# Patient Record
Sex: FEMALE | Race: WHITE | Employment: OTHER | URBAN - METROPOLITAN AREA
[De-identification: names, ages, dates, MRNs, and addresses within clinical notes are randomized per-mention and may not be internally consistent; named-entity substitution may affect disease eponyms.]

---

## 2017-01-01 ENCOUNTER — OFFICE VISIT (OUTPATIENT)
Dept: ONCOLOGY | Age: 67
End: 2017-01-01

## 2017-01-01 ENCOUNTER — HOSPITAL ENCOUNTER (OUTPATIENT)
Dept: LAB | Age: 67
Discharge: HOME | End: 2017-12-26
Payer: MEDICARE

## 2017-01-01 ENCOUNTER — TELEPHONE (OUTPATIENT)
Dept: INTERNAL MEDICINE CLINIC | Age: 67
End: 2017-01-01

## 2017-01-01 ENCOUNTER — HOSPITAL ENCOUNTER (OUTPATIENT)
Dept: LAB | Age: 67
Discharge: HOME | End: 2017-10-09
Payer: MEDICARE

## 2017-01-01 ENCOUNTER — DOCUMENTATION ONLY (OUTPATIENT)
Dept: INTERNAL MEDICINE CLINIC | Age: 67
End: 2017-01-01

## 2017-01-01 ENCOUNTER — HOSPITAL ENCOUNTER (OUTPATIENT)
Age: 67
Setting detail: OUTPATIENT SURGERY
Discharge: HOME | End: 2017-08-16
Attending: SURGERY | Admitting: SURGERY
Payer: MEDICARE

## 2017-01-01 ENCOUNTER — TELEPHONE (OUTPATIENT)
Dept: ONCOLOGY | Age: 67
End: 2017-01-01

## 2017-01-01 ENCOUNTER — HOSPITAL ENCOUNTER (OUTPATIENT)
Dept: NON INVASIVE DIAGNOSTICS | Age: 67
Discharge: HOME | End: 2017-08-11
Payer: MEDICARE

## 2017-01-01 ENCOUNTER — HOSPITAL ENCOUNTER (OUTPATIENT)
Dept: CT IMAGING | Age: 67
Discharge: HOME | End: 2017-11-28
Attending: INTERNAL MEDICINE
Payer: MEDICARE

## 2017-01-01 ENCOUNTER — DOCUMENTATION ONLY (OUTPATIENT)
Dept: ONCOLOGY | Age: 67
End: 2017-01-01

## 2017-01-01 ENCOUNTER — HOSPITAL ENCOUNTER (OUTPATIENT)
Dept: MRI IMAGING | Age: 67
Discharge: HOME | End: 2017-08-25
Attending: INTERNAL MEDICINE
Payer: MEDICARE

## 2017-01-01 ENCOUNTER — HOSPITAL ENCOUNTER (OUTPATIENT)
Dept: LAB | Age: 67
Discharge: HOME | End: 2017-10-30
Payer: MEDICARE

## 2017-01-01 ENCOUNTER — TELEPHONE (OUTPATIENT)
Dept: SURGERY | Age: 67
End: 2017-01-01

## 2017-01-01 ENCOUNTER — ANESTHESIA (OUTPATIENT)
Dept: SURGERY | Age: 67
End: 2017-01-01
Payer: MEDICARE

## 2017-01-01 ENCOUNTER — HOSPITAL ENCOUNTER (OUTPATIENT)
Dept: LAB | Age: 67
Discharge: HOME | End: 2017-08-23
Payer: MEDICARE

## 2017-01-01 ENCOUNTER — HOSPITAL ENCOUNTER (INPATIENT)
Age: 67
LOS: 10 days | Discharge: NURSING, REHAB OR LONG-TERM CARE | DRG: 871 | End: 2017-09-10
Attending: EMERGENCY MEDICINE | Admitting: FAMILY MEDICINE
Payer: MEDICARE

## 2017-01-01 ENCOUNTER — HOSPITAL ENCOUNTER (OUTPATIENT)
Dept: LAB | Age: 67
Discharge: HOME | End: 2017-11-20
Payer: MEDICARE

## 2017-01-01 ENCOUNTER — HOSPITAL ENCOUNTER (OUTPATIENT)
Dept: CT IMAGING | Age: 67
Discharge: HOME | End: 2017-08-01
Attending: NURSE PRACTITIONER
Payer: MEDICARE

## 2017-01-01 ENCOUNTER — HOSPITAL ENCOUNTER (OUTPATIENT)
Dept: ULTRASOUND IMAGING | Age: 67
Discharge: HOME | End: 2017-08-01
Attending: NURSE PRACTITIONER
Payer: MEDICARE

## 2017-01-01 ENCOUNTER — OFFICE VISIT (OUTPATIENT)
Dept: INTERNAL MEDICINE CLINIC | Age: 67
End: 2017-01-01

## 2017-01-01 ENCOUNTER — HOSPITAL ENCOUNTER (OUTPATIENT)
Dept: LAB | Age: 67
Discharge: HOME | End: 2017-11-27
Payer: MEDICARE

## 2017-01-01 ENCOUNTER — HOSPITAL ENCOUNTER (OUTPATIENT)
Age: 67
Discharge: HOME | End: 2017-09-23
Attending: PHYSICAL MEDICINE & REHABILITATION | Admitting: PHYSICAL MEDICINE & REHABILITATION

## 2017-01-01 ENCOUNTER — OFFICE VISIT (OUTPATIENT)
Dept: SURGERY | Age: 67
End: 2017-01-01

## 2017-01-01 ENCOUNTER — HOSPITAL ENCOUNTER (OUTPATIENT)
Dept: CT IMAGING | Age: 67
Discharge: HOME | End: 2017-08-22
Attending: SURGERY
Payer: MEDICARE

## 2017-01-01 ENCOUNTER — HOSPITAL ENCOUNTER (OUTPATIENT)
Dept: MRI IMAGING | Age: 67
Discharge: HOME | End: 2017-12-02
Attending: INTERNAL MEDICINE
Payer: MEDICARE

## 2017-01-01 ENCOUNTER — HOSPITAL ENCOUNTER (OUTPATIENT)
Dept: LAB | Age: 67
Discharge: HOME | End: 2017-11-06
Payer: MEDICARE

## 2017-01-01 ENCOUNTER — APPOINTMENT (OUTPATIENT)
Dept: GENERAL RADIOLOGY | Age: 67
DRG: 871 | End: 2017-01-01
Attending: FAMILY MEDICINE
Payer: MEDICARE

## 2017-01-01 ENCOUNTER — HOSPITAL ENCOUNTER (OUTPATIENT)
Dept: NON INVASIVE DIAGNOSTICS | Age: 67
Discharge: HOME | End: 2017-10-09
Payer: MEDICARE

## 2017-01-01 ENCOUNTER — APPOINTMENT (OUTPATIENT)
Dept: MRI IMAGING | Age: 67
DRG: 871 | End: 2017-01-01
Attending: HOSPITALIST
Payer: MEDICARE

## 2017-01-01 ENCOUNTER — HOSPITAL ENCOUNTER (OUTPATIENT)
Dept: MAMMOGRAPHY | Age: 67
Discharge: HOME | End: 2017-03-17
Attending: INTERNAL MEDICINE
Payer: MEDICARE

## 2017-01-01 ENCOUNTER — HOSPITAL ENCOUNTER (OUTPATIENT)
Dept: LAB | Age: 67
Discharge: HOME | End: 2017-11-13
Payer: MEDICARE

## 2017-01-01 ENCOUNTER — ANESTHESIA EVENT (OUTPATIENT)
Dept: SURGERY | Age: 67
End: 2017-01-01
Payer: MEDICARE

## 2017-01-01 ENCOUNTER — APPOINTMENT (OUTPATIENT)
Dept: ULTRASOUND IMAGING | Age: 67
DRG: 871 | End: 2017-01-01
Attending: FAMILY MEDICINE
Payer: MEDICARE

## 2017-01-01 VITALS
HEIGHT: 60 IN | OXYGEN SATURATION: 97 % | WEIGHT: 97 LBS | RESPIRATION RATE: 20 BRPM | BODY MASS INDEX: 19.04 KG/M2 | HEART RATE: 69 BPM | SYSTOLIC BLOOD PRESSURE: 121 MMHG | DIASTOLIC BLOOD PRESSURE: 79 MMHG | TEMPERATURE: 98.5 F

## 2017-01-01 VITALS
HEART RATE: 73 BPM | DIASTOLIC BLOOD PRESSURE: 69 MMHG | SYSTOLIC BLOOD PRESSURE: 131 MMHG | WEIGHT: 132.3 LBS | RESPIRATION RATE: 16 BRPM | HEIGHT: 60 IN | OXYGEN SATURATION: 94 % | TEMPERATURE: 97.5 F | BODY MASS INDEX: 25.97 KG/M2

## 2017-01-01 VITALS
DIASTOLIC BLOOD PRESSURE: 43 MMHG | SYSTOLIC BLOOD PRESSURE: 132 MMHG | RESPIRATION RATE: 20 BRPM | HEART RATE: 86 BPM | WEIGHT: 111.55 LBS | OXYGEN SATURATION: 94 % | BODY MASS INDEX: 21.9 KG/M2 | HEIGHT: 60 IN | TEMPERATURE: 97.9 F

## 2017-01-01 VITALS
HEIGHT: 60 IN | TEMPERATURE: 98.9 F | DIASTOLIC BLOOD PRESSURE: 68 MMHG | OXYGEN SATURATION: 97 % | RESPIRATION RATE: 16 BRPM | SYSTOLIC BLOOD PRESSURE: 116 MMHG | HEART RATE: 75 BPM

## 2017-01-01 VITALS
HEIGHT: 61 IN | OXYGEN SATURATION: 99 % | DIASTOLIC BLOOD PRESSURE: 76 MMHG | BODY MASS INDEX: 20.69 KG/M2 | SYSTOLIC BLOOD PRESSURE: 112 MMHG | TEMPERATURE: 97.1 F | RESPIRATION RATE: 16 BRPM | HEART RATE: 88 BPM | WEIGHT: 109.6 LBS

## 2017-01-01 VITALS
BODY MASS INDEX: 22.58 KG/M2 | SYSTOLIC BLOOD PRESSURE: 131 MMHG | HEIGHT: 60 IN | HEART RATE: 79 BPM | OXYGEN SATURATION: 100 % | DIASTOLIC BLOOD PRESSURE: 72 MMHG | WEIGHT: 115 LBS

## 2017-01-01 VITALS
SYSTOLIC BLOOD PRESSURE: 130 MMHG | RESPIRATION RATE: 18 BRPM | HEART RATE: 76 BPM | HEIGHT: 60 IN | TEMPERATURE: 97.8 F | BODY MASS INDEX: 22.54 KG/M2 | DIASTOLIC BLOOD PRESSURE: 77 MMHG | OXYGEN SATURATION: 100 % | WEIGHT: 114.8 LBS

## 2017-01-01 VITALS
HEART RATE: 98 BPM | HEIGHT: 60 IN | WEIGHT: 117.8 LBS | DIASTOLIC BLOOD PRESSURE: 82 MMHG | SYSTOLIC BLOOD PRESSURE: 158 MMHG | OXYGEN SATURATION: 100 % | BODY MASS INDEX: 23.13 KG/M2

## 2017-01-01 VITALS
DIASTOLIC BLOOD PRESSURE: 78 MMHG | HEART RATE: 94 BPM | OXYGEN SATURATION: 95 % | BODY MASS INDEX: 23.95 KG/M2 | WEIGHT: 122 LBS | HEIGHT: 60 IN | SYSTOLIC BLOOD PRESSURE: 166 MMHG | TEMPERATURE: 99 F

## 2017-01-01 VITALS
DIASTOLIC BLOOD PRESSURE: 61 MMHG | WEIGHT: 125 LBS | HEIGHT: 60 IN | HEART RATE: 62 BPM | BODY MASS INDEX: 24.54 KG/M2 | SYSTOLIC BLOOD PRESSURE: 123 MMHG

## 2017-01-01 VITALS
DIASTOLIC BLOOD PRESSURE: 79 MMHG | WEIGHT: 103 LBS | BODY MASS INDEX: 19.45 KG/M2 | HEART RATE: 84 BPM | SYSTOLIC BLOOD PRESSURE: 122 MMHG | HEIGHT: 61 IN

## 2017-01-01 VITALS
BODY MASS INDEX: 19.14 KG/M2 | OXYGEN SATURATION: 96 % | SYSTOLIC BLOOD PRESSURE: 103 MMHG | WEIGHT: 101.4 LBS | DIASTOLIC BLOOD PRESSURE: 60 MMHG | HEIGHT: 61 IN | RESPIRATION RATE: 18 BRPM | HEART RATE: 83 BPM | TEMPERATURE: 99.1 F

## 2017-01-01 VITALS
WEIGHT: 113 LBS | DIASTOLIC BLOOD PRESSURE: 70 MMHG | HEIGHT: 60 IN | BODY MASS INDEX: 22.19 KG/M2 | SYSTOLIC BLOOD PRESSURE: 138 MMHG

## 2017-01-01 DIAGNOSIS — R74.01 TRANSAMINITIS: ICD-10-CM

## 2017-01-01 DIAGNOSIS — C79.9 METASTATIC ADENOCARCINOMA (HCC): Primary | ICD-10-CM

## 2017-01-01 DIAGNOSIS — C64.9 CLEAR CELL RENAL CELL CARCINOMA, UNSPECIFIED LATERALITY (HCC): Primary | ICD-10-CM

## 2017-01-01 DIAGNOSIS — E43 PROTEIN-CALORIE MALNUTRITION, SEVERE (HCC): ICD-10-CM

## 2017-01-01 DIAGNOSIS — K76.9 LIVER LESION, RIGHT LOBE: ICD-10-CM

## 2017-01-01 DIAGNOSIS — C79.31 BRAIN METASTASES (HCC): ICD-10-CM

## 2017-01-01 DIAGNOSIS — A41.9 SEPSIS, DUE TO UNSPECIFIED ORGANISM: ICD-10-CM

## 2017-01-01 DIAGNOSIS — Z79.899 ON STATIN THERAPY: ICD-10-CM

## 2017-01-01 DIAGNOSIS — C79.9 METASTATIC CANCER (HCC): ICD-10-CM

## 2017-01-01 DIAGNOSIS — D49.89 NEOPLASM OF ABDOMEN: ICD-10-CM

## 2017-01-01 DIAGNOSIS — C79.9 METASTATIC CARCINOMA (HCC): ICD-10-CM

## 2017-01-01 DIAGNOSIS — R53.1 WEAKNESS GENERALIZED: ICD-10-CM

## 2017-01-01 DIAGNOSIS — C64.2 KIDNEY CANCER, PRIMARY, WITH METASTASIS FROM KIDNEY TO OTHER SITE, LEFT (HCC): ICD-10-CM

## 2017-01-01 DIAGNOSIS — M48.061 SPINAL STENOSIS OF LUMBAR REGION WITH RADICULOPATHY: ICD-10-CM

## 2017-01-01 DIAGNOSIS — C79.9 METASTATIC ADENOCARCINOMA (HCC): ICD-10-CM

## 2017-01-01 DIAGNOSIS — C64.9 CLEAR CELL ADENOCARCINOMA OF KIDNEY, UNSPECIFIED LATERALITY: Primary | ICD-10-CM

## 2017-01-01 DIAGNOSIS — C64.9 METASTATIC RENAL CELL CARCINOMA, UNSPECIFIED LATERALITY (HCC): ICD-10-CM

## 2017-01-01 DIAGNOSIS — C64.9 CLEAR CELL ADENOCARCINOMA OF KIDNEY, UNSPECIFIED LATERALITY: ICD-10-CM

## 2017-01-01 DIAGNOSIS — Z12.31 VISIT FOR SCREENING MAMMOGRAM: ICD-10-CM

## 2017-01-01 DIAGNOSIS — R22.1 MASS OF RIGHT SIDE OF NECK: ICD-10-CM

## 2017-01-01 DIAGNOSIS — Z09 POSTOPERATIVE EXAMINATION: Primary | ICD-10-CM

## 2017-01-01 DIAGNOSIS — C78.00 METASTATIC RENAL CELL CARCINOMA TO LUNG, UNSPECIFIED LATERALITY (HCC): ICD-10-CM

## 2017-01-01 DIAGNOSIS — R93.89 ABNORMAL CHEST X-RAY: ICD-10-CM

## 2017-01-01 DIAGNOSIS — E27.8 ADRENAL NODULE (HCC): ICD-10-CM

## 2017-01-01 DIAGNOSIS — B37.9 CANDIDA INFECTION: ICD-10-CM

## 2017-01-01 DIAGNOSIS — C78.02 SECONDARY RENAL CELL CARCINOMA OF LEFT LUNG (HCC): Primary | ICD-10-CM

## 2017-01-01 DIAGNOSIS — R22.1 NECK MASS: Primary | ICD-10-CM

## 2017-01-01 DIAGNOSIS — C64.2 CLEAR CELL ADENOCARCINOMA OF LEFT KIDNEY (HCC): ICD-10-CM

## 2017-01-01 DIAGNOSIS — C64.9 METASTATIC RENAL CELL CARCINOMA TO LUNG, UNSPECIFIED LATERALITY (HCC): Primary | ICD-10-CM

## 2017-01-01 DIAGNOSIS — Z87.898: Primary | ICD-10-CM

## 2017-01-01 DIAGNOSIS — E11.9 TYPE 2 DIABETES MELLITUS WITHOUT COMPLICATION, WITHOUT LONG-TERM CURRENT USE OF INSULIN (HCC): Primary | ICD-10-CM

## 2017-01-01 DIAGNOSIS — R53.0 NEOPLASTIC MALIGNANT RELATED FATIGUE: ICD-10-CM

## 2017-01-01 DIAGNOSIS — E78.00 HYPERCHOLESTEROLEMIA: ICD-10-CM

## 2017-01-01 DIAGNOSIS — K76.9 LIVER LESION, LEFT LOBE: ICD-10-CM

## 2017-01-01 DIAGNOSIS — Z71.89 GOALS OF CARE, COUNSELING/DISCUSSION: ICD-10-CM

## 2017-01-01 DIAGNOSIS — C64.2 RENAL CELL CARCINOMA, LEFT (HCC): ICD-10-CM

## 2017-01-01 DIAGNOSIS — R22.1 MASS OF RIGHT SIDE OF NECK: Primary | ICD-10-CM

## 2017-01-01 DIAGNOSIS — G95.9 CERVICAL MYELOPATHY (HCC): ICD-10-CM

## 2017-01-01 DIAGNOSIS — F32.89 OTHER DEPRESSION: ICD-10-CM

## 2017-01-01 DIAGNOSIS — R63.0 ANOREXIA: ICD-10-CM

## 2017-01-01 DIAGNOSIS — E83.42 HYPOMAGNESEMIA: ICD-10-CM

## 2017-01-01 DIAGNOSIS — K59.09 OTHER CONSTIPATION: ICD-10-CM

## 2017-01-01 DIAGNOSIS — C79.70 MALIGNANT NEOPLASM METASTATIC TO ADRENAL GLAND, UNSPECIFIED LATERALITY (HCC): ICD-10-CM

## 2017-01-01 DIAGNOSIS — C64.2 RENAL CELL CANCER, LEFT (HCC): ICD-10-CM

## 2017-01-01 DIAGNOSIS — R73.9 HYPERGLYCEMIA: ICD-10-CM

## 2017-01-01 DIAGNOSIS — R52 GENERALIZED PAIN: ICD-10-CM

## 2017-01-01 DIAGNOSIS — A49.9 BACTERIAL UTI: Primary | ICD-10-CM

## 2017-01-01 DIAGNOSIS — M54.16 SPINAL STENOSIS OF LUMBAR REGION WITH RADICULOPATHY: ICD-10-CM

## 2017-01-01 DIAGNOSIS — R52 PAIN: ICD-10-CM

## 2017-01-01 DIAGNOSIS — E11.9 CONTROLLED TYPE 2 DIABETES MELLITUS WITHOUT COMPLICATION, WITHOUT LONG-TERM CURRENT USE OF INSULIN (HCC): Primary | ICD-10-CM

## 2017-01-01 DIAGNOSIS — N39.0 BACTERIAL UTI: Primary | ICD-10-CM

## 2017-01-01 DIAGNOSIS — C64.2 RENAL CELL CARCINOMA, LEFT (HCC): Primary | ICD-10-CM

## 2017-01-01 DIAGNOSIS — G47.00 INSOMNIA, UNSPECIFIED TYPE: ICD-10-CM

## 2017-01-01 DIAGNOSIS — C64.9 CLEAR CELL RENAL CELL CARCINOMA, UNSPECIFIED LATERALITY (HCC): ICD-10-CM

## 2017-01-01 DIAGNOSIS — R22.1 NECK MASS: ICD-10-CM

## 2017-01-01 DIAGNOSIS — C64.2 KIDNEY CANCER, PRIMARY, WITH METASTASIS FROM KIDNEY TO OTHER SITE, LEFT (HCC): Primary | ICD-10-CM

## 2017-01-01 DIAGNOSIS — F41.9 ANXIETY: ICD-10-CM

## 2017-01-01 DIAGNOSIS — C78.00 METASTATIC RENAL CELL CARCINOMA TO LUNG, UNSPECIFIED LATERALITY (HCC): Primary | ICD-10-CM

## 2017-01-01 DIAGNOSIS — C80.1 CARCINOMA (HCC): ICD-10-CM

## 2017-01-01 DIAGNOSIS — C64.9 METASTATIC RENAL CELL CARCINOMA TO LUNG, UNSPECIFIED LATERALITY (HCC): ICD-10-CM

## 2017-01-01 DIAGNOSIS — B37.31 VAGINITIS DUE TO CANDIDA: ICD-10-CM

## 2017-01-01 DIAGNOSIS — C64.9 SECONDARY RENAL CELL CARCINOMA OF LEFT LUNG (HCC): Primary | ICD-10-CM

## 2017-01-01 DIAGNOSIS — R53.83 FATIGUE, UNSPECIFIED TYPE: ICD-10-CM

## 2017-01-01 LAB
25(OH)D3 SERPL-MCNC: 33.3 NG/ML (ref 30–100)
ABO + RH BLD: NORMAL
ALBUMIN SERPL-MCNC: 1.7 G/DL (ref 3.5–5)
ALBUMIN SERPL-MCNC: 1.9 G/DL (ref 3.5–5)
ALBUMIN SERPL-MCNC: 2 G/DL (ref 3.5–5)
ALBUMIN SERPL-MCNC: 2.3 G/DL (ref 3.5–5)
ALBUMIN SERPL-MCNC: 2.8 G/DL (ref 3.6–4.8)
ALBUMIN SERPL-MCNC: 3 G/DL (ref 3.6–4.8)
ALBUMIN SERPL-MCNC: 3.2 G/DL (ref 3.6–4.8)
ALBUMIN SERPL-MCNC: 3.3 G/DL (ref 3.6–4.8)
ALBUMIN SERPL-MCNC: 3.6 G/DL (ref 3.9–5.4)
ALBUMIN/GLOB SERPL: 0.3 {RATIO} (ref 1.1–2.2)
ALBUMIN/GLOB SERPL: 0.4 {RATIO} (ref 1.1–2.2)
ALBUMIN/GLOB SERPL: 0.5 {RATIO} (ref 1.1–2.2)
ALBUMIN/GLOB SERPL: 0.6 {RATIO} (ref 1.1–2.2)
ALBUMIN/GLOB SERPL: 0.7 {RATIO} (ref 1.1–2.2)
ALBUMIN/GLOB SERPL: 0.8 {RATIO} (ref 1.2–2.2)
ALBUMIN/GLOB SERPL: 0.8 {RATIO} (ref 1.2–2.2)
ALBUMIN/GLOB SERPL: 0.9 {RATIO} (ref 1.2–2.2)
ALBUMIN/GLOB SERPL: 0.9 {RATIO} (ref 1.2–2.2)
ALBUMIN/GLOB SERPL: 1 {RATIO} (ref 1.2–2.2)
ALBUMIN/GLOB SERPL: 1 {RATIO} (ref 1.2–2.2)
ALBUMIN/GLOB SERPL: 1.1 {RATIO} (ref 1.2–2.2)
ALBUMIN/GLOB SERPL: 1.1 {RATIO} (ref 1.2–2.2)
ALKALINE PHOS POC: 109 U/L (ref 38–126)
ALP SERPL-CCNC: 100 IU/L (ref 39–117)
ALP SERPL-CCNC: 106 U/L (ref 45–117)
ALP SERPL-CCNC: 136 U/L (ref 45–117)
ALP SERPL-CCNC: 150 U/L (ref 45–117)
ALP SERPL-CCNC: 153 IU/L (ref 39–117)
ALP SERPL-CCNC: 191 IU/L (ref 39–117)
ALP SERPL-CCNC: 211 IU/L (ref 39–117)
ALP SERPL-CCNC: 221 IU/L (ref 39–117)
ALP SERPL-CCNC: 232 IU/L (ref 39–117)
ALP SERPL-CCNC: 250 IU/L (ref 39–117)
ALP SERPL-CCNC: 90 U/L (ref 45–117)
ALP SERPL-CCNC: 92 U/L (ref 45–117)
ALP SERPL-CCNC: 99 IU/L (ref 39–117)
ALT SERPL-CCNC: 108 IU/L (ref 0–32)
ALT SERPL-CCNC: 117 IU/L (ref 0–32)
ALT SERPL-CCNC: 12 U/L (ref 12–78)
ALT SERPL-CCNC: 14 U/L (ref 12–78)
ALT SERPL-CCNC: 15 U/L (ref 12–78)
ALT SERPL-CCNC: 174 IU/L (ref 0–32)
ALT SERPL-CCNC: 207 IU/L (ref 0–32)
ALT SERPL-CCNC: 207 IU/L (ref 0–32)
ALT SERPL-CCNC: 23 U/L (ref 9–52)
ALT SERPL-CCNC: 45 IU/L (ref 0–32)
ALT SERPL-CCNC: 7 U/L (ref 12–78)
ALT SERPL-CCNC: 8 IU/L (ref 0–32)
ALT SERPL-CCNC: 8 U/L (ref 12–78)
ALT SERPL-CCNC: 89 IU/L (ref 0–32)
ANION GAP BLD CALC-SCNC: 14 MMOL/L (ref 5–15)
ANION GAP SERPL CALC-SCNC: 11 MMOL/L (ref 5–15)
ANION GAP SERPL CALC-SCNC: 11 MMOL/L (ref 5–15)
ANION GAP SERPL CALC-SCNC: 12 MMOL/L (ref 5–15)
ANION GAP SERPL CALC-SCNC: 13 MMOL/L (ref 5–15)
ANION GAP SERPL CALC-SCNC: 14 MMOL/L (ref 5–15)
ANION GAP SERPL CALC-SCNC: 15 MMOL/L (ref 5–15)
ANION GAP SERPL CALC-SCNC: 16 MMOL/L (ref 5–15)
ANION GAP SERPL CALC-SCNC: 16 MMOL/L (ref 5–15)
ANION GAP SERPL CALC-SCNC: 6 MMOL/L (ref 5–15)
ANION GAP SERPL CALC-SCNC: 7 MMOL/L (ref 5–15)
ANION GAP SERPL CALC-SCNC: 8 MMOL/L (ref 5–15)
ANION GAP SERPL CALC-SCNC: 9 MMOL/L (ref 5–15)
ANION GAP SERPL CALC-SCNC: 9 MMOL/L (ref 5–15)
APPEARANCE UR: ABNORMAL
ARTERIAL PATENCY WRIST A: YES
AST SERPL-CCNC: 124 IU/L (ref 0–40)
AST SERPL-CCNC: 132 IU/L (ref 0–40)
AST SERPL-CCNC: 14 U/L (ref 14–36)
AST SERPL-CCNC: 14 U/L (ref 15–37)
AST SERPL-CCNC: 16 U/L (ref 15–37)
AST SERPL-CCNC: 19 U/L (ref 15–37)
AST SERPL-CCNC: 28 IU/L (ref 0–40)
AST SERPL-CCNC: 30 IU/L (ref 0–40)
AST SERPL-CCNC: 61 IU/L (ref 0–40)
AST SERPL-CCNC: 7 IU/L (ref 0–40)
AST SERPL-CCNC: 71 IU/L (ref 0–40)
AST SERPL-CCNC: 8 U/L (ref 15–37)
AST SERPL-CCNC: 8 U/L (ref 15–37)
AST SERPL-CCNC: 99 IU/L (ref 0–40)
ATRIAL RATE: 113 BPM
ATRIAL RATE: 60 BPM
ATRIAL RATE: 83 BPM
ATRIAL RATE: 99 BPM
BACTERIA SPEC CULT: ABNORMAL
BACTERIA SPEC CULT: NORMAL
BACTERIA URNS QL MICRO: ABNORMAL /HPF
BACTERIA URNS QL MICRO: ABNORMAL /HPF
BACTERIA URNS QL MICRO: NEGATIVE /HPF
BASE DEFICIT BLD-SCNC: 6 MMOL/L
BASOPHILS # BLD AUTO: 0 X10E3/UL (ref 0–0.2)
BASOPHILS # BLD AUTO: 0.1 X10E3/UL (ref 0–0.2)
BASOPHILS # BLD: 0 K/UL (ref 0–0.1)
BASOPHILS NFR BLD AUTO: 0 %
BASOPHILS NFR BLD AUTO: 1 %
BASOPHILS NFR BLD: 0 % (ref 0–1)
BDY SITE: ABNORMAL
BILIRUB SERPL-MCNC: 0.2 MG/DL (ref 0.2–1)
BILIRUB SERPL-MCNC: 0.2 MG/DL (ref 0–1.2)
BILIRUB SERPL-MCNC: 0.3 MG/DL (ref 0.2–1)
BILIRUB SERPL-MCNC: 0.3 MG/DL (ref 0–1.2)
BILIRUB SERPL-MCNC: 0.4 MG/DL (ref 0.2–1)
BILIRUB SERPL-MCNC: <0.2 MG/DL (ref 0–1.2)
BILIRUB SERPL-MCNC: <0.2 MG/DL (ref 0–1.2)
BILIRUB UR QL: NEGATIVE
BLD PROD TYP BPU: NORMAL
BLD PROD TYP BPU: NORMAL
BLOOD GROUP ANTIBODIES SERPL: NORMAL
BPU ID: NORMAL
BPU ID: NORMAL
BUN BLD-MCNC: 11 MG/DL (ref 7–17)
BUN BLD-MCNC: 13 MG/DL (ref 9–20)
BUN SERPL-MCNC: 11 MG/DL (ref 6–20)
BUN SERPL-MCNC: 13 MG/DL (ref 8–27)
BUN SERPL-MCNC: 14 MG/DL (ref 6–20)
BUN SERPL-MCNC: 15 MG/DL (ref 8–27)
BUN SERPL-MCNC: 16 MG/DL (ref 8–27)
BUN SERPL-MCNC: 18 MG/DL (ref 6–20)
BUN SERPL-MCNC: 18 MG/DL (ref 8–27)
BUN SERPL-MCNC: 19 MG/DL (ref 8–27)
BUN SERPL-MCNC: 23 MG/DL (ref 8–27)
BUN SERPL-MCNC: 27 MG/DL (ref 6–20)
BUN SERPL-MCNC: 31 MG/DL (ref 6–20)
BUN SERPL-MCNC: 35 MG/DL (ref 6–20)
BUN SERPL-MCNC: 41 MG/DL (ref 6–20)
BUN SERPL-MCNC: 42 MG/DL (ref 6–20)
BUN SERPL-MCNC: 43 MG/DL (ref 6–20)
BUN SERPL-MCNC: 50 MG/DL (ref 6–20)
BUN SERPL-MCNC: 51 MG/DL (ref 6–20)
BUN SERPL-MCNC: 54 MG/DL (ref 6–20)
BUN SERPL-MCNC: 54 MG/DL (ref 6–20)
BUN SERPL-MCNC: 56 MG/DL (ref 6–20)
BUN SERPL-MCNC: 57 MG/DL (ref 6–20)
BUN SERPL-MCNC: 58 MG/DL (ref 6–20)
BUN SERPL-MCNC: 58 MG/DL (ref 6–20)
BUN/CREAT SERPL: 14 (ref 12–20)
BUN/CREAT SERPL: 14 (ref 12–28)
BUN/CREAT SERPL: 14 (ref 12–28)
BUN/CREAT SERPL: 15 (ref 12–28)
BUN/CREAT SERPL: 16 (ref 12–20)
BUN/CREAT SERPL: 16 (ref 12–28)
BUN/CREAT SERPL: 17 (ref 12–20)
BUN/CREAT SERPL: 18 (ref 12–20)
BUN/CREAT SERPL: 18 (ref 12–28)
BUN/CREAT SERPL: 19 (ref 12–20)
BUN/CREAT SERPL: 19 (ref 12–20)
BUN/CREAT SERPL: 21 (ref 12–20)
BUN/CREAT SERPL: 21 (ref 12–20)
BUN/CREAT SERPL: 23 (ref 12–20)
BUN/CREAT SERPL: 25 (ref 12–20)
BUN/CREAT SERPL: 27 (ref 12–28)
BUN/CREAT SERPL: 31 (ref 12–20)
BUN/CREAT SERPL: 33 (ref 12–20)
BUN/CREAT SERPL: 40 (ref 12–20)
CA-I BLD-MCNC: 1.49 MMOL/L (ref 1.12–1.32)
CALCIUM BLD-MCNC: 10.9 MG/DL (ref 8.4–10.2)
CALCIUM SERPL-MCNC: 10 MG/DL (ref 8.7–10.3)
CALCIUM SERPL-MCNC: 10.2 MG/DL (ref 8.5–10.1)
CALCIUM SERPL-MCNC: 10.9 MG/DL (ref 8.5–10.1)
CALCIUM SERPL-MCNC: 8.1 MG/DL (ref 8.7–10.3)
CALCIUM SERPL-MCNC: 8.2 MG/DL (ref 8.5–10.1)
CALCIUM SERPL-MCNC: 8.4 MG/DL (ref 8.5–10.1)
CALCIUM SERPL-MCNC: 8.4 MG/DL (ref 8.7–10.3)
CALCIUM SERPL-MCNC: 8.5 MG/DL (ref 8.5–10.1)
CALCIUM SERPL-MCNC: 8.6 MG/DL (ref 8.5–10.1)
CALCIUM SERPL-MCNC: 8.6 MG/DL (ref 8.5–10.1)
CALCIUM SERPL-MCNC: 8.6 MG/DL (ref 8.7–10.3)
CALCIUM SERPL-MCNC: 8.6 MG/DL (ref 8.7–10.3)
CALCIUM SERPL-MCNC: 8.8 MG/DL (ref 8.5–10.1)
CALCIUM SERPL-MCNC: 8.8 MG/DL (ref 8.5–10.1)
CALCIUM SERPL-MCNC: 9 MG/DL (ref 8.5–10.1)
CALCIUM SERPL-MCNC: 9 MG/DL (ref 8.7–10.3)
CALCIUM SERPL-MCNC: 9.2 MG/DL (ref 8.5–10.1)
CALCIUM SERPL-MCNC: 9.2 MG/DL (ref 8.5–10.1)
CALCIUM SERPL-MCNC: 9.3 MG/DL (ref 8.5–10.1)
CALCIUM SERPL-MCNC: 9.4 MG/DL (ref 8.5–10.1)
CALCIUM SERPL-MCNC: 9.5 MG/DL (ref 8.5–10.1)
CALCIUM SERPL-MCNC: 9.6 MG/DL (ref 8.5–10.1)
CALCIUM SERPL-MCNC: 9.7 MG/DL (ref 8.5–10.1)
CALCIUM SERPL-MCNC: 9.8 MG/DL (ref 8.5–10.1)
CALCULATED P AXIS, ECG09: -11 DEGREES
CALCULATED P AXIS, ECG09: 18 DEGREES
CALCULATED P AXIS, ECG09: 62 DEGREES
CALCULATED P AXIS, ECG09: 66 DEGREES
CALCULATED R AXIS, ECG10: -27 DEGREES
CALCULATED R AXIS, ECG10: -33 DEGREES
CALCULATED R AXIS, ECG10: -48 DEGREES
CALCULATED R AXIS, ECG10: -51 DEGREES
CALCULATED T AXIS, ECG11: 155 DEGREES
CALCULATED T AXIS, ECG11: 28 DEGREES
CALCULATED T AXIS, ECG11: 31 DEGREES
CALCULATED T AXIS, ECG11: 91 DEGREES
CC UR VC: ABNORMAL
CC UR VC: NORMAL
CC UR VC: NORMAL
CHLORIDE BLD-SCNC: 98 MMOL/L (ref 98–107)
CHLORIDE BLD-SCNC: 98 MMOL/L (ref 98–107)
CHLORIDE SERPL-SCNC: 101 MMOL/L (ref 96–106)
CHLORIDE SERPL-SCNC: 101 MMOL/L (ref 97–108)
CHLORIDE SERPL-SCNC: 102 MMOL/L (ref 96–106)
CHLORIDE SERPL-SCNC: 102 MMOL/L (ref 96–106)
CHLORIDE SERPL-SCNC: 103 MMOL/L (ref 96–106)
CHLORIDE SERPL-SCNC: 103 MMOL/L (ref 96–106)
CHLORIDE SERPL-SCNC: 103 MMOL/L (ref 97–108)
CHLORIDE SERPL-SCNC: 104 MMOL/L (ref 96–106)
CHLORIDE SERPL-SCNC: 104 MMOL/L (ref 97–108)
CHLORIDE SERPL-SCNC: 105 MMOL/L (ref 96–106)
CHLORIDE SERPL-SCNC: 105 MMOL/L (ref 97–108)
CHLORIDE SERPL-SCNC: 108 MMOL/L (ref 97–108)
CHLORIDE SERPL-SCNC: 108 MMOL/L (ref 97–108)
CHLORIDE SERPL-SCNC: 109 MMOL/L (ref 97–108)
CHLORIDE SERPL-SCNC: 89 MMOL/L (ref 97–108)
CHLORIDE SERPL-SCNC: 97 MMOL/L (ref 97–108)
CHLORIDE SERPL-SCNC: 97 MMOL/L (ref 97–108)
CHLORIDE SERPL-SCNC: 98 MMOL/L (ref 96–106)
CHLORIDE SERPL-SCNC: 98 MMOL/L (ref 97–108)
CHLORIDE SERPL-SCNC: 99 MMOL/L (ref 97–108)
CHOLEST SERPL-MCNC: 119 MG/DL
CK SERPL-CCNC: 49 U/L (ref 26–192)
CO2 BLD-SCNC: 28 MMOL/L (ref 21–32)
CO2 POC: 27 MMOL/L (ref 22–32)
CO2 SERPL-SCNC: 16 MMOL/L (ref 18–29)
CO2 SERPL-SCNC: 17 MMOL/L (ref 18–29)
CO2 SERPL-SCNC: 18 MMOL/L (ref 18–29)
CO2 SERPL-SCNC: 18 MMOL/L (ref 18–29)
CO2 SERPL-SCNC: 19 MMOL/L (ref 18–29)
CO2 SERPL-SCNC: 19 MMOL/L (ref 21–32)
CO2 SERPL-SCNC: 20 MMOL/L (ref 21–32)
CO2 SERPL-SCNC: 20 MMOL/L (ref 21–32)
CO2 SERPL-SCNC: 21 MMOL/L (ref 21–32)
CO2 SERPL-SCNC: 22 MMOL/L (ref 21–32)
CO2 SERPL-SCNC: 22 MMOL/L (ref 21–32)
CO2 SERPL-SCNC: 23 MMOL/L (ref 21–32)
CO2 SERPL-SCNC: 24 MMOL/L (ref 21–32)
CO2 SERPL-SCNC: 26 MMOL/L (ref 21–32)
CO2 SERPL-SCNC: 27 MMOL/L (ref 18–29)
CO2 SERPL-SCNC: 27 MMOL/L (ref 21–32)
COLOR UR: ABNORMAL
CREAT BLD-MCNC: 0.7 MG/DL (ref 0.7–1.2)
CREAT BLD-MCNC: 0.9 MG/DL (ref 0.6–1.3)
CREAT SERPL-MCNC: 0.73 MG/DL (ref 0.57–1)
CREAT SERPL-MCNC: 0.77 MG/DL (ref 0.55–1.02)
CREAT SERPL-MCNC: 0.81 MG/DL (ref 0.55–1.02)
CREAT SERPL-MCNC: 0.86 MG/DL (ref 0.57–1)
CREAT SERPL-MCNC: 0.95 MG/DL (ref 0.55–1.02)
CREAT SERPL-MCNC: 0.97 MG/DL (ref 0.57–1)
CREAT SERPL-MCNC: 0.97 MG/DL (ref 0.57–1)
CREAT SERPL-MCNC: 1.07 MG/DL (ref 0.55–1.02)
CREAT SERPL-MCNC: 1.07 MG/DL (ref 0.55–1.02)
CREAT SERPL-MCNC: 1.14 MG/DL (ref 0.57–1)
CREAT SERPL-MCNC: 1.15 MG/DL (ref 0.57–1)
CREAT SERPL-MCNC: 1.16 MG/DL (ref 0.57–1)
CREAT SERPL-MCNC: 1.17 MG/DL (ref 0.57–1)
CREAT SERPL-MCNC: 1.28 MG/DL (ref 0.55–1.02)
CREAT SERPL-MCNC: 1.34 MG/DL (ref 0.55–1.02)
CREAT SERPL-MCNC: 1.34 MG/DL (ref 0.55–1.02)
CREAT SERPL-MCNC: 1.63 MG/DL (ref 0.55–1.02)
CREAT SERPL-MCNC: 2.37 MG/DL (ref 0.55–1.02)
CREAT SERPL-MCNC: 2.99 MG/DL (ref 0.55–1.02)
CREAT SERPL-MCNC: 3.06 MG/DL (ref 0.55–1.02)
CREAT SERPL-MCNC: 3.17 MG/DL (ref 0.55–1.02)
CREAT SERPL-MCNC: 3.2 MG/DL (ref 0.55–1.02)
CREAT SERPL-MCNC: 3.24 MG/DL (ref 0.55–1.02)
CREAT SERPL-MCNC: 3.31 MG/DL (ref 0.55–1.02)
CREAT SERPL-MCNC: 3.4 MG/DL (ref 0.55–1.02)
CREAT UR-MCNC: 77.3 MG/DL
CROSSMATCH RESULT,%XM: NORMAL
CROSSMATCH RESULT,%XM: NORMAL
DIAGNOSIS, 93000: NORMAL
DIFFERENTIAL METHOD BLD: ABNORMAL
DIFFERENTIAL METHOD BLD: ABNORMAL
EGFR (POC): 88.4
EOSINOPHIL # BLD AUTO: 0 X10E3/UL (ref 0–0.4)
EOSINOPHIL # BLD AUTO: 0.1 X10E3/UL (ref 0–0.4)
EOSINOPHIL # BLD AUTO: 0.2 X10E3/UL (ref 0–0.4)
EOSINOPHIL # BLD: 0 K/UL (ref 0–0.4)
EOSINOPHIL NFR BLD AUTO: 0 %
EOSINOPHIL NFR BLD AUTO: 2 %
EOSINOPHIL NFR BLD AUTO: 3 %
EOSINOPHIL NFR BLD AUTO: 3 %
EOSINOPHIL NFR BLD: 0 % (ref 0–7)
EPITH CASTS URNS QL MICRO: ABNORMAL /LPF
ERYTHROCYTE [DISTWIDTH] IN BLOOD BY AUTOMATED COUNT: 14.2 % (ref 12.3–15.4)
ERYTHROCYTE [DISTWIDTH] IN BLOOD BY AUTOMATED COUNT: 14.5 % (ref 11.5–14.5)
ERYTHROCYTE [DISTWIDTH] IN BLOOD BY AUTOMATED COUNT: 14.5 % (ref 11.5–14.5)
ERYTHROCYTE [DISTWIDTH] IN BLOOD BY AUTOMATED COUNT: 14.6 % (ref 11.5–14.5)
ERYTHROCYTE [DISTWIDTH] IN BLOOD BY AUTOMATED COUNT: 14.7 % (ref 11.5–14.5)
ERYTHROCYTE [DISTWIDTH] IN BLOOD BY AUTOMATED COUNT: 14.8 % (ref 11.5–14.5)
ERYTHROCYTE [DISTWIDTH] IN BLOOD BY AUTOMATED COUNT: 15.1 % (ref 11.5–14.5)
ERYTHROCYTE [DISTWIDTH] IN BLOOD BY AUTOMATED COUNT: 15.6 % (ref 11.5–14.5)
ERYTHROCYTE [DISTWIDTH] IN BLOOD BY AUTOMATED COUNT: 16 % (ref 11.5–14.5)
ERYTHROCYTE [DISTWIDTH] IN BLOOD BY AUTOMATED COUNT: 16.2 % (ref 11.5–14.5)
ERYTHROCYTE [DISTWIDTH] IN BLOOD BY AUTOMATED COUNT: 16.5 % (ref 11.5–14.5)
ERYTHROCYTE [DISTWIDTH] IN BLOOD BY AUTOMATED COUNT: 16.5 % (ref 11.5–14.5)
ERYTHROCYTE [DISTWIDTH] IN BLOOD BY AUTOMATED COUNT: 17.3 % (ref 11.5–14.5)
ERYTHROCYTE [DISTWIDTH] IN BLOOD BY AUTOMATED COUNT: 19.1 % (ref 12.3–15.4)
ERYTHROCYTE [DISTWIDTH] IN BLOOD BY AUTOMATED COUNT: 20.7 % (ref 12.3–15.4)
ERYTHROCYTE [DISTWIDTH] IN BLOOD BY AUTOMATED COUNT: 23 % (ref 12.3–15.4)
ERYTHROCYTE [DISTWIDTH] IN BLOOD BY AUTOMATED COUNT: 23.2 % (ref 12.3–15.4)
ERYTHROCYTE [DISTWIDTH] IN BLOOD BY AUTOMATED COUNT: 23.8 % (ref 12.3–15.4)
EST. AVERAGE GLUCOSE BLD GHB EST-MCNC: 169 MG/DL
GAS FLOW.O2 O2 DELIVERY SYS: ABNORMAL L/MIN
GFR SERPLBLD CREATININE-BSD FMLA CKD-EPI: 48 ML/MIN/1.73
GFR SERPLBLD CREATININE-BSD FMLA CKD-EPI: 48 ML/MIN/1.73
GFR SERPLBLD CREATININE-BSD FMLA CKD-EPI: 49 ML/MIN/1.73
GFR SERPLBLD CREATININE-BSD FMLA CKD-EPI: 49 ML/MIN/1.73
GFR SERPLBLD CREATININE-BSD FMLA CKD-EPI: 55 ML/MIN/1.73
GFR SERPLBLD CREATININE-BSD FMLA CKD-EPI: 56 ML/MIN/1.73
GFR SERPLBLD CREATININE-BSD FMLA CKD-EPI: 56 ML/MIN/1.73
GFR SERPLBLD CREATININE-BSD FMLA CKD-EPI: 57 ML/MIN/1.73
GFR SERPLBLD CREATININE-BSD FMLA CKD-EPI: 60 ML/MIN/1.73
GFR SERPLBLD CREATININE-BSD FMLA CKD-EPI: 69 ML/MIN/1.73
GLOBULIN SER CALC-MCNC: 2.9 G/DL (ref 1.5–4.5)
GLOBULIN SER CALC-MCNC: 3 G/DL (ref 1.5–4.5)
GLOBULIN SER CALC-MCNC: 3 G/DL (ref 1.5–4.5)
GLOBULIN SER CALC-MCNC: 3.2 G/DL (ref 1.5–4.5)
GLOBULIN SER CALC-MCNC: 3.3 G/DL (ref 1.5–4.5)
GLOBULIN SER CALC-MCNC: 3.4 G/DL (ref 1.5–4.5)
GLOBULIN SER CALC-MCNC: 3.4 G/DL (ref 1.5–4.5)
GLOBULIN SER CALC-MCNC: 3.4 G/DL (ref 2–4)
GLOBULIN SER CALC-MCNC: 3.4 G/DL (ref 2–4)
GLOBULIN SER CALC-MCNC: 3.5 G/DL (ref 1.5–4.5)
GLOBULIN SER CALC-MCNC: 3.7 G/DL (ref 2–4)
GLOBULIN SER CALC-MCNC: 4.2 G/DL (ref 2–4)
GLOBULIN SER CALC-MCNC: 5.5 G/DL (ref 2–4)
GLUCOSE BLD STRIP.AUTO-MCNC: 110 MG/DL (ref 65–100)
GLUCOSE BLD STRIP.AUTO-MCNC: 111 MG/DL (ref 65–100)
GLUCOSE BLD STRIP.AUTO-MCNC: 112 MG/DL (ref 65–100)
GLUCOSE BLD STRIP.AUTO-MCNC: 117 MG/DL (ref 65–100)
GLUCOSE BLD STRIP.AUTO-MCNC: 117 MG/DL (ref 65–100)
GLUCOSE BLD STRIP.AUTO-MCNC: 127 MG/DL (ref 65–100)
GLUCOSE BLD STRIP.AUTO-MCNC: 138 MG/DL (ref 65–100)
GLUCOSE BLD STRIP.AUTO-MCNC: 139 MG/DL (ref 65–100)
GLUCOSE BLD STRIP.AUTO-MCNC: 140 MG/DL (ref 65–100)
GLUCOSE BLD STRIP.AUTO-MCNC: 142 MG/DL (ref 65–100)
GLUCOSE BLD STRIP.AUTO-MCNC: 143 MG/DL (ref 65–100)
GLUCOSE BLD STRIP.AUTO-MCNC: 151 MG/DL (ref 65–100)
GLUCOSE BLD STRIP.AUTO-MCNC: 152 MG/DL (ref 65–100)
GLUCOSE BLD STRIP.AUTO-MCNC: 155 MG/DL (ref 65–100)
GLUCOSE BLD STRIP.AUTO-MCNC: 155 MG/DL (ref 65–100)
GLUCOSE BLD STRIP.AUTO-MCNC: 159 MG/DL (ref 65–100)
GLUCOSE BLD STRIP.AUTO-MCNC: 163 MG/DL (ref 65–100)
GLUCOSE BLD STRIP.AUTO-MCNC: 166 MG/DL (ref 65–100)
GLUCOSE BLD STRIP.AUTO-MCNC: 167 MG/DL (ref 65–100)
GLUCOSE BLD STRIP.AUTO-MCNC: 172 MG/DL (ref 65–100)
GLUCOSE BLD STRIP.AUTO-MCNC: 177 MG/DL (ref 65–100)
GLUCOSE BLD STRIP.AUTO-MCNC: 178 MG/DL (ref 65–100)
GLUCOSE BLD STRIP.AUTO-MCNC: 182 MG/DL (ref 65–100)
GLUCOSE BLD STRIP.AUTO-MCNC: 182 MG/DL (ref 65–100)
GLUCOSE BLD STRIP.AUTO-MCNC: 185 MG/DL (ref 65–100)
GLUCOSE BLD STRIP.AUTO-MCNC: 186 MG/DL (ref 65–100)
GLUCOSE BLD STRIP.AUTO-MCNC: 187 MG/DL (ref 65–100)
GLUCOSE BLD STRIP.AUTO-MCNC: 188 MG/DL (ref 65–100)
GLUCOSE BLD STRIP.AUTO-MCNC: 194 MG/DL (ref 65–100)
GLUCOSE BLD STRIP.AUTO-MCNC: 196 MG/DL (ref 65–100)
GLUCOSE BLD STRIP.AUTO-MCNC: 196 MG/DL (ref 65–100)
GLUCOSE BLD STRIP.AUTO-MCNC: 197 MG/DL (ref 65–100)
GLUCOSE BLD STRIP.AUTO-MCNC: 205 MG/DL (ref 65–100)
GLUCOSE BLD STRIP.AUTO-MCNC: 205 MG/DL (ref 65–100)
GLUCOSE BLD STRIP.AUTO-MCNC: 215 MG/DL (ref 65–100)
GLUCOSE BLD STRIP.AUTO-MCNC: 217 MG/DL (ref 65–100)
GLUCOSE BLD STRIP.AUTO-MCNC: 224 MG/DL (ref 65–100)
GLUCOSE BLD STRIP.AUTO-MCNC: 247 MG/DL (ref 65–100)
GLUCOSE BLD STRIP.AUTO-MCNC: 276 MG/DL (ref 65–100)
GLUCOSE BLD STRIP.AUTO-MCNC: 292 MG/DL (ref 65–100)
GLUCOSE BLD STRIP.AUTO-MCNC: 56 MG/DL (ref 65–100)
GLUCOSE BLD STRIP.AUTO-MCNC: 58 MG/DL (ref 65–100)
GLUCOSE BLD STRIP.AUTO-MCNC: 89 MG/DL (ref 65–100)
GLUCOSE BLD STRIP.AUTO-MCNC: 91 MG/DL (ref 65–100)
GLUCOSE BLD-MCNC: 103 MG/DL (ref 65–100)
GLUCOSE POC: 99 MG/DL (ref 65–105)
GLUCOSE SERPL-MCNC: 121 MG/DL (ref 65–100)
GLUCOSE SERPL-MCNC: 125 MG/DL (ref 65–100)
GLUCOSE SERPL-MCNC: 126 MG/DL (ref 65–100)
GLUCOSE SERPL-MCNC: 127 MG/DL (ref 65–99)
GLUCOSE SERPL-MCNC: 135 MG/DL (ref 65–100)
GLUCOSE SERPL-MCNC: 138 MG/DL (ref 65–99)
GLUCOSE SERPL-MCNC: 145 MG/DL (ref 65–99)
GLUCOSE SERPL-MCNC: 150 MG/DL (ref 65–100)
GLUCOSE SERPL-MCNC: 151 MG/DL (ref 65–100)
GLUCOSE SERPL-MCNC: 152 MG/DL (ref 65–99)
GLUCOSE SERPL-MCNC: 153 MG/DL (ref 65–99)
GLUCOSE SERPL-MCNC: 161 MG/DL (ref 65–100)
GLUCOSE SERPL-MCNC: 164 MG/DL (ref 65–100)
GLUCOSE SERPL-MCNC: 165 MG/DL (ref 65–99)
GLUCOSE SERPL-MCNC: 175 MG/DL (ref 65–100)
GLUCOSE SERPL-MCNC: 175 MG/DL (ref 65–100)
GLUCOSE SERPL-MCNC: 178 MG/DL (ref 65–100)
GLUCOSE SERPL-MCNC: 181 MG/DL (ref 65–100)
GLUCOSE SERPL-MCNC: 189 MG/DL (ref 65–100)
GLUCOSE SERPL-MCNC: 216 MG/DL (ref 65–100)
GLUCOSE SERPL-MCNC: 219 MG/DL (ref 65–100)
GLUCOSE SERPL-MCNC: 220 MG/DL (ref 65–99)
GLUCOSE SERPL-MCNC: 239 MG/DL (ref 65–99)
GLUCOSE SERPL-MCNC: 269 MG/DL (ref 65–100)
GLUCOSE SERPL-MCNC: 51 MG/DL (ref 65–100)
GLUCOSE UR STRIP.AUTO-MCNC: 100 MG/DL
GLUCOSE UR STRIP.AUTO-MCNC: 100 MG/DL
GLUCOSE UR STRIP.AUTO-MCNC: NEGATIVE MG/DL
GRAN# POC: 8.2 K/UL (ref 2–7.8)
GRAN% POC: 80.4 % (ref 37–92)
HBA1C MFR BLD: 7.5 % (ref 4.2–6.3)
HCO3 BLD-SCNC: 18.1 MMOL/L (ref 22–26)
HCT VFR BLD AUTO: 19.8 % (ref 35–47)
HCT VFR BLD AUTO: 22 % (ref 35–47)
HCT VFR BLD AUTO: 22.9 % (ref 35–47)
HCT VFR BLD AUTO: 23.3 % (ref 35–47)
HCT VFR BLD AUTO: 23.4 % (ref 35–47)
HCT VFR BLD AUTO: 24.2 % (ref 35–47)
HCT VFR BLD AUTO: 24.3 % (ref 35–47)
HCT VFR BLD AUTO: 25.3 % (ref 35–47)
HCT VFR BLD AUTO: 25.4 % (ref 35–47)
HCT VFR BLD AUTO: 25.8 % (ref 35–47)
HCT VFR BLD AUTO: 26.7 % (ref 35–47)
HCT VFR BLD AUTO: 26.8 % (ref 35–47)
HCT VFR BLD AUTO: 26.9 % (ref 35–47)
HCT VFR BLD AUTO: 27.9 % (ref 34–46.6)
HCT VFR BLD AUTO: 29.9 % (ref 35–47)
HCT VFR BLD AUTO: 30.1 % (ref 34–46.6)
HCT VFR BLD AUTO: 30.7 % (ref 34–46.6)
HCT VFR BLD AUTO: 30.8 % (ref 34–46.6)
HCT VFR BLD AUTO: 31.3 % (ref 34–46.6)
HCT VFR BLD AUTO: 31.7 % (ref 35–47)
HCT VFR BLD AUTO: 32.3 % (ref 34–46.6)
HCT VFR BLD CALC: 32.1 % (ref 37–51)
HCT VFR BLD CALC: 33 % (ref 35–47)
HDLC SERPL-MCNC: 17 MG/DL
HDLC SERPL: 7 {RATIO} (ref 0–5)
HGB BLD-MCNC: 10 G/DL (ref 11.1–15.9)
HGB BLD-MCNC: 10.3 G/DL (ref 12–18)
HGB BLD-MCNC: 10.4 G/DL (ref 11.1–15.9)
HGB BLD-MCNC: 10.6 G/DL (ref 11.5–16)
HGB BLD-MCNC: 11.2 GM/DL (ref 11.5–16)
HGB BLD-MCNC: 6.4 G/DL (ref 11.5–16)
HGB BLD-MCNC: 7.2 G/DL (ref 11.5–16)
HGB BLD-MCNC: 7.3 G/DL (ref 11.5–16)
HGB BLD-MCNC: 7.4 G/DL (ref 11.5–16)
HGB BLD-MCNC: 7.4 G/DL (ref 11.5–16)
HGB BLD-MCNC: 7.7 G/DL (ref 11.5–16)
HGB BLD-MCNC: 7.8 G/DL (ref 11.5–16)
HGB BLD-MCNC: 8.1 G/DL (ref 11.5–16)
HGB BLD-MCNC: 8.3 G/DL (ref 11.5–16)
HGB BLD-MCNC: 8.5 G/DL (ref 11.5–16)
HGB BLD-MCNC: 8.5 G/DL (ref 11.5–16)
HGB BLD-MCNC: 8.6 G/DL (ref 11.1–15.9)
HGB BLD-MCNC: 8.8 G/DL (ref 11.5–16)
HGB BLD-MCNC: 8.9 G/DL (ref 11.5–16)
HGB BLD-MCNC: 9.4 G/DL (ref 11.5–16)
HGB BLD-MCNC: 9.6 G/DL (ref 11.1–15.9)
HGB BLD-MCNC: 9.7 G/DL (ref 11.1–15.9)
HGB BLD-MCNC: 9.7 G/DL (ref 11.5–16)
HGB BLD-MCNC: 9.8 G/DL (ref 11.1–15.9)
HGB UR QL STRIP: ABNORMAL
HYALINE CASTS URNS QL MICRO: ABNORMAL /LPF (ref 0–5)
IMM GRANULOCYTES # BLD: 0 X10E3/UL (ref 0–0.1)
IMM GRANULOCYTES # BLD: 0.1 X10E3/UL (ref 0–0.1)
IMM GRANULOCYTES NFR BLD: 0 %
IMM GRANULOCYTES NFR BLD: 1 %
IMM GRANULOCYTES NFR BLD: 1 %
KETONES UR QL STRIP.AUTO: ABNORMAL MG/DL
KETONES UR QL STRIP.AUTO: NEGATIVE MG/DL
KETONES UR QL STRIP.AUTO: NEGATIVE MG/DL
LACTATE SERPL-SCNC: 1.7 MMOL/L (ref 0.4–2)
LACTATE SERPL-SCNC: 1.9 MMOL/L (ref 0.4–2)
LDH SERPL-CCNC: 92 IU/L (ref 119–226)
LDLC SERPL CALC-MCNC: 32 MG/DL (ref 0–100)
LEUKOCYTE ESTERASE UR QL STRIP.AUTO: ABNORMAL
LIPID PROFILE,FLP: ABNORMAL
LY# POC: 1.5 K/UL (ref 0.6–4.1)
LY% POC: 15.4 % (ref 10–58.5)
LYMPHOCYTES # BLD AUTO: 0.8 X10E3/UL (ref 0.7–3.1)
LYMPHOCYTES # BLD AUTO: 0.9 X10E3/UL (ref 0.7–3.1)
LYMPHOCYTES # BLD AUTO: 1 X10E3/UL (ref 0.7–3.1)
LYMPHOCYTES # BLD AUTO: 1.2 X10E3/UL (ref 0.7–3.1)
LYMPHOCYTES # BLD AUTO: 1.3 X10E3/UL (ref 0.7–3.1)
LYMPHOCYTES # BLD AUTO: 1.4 X10E3/UL (ref 0.7–3.1)
LYMPHOCYTES # BLD: 0.3 K/UL (ref 0.8–3.5)
LYMPHOCYTES # BLD: 0.5 K/UL (ref 0.8–3.5)
LYMPHOCYTES # BLD: 0.6 K/UL (ref 0.8–3.5)
LYMPHOCYTES # BLD: 0.9 K/UL (ref 0.8–3.5)
LYMPHOCYTES NFR BLD AUTO: 12 %
LYMPHOCYTES NFR BLD AUTO: 14 %
LYMPHOCYTES NFR BLD AUTO: 15 %
LYMPHOCYTES NFR BLD AUTO: 16 %
LYMPHOCYTES NFR BLD AUTO: 17 %
LYMPHOCYTES NFR BLD AUTO: 8 %
LYMPHOCYTES NFR BLD: 1 % (ref 12–49)
LYMPHOCYTES NFR BLD: 2 % (ref 12–49)
LYMPHOCYTES NFR BLD: 3 % (ref 12–49)
LYMPHOCYTES NFR BLD: 6 % (ref 12–49)
MAGNESIUM SERPL-MCNC: 1.1 MG/DL (ref 1.6–2.4)
MAGNESIUM SERPL-MCNC: 1.2 MG/DL (ref 1.6–2.3)
MAGNESIUM SERPL-MCNC: 1.2 MG/DL (ref 1.6–2.3)
MAGNESIUM SERPL-MCNC: 1.2 MG/DL (ref 1.6–2.4)
MAGNESIUM SERPL-MCNC: 1.2 MG/DL (ref 1.6–2.4)
MAGNESIUM SERPL-MCNC: 1.3 MG/DL (ref 1.6–2.3)
MAGNESIUM SERPL-MCNC: 1.3 MG/DL (ref 1.6–2.4)
MAGNESIUM SERPL-MCNC: 1.5 MG/DL (ref 1.6–2.4)
MAGNESIUM SERPL-MCNC: 1.6 MG/DL (ref 1.6–2.4)
MAGNESIUM SERPL-MCNC: 1.7 MG/DL (ref 1.6–2.3)
MAGNESIUM SERPL-MCNC: 1.7 MG/DL (ref 1.6–2.4)
MAGNESIUM SERPL-MCNC: 1.9 MG/DL (ref 1.6–2.4)
MAGNESIUM SERPL-MCNC: 1.9 MG/DL (ref 1.6–2.4)
MAGNESIUM SERPL-MCNC: 2 MG/DL (ref 1.6–2.4)
MAGNESIUM SERPL-MCNC: 2 MG/DL (ref 1.6–2.4)
MAGNESIUM SERPL-MCNC: 2.2 MG/DL (ref 1.6–2.4)
MCH RBC QN AUTO: 24.2 PG (ref 26–34)
MCH RBC QN AUTO: 24.4 PG (ref 26–34)
MCH RBC QN AUTO: 24.5 PG (ref 26–34)
MCH RBC QN AUTO: 24.5 PG (ref 26–34)
MCH RBC QN AUTO: 24.7 PG (ref 26–34)
MCH RBC QN AUTO: 24.8 PG (ref 26–34)
MCH RBC QN AUTO: 24.9 PG (ref 26.6–33)
MCH RBC QN AUTO: 24.9 PG (ref 26.6–33)
MCH RBC QN AUTO: 24.9 PG (ref 26–34)
MCH RBC QN AUTO: 25 PG (ref 26–34)
MCH RBC QN AUTO: 25.1 PG (ref 26–34)
MCH RBC QN AUTO: 25.4 PG (ref 26–34)
MCH RBC QN AUTO: 25.5 PG (ref 26–34)
MCH RBC QN AUTO: 25.7 PG (ref 26.6–33)
MCH RBC QN AUTO: 25.7 PG (ref 26.6–33)
MCH RBC QN AUTO: 25.7 PG (ref 26–34)
MCH RBC QN AUTO: 26.7 PG (ref 26.6–33)
MCH RBC QN AUTO: 29.8 PG (ref 26.6–33)
MCH RBC QN: 26.5 PG (ref 26–32)
MCHC RBC AUTO-ENTMCNC: 30.8 G/DL (ref 31.5–35.7)
MCHC RBC AUTO-ENTMCNC: 31 G/DL (ref 31.5–35.7)
MCHC RBC AUTO-ENTMCNC: 31.2 G/DL (ref 31.5–35.7)
MCHC RBC AUTO-ENTMCNC: 31.3 G/DL (ref 30–36.5)
MCHC RBC AUTO-ENTMCNC: 31.4 G/DL (ref 30–36.5)
MCHC RBC AUTO-ENTMCNC: 31.4 G/DL (ref 30–36.5)
MCHC RBC AUTO-ENTMCNC: 31.6 G/DL (ref 30–36.5)
MCHC RBC AUTO-ENTMCNC: 31.8 G/DL (ref 30–36.5)
MCHC RBC AUTO-ENTMCNC: 31.9 G/DL (ref 31.5–35.7)
MCHC RBC AUTO-ENTMCNC: 32.2 G/DL (ref 30–36.5)
MCHC RBC AUTO-ENTMCNC: 32.2 G/DL (ref 31.5–35.7)
MCHC RBC AUTO-ENTMCNC: 32.3 G/DL (ref 30–36.5)
MCHC RBC AUTO-ENTMCNC: 32.3 G/DL (ref 30–36.5)
MCHC RBC AUTO-ENTMCNC: 32.7 G/DL (ref 30–36.5)
MCHC RBC AUTO-ENTMCNC: 33.2 G/DL (ref 30–36.5)
MCHC RBC AUTO-ENTMCNC: 33.2 G/DL (ref 31.5–35.7)
MCHC RBC AUTO-ENTMCNC: 33.4 G/DL (ref 30–36.5)
MCHC RBC AUTO-ENTMCNC: 33.6 G/DL (ref 30–36.5)
MCHC RBC-ENTMCNC: 31.9 G/DL (ref 30–36)
MCV RBC AUTO: 74.7 FL (ref 80–99)
MCV RBC AUTO: 76 FL (ref 80–99)
MCV RBC AUTO: 76.1 FL (ref 80–99)
MCV RBC AUTO: 76.3 FL (ref 80–99)
MCV RBC AUTO: 76.4 FL (ref 80–99)
MCV RBC AUTO: 76.6 FL (ref 80–99)
MCV RBC AUTO: 76.8 FL (ref 80–99)
MCV RBC AUTO: 76.9 FL (ref 80–99)
MCV RBC AUTO: 77 FL (ref 79–97)
MCV RBC AUTO: 77.2 FL (ref 80–99)
MCV RBC AUTO: 77.3 FL (ref 80–99)
MCV RBC AUTO: 78.2 FL (ref 80–99)
MCV RBC AUTO: 78.2 FL (ref 80–99)
MCV RBC AUTO: 78.5 FL (ref 80–99)
MCV RBC AUTO: 78.9 FL (ref 80–99)
MCV RBC AUTO: 81 FL (ref 79–97)
MCV RBC AUTO: 81 FL (ref 79–97)
MCV RBC AUTO: 83 FL (ref 79–97)
MCV RBC AUTO: 86 FL (ref 79–97)
MCV RBC AUTO: 90 FL (ref 79–97)
MCV RBC: 83 FL (ref 80–97)
MID #, POC: 0.4 K/UL (ref 0–1.8)
MID% POC: 4.2 % (ref 0.1–24)
MONOCYTES # BLD AUTO: 0.6 X10E3/UL (ref 0.1–0.9)
MONOCYTES # BLD AUTO: 0.7 X10E3/UL (ref 0.1–0.9)
MONOCYTES # BLD AUTO: 0.8 X10E3/UL (ref 0.1–0.9)
MONOCYTES # BLD AUTO: 0.8 X10E3/UL (ref 0.1–0.9)
MONOCYTES # BLD AUTO: 0.9 X10E3/UL (ref 0.1–0.9)
MONOCYTES # BLD AUTO: 0.9 X10E3/UL (ref 0.1–0.9)
MONOCYTES # BLD: 0 K/UL (ref 0–1)
MONOCYTES # BLD: 0.7 K/UL (ref 0–1)
MONOCYTES # BLD: 0.7 K/UL (ref 0–1)
MONOCYTES # BLD: 0.9 K/UL (ref 0–1)
MONOCYTES NFR BLD AUTO: 10 %
MONOCYTES NFR BLD AUTO: 10 %
MONOCYTES NFR BLD AUTO: 11 %
MONOCYTES NFR BLD AUTO: 7 %
MONOCYTES NFR BLD AUTO: 9 %
MONOCYTES NFR BLD AUTO: 9 %
MONOCYTES NFR BLD: 0 % (ref 5–13)
MONOCYTES NFR BLD: 3 % (ref 5–13)
MONOCYTES NFR BLD: 4 % (ref 5–13)
MONOCYTES NFR BLD: 5 % (ref 5–13)
MORPHOLOGY BLD-IMP: ABNORMAL
NEUTROPHILS # BLD AUTO: 5 X10E3/UL (ref 1.4–7)
NEUTROPHILS # BLD AUTO: 5.5 X10E3/UL (ref 1.4–7)
NEUTROPHILS # BLD AUTO: 6 X10E3/UL (ref 1.4–7)
NEUTROPHILS # BLD AUTO: 6.1 X10E3/UL (ref 1.4–7)
NEUTROPHILS # BLD AUTO: 6.2 X10E3/UL (ref 1.4–7)
NEUTROPHILS # BLD AUTO: 8.1 X10E3/UL (ref 1.4–7)
NEUTROPHILS NFR BLD AUTO: 70 %
NEUTROPHILS NFR BLD AUTO: 71 %
NEUTROPHILS NFR BLD AUTO: 72 %
NEUTROPHILS NFR BLD AUTO: 72 %
NEUTROPHILS NFR BLD AUTO: 76 %
NEUTROPHILS NFR BLD AUTO: 84 %
NEUTS BAND NFR BLD MANUAL: 10 % (ref 0–6)
NEUTS BAND NFR BLD MANUAL: 15 % (ref 0–6)
NEUTS SEG # BLD: 12.4 K/UL (ref 1.8–8)
NEUTS SEG # BLD: 18.9 K/UL (ref 1.8–8)
NEUTS SEG # BLD: 23 K/UL (ref 1.8–8)
NEUTS SEG # BLD: 30.4 K/UL (ref 1.8–8)
NEUTS SEG NFR BLD: 83 % (ref 32–75)
NEUTS SEG NFR BLD: 86 % (ref 32–75)
NEUTS SEG NFR BLD: 89 % (ref 32–75)
NEUTS SEG NFR BLD: 93 % (ref 32–75)
NITRITE UR QL STRIP.AUTO: NEGATIVE
P-R INTERVAL, ECG05: 128 MS
P-R INTERVAL, ECG05: 130 MS
P-R INTERVAL, ECG05: 152 MS
P-R INTERVAL, ECG05: 158 MS
PATH REV BLD -IMP: ABNORMAL
PCO2 BLD: 28.3 MMHG (ref 35–45)
PH BLD: 7.41 [PH] (ref 7.35–7.45)
PH UR STRIP: 5.5 [PH] (ref 5–8)
PH UR STRIP: 6 [PH] (ref 5–8)
PH UR STRIP: 6.5 [PH] (ref 5–8)
PHOSPHATE SERPL-MCNC: 2.4 MG/DL (ref 2.6–4.7)
PHOSPHATE SERPL-MCNC: 2.4 MG/DL (ref 2.6–4.7)
PHOSPHATE SERPL-MCNC: 2.6 MG/DL (ref 2.6–4.7)
PHOSPHATE SERPL-MCNC: 2.7 MG/DL (ref 2.6–4.7)
PHOSPHATE SERPL-MCNC: 2.8 MG/DL (ref 2.6–4.7)
PHOSPHATE SERPL-MCNC: 3.2 MG/DL (ref 2.6–4.7)
PHOSPHATE SERPL-MCNC: 3.2 MG/DL (ref 2.6–4.7)
PHOSPHATE SERPL-MCNC: 3.3 MG/DL (ref 2.6–4.7)
PHOSPHATE SERPL-MCNC: 3.5 MG/DL (ref 2.6–4.7)
PLATELET # BLD AUTO: 234 K/UL (ref 150–400)
PLATELET # BLD AUTO: 278 K/UL (ref 150–400)
PLATELET # BLD AUTO: 283 K/UL (ref 150–400)
PLATELET # BLD AUTO: 288 K/UL (ref 150–400)
PLATELET # BLD AUTO: 302 X10E3/UL (ref 150–379)
PLATELET # BLD AUTO: 309 X10E3/UL (ref 150–379)
PLATELET # BLD AUTO: 322 K/UL (ref 150–400)
PLATELET # BLD AUTO: 333 X10E3/UL (ref 150–379)
PLATELET # BLD AUTO: 343 K/UL (ref 150–400)
PLATELET # BLD AUTO: 346 K/UL (ref 150–400)
PLATELET # BLD AUTO: 348 K/UL (ref 150–400)
PLATELET # BLD AUTO: 351 X10E3/UL (ref 150–379)
PLATELET # BLD AUTO: 356 K/UL (ref 150–400)
PLATELET # BLD AUTO: 357 K/UL (ref 150–400)
PLATELET # BLD AUTO: 420 K/UL (ref 150–400)
PLATELET # BLD AUTO: 439 K/UL (ref 150–400)
PLATELET # BLD AUTO: 453 K/UL (ref 150–400)
PLATELET # BLD AUTO: 496 X10E3/UL (ref 150–379)
PLATELET # BLD AUTO: 537 X10E3/UL (ref 150–379)
PLATELET # BLD AUTO: 556 K/UL (ref 150–400)
PLATELET # BLD: 404 K/UL (ref 140–440)
PLATELET COMMENTS,PCOM: ABNORMAL
PLATELET COMMENTS,PCOM: ABNORMAL
PO2 BLD: 74 MMHG (ref 80–100)
POTASSIUM BLD-SCNC: 4 MMOL/L (ref 3.5–5.1)
POTASSIUM SERPL-SCNC: 3 MMOL/L (ref 3.5–5.1)
POTASSIUM SERPL-SCNC: 3.2 MMOL/L (ref 3.5–5.1)
POTASSIUM SERPL-SCNC: 3.4 MMOL/L (ref 3.5–5.1)
POTASSIUM SERPL-SCNC: 3.5 MMOL/L (ref 3.5–5.1)
POTASSIUM SERPL-SCNC: 3.7 MMOL/L (ref 3.5–5.1)
POTASSIUM SERPL-SCNC: 3.8 MMOL/L (ref 3.5–5.1)
POTASSIUM SERPL-SCNC: 3.8 MMOL/L (ref 3.5–5.1)
POTASSIUM SERPL-SCNC: 4 MMOL/L (ref 3.5–5.1)
POTASSIUM SERPL-SCNC: 4.1 MMOL/L (ref 3.5–5.1)
POTASSIUM SERPL-SCNC: 4.2 MMOL/L (ref 3.5–5.1)
POTASSIUM SERPL-SCNC: 4.3 MMOL/L (ref 3.5–5.1)
POTASSIUM SERPL-SCNC: 4.3 MMOL/L (ref 3.5–5.2)
POTASSIUM SERPL-SCNC: 4.4 MMOL/L (ref 3.5–5.1)
POTASSIUM SERPL-SCNC: 4.5 MMOL/L (ref 3.5–5.2)
POTASSIUM SERPL-SCNC: 4.6 MMOL/L (ref 3.5–5.1)
POTASSIUM SERPL-SCNC: 4.6 MMOL/L (ref 3.5–5.2)
POTASSIUM SERPL-SCNC: 4.6 MMOL/L (ref 3.5–5.2)
POTASSIUM SERPL-SCNC: 4.7 MMOL/L (ref 3.5–5.2)
POTASSIUM SERPL-SCNC: 4.7 MMOL/L (ref 3.6–5)
POTASSIUM SERPL-SCNC: 4.8 MMOL/L (ref 3.5–5.2)
POTASSIUM SERPL-SCNC: 5.3 MMOL/L (ref 3.5–5.1)
POTASSIUM SERPL-SCNC: 5.7 MMOL/L (ref 3.5–5.1)
PROT SERPL-MCNC: 5.4 G/DL (ref 6.4–8.2)
PROT SERPL-MCNC: 5.6 G/DL (ref 6.4–8.2)
PROT SERPL-MCNC: 5.7 G/DL (ref 6.4–8.2)
PROT SERPL-MCNC: 5.7 G/DL (ref 6–8.5)
PROT SERPL-MCNC: 5.9 G/DL (ref 6.4–8.2)
PROT SERPL-MCNC: 6.1 G/DL (ref 6–8.5)
PROT SERPL-MCNC: 6.2 G/DL (ref 6–8.5)
PROT SERPL-MCNC: 6.5 G/DL (ref 6–8.5)
PROT SERPL-MCNC: 6.5 G/DL (ref 6–8.5)
PROT SERPL-MCNC: 6.6 G/DL (ref 6–8.5)
PROT SERPL-MCNC: 6.7 G/DL (ref 6.3–8.2)
PROT SERPL-MCNC: 7.4 G/DL (ref 6.4–8.2)
PROT UR STRIP-MCNC: 100 MG/DL
PROT UR STRIP-MCNC: 30 MG/DL
PROT UR STRIP-MCNC: 300 MG/DL
PROT UR-MCNC: 77.3 MG/DL
PROT UR-MCNC: NORMAL MG/DL
PTH-INTACT SERPL-MCNC: <6.3 PG/ML (ref 14–72)
Q-T INTERVAL, ECG07: 328 MS
Q-T INTERVAL, ECG07: 344 MS
Q-T INTERVAL, ECG07: 354 MS
Q-T INTERVAL, ECG07: 394 MS
QRS DURATION, ECG06: 74 MS
QRS DURATION, ECG06: 82 MS
QRS DURATION, ECG06: 84 MS
QRS DURATION, ECG06: 86 MS
QTC CALCULATION (BEZET), ECG08: 394 MS
QTC CALCULATION (BEZET), ECG08: 415 MS
QTC CALCULATION (BEZET), ECG08: 441 MS
QTC CALCULATION (BEZET), ECG08: 449 MS
RBC # BLD AUTO: 2.65 M/UL (ref 3.8–5.2)
RBC # BLD AUTO: 2.89 M/UL (ref 3.8–5.2)
RBC # BLD AUTO: 2.98 M/UL (ref 3.8–5.2)
RBC # BLD AUTO: 2.99 M/UL (ref 3.8–5.2)
RBC # BLD AUTO: 3.03 M/UL (ref 3.8–5.2)
RBC # BLD AUTO: 3.13 M/UL (ref 3.8–5.2)
RBC # BLD AUTO: 3.3 M/UL (ref 3.8–5.2)
RBC # BLD AUTO: 3.33 M/UL (ref 3.8–5.2)
RBC # BLD AUTO: 3.33 M/UL (ref 3.8–5.2)
RBC # BLD AUTO: 3.4 M/UL (ref 3.8–5.2)
RBC # BLD AUTO: 3.45 X10E6/UL (ref 3.77–5.28)
RBC # BLD AUTO: 3.49 X10E6/UL (ref 3.77–5.28)
RBC # BLD AUTO: 3.5 M/UL (ref 3.8–5.2)
RBC # BLD AUTO: 3.51 M/UL (ref 3.8–5.2)
RBC # BLD AUTO: 3.59 X10E6/UL (ref 3.77–5.28)
RBC # BLD AUTO: 3.79 M/UL (ref 3.8–5.2)
RBC # BLD AUTO: 3.81 X10E6/UL (ref 3.77–5.28)
RBC # BLD AUTO: 3.89 X10E6/UL (ref 3.77–5.28)
RBC # BLD AUTO: 3.9 X10E6/UL (ref 3.77–5.28)
RBC # BLD AUTO: 4.13 M/UL (ref 3.8–5.2)
RBC # BLD: 3.88 M/UL (ref 4.2–6.3)
RBC #/AREA URNS HPF: ABNORMAL /HPF (ref 0–5)
RBC MORPH BLD: ABNORMAL
SAO2 % BLD: 95 % (ref 92–97)
SERVICE CMNT-IMP: ABNORMAL
SERVICE CMNT-IMP: NORMAL
SODIUM BLD-SCNC: 135 MMOL/L (ref 136–145)
SODIUM SERPL-SCNC: 125 MMOL/L (ref 136–145)
SODIUM SERPL-SCNC: 130 MMOL/L (ref 136–145)
SODIUM SERPL-SCNC: 132 MMOL/L (ref 136–145)
SODIUM SERPL-SCNC: 133 MMOL/L (ref 136–145)
SODIUM SERPL-SCNC: 134 MMOL/L (ref 136–145)
SODIUM SERPL-SCNC: 135 MMOL/L (ref 134–144)
SODIUM SERPL-SCNC: 135 MMOL/L (ref 136–145)
SODIUM SERPL-SCNC: 136 MMOL/L (ref 136–145)
SODIUM SERPL-SCNC: 137 MMOL/L (ref 134–144)
SODIUM SERPL-SCNC: 138 MMOL/L (ref 134–144)
SODIUM SERPL-SCNC: 138 MMOL/L (ref 136–145)
SODIUM SERPL-SCNC: 138 MMOL/L (ref 136–145)
SODIUM SERPL-SCNC: 139 MMOL/L (ref 134–144)
SODIUM SERPL-SCNC: 139 MMOL/L (ref 137–145)
SODIUM SERPL-SCNC: 140 MMOL/L (ref 134–144)
SODIUM SERPL-SCNC: 140 MMOL/L (ref 136–145)
SODIUM SERPL-SCNC: 141 MMOL/L (ref 134–144)
SODIUM SERPL-SCNC: 142 MMOL/L (ref 136–145)
SODIUM UR-SCNC: 64 MMOL/L
SP GR UR REFRACTOMETRY: 1.01 (ref 1–1.03)
SP GR UR REFRACTOMETRY: 1.01 (ref 1–1.03)
SP GR UR REFRACTOMETRY: 1.02 (ref 1–1.03)
SPECIMEN EXP DATE BLD: NORMAL
SPECIMEN TYPE: ABNORMAL
STATUS OF UNIT,%ST: NORMAL
STATUS OF UNIT,%ST: NORMAL
TOTAL BILIRUBIN POC: 0.5 MG/DL (ref 0.2–1.3)
TRIGL SERPL-MCNC: 350 MG/DL (ref ?–150)
TROPONIN I SERPL-MCNC: 4.47 NG/ML
TROPONIN I SERPL-MCNC: 5.12 NG/ML
TROPONIN I SERPL-MCNC: 6.61 NG/ML
TSH SERPL DL<=0.005 MIU/L-ACNC: 0.79 UIU/ML (ref 0.45–4.5)
TSH SERPL DL<=0.005 MIU/L-ACNC: 2.68 UIU/ML (ref 0.45–4.5)
TSH SERPL DL<=0.005 MIU/L-ACNC: 5.3 UIU/ML (ref 0.45–4.5)
UNIT DIVISION, %UDIV: 0
UNIT DIVISION, %UDIV: 0
UR CULT HOLD, URHOLD: NORMAL
UROBILINOGEN UR QL STRIP.AUTO: 0.2 EU/DL (ref 0.2–1)
VENTRICULAR RATE, ECG03: 113 BPM
VENTRICULAR RATE, ECG03: 60 BPM
VENTRICULAR RATE, ECG03: 83 BPM
VENTRICULAR RATE, ECG03: 99 BPM
VLDLC SERPL CALC-MCNC: 70 MG/DL
WBC # BLD AUTO: 11.3 K/UL (ref 3.6–11)
WBC # BLD AUTO: 11.3 K/UL (ref 3.6–11)
WBC # BLD AUTO: 12 K/UL (ref 3.6–11)
WBC # BLD AUTO: 12.1 K/UL (ref 3.6–11)
WBC # BLD AUTO: 12.1 K/UL (ref 3.6–11)
WBC # BLD AUTO: 12.2 K/UL (ref 3.6–11)
WBC # BLD AUTO: 14.1 K/UL (ref 3.6–11)
WBC # BLD AUTO: 17.9 K/UL (ref 3.6–11)
WBC # BLD AUTO: 20.2 K/UL (ref 3.6–11)
WBC # BLD AUTO: 23.5 K/UL (ref 3.6–11)
WBC # BLD AUTO: 31.6 K/UL (ref 3.6–11)
WBC # BLD AUTO: 6.8 X10E3/UL (ref 3.4–10.8)
WBC # BLD AUTO: 7.2 K/UL (ref 3.6–11)
WBC # BLD AUTO: 7.7 X10E3/UL (ref 3.4–10.8)
WBC # BLD AUTO: 8.3 X10E3/UL (ref 3.4–10.8)
WBC # BLD AUTO: 8.4 X10E3/UL (ref 3.4–10.8)
WBC # BLD AUTO: 8.5 X10E3/UL (ref 3.4–10.8)
WBC # BLD AUTO: 9.7 K/UL (ref 3.6–11)
WBC # BLD AUTO: 9.7 X10E3/UL (ref 3.4–10.8)
WBC # BLD AUTO: 9.9 K/UL (ref 3.6–11)
WBC # BLD: 10.1 K/UL (ref 4.1–10.9)
WBC URNS QL MICRO: >100 /HPF (ref 0–4)

## 2017-01-01 PROCEDURE — 36415 COLL VENOUS BLD VENIPUNCTURE: CPT

## 2017-01-01 PROCEDURE — 74011250636 HC RX REV CODE- 250/636: Performed by: HOSPITALIST

## 2017-01-01 PROCEDURE — 81001 URINALYSIS AUTO W/SCOPE: CPT | Performed by: NURSE PRACTITIONER

## 2017-01-01 PROCEDURE — 74011636637 HC RX REV CODE- 636/637: Performed by: PHYSICAL MEDICINE & REHABILITATION

## 2017-01-01 PROCEDURE — 74011250637 HC RX REV CODE- 250/637: Performed by: INTERNAL MEDICINE

## 2017-01-01 PROCEDURE — 74011000250 HC RX REV CODE- 250: Performed by: FAMILY MEDICINE

## 2017-01-01 PROCEDURE — 74011250637 HC RX REV CODE- 250/637: Performed by: FAMILY MEDICINE

## 2017-01-01 PROCEDURE — 80061 LIPID PANEL: CPT | Performed by: NURSE PRACTITIONER

## 2017-01-01 PROCEDURE — 74011250637 HC RX REV CODE- 250/637: Performed by: PHYSICAL MEDICINE & REHABILITATION

## 2017-01-01 PROCEDURE — 82962 GLUCOSE BLOOD TEST: CPT

## 2017-01-01 PROCEDURE — 85027 COMPLETE CBC AUTOMATED: CPT | Performed by: PHYSICAL MEDICINE & REHABILITATION

## 2017-01-01 PROCEDURE — 74011250636 HC RX REV CODE- 250/636: Performed by: INTERNAL MEDICINE

## 2017-01-01 PROCEDURE — 83735 ASSAY OF MAGNESIUM: CPT | Performed by: PHYSICAL MEDICINE & REHABILITATION

## 2017-01-01 PROCEDURE — 36415 COLL VENOUS BLD VENIPUNCTURE: CPT | Performed by: HOSPITALIST

## 2017-01-01 PROCEDURE — 88172 CYTP DX EVAL FNA 1ST EA SITE: CPT | Performed by: NURSE PRACTITIONER

## 2017-01-01 PROCEDURE — 80048 BASIC METABOLIC PNL TOTAL CA: CPT | Performed by: INTERNAL MEDICINE

## 2017-01-01 PROCEDURE — 97165 OT EVAL LOW COMPLEX 30 MIN: CPT

## 2017-01-01 PROCEDURE — 97116 GAIT TRAINING THERAPY: CPT

## 2017-01-01 PROCEDURE — 74011250637 HC RX REV CODE- 250/637: Performed by: HOSPITALIST

## 2017-01-01 PROCEDURE — 80048 BASIC METABOLIC PNL TOTAL CA: CPT | Performed by: FAMILY MEDICINE

## 2017-01-01 PROCEDURE — 87186 SC STD MICRODIL/AGAR DIL: CPT | Performed by: NURSE PRACTITIONER

## 2017-01-01 PROCEDURE — 74011636320 HC RX REV CODE- 636/320: Performed by: INTERNAL MEDICINE

## 2017-01-01 PROCEDURE — 74183 MRI ABD W/O CNTR FLWD CNTR: CPT

## 2017-01-01 PROCEDURE — 80053 COMPREHEN METABOLIC PANEL: CPT | Performed by: PHYSICAL MEDICINE & REHABILITATION

## 2017-01-01 PROCEDURE — P9047 ALBUMIN (HUMAN), 25%, 50ML: HCPCS | Performed by: HOSPITALIST

## 2017-01-01 PROCEDURE — 77030008684 HC TU ET CUF COVD -B: Performed by: ANESTHESIOLOGY

## 2017-01-01 PROCEDURE — 97530 THERAPEUTIC ACTIVITIES: CPT

## 2017-01-01 PROCEDURE — 74011250636 HC RX REV CODE- 250/636: Performed by: PHYSICAL MEDICINE & REHABILITATION

## 2017-01-01 PROCEDURE — 84300 ASSAY OF URINE SODIUM: CPT | Performed by: FAMILY MEDICINE

## 2017-01-01 PROCEDURE — 82306 VITAMIN D 25 HYDROXY: CPT | Performed by: PHYSICAL MEDICINE & REHABILITATION

## 2017-01-01 PROCEDURE — 83735 ASSAY OF MAGNESIUM: CPT | Performed by: FAMILY MEDICINE

## 2017-01-01 PROCEDURE — 93005 ELECTROCARDIOGRAM TRACING: CPT

## 2017-01-01 PROCEDURE — 80069 RENAL FUNCTION PANEL: CPT | Performed by: HOSPITALIST

## 2017-01-01 PROCEDURE — 74011250637 HC RX REV CODE- 250/637: Performed by: SPECIALIST

## 2017-01-01 PROCEDURE — 96360 HYDRATION IV INFUSION INIT: CPT

## 2017-01-01 PROCEDURE — 74011636637 HC RX REV CODE- 636/637: Performed by: HOSPITALIST

## 2017-01-01 PROCEDURE — 97161 PT EVAL LOW COMPLEX 20 MIN: CPT

## 2017-01-01 PROCEDURE — 77030031139 HC SUT VCRL2 J&J -A: Performed by: SURGERY

## 2017-01-01 PROCEDURE — 85025 COMPLETE CBC W/AUTO DIFF WBC: CPT

## 2017-01-01 PROCEDURE — 88305 TISSUE EXAM BY PATHOLOGIST: CPT | Performed by: SURGERY

## 2017-01-01 PROCEDURE — 88185 FLOWCYTOMETRY/TC ADD-ON: CPT | Performed by: SURGERY

## 2017-01-01 PROCEDURE — 85025 COMPLETE CBC W/AUTO DIFF WBC: CPT | Performed by: HOSPITALIST

## 2017-01-01 PROCEDURE — 65660000000 HC RM CCU STEPDOWN

## 2017-01-01 PROCEDURE — 83615 LACTATE (LD) (LDH) ENZYME: CPT

## 2017-01-01 PROCEDURE — 74011000250 HC RX REV CODE- 250: Performed by: SURGERY

## 2017-01-01 PROCEDURE — 77067 SCR MAMMO BI INCL CAD: CPT

## 2017-01-01 PROCEDURE — G8988 SELF CARE GOAL STATUS: HCPCS

## 2017-01-01 PROCEDURE — 83735 ASSAY OF MAGNESIUM: CPT

## 2017-01-01 PROCEDURE — 74011250636 HC RX REV CODE- 250/636

## 2017-01-01 PROCEDURE — 74011250636 HC RX REV CODE- 250/636: Performed by: ANESTHESIOLOGY

## 2017-01-01 PROCEDURE — 83735 ASSAY OF MAGNESIUM: CPT | Performed by: INTERNAL MEDICINE

## 2017-01-01 PROCEDURE — 36600 WITHDRAWAL OF ARTERIAL BLOOD: CPT

## 2017-01-01 PROCEDURE — 84100 ASSAY OF PHOSPHORUS: CPT | Performed by: INTERNAL MEDICINE

## 2017-01-01 PROCEDURE — 74011250637 HC RX REV CODE- 250/637

## 2017-01-01 PROCEDURE — 74011000258 HC RX REV CODE- 258: Performed by: NURSE PRACTITIONER

## 2017-01-01 PROCEDURE — 88342 IMHCHEM/IMCYTCHM 1ST ANTB: CPT | Performed by: SURGERY

## 2017-01-01 PROCEDURE — 36415 COLL VENOUS BLD VENIPUNCTURE: CPT | Performed by: FAMILY MEDICINE

## 2017-01-01 PROCEDURE — 74011250636 HC RX REV CODE- 250/636: Performed by: FAMILY MEDICINE

## 2017-01-01 PROCEDURE — 74011000258 HC RX REV CODE- 258: Performed by: INTERNAL MEDICINE

## 2017-01-01 PROCEDURE — 82803 BLOOD GASES ANY COMBINATION: CPT

## 2017-01-01 PROCEDURE — 74011250636 HC RX REV CODE- 250/636: Performed by: NURSE PRACTITIONER

## 2017-01-01 PROCEDURE — 36415 COLL VENOUS BLD VENIPUNCTURE: CPT | Performed by: PHYSICAL MEDICINE & REHABILITATION

## 2017-01-01 PROCEDURE — 77030032490 HC SLV COMPR SCD KNE COVD -B: Performed by: SURGERY

## 2017-01-01 PROCEDURE — 87040 BLOOD CULTURE FOR BACTERIA: CPT | Performed by: NURSE PRACTITIONER

## 2017-01-01 PROCEDURE — 80053 COMPREHEN METABOLIC PANEL: CPT | Performed by: INTERNAL MEDICINE

## 2017-01-01 PROCEDURE — 87086 URINE CULTURE/COLONY COUNT: CPT | Performed by: PHYSICAL MEDICINE & REHABILITATION

## 2017-01-01 PROCEDURE — 88341 IMHCHEM/IMCYTCHM EA ADD ANTB: CPT | Performed by: SURGERY

## 2017-01-01 PROCEDURE — A9577 INJ MULTIHANCE: HCPCS | Performed by: INTERNAL MEDICINE

## 2017-01-01 PROCEDURE — 88184 FLOWCYTOMETRY/ TC 1 MARKER: CPT | Performed by: SURGERY

## 2017-01-01 PROCEDURE — 36415 COLL VENOUS BLD VENIPUNCTURE: CPT | Performed by: INTERNAL MEDICINE

## 2017-01-01 PROCEDURE — 74011250637 HC RX REV CODE- 250/637: Performed by: NURSE PRACTITIONER

## 2017-01-01 PROCEDURE — G8987 SELF CARE CURRENT STATUS: HCPCS

## 2017-01-01 PROCEDURE — 80053 COMPREHEN METABOLIC PANEL: CPT

## 2017-01-01 PROCEDURE — 88185 FLOWCYTOMETRY/TC ADD-ON: CPT | Performed by: NURSE PRACTITIONER

## 2017-01-01 PROCEDURE — 74011000258 HC RX REV CODE- 258: Performed by: FAMILY MEDICINE

## 2017-01-01 PROCEDURE — 65270000032 HC RM SEMIPRIVATE

## 2017-01-01 PROCEDURE — 93306 TTE W/DOPPLER COMPLETE: CPT

## 2017-01-01 PROCEDURE — 76942 ECHO GUIDE FOR BIOPSY: CPT

## 2017-01-01 PROCEDURE — 74011250636 HC RX REV CODE- 250/636: Performed by: SURGERY

## 2017-01-01 PROCEDURE — 77030011640 HC PAD GRND REM COVD -A: Performed by: SURGERY

## 2017-01-01 PROCEDURE — 77030010507 HC ADH SKN DERMBND J&J -B: Performed by: SURGERY

## 2017-01-01 PROCEDURE — 80047 BASIC METABLC PNL IONIZED CA: CPT

## 2017-01-01 PROCEDURE — 85027 COMPLETE CBC AUTOMATED: CPT | Performed by: HOSPITALIST

## 2017-01-01 PROCEDURE — 80048 BASIC METABOLIC PNL TOTAL CA: CPT | Performed by: PHYSICAL MEDICINE & REHABILITATION

## 2017-01-01 PROCEDURE — 74178 CT ABD&PLV WO CNTR FLWD CNTR: CPT

## 2017-01-01 PROCEDURE — 71010 XR CHEST PORT: CPT

## 2017-01-01 PROCEDURE — 94762 N-INVAS EAR/PLS OXIMTRY CONT: CPT

## 2017-01-01 PROCEDURE — 51798 US URINE CAPACITY MEASURE: CPT

## 2017-01-01 PROCEDURE — P9016 RBC LEUKOCYTES REDUCED: HCPCS | Performed by: PHYSICAL MEDICINE & REHABILITATION

## 2017-01-01 PROCEDURE — 88173 CYTOPATH EVAL FNA REPORT: CPT | Performed by: NURSE PRACTITIONER

## 2017-01-01 PROCEDURE — 70553 MRI BRAIN STEM W/O & W/DYE: CPT

## 2017-01-01 PROCEDURE — 83605 ASSAY OF LACTIC ACID: CPT | Performed by: NURSE PRACTITIONER

## 2017-01-01 PROCEDURE — 84443 ASSAY THYROID STIM HORMONE: CPT

## 2017-01-01 PROCEDURE — 85018 HEMOGLOBIN: CPT | Performed by: HOSPITALIST

## 2017-01-01 PROCEDURE — 76010000149 HC OR TIME 1 TO 1.5 HR: Performed by: SURGERY

## 2017-01-01 PROCEDURE — 85025 COMPLETE CBC W/AUTO DIFF WBC: CPT | Performed by: STUDENT IN AN ORGANIZED HEALTH CARE EDUCATION/TRAINING PROGRAM

## 2017-01-01 PROCEDURE — A9576 INJ PROHANCE MULTIPACK: HCPCS | Performed by: INTERNAL MEDICINE

## 2017-01-01 PROCEDURE — 77030019908 HC STETH ESOPH SIMS -A: Performed by: ANESTHESIOLOGY

## 2017-01-01 PROCEDURE — 76210000006 HC OR PH I REC 0.5 TO 1 HR: Performed by: SURGERY

## 2017-01-01 PROCEDURE — 65620000000 HC RM CCU GENERAL

## 2017-01-01 PROCEDURE — 74011000258 HC RX REV CODE- 258: Performed by: HOSPITALIST

## 2017-01-01 PROCEDURE — 83036 HEMOGLOBIN GLYCOSYLATED A1C: CPT | Performed by: FAMILY MEDICINE

## 2017-01-01 PROCEDURE — 85027 COMPLETE CBC AUTOMATED: CPT | Performed by: INTERNAL MEDICINE

## 2017-01-01 PROCEDURE — 74011636320 HC RX REV CODE- 636/320: Performed by: NURSE PRACTITIONER

## 2017-01-01 PROCEDURE — 74011636637 HC RX REV CODE- 636/637: Performed by: FAMILY MEDICINE

## 2017-01-01 PROCEDURE — 84132 ASSAY OF SERUM POTASSIUM: CPT | Performed by: HOSPITALIST

## 2017-01-01 PROCEDURE — 71260 CT THORAX DX C+: CPT

## 2017-01-01 PROCEDURE — 99285 EMERGENCY DEPT VISIT HI MDM: CPT

## 2017-01-01 PROCEDURE — 77010033678 HC OXYGEN DAILY

## 2017-01-01 PROCEDURE — 82570 ASSAY OF URINE CREATININE: CPT | Performed by: FAMILY MEDICINE

## 2017-01-01 PROCEDURE — 85025 COMPLETE CBC W/AUTO DIFF WBC: CPT | Performed by: FAMILY MEDICINE

## 2017-01-01 PROCEDURE — 74011000250 HC RX REV CODE- 250

## 2017-01-01 PROCEDURE — 82040 ASSAY OF SERUM ALBUMIN: CPT | Performed by: HOSPITALIST

## 2017-01-01 PROCEDURE — 84484 ASSAY OF TROPONIN QUANT: CPT | Performed by: NURSE PRACTITIONER

## 2017-01-01 PROCEDURE — 86923 COMPATIBILITY TEST ELECTRIC: CPT | Performed by: PHYSICAL MEDICINE & REHABILITATION

## 2017-01-01 PROCEDURE — 87040 BLOOD CULTURE FOR BACTERIA: CPT | Performed by: INTERNAL MEDICINE

## 2017-01-01 PROCEDURE — A9585 GADOBUTROL INJECTION: HCPCS | Performed by: HOSPITALIST

## 2017-01-01 PROCEDURE — 83970 ASSAY OF PARATHORMONE: CPT | Performed by: HOSPITALIST

## 2017-01-01 PROCEDURE — 87086 URINE CULTURE/COLONY COUNT: CPT | Performed by: NURSE PRACTITIONER

## 2017-01-01 PROCEDURE — 99233 SBSQ HOSP IP/OBS HIGH 50: CPT | Performed by: INTERNAL MEDICINE

## 2017-01-01 PROCEDURE — 81001 URINALYSIS AUTO W/SCOPE: CPT | Performed by: PHYSICAL MEDICINE & REHABILITATION

## 2017-01-01 PROCEDURE — 83605 ASSAY OF LACTIC ACID: CPT | Performed by: FAMILY MEDICINE

## 2017-01-01 PROCEDURE — 76210000021 HC REC RM PH II 0.5 TO 1 HR: Performed by: SURGERY

## 2017-01-01 PROCEDURE — 76450000000

## 2017-01-01 PROCEDURE — 99232 SBSQ HOSP IP/OBS MODERATE 35: CPT | Performed by: INTERNAL MEDICINE

## 2017-01-01 PROCEDURE — 77030002888 HC SUT CHRMC J&J -A: Performed by: SURGERY

## 2017-01-01 PROCEDURE — 99223 1ST HOSP IP/OBS HIGH 75: CPT | Performed by: INTERNAL MEDICINE

## 2017-01-01 PROCEDURE — 74177 CT ABD & PELVIS W/CONTRAST: CPT

## 2017-01-01 PROCEDURE — 77030010938 HC CLP LIG TELE -A: Performed by: SURGERY

## 2017-01-01 PROCEDURE — 77030034850

## 2017-01-01 PROCEDURE — 76060000033 HC ANESTHESIA 1 TO 1.5 HR: Performed by: SURGERY

## 2017-01-01 PROCEDURE — C9113 INJ PANTOPRAZOLE SODIUM, VIA: HCPCS | Performed by: FAMILY MEDICINE

## 2017-01-01 PROCEDURE — 77030026438 HC STYL ET INTUB CARD -A: Performed by: ANESTHESIOLOGY

## 2017-01-01 PROCEDURE — 80053 COMPREHEN METABOLIC PANEL: CPT | Performed by: STUDENT IN AN ORGANIZED HEALTH CARE EDUCATION/TRAINING PROGRAM

## 2017-01-01 PROCEDURE — 82550 ASSAY OF CK (CPK): CPT | Performed by: FAMILY MEDICINE

## 2017-01-01 PROCEDURE — 87077 CULTURE AEROBIC IDENTIFY: CPT | Performed by: NURSE PRACTITIONER

## 2017-01-01 PROCEDURE — 84156 ASSAY OF PROTEIN URINE: CPT

## 2017-01-01 PROCEDURE — 77030018836 HC SOL IRR NACL ICUM -A: Performed by: SURGERY

## 2017-01-01 PROCEDURE — 88305 TISSUE EXAM BY PATHOLOGIST: CPT | Performed by: NURSE PRACTITIONER

## 2017-01-01 PROCEDURE — 88112 CYTOPATH CELL ENHANCE TECH: CPT | Performed by: NURSE PRACTITIONER

## 2017-01-01 PROCEDURE — 77030020782 HC GWN BAIR PAWS FLX 3M -B

## 2017-01-01 PROCEDURE — 74011636320 HC RX REV CODE- 636/320: Performed by: SURGERY

## 2017-01-01 PROCEDURE — 76770 US EXAM ABDO BACK WALL COMP: CPT

## 2017-01-01 PROCEDURE — 84100 ASSAY OF PHOSPHORUS: CPT | Performed by: FAMILY MEDICINE

## 2017-01-01 PROCEDURE — 80053 COMPREHEN METABOLIC PANEL: CPT | Performed by: HOSPITALIST

## 2017-01-01 PROCEDURE — 85018 HEMOGLOBIN: CPT | Performed by: PHYSICAL MEDICINE & REHABILITATION

## 2017-01-01 PROCEDURE — 88184 FLOWCYTOMETRY/ TC 1 MARKER: CPT | Performed by: NURSE PRACTITIONER

## 2017-01-01 PROCEDURE — 86900 BLOOD TYPING SEROLOGIC ABO: CPT | Performed by: PHYSICAL MEDICINE & REHABILITATION

## 2017-01-01 PROCEDURE — 77030013079 HC BLNKT BAIR HGGR 3M -A: Performed by: ANESTHESIOLOGY

## 2017-01-01 PROCEDURE — 76536 US EXAM OF HEAD AND NECK: CPT

## 2017-01-01 RX ORDER — SODIUM CHLORIDE 9 MG/ML
50 INJECTION, SOLUTION INTRAVENOUS
Status: COMPLETED | OUTPATIENT
Start: 2017-01-01 | End: 2017-01-01

## 2017-01-01 RX ORDER — CARVEDILOL 6.25 MG/1
6.25 TABLET ORAL 2 TIMES DAILY WITH MEALS
Qty: 60 TAB | Refills: 2 | Status: SHIPPED
Start: 2017-01-01 | End: 2018-01-01 | Stop reason: SDUPTHER

## 2017-01-01 RX ORDER — HYDROMORPHONE HYDROCHLORIDE 1 MG/ML
.2-.5 INJECTION, SOLUTION INTRAMUSCULAR; INTRAVENOUS; SUBCUTANEOUS
Status: CANCELLED | OUTPATIENT
Start: 2017-01-01

## 2017-01-01 RX ORDER — ALPRAZOLAM 0.25 MG/1
0.25 TABLET ORAL
Status: DISCONTINUED | OUTPATIENT
Start: 2017-01-01 | End: 2017-01-01

## 2017-01-01 RX ORDER — FUROSEMIDE 80 MG/1
80 TABLET ORAL DAILY
COMMUNITY
End: 2017-01-01

## 2017-01-01 RX ORDER — MAGNESIUM SULFATE 100 %
4 CRYSTALS MISCELLANEOUS AS NEEDED
Status: DISCONTINUED | OUTPATIENT
Start: 2017-01-01 | End: 2017-01-01 | Stop reason: HOSPADM

## 2017-01-01 RX ORDER — INSULIN LISPRO 100 [IU]/ML
INJECTION, SOLUTION INTRAVENOUS; SUBCUTANEOUS EVERY 6 HOURS
Status: DISCONTINUED | OUTPATIENT
Start: 2017-01-01 | End: 2017-01-01

## 2017-01-01 RX ORDER — HYDROCODONE BITARTRATE AND ACETAMINOPHEN 5; 325 MG/1; MG/1
1 TABLET ORAL
Qty: 20 TAB | Refills: 0 | Status: SHIPPED | OUTPATIENT
Start: 2017-01-01 | End: 2017-01-01

## 2017-01-01 RX ORDER — OXYCODONE HYDROCHLORIDE 5 MG/1
5 TABLET ORAL
Status: DISCONTINUED | OUTPATIENT
Start: 2017-01-01 | End: 2017-01-01 | Stop reason: HOSPADM

## 2017-01-01 RX ORDER — DIPHENHYDRAMINE HYDROCHLORIDE 50 MG/ML
12.5 INJECTION, SOLUTION INTRAMUSCULAR; INTRAVENOUS AS NEEDED
Status: CANCELLED | OUTPATIENT
Start: 2017-01-01 | End: 2017-01-01

## 2017-01-01 RX ORDER — GUAIFENESIN 100 MG/5ML
81 LIQUID (ML) ORAL DAILY
Status: DISCONTINUED | OUTPATIENT
Start: 2017-01-01 | End: 2017-01-01 | Stop reason: HOSPADM

## 2017-01-01 RX ORDER — ATORVASTATIN CALCIUM 40 MG/1
40 TABLET, FILM COATED ORAL EVERY EVENING
Status: DISCONTINUED | OUTPATIENT
Start: 2017-01-01 | End: 2017-01-01 | Stop reason: HOSPADM

## 2017-01-01 RX ORDER — INSULIN LISPRO 100 [IU]/ML
INJECTION, SOLUTION INTRAVENOUS; SUBCUTANEOUS
Status: DISCONTINUED | OUTPATIENT
Start: 2017-01-01 | End: 2017-01-01 | Stop reason: HOSPADM

## 2017-01-01 RX ORDER — GLIPIZIDE 5 MG/1
2.5 TABLET ORAL 2 TIMES DAILY
Qty: 30 TAB | Refills: 1 | Status: SHIPPED | OUTPATIENT
Start: 2017-01-01 | End: 2018-01-01 | Stop reason: SDUPTHER

## 2017-01-01 RX ORDER — SODIUM CHLORIDE 0.9 % (FLUSH) 0.9 %
5 SYRINGE (ML) INJECTION EVERY 8 HOURS
Status: DISCONTINUED | OUTPATIENT
Start: 2017-01-01 | End: 2017-01-01

## 2017-01-01 RX ORDER — ASPIRIN 325 MG
325 TABLET ORAL ONCE
Status: COMPLETED | OUTPATIENT
Start: 2017-01-01 | End: 2017-01-01

## 2017-01-01 RX ORDER — LIDOCAINE HYDROCHLORIDE 10 MG/ML
INJECTION INFILTRATION; PERINEURAL AS NEEDED
Status: DISCONTINUED | OUTPATIENT
Start: 2017-01-01 | End: 2017-01-01 | Stop reason: HOSPADM

## 2017-01-01 RX ORDER — FUROSEMIDE 10 MG/ML
20 INJECTION INTRAMUSCULAR; INTRAVENOUS
Status: DISCONTINUED | OUTPATIENT
Start: 2017-01-01 | End: 2017-01-01

## 2017-01-01 RX ORDER — ATORVASTATIN CALCIUM 20 MG/1
20 TABLET, FILM COATED ORAL DAILY
Status: DISCONTINUED | OUTPATIENT
Start: 2017-01-01 | End: 2017-01-01 | Stop reason: SDUPTHER

## 2017-01-01 RX ORDER — MORPHINE SULFATE 10 MG/ML
2 INJECTION, SOLUTION INTRAMUSCULAR; INTRAVENOUS
Status: CANCELLED | OUTPATIENT
Start: 2017-01-01

## 2017-01-01 RX ORDER — POLYETHYLENE GLYCOL 3350 17 G/17G
17 POWDER, FOR SOLUTION ORAL DAILY
Status: DISCONTINUED | OUTPATIENT
Start: 2017-01-01 | End: 2017-01-01

## 2017-01-01 RX ORDER — MAGNESIUM SULFATE 1 G/100ML
1 INJECTION INTRAVENOUS
Status: COMPLETED | OUTPATIENT
Start: 2017-01-01 | End: 2017-01-01

## 2017-01-01 RX ORDER — MAGNESIUM SULFATE 1 G/100ML
1 INJECTION INTRAVENOUS ONCE
Status: COMPLETED | OUTPATIENT
Start: 2017-01-01 | End: 2017-01-01

## 2017-01-01 RX ORDER — HYDROCODONE BITARTRATE AND ACETAMINOPHEN 5; 325 MG/1; MG/1
1 TABLET ORAL
Status: DISCONTINUED | OUTPATIENT
Start: 2017-01-01 | End: 2017-01-01

## 2017-01-01 RX ORDER — FENTANYL CITRATE 50 UG/ML
INJECTION, SOLUTION INTRAMUSCULAR; INTRAVENOUS AS NEEDED
Status: DISCONTINUED | OUTPATIENT
Start: 2017-01-01 | End: 2017-01-01 | Stop reason: HOSPADM

## 2017-01-01 RX ORDER — KETOROLAC TROMETHAMINE 30 MG/ML
INJECTION, SOLUTION INTRAMUSCULAR; INTRAVENOUS AS NEEDED
Status: DISCONTINUED | OUTPATIENT
Start: 2017-01-01 | End: 2017-01-01 | Stop reason: HOSPADM

## 2017-01-01 RX ORDER — CEFAZOLIN SODIUM IN 0.9 % NACL 2 G/100 ML
2 PLASTIC BAG, INJECTION (ML) INTRAVENOUS ONCE
Status: COMPLETED | OUTPATIENT
Start: 2017-01-01 | End: 2017-01-01

## 2017-01-01 RX ORDER — DEXAMETHASONE 0.5 MG/1
TABLET ORAL
COMMUNITY
Start: 2017-01-01 | End: 2017-01-01

## 2017-01-01 RX ORDER — DIAZEPAM 5 MG/1
5 TABLET ORAL
Status: DISCONTINUED | OUTPATIENT
Start: 2017-01-01 | End: 2017-01-01

## 2017-01-01 RX ORDER — INSULIN GLARGINE 100 [IU]/ML
12 INJECTION, SOLUTION SUBCUTANEOUS
Qty: 1 VIAL | Refills: 0 | Status: SHIPPED
Start: 2017-01-01 | End: 2017-01-01

## 2017-01-01 RX ORDER — GLIPIZIDE AND METFORMIN HCL 5; 500 MG/1; MG/1
2 TABLET, FILM COATED ORAL
COMMUNITY
End: 2017-01-01

## 2017-01-01 RX ORDER — ADHESIVE BANDAGE
30 BANDAGE TOPICAL DAILY PRN
Status: DISCONTINUED | OUTPATIENT
Start: 2017-01-01 | End: 2017-01-01 | Stop reason: HOSPADM

## 2017-01-01 RX ORDER — FACIAL-BODY WIPES
10 EACH TOPICAL DAILY PRN
Status: DISCONTINUED | OUTPATIENT
Start: 2017-01-01 | End: 2017-01-01 | Stop reason: HOSPADM

## 2017-01-01 RX ORDER — OXYCODONE HYDROCHLORIDE 5 MG/1
7.5 TABLET ORAL
Status: DISCONTINUED | OUTPATIENT
Start: 2017-01-01 | End: 2017-01-01 | Stop reason: HOSPADM

## 2017-01-01 RX ORDER — LOPERAMIDE HYDROCHLORIDE 2 MG/1
2 CAPSULE ORAL
Status: DISCONTINUED | OUTPATIENT
Start: 2017-01-01 | End: 2017-01-01 | Stop reason: HOSPADM

## 2017-01-01 RX ORDER — GLYCOPYRROLATE 0.2 MG/ML
INJECTION INTRAMUSCULAR; INTRAVENOUS AS NEEDED
Status: DISCONTINUED | OUTPATIENT
Start: 2017-01-01 | End: 2017-01-01 | Stop reason: HOSPADM

## 2017-01-01 RX ORDER — DEXTROSE 50 % IN WATER (D50W) INTRAVENOUS SYRINGE
25 ONCE
Status: COMPLETED | OUTPATIENT
Start: 2017-01-01 | End: 2017-01-01

## 2017-01-01 RX ORDER — CARVEDILOL 6.25 MG/1
6.25 TABLET ORAL 2 TIMES DAILY WITH MEALS
Status: DISCONTINUED | OUTPATIENT
Start: 2017-01-01 | End: 2017-01-01 | Stop reason: HOSPADM

## 2017-01-01 RX ORDER — ALPRAZOLAM 0.25 MG/1
TABLET ORAL
Qty: 10 TAB | Refills: 0 | Status: SHIPPED | OUTPATIENT
Start: 2017-01-01 | End: 2017-01-01 | Stop reason: ALTCHOICE

## 2017-01-01 RX ORDER — DEXAMETHASONE 1 MG/1
1 TABLET ORAL 2 TIMES DAILY WITH MEALS
Qty: 60 TAB | Refills: 0 | Status: SHIPPED
Start: 2017-01-01 | End: 2017-01-01

## 2017-01-01 RX ORDER — SERTRALINE HYDROCHLORIDE 50 MG/1
25 TABLET, FILM COATED ORAL DAILY
Status: DISCONTINUED | OUTPATIENT
Start: 2017-01-01 | End: 2017-01-01

## 2017-01-01 RX ORDER — CALCIUM GLUCONATE 94 MG/ML
1 INJECTION, SOLUTION INTRAVENOUS ONCE
Status: DISCONTINUED | OUTPATIENT
Start: 2017-01-01 | End: 2017-01-01 | Stop reason: SDUPTHER

## 2017-01-01 RX ORDER — SODIUM CHLORIDE 0.9 % (FLUSH) 0.9 %
5-10 SYRINGE (ML) INJECTION AS NEEDED
Status: CANCELLED | OUTPATIENT
Start: 2017-01-01

## 2017-01-01 RX ORDER — ATORVASTATIN CALCIUM 40 MG/1
40 TABLET, FILM COATED ORAL
Status: DISCONTINUED | OUTPATIENT
Start: 2017-01-01 | End: 2017-01-01 | Stop reason: HOSPADM

## 2017-01-01 RX ORDER — ONDANSETRON 2 MG/ML
4 INJECTION INTRAMUSCULAR; INTRAVENOUS
Status: DISCONTINUED | OUTPATIENT
Start: 2017-01-01 | End: 2017-01-01

## 2017-01-01 RX ORDER — LANOLIN ALCOHOL/MO/W.PET/CERES
CREAM (GRAM) TOPICAL
COMMUNITY
End: 2017-01-01

## 2017-01-01 RX ORDER — MAGNESIUM SULFATE 100 %
CRYSTALS MISCELLANEOUS
Status: COMPLETED
Start: 2017-01-01 | End: 2017-01-01

## 2017-01-01 RX ORDER — SIMETHICONE 80 MG
80 TABLET,CHEWABLE ORAL
Status: DISCONTINUED | OUTPATIENT
Start: 2017-01-01 | End: 2017-01-01 | Stop reason: HOSPADM

## 2017-01-01 RX ORDER — SERTRALINE HYDROCHLORIDE 50 MG/1
50 TABLET, FILM COATED ORAL DAILY
COMMUNITY
Start: 2017-01-01 | End: 2018-01-01 | Stop reason: SDUPTHER

## 2017-01-01 RX ORDER — THERA TABS 400 MCG
1 TAB ORAL DAILY
Status: DISCONTINUED | OUTPATIENT
Start: 2017-01-01 | End: 2017-01-01 | Stop reason: HOSPADM

## 2017-01-01 RX ORDER — SODIUM CHLORIDE 0.9 % (FLUSH) 0.9 %
5-10 SYRINGE (ML) INJECTION AS NEEDED
Status: DISCONTINUED | OUTPATIENT
Start: 2017-01-01 | End: 2017-01-01 | Stop reason: HOSPADM

## 2017-01-01 RX ORDER — ACETAMINOPHEN 10 MG/ML
INJECTION, SOLUTION INTRAVENOUS AS NEEDED
Status: DISCONTINUED | OUTPATIENT
Start: 2017-01-01 | End: 2017-01-01 | Stop reason: HOSPADM

## 2017-01-01 RX ORDER — INSULIN GLARGINE 100 [IU]/ML
12 INJECTION, SOLUTION SUBCUTANEOUS
Status: DISCONTINUED | OUTPATIENT
Start: 2017-01-01 | End: 2017-01-01 | Stop reason: ALTCHOICE

## 2017-01-01 RX ORDER — SODIUM CHLORIDE 0.9 % (FLUSH) 0.9 %
5 SYRINGE (ML) INJECTION EVERY 8 HOURS
Status: DISCONTINUED | OUTPATIENT
Start: 2017-01-01 | End: 2017-01-01 | Stop reason: HOSPADM

## 2017-01-01 RX ORDER — SODIUM CHLORIDE 0.9 % (FLUSH) 0.9 %
5-10 SYRINGE (ML) INJECTION EVERY 8 HOURS
Status: DISCONTINUED | OUTPATIENT
Start: 2017-01-01 | End: 2017-01-01 | Stop reason: HOSPADM

## 2017-01-01 RX ORDER — SODIUM CHLORIDE 0.9 % (FLUSH) 0.9 %
10 SYRINGE (ML) INJECTION
Status: COMPLETED | OUTPATIENT
Start: 2017-01-01 | End: 2017-01-01

## 2017-01-01 RX ORDER — ONDANSETRON 2 MG/ML
4 INJECTION INTRAMUSCULAR; INTRAVENOUS AS NEEDED
Status: CANCELLED | OUTPATIENT
Start: 2017-01-01

## 2017-01-01 RX ORDER — AMOXICILLIN 250 MG
2 CAPSULE ORAL DAILY
Status: DISCONTINUED | OUTPATIENT
Start: 2017-01-01 | End: 2017-01-01 | Stop reason: HOSPADM

## 2017-01-01 RX ORDER — ALPRAZOLAM 0.25 MG/1
TABLET ORAL
COMMUNITY
End: 2017-01-01 | Stop reason: SDUPTHER

## 2017-01-01 RX ORDER — HYDROCODONE BITARTRATE AND ACETAMINOPHEN 5; 325 MG/1; MG/1
1-2 TABLET ORAL
Qty: 15 TAB | Refills: 0 | Status: SHIPPED | OUTPATIENT
Start: 2017-01-01 | End: 2017-01-01 | Stop reason: ALTCHOICE

## 2017-01-01 RX ORDER — OXYCODONE HYDROCHLORIDE 5 MG/1
5 TABLET ORAL
Qty: 90 TAB | Refills: 0 | Status: SHIPPED | OUTPATIENT
Start: 2017-01-01 | End: 2017-01-01 | Stop reason: SDUPTHER

## 2017-01-01 RX ORDER — ALPRAZOLAM 0.25 MG/1
0.25 TABLET ORAL
Status: ON HOLD | COMMUNITY
End: 2017-01-01

## 2017-01-01 RX ORDER — HYDROMORPHONE HYDROCHLORIDE 5 MG/5ML
1 SOLUTION ORAL
Status: DISCONTINUED | OUTPATIENT
Start: 2017-01-01 | End: 2017-01-01

## 2017-01-01 RX ORDER — FERROUS SULFATE, DRIED 160(50) MG
1 TABLET, EXTENDED RELEASE ORAL 2 TIMES DAILY WITH MEALS
COMMUNITY
End: 2017-01-01

## 2017-01-01 RX ORDER — NYSTATIN 100000 [USP'U]/ML
500000 SUSPENSION ORAL 3 TIMES DAILY
Status: DISCONTINUED | OUTPATIENT
Start: 2017-01-01 | End: 2017-01-01 | Stop reason: SDUPTHER

## 2017-01-01 RX ORDER — ALPRAZOLAM 0.25 MG/1
0.25 TABLET ORAL
Status: DISCONTINUED | OUTPATIENT
Start: 2017-01-01 | End: 2017-01-01 | Stop reason: HOSPADM

## 2017-01-01 RX ORDER — NYSTATIN 100000 [USP'U]/ML
500000 SUSPENSION ORAL 4 TIMES DAILY
Qty: 100 ML | Refills: 0 | Status: SHIPPED
Start: 2017-01-01 | End: 2017-01-01

## 2017-01-01 RX ORDER — OXYBUTYNIN CHLORIDE 5 MG/1
5 TABLET ORAL
COMMUNITY
Start: 2017-01-01 | End: 2017-01-01

## 2017-01-01 RX ORDER — NITROGLYCERIN 0.4 MG/1
0.4 TABLET SUBLINGUAL
Status: DISCONTINUED | OUTPATIENT
Start: 2017-01-01 | End: 2017-01-01 | Stop reason: HOSPADM

## 2017-01-01 RX ORDER — GUAIFENESIN 100 MG/5ML
81 LIQUID (ML) ORAL DAILY
Qty: 30 TAB | Refills: 2 | Status: SHIPPED
Start: 2017-01-01 | End: 2017-01-01

## 2017-01-01 RX ORDER — INSULIN GLARGINE 100 [IU]/ML
12 INJECTION, SOLUTION SUBCUTANEOUS
Status: DISCONTINUED | OUTPATIENT
Start: 2017-01-01 | End: 2017-01-01 | Stop reason: HOSPADM

## 2017-01-01 RX ORDER — ALPRAZOLAM 0.25 MG/1
TABLET ORAL
Qty: 90 TAB | Refills: 0 | Status: SHIPPED | OUTPATIENT
Start: 2017-01-01 | End: 2017-01-01

## 2017-01-01 RX ORDER — PHENYLEPHRINE HCL IN 0.9% NACL 0.4MG/10ML
SYRINGE (ML) INTRAVENOUS AS NEEDED
Status: DISCONTINUED | OUTPATIENT
Start: 2017-01-01 | End: 2017-01-01 | Stop reason: HOSPADM

## 2017-01-01 RX ORDER — NYSTATIN 100000 [USP'U]/G
POWDER TOPICAL 4 TIMES DAILY
Qty: 60 G | Refills: 2 | Status: SHIPPED | OUTPATIENT
Start: 2017-01-01

## 2017-01-01 RX ORDER — LIDOCAINE HYDROCHLORIDE 20 MG/ML
INJECTION, SOLUTION EPIDURAL; INFILTRATION; INTRACAUDAL; PERINEURAL AS NEEDED
Status: DISCONTINUED | OUTPATIENT
Start: 2017-01-01 | End: 2017-01-01 | Stop reason: HOSPADM

## 2017-01-01 RX ORDER — DEXAMETHASONE 1 MG/1
1 TABLET ORAL EVERY 12 HOURS
Status: DISCONTINUED | OUTPATIENT
Start: 2017-01-01 | End: 2017-01-01 | Stop reason: HOSPADM

## 2017-01-01 RX ORDER — LEVOFLOXACIN 500 MG/1
500 TABLET, FILM COATED ORAL EVERY 24 HOURS
Status: DISCONTINUED | OUTPATIENT
Start: 2017-01-01 | End: 2017-01-01

## 2017-01-01 RX ORDER — HEPARIN SODIUM 5000 [USP'U]/ML
5000 INJECTION, SOLUTION INTRAVENOUS; SUBCUTANEOUS EVERY 12 HOURS
Status: DISCONTINUED | OUTPATIENT
Start: 2017-01-01 | End: 2017-01-01 | Stop reason: HOSPADM

## 2017-01-01 RX ORDER — MEGESTROL ACETATE 40 MG/ML
200 SUSPENSION ORAL DAILY
Qty: 100 ML | Refills: 0 | Status: SHIPPED
Start: 2017-01-01 | End: 2017-01-01 | Stop reason: SDUPTHER

## 2017-01-01 RX ORDER — SODIUM CHLORIDE, SODIUM LACTATE, POTASSIUM CHLORIDE, CALCIUM CHLORIDE 600; 310; 30; 20 MG/100ML; MG/100ML; MG/100ML; MG/100ML
25 INJECTION, SOLUTION INTRAVENOUS CONTINUOUS
Status: DISCONTINUED | OUTPATIENT
Start: 2017-01-01 | End: 2017-01-01 | Stop reason: HOSPADM

## 2017-01-01 RX ORDER — POTASSIUM CHLORIDE 1500 MG/1
20 TABLET, EXTENDED RELEASE ORAL DAILY
COMMUNITY
Start: 2017-01-01 | End: 2017-01-01

## 2017-01-01 RX ORDER — ONDANSETRON 2 MG/ML
4 INJECTION INTRAMUSCULAR; INTRAVENOUS
Status: DISCONTINUED | OUTPATIENT
Start: 2017-01-01 | End: 2017-01-01 | Stop reason: HOSPADM

## 2017-01-01 RX ORDER — DEXAMETHASONE 0.5 MG/1
1 TABLET ORAL EVERY 12 HOURS
Status: DISCONTINUED | OUTPATIENT
Start: 2017-01-01 | End: 2017-01-01 | Stop reason: HOSPADM

## 2017-01-01 RX ORDER — PANTOPRAZOLE SODIUM 40 MG/1
40 TABLET, DELAYED RELEASE ORAL
Status: DISCONTINUED | OUTPATIENT
Start: 2017-01-01 | End: 2017-01-01 | Stop reason: HOSPADM

## 2017-01-01 RX ORDER — DEXTROSE MONOHYDRATE 50 MG/ML
75 INJECTION, SOLUTION INTRAVENOUS CONTINUOUS
Status: DISCONTINUED | OUTPATIENT
Start: 2017-01-01 | End: 2017-01-01

## 2017-01-01 RX ORDER — NYSTATIN 100000 U/G
CREAM TOPICAL 2 TIMES DAILY
Qty: 15 G | Refills: 0 | Status: SHIPPED | OUTPATIENT
Start: 2017-01-01 | End: 2017-01-01 | Stop reason: ALTCHOICE

## 2017-01-01 RX ORDER — CALCITONIN SALMON 200 [USP'U]/ML
100 INJECTION, SOLUTION INTRAMUSCULAR; SUBCUTANEOUS EVERY 12 HOURS
Status: DISCONTINUED | OUTPATIENT
Start: 2017-01-01 | End: 2017-01-01 | Stop reason: HOSPADM

## 2017-01-01 RX ORDER — NYSTATIN 100000 [USP'U]/ML
500000 SUSPENSION ORAL 4 TIMES DAILY
Status: DISPENSED | OUTPATIENT
Start: 2017-01-01 | End: 2017-01-01

## 2017-01-01 RX ORDER — LIDOCAINE HYDROCHLORIDE 10 MG/ML
0.1 INJECTION, SOLUTION EPIDURAL; INFILTRATION; INTRACAUDAL; PERINEURAL AS NEEDED
Status: DISCONTINUED | OUTPATIENT
Start: 2017-01-01 | End: 2017-01-01 | Stop reason: HOSPADM

## 2017-01-01 RX ORDER — CIPROFLOXACIN 250 MG/1
250 TABLET, FILM COATED ORAL 2 TIMES DAILY
Qty: 20 TAB | Refills: 0 | Status: SHIPPED | OUTPATIENT
Start: 2017-01-01 | End: 2017-01-01 | Stop reason: ALTCHOICE

## 2017-01-01 RX ORDER — PANTOPRAZOLE SODIUM 40 MG/1
TABLET, DELAYED RELEASE ORAL
COMMUNITY
Start: 2017-01-01 | End: 2017-01-01 | Stop reason: SDUPTHER

## 2017-01-01 RX ORDER — MEGESTROL ACETATE 40 MG/ML
200 SUSPENSION ORAL DAILY
Status: DISCONTINUED | OUTPATIENT
Start: 2017-01-01 | End: 2017-01-01 | Stop reason: HOSPADM

## 2017-01-01 RX ORDER — SODIUM CHLORIDE 9 MG/ML
100 INJECTION, SOLUTION INTRAVENOUS CONTINUOUS
Status: DISCONTINUED | OUTPATIENT
Start: 2017-01-01 | End: 2017-01-01

## 2017-01-01 RX ORDER — LANOLIN ALCOHOL/MO/W.PET/CERES
1 CREAM (GRAM) TOPICAL 2 TIMES DAILY WITH MEALS
Status: DISCONTINUED | OUTPATIENT
Start: 2017-01-01 | End: 2017-01-01 | Stop reason: HOSPADM

## 2017-01-01 RX ORDER — POTASSIUM CHLORIDE 750 MG/1
20 TABLET, FILM COATED, EXTENDED RELEASE ORAL DAILY
Status: DISCONTINUED | OUTPATIENT
Start: 2017-01-01 | End: 2017-01-01

## 2017-01-01 RX ORDER — METOPROLOL TARTRATE 25 MG/1
25 TABLET, FILM COATED ORAL 2 TIMES DAILY
Status: DISCONTINUED | OUTPATIENT
Start: 2017-01-01 | End: 2017-01-01

## 2017-01-01 RX ORDER — FUROSEMIDE 10 MG/ML
20 INJECTION INTRAMUSCULAR; INTRAVENOUS ONCE
Status: COMPLETED | OUTPATIENT
Start: 2017-01-01 | End: 2017-01-01

## 2017-01-01 RX ORDER — LEVOFLOXACIN 500 MG/1
500 TABLET, FILM COATED ORAL DAILY
Qty: 8 TAB | Refills: 0 | Status: SHIPPED
Start: 2017-01-01 | End: 2017-01-01

## 2017-01-01 RX ORDER — SODIUM CHLORIDE 9 MG/ML
75 INJECTION, SOLUTION INTRAVENOUS CONTINUOUS
Status: DISCONTINUED | OUTPATIENT
Start: 2017-01-01 | End: 2017-01-01

## 2017-01-01 RX ORDER — GLIPIZIDE 5 MG/1
10 TABLET ORAL
Status: DISCONTINUED | OUTPATIENT
Start: 2017-01-01 | End: 2017-01-01

## 2017-01-01 RX ORDER — HYDROMORPHONE HYDROCHLORIDE 1 MG/ML
1 INJECTION, SOLUTION INTRAMUSCULAR; INTRAVENOUS; SUBCUTANEOUS ONCE
Status: DISPENSED | OUTPATIENT
Start: 2017-01-01 | End: 2017-01-01

## 2017-01-01 RX ORDER — ROCURONIUM BROMIDE 10 MG/ML
INJECTION, SOLUTION INTRAVENOUS AS NEEDED
Status: DISCONTINUED | OUTPATIENT
Start: 2017-01-01 | End: 2017-01-01 | Stop reason: HOSPADM

## 2017-01-01 RX ORDER — SERTRALINE HYDROCHLORIDE 50 MG/1
50 TABLET, FILM COATED ORAL DAILY
Status: DISCONTINUED | OUTPATIENT
Start: 2017-01-01 | End: 2017-01-01 | Stop reason: HOSPADM

## 2017-01-01 RX ORDER — GLIPIZIDE 5 MG/1
5 TABLET ORAL
Status: DISCONTINUED | OUTPATIENT
Start: 2017-01-01 | End: 2017-01-01

## 2017-01-01 RX ORDER — AMOXICILLIN 250 MG
1 CAPSULE ORAL 2 TIMES DAILY
Status: DISCONTINUED | OUTPATIENT
Start: 2017-01-01 | End: 2017-01-01

## 2017-01-01 RX ORDER — MEGESTROL ACETATE 40 MG/ML
200 SUSPENSION ORAL DAILY
Qty: 100 ML | Refills: 0 | Status: SHIPPED | OUTPATIENT
Start: 2017-01-01

## 2017-01-01 RX ORDER — GLIPIZIDE 5 MG/1
2.5 TABLET ORAL DAILY
Qty: 30 TAB | Refills: 1 | Status: SHIPPED | OUTPATIENT
Start: 2017-01-01 | End: 2017-01-01 | Stop reason: SDUPTHER

## 2017-01-01 RX ORDER — NYSTATIN 100000 U/G
CREAM TOPICAL
COMMUNITY
Start: 2017-01-01

## 2017-01-01 RX ORDER — FENTANYL CITRATE 50 UG/ML
50 INJECTION, SOLUTION INTRAMUSCULAR; INTRAVENOUS AS NEEDED
Status: DISCONTINUED | OUTPATIENT
Start: 2017-01-01 | End: 2017-01-01 | Stop reason: HOSPADM

## 2017-01-01 RX ORDER — HYDROMORPHONE HYDROCHLORIDE 1 MG/ML
1 INJECTION, SOLUTION INTRAMUSCULAR; INTRAVENOUS; SUBCUTANEOUS
Status: DISCONTINUED | OUTPATIENT
Start: 2017-01-01 | End: 2017-01-01

## 2017-01-01 RX ORDER — OXYCODONE HYDROCHLORIDE 5 MG/1
5 TABLET ORAL
Qty: 90 TAB | Refills: 0 | Status: SHIPPED | OUTPATIENT
Start: 2017-01-01

## 2017-01-01 RX ORDER — ACETAMINOPHEN 500 MG
1000 TABLET ORAL AS NEEDED
Status: DISCONTINUED | OUTPATIENT
Start: 2017-01-01 | End: 2017-01-01 | Stop reason: HOSPADM

## 2017-01-01 RX ORDER — POTASSIUM CHLORIDE 750 MG/1
20 TABLET, FILM COATED, EXTENDED RELEASE ORAL
Status: COMPLETED | OUTPATIENT
Start: 2017-01-01 | End: 2017-01-01

## 2017-01-01 RX ORDER — INSULIN LISPRO 100 [IU]/ML
INJECTION, SOLUTION INTRAVENOUS; SUBCUTANEOUS
Qty: 1 VIAL | Refills: 0 | Status: SHIPPED
Start: 2017-01-01 | End: 2017-01-01

## 2017-01-01 RX ORDER — METHYLPHENIDATE HYDROCHLORIDE 5 MG/1
2.5 TABLET ORAL 2 TIMES DAILY
Status: DISCONTINUED | OUTPATIENT
Start: 2017-01-01 | End: 2017-01-01

## 2017-01-01 RX ORDER — BARIUM SULFATE 20 MG/ML
900 SUSPENSION ORAL
Status: DISCONTINUED | OUTPATIENT
Start: 2017-01-01 | End: 2017-01-01 | Stop reason: SINTOL

## 2017-01-01 RX ORDER — POTASSIUM CHLORIDE 750 MG/1
40 TABLET, FILM COATED, EXTENDED RELEASE ORAL
Status: COMPLETED | OUTPATIENT
Start: 2017-01-01 | End: 2017-01-01

## 2017-01-01 RX ORDER — GENTAMICIN SULFATE 3 MG/ML
2 SOLUTION/ DROPS OPHTHALMIC 4 TIMES DAILY
Qty: 1 BOTTLE | Refills: 0 | Status: SHIPPED | OUTPATIENT
Start: 2017-01-01 | End: 2017-01-01

## 2017-01-01 RX ORDER — SODIUM CHLORIDE 0.9 % (FLUSH) 0.9 %
10 SYRINGE (ML) INJECTION AS NEEDED
Status: DISCONTINUED | OUTPATIENT
Start: 2017-01-01 | End: 2017-01-01

## 2017-01-01 RX ORDER — TALC
250 POWDER (GRAM) TOPICAL
COMMUNITY

## 2017-01-01 RX ORDER — MIRTAZAPINE 15 MG/1
15 TABLET, FILM COATED ORAL
Status: DISCONTINUED | OUTPATIENT
Start: 2017-01-01 | End: 2017-01-01 | Stop reason: HOSPADM

## 2017-01-01 RX ORDER — MEGESTROL ACETATE 40 MG/ML
SUSPENSION ORAL
COMMUNITY
Start: 2017-01-01 | End: 2017-01-01 | Stop reason: SDUPTHER

## 2017-01-01 RX ORDER — FENTANYL CITRATE 50 UG/ML
25 INJECTION, SOLUTION INTRAMUSCULAR; INTRAVENOUS
Status: CANCELLED | OUTPATIENT
Start: 2017-01-01

## 2017-01-01 RX ORDER — METFORMIN HYDROCHLORIDE 500 MG/1
500 TABLET ORAL 2 TIMES DAILY WITH MEALS
COMMUNITY
Start: 2017-01-01 | End: 2017-01-01

## 2017-01-01 RX ORDER — POTASSIUM CHLORIDE AND SODIUM CHLORIDE 900; 300 MG/100ML; MG/100ML
INJECTION, SOLUTION INTRAVENOUS CONTINUOUS
Status: DISCONTINUED | OUTPATIENT
Start: 2017-01-01 | End: 2017-01-01

## 2017-01-01 RX ORDER — LANOLIN ALCOHOL/MO/W.PET/CERES
1 CREAM (GRAM) TOPICAL
Status: DISCONTINUED | OUTPATIENT
Start: 2017-01-01 | End: 2017-01-01

## 2017-01-01 RX ORDER — GLIPIZIDE 5 MG/1
5 TABLET ORAL
Status: DISCONTINUED | OUTPATIENT
Start: 2017-01-01 | End: 2017-01-01 | Stop reason: ALTCHOICE

## 2017-01-01 RX ORDER — METFORMIN HYDROCHLORIDE 500 MG/1
500 TABLET ORAL
Status: DISCONTINUED | OUTPATIENT
Start: 2017-01-01 | End: 2017-01-01

## 2017-01-01 RX ORDER — MAGNESIUM SULFATE HEPTAHYDRATE 40 MG/ML
2 INJECTION, SOLUTION INTRAVENOUS ONCE
Status: COMPLETED | OUTPATIENT
Start: 2017-01-01 | End: 2017-01-01

## 2017-01-01 RX ORDER — FUROSEMIDE 40 MG/1
40 TABLET ORAL DAILY
Status: DISCONTINUED | OUTPATIENT
Start: 2017-01-01 | End: 2017-01-01

## 2017-01-01 RX ORDER — FUROSEMIDE 20 MG/1
20 TABLET ORAL DAILY
Status: DISCONTINUED | OUTPATIENT
Start: 2017-01-01 | End: 2017-01-01

## 2017-01-01 RX ORDER — NYSTATIN 100000 [USP'U]/ML
500000 SUSPENSION ORAL 4 TIMES DAILY
Qty: 200 ML | Refills: 0 | Status: SHIPPED | OUTPATIENT
Start: 2017-01-01 | End: 2017-01-01 | Stop reason: ALTCHOICE

## 2017-01-01 RX ORDER — MIDAZOLAM HYDROCHLORIDE 1 MG/ML
0.5 INJECTION, SOLUTION INTRAMUSCULAR; INTRAVENOUS
Status: CANCELLED | OUTPATIENT
Start: 2017-01-01

## 2017-01-01 RX ORDER — METFORMIN HYDROCHLORIDE 500 MG/1
500 TABLET ORAL 2 TIMES DAILY WITH MEALS
Status: DISCONTINUED | OUTPATIENT
Start: 2017-01-01 | End: 2017-01-01 | Stop reason: HOSPADM

## 2017-01-01 RX ORDER — ONDANSETRON 4 MG/1
4 TABLET, ORALLY DISINTEGRATING ORAL
Status: DISCONTINUED | OUTPATIENT
Start: 2017-01-01 | End: 2017-01-01 | Stop reason: HOSPADM

## 2017-01-01 RX ORDER — ATORVASTATIN CALCIUM 40 MG/1
40 TABLET, FILM COATED ORAL DAILY
Qty: 30 TAB | Refills: 2 | Status: SHIPPED
Start: 2017-01-01

## 2017-01-01 RX ORDER — NITROGLYCERIN 0.4 MG/1
0.4 TABLET SUBLINGUAL
Qty: 10 TAB | Refills: 0 | Status: SHIPPED
Start: 2017-01-01 | End: 2017-01-01

## 2017-01-01 RX ORDER — NYSTATIN 100000 [USP'U]/ML
SUSPENSION ORAL
COMMUNITY
Start: 2017-01-01

## 2017-01-01 RX ORDER — SODIUM POLYSTYRENE SULFONATE 15 G/60ML
30 SUSPENSION ORAL; RECTAL
Status: COMPLETED | OUTPATIENT
Start: 2017-01-01 | End: 2017-01-01

## 2017-01-01 RX ORDER — SODIUM CHLORIDE 9 MG/ML
500 INJECTION, SOLUTION INTRAVENOUS ONCE
Status: COMPLETED | OUTPATIENT
Start: 2017-01-01 | End: 2017-01-01

## 2017-01-01 RX ORDER — ATORVASTATIN CALCIUM 20 MG/1
20 TABLET, FILM COATED ORAL DAILY
Status: ON HOLD | COMMUNITY
End: 2017-01-01

## 2017-01-01 RX ORDER — NEOSTIGMINE METHYLSULFATE 1 MG/ML
INJECTION INTRAVENOUS AS NEEDED
Status: DISCONTINUED | OUTPATIENT
Start: 2017-01-01 | End: 2017-01-01 | Stop reason: HOSPADM

## 2017-01-01 RX ORDER — SAME BUTANEDISULFONATE/BETAINE 400-600 MG
250 POWDER IN PACKET (EA) ORAL 2 TIMES DAILY
Qty: 60 CAP | Refills: 0 | Status: SHIPPED
Start: 2017-01-01 | End: 2017-01-01

## 2017-01-01 RX ORDER — SODIUM CHLORIDE 0.9 % (FLUSH) 0.9 %
10 SYRINGE (ML) INJECTION AS NEEDED
Status: DISCONTINUED | OUTPATIENT
Start: 2017-01-01 | End: 2017-01-01 | Stop reason: HOSPADM

## 2017-01-01 RX ORDER — MIDAZOLAM HYDROCHLORIDE 1 MG/ML
INJECTION, SOLUTION INTRAMUSCULAR; INTRAVENOUS AS NEEDED
Status: DISCONTINUED | OUTPATIENT
Start: 2017-01-01 | End: 2017-01-01 | Stop reason: HOSPADM

## 2017-01-01 RX ORDER — ONDANSETRON 2 MG/ML
INJECTION INTRAMUSCULAR; INTRAVENOUS AS NEEDED
Status: DISCONTINUED | OUTPATIENT
Start: 2017-01-01 | End: 2017-01-01 | Stop reason: HOSPADM

## 2017-01-01 RX ORDER — OXYBUTYNIN CHLORIDE 5 MG/1
5 TABLET, EXTENDED RELEASE ORAL
Status: DISCONTINUED | OUTPATIENT
Start: 2017-01-01 | End: 2017-01-01 | Stop reason: HOSPADM

## 2017-01-01 RX ORDER — INSULIN GLARGINE 100 [IU]/ML
8 INJECTION, SOLUTION SUBCUTANEOUS
Status: DISCONTINUED | OUTPATIENT
Start: 2017-01-01 | End: 2017-01-01

## 2017-01-01 RX ORDER — AMOXICILLIN 250 MG
2 CAPSULE ORAL DAILY
Qty: 60 TAB | Refills: 0 | Status: SHIPPED
Start: 2017-01-01 | End: 2017-01-01

## 2017-01-01 RX ORDER — PANTOPRAZOLE SODIUM 40 MG/1
40 TABLET, DELAYED RELEASE ORAL DAILY
Qty: 30 TAB | Refills: 3 | Status: SHIPPED | OUTPATIENT
Start: 2017-01-01

## 2017-01-01 RX ORDER — THERA TABS 400 MCG
1 TAB ORAL DAILY
COMMUNITY
End: 2017-01-01

## 2017-01-01 RX ORDER — MIRTAZAPINE 15 MG/1
15 TABLET, FILM COATED ORAL
Qty: 30 TAB | Refills: 0 | Status: SHIPPED
Start: 2017-01-01 | End: 2017-01-01 | Stop reason: SDUPTHER

## 2017-01-01 RX ORDER — ALBUMIN HUMAN 250 G/1000ML
25 SOLUTION INTRAVENOUS EVERY 6 HOURS
Status: COMPLETED | OUTPATIENT
Start: 2017-01-01 | End: 2017-01-01

## 2017-01-01 RX ORDER — ONDANSETRON 4 MG/1
4 TABLET, ORALLY DISINTEGRATING ORAL
Qty: 40 TAB | Refills: 1 | Status: SHIPPED | OUTPATIENT
Start: 2017-01-01

## 2017-01-01 RX ORDER — DEXTROSE 50 % IN WATER (D50W) INTRAVENOUS SYRINGE
12.5-25 AS NEEDED
Status: DISCONTINUED | OUTPATIENT
Start: 2017-01-01 | End: 2017-01-01 | Stop reason: HOSPADM

## 2017-01-01 RX ORDER — NYSTATIN 100000 [USP'U]/ML
500000 SUSPENSION ORAL 4 TIMES DAILY
Status: DISCONTINUED | OUTPATIENT
Start: 2017-01-01 | End: 2017-01-01 | Stop reason: HOSPADM

## 2017-01-01 RX ORDER — PROCHLORPERAZINE MALEATE 10 MG
5 TABLET ORAL
Qty: 40 TAB | Refills: 1 | Status: SHIPPED | OUTPATIENT
Start: 2017-01-01 | End: 2017-01-01

## 2017-01-01 RX ORDER — POTASSIUM CHLORIDE 750 MG/1
10 TABLET, FILM COATED, EXTENDED RELEASE ORAL DAILY
Status: DISCONTINUED | OUTPATIENT
Start: 2017-01-01 | End: 2017-01-01

## 2017-01-01 RX ORDER — PROPOFOL 10 MG/ML
INJECTION, EMULSION INTRAVENOUS AS NEEDED
Status: DISCONTINUED | OUTPATIENT
Start: 2017-01-01 | End: 2017-01-01 | Stop reason: HOSPADM

## 2017-01-01 RX ORDER — FUROSEMIDE 40 MG/1
80 TABLET ORAL DAILY
Status: DISCONTINUED | OUTPATIENT
Start: 2017-01-01 | End: 2017-01-01

## 2017-01-01 RX ORDER — PANTOPRAZOLE SODIUM 40 MG/1
40 TABLET, DELAYED RELEASE ORAL DAILY
Qty: 30 TAB | Refills: 6 | OUTPATIENT
Start: 2017-01-01

## 2017-01-01 RX ORDER — HEPARIN SODIUM 5000 [USP'U]/ML
5000 INJECTION, SOLUTION INTRAVENOUS; SUBCUTANEOUS EVERY 8 HOURS
Status: DISCONTINUED | OUTPATIENT
Start: 2017-01-01 | End: 2017-01-01

## 2017-01-01 RX ORDER — MIRTAZAPINE 30 MG/1
30 TABLET, FILM COATED ORAL
Qty: 30 TAB | Refills: 3 | Status: SHIPPED | OUTPATIENT
Start: 2017-01-01 | End: 2018-01-01 | Stop reason: SDUPTHER

## 2017-01-01 RX ADMIN — CALCIUM GLUCONATE 1 G: 94 INJECTION, SOLUTION INTRAVENOUS at 19:17

## 2017-01-01 RX ADMIN — HEPARIN SODIUM 5000 UNITS: 5000 INJECTION, SOLUTION INTRAVENOUS; SUBCUTANEOUS at 11:11

## 2017-01-01 RX ADMIN — CARVEDILOL 6.25 MG: 6.25 TABLET, FILM COATED ORAL at 09:16

## 2017-01-01 RX ADMIN — DEXAMETHASONE 1 MG: 0.5 TABLET ORAL at 08:55

## 2017-01-01 RX ADMIN — INSULIN LISPRO 2 UNITS: 100 INJECTION, SOLUTION INTRAVENOUS; SUBCUTANEOUS at 07:23

## 2017-01-01 RX ADMIN — NYSTATIN 500000 UNITS: 100000 SUSPENSION ORAL at 18:00

## 2017-01-01 RX ADMIN — GADOBENATE DIMEGLUMINE 10 ML: 529 INJECTION, SOLUTION INTRAVENOUS at 18:53

## 2017-01-01 RX ADMIN — HYDROMORPHONE HYDROCHLORIDE 1 MG: 1 INJECTION, SOLUTION INTRAMUSCULAR; INTRAVENOUS; SUBCUTANEOUS at 05:45

## 2017-01-01 RX ADMIN — METFORMIN HYDROCHLORIDE 500 MG: 500 TABLET ORAL at 09:24

## 2017-01-01 RX ADMIN — INSULIN LISPRO 5 UNITS: 100 INJECTION, SOLUTION INTRAVENOUS; SUBCUTANEOUS at 17:19

## 2017-01-01 RX ADMIN — Medication 10 ML: at 14:22

## 2017-01-01 RX ADMIN — ASPIRIN 81 MG 81 MG: 81 TABLET ORAL at 09:16

## 2017-01-01 RX ADMIN — FERROUS SULFATE TAB 325 MG (65 MG ELEMENTAL FE) 325 MG: 325 (65 FE) TAB at 09:23

## 2017-01-01 RX ADMIN — ASPIRIN 81 MG 81 MG: 81 TABLET ORAL at 09:56

## 2017-01-01 RX ADMIN — MEGESTROL ACETATE 200 MG: 40 SUSPENSION ORAL at 08:57

## 2017-01-01 RX ADMIN — IOPAMIDOL 100 ML: 612 INJECTION, SOLUTION INTRAVENOUS at 08:54

## 2017-01-01 RX ADMIN — CARVEDILOL 6.25 MG: 6.25 TABLET, FILM COATED ORAL at 17:15

## 2017-01-01 RX ADMIN — HYDROMORPHONE HYDROCHLORIDE 1 MG: 1 INJECTION, SOLUTION INTRAMUSCULAR; INTRAVENOUS; SUBCUTANEOUS at 22:25

## 2017-01-01 RX ADMIN — DEXTROSE MONOHYDRATE 25 G: 25 INJECTION, SOLUTION INTRAVENOUS at 19:25

## 2017-01-01 RX ADMIN — NYSTATIN 500000 UNITS: 100000 SUSPENSION ORAL at 12:36

## 2017-01-01 RX ADMIN — ALPRAZOLAM 0.25 MG: 0.25 TABLET ORAL at 05:35

## 2017-01-01 RX ADMIN — CALCITONIN SALMON 100 INT'L UNITS: 200 INJECTION, SOLUTION INTRAMUSCULAR; SUBCUTANEOUS at 21:10

## 2017-01-01 RX ADMIN — FUROSEMIDE 20 MG: 10 INJECTION, SOLUTION INTRAMUSCULAR; INTRAVENOUS at 19:00

## 2017-01-01 RX ADMIN — PANTOPRAZOLE SODIUM 40 MG: 40 TABLET, DELAYED RELEASE ORAL at 08:55

## 2017-01-01 RX ADMIN — ASPIRIN 81 MG 81 MG: 81 TABLET ORAL at 08:57

## 2017-01-01 RX ADMIN — ALPRAZOLAM 0.25 MG: 0.25 TABLET ORAL at 20:21

## 2017-01-01 RX ADMIN — INSULIN LISPRO 2 UNITS: 100 INJECTION, SOLUTION INTRAVENOUS; SUBCUTANEOUS at 12:25

## 2017-01-01 RX ADMIN — ALPRAZOLAM 0.25 MG: 0.25 TABLET ORAL at 12:30

## 2017-01-01 RX ADMIN — Medication 1 CAPSULE: at 08:35

## 2017-01-01 RX ADMIN — ASPIRIN 81 MG 81 MG: 81 TABLET ORAL at 09:24

## 2017-01-01 RX ADMIN — Medication 10 ML: at 22:20

## 2017-01-01 RX ADMIN — MULTIPLE VITAMINS W/ MINERALS TAB 1 TABLET: TAB at 09:23

## 2017-01-01 RX ADMIN — PANTOPRAZOLE SODIUM 40 MG: 40 TABLET, DELAYED RELEASE ORAL at 09:24

## 2017-01-01 RX ADMIN — SENNOSIDES AND DOCUSATE SODIUM 1 TABLET: 8.6; 5 TABLET ORAL at 08:36

## 2017-01-01 RX ADMIN — NYSTATIN 500000 UNITS: 100000 SUSPENSION ORAL at 10:00

## 2017-01-01 RX ADMIN — OXYCODONE HYDROCHLORIDE 5 MG: 5 TABLET ORAL at 23:08

## 2017-01-01 RX ADMIN — CALCITONIN SALMON 100 INT'L UNITS: 200 INJECTION, SOLUTION INTRAMUSCULAR; SUBCUTANEOUS at 09:16

## 2017-01-01 RX ADMIN — MEGESTROL ACETATE 200 MG: 40 SUSPENSION ORAL at 17:10

## 2017-01-01 RX ADMIN — ALPRAZOLAM 0.25 MG: 0.25 TABLET ORAL at 22:17

## 2017-01-01 RX ADMIN — Medication 1 CAPSULE: at 08:55

## 2017-01-01 RX ADMIN — HYDROMORPHONE HYDROCHLORIDE 1 MG: 1 INJECTION, SOLUTION INTRAMUSCULAR; INTRAVENOUS; SUBCUTANEOUS at 12:37

## 2017-01-01 RX ADMIN — HEPARIN SODIUM 5000 UNITS: 5000 INJECTION, SOLUTION INTRAVENOUS; SUBCUTANEOUS at 22:19

## 2017-01-01 RX ADMIN — Medication 5 ML: at 07:04

## 2017-01-01 RX ADMIN — INSULIN LISPRO 3 UNITS: 100 INJECTION, SOLUTION INTRAVENOUS; SUBCUTANEOUS at 12:45

## 2017-01-01 RX ADMIN — Medication 10 ML: at 22:10

## 2017-01-01 RX ADMIN — INSULIN GLARGINE 12 UNITS: 100 INJECTION, SOLUTION SUBCUTANEOUS at 21:52

## 2017-01-01 RX ADMIN — NYSTATIN 500000 UNITS: 100000 SUSPENSION ORAL at 17:19

## 2017-01-01 RX ADMIN — ATORVASTATIN CALCIUM 40 MG: 20 TABLET, FILM COATED ORAL at 17:18

## 2017-01-01 RX ADMIN — POTASSIUM CHLORIDE 10 MEQ: 750 TABLET, FILM COATED, EXTENDED RELEASE ORAL at 08:52

## 2017-01-01 RX ADMIN — ALPRAZOLAM 0.25 MG: 0.25 TABLET ORAL at 21:42

## 2017-01-01 RX ADMIN — GLIPIZIDE 5 MG: 5 TABLET ORAL at 08:56

## 2017-01-01 RX ADMIN — POTASSIUM CHLORIDE 20 MEQ: 750 TABLET, FILM COATED, EXTENDED RELEASE ORAL at 08:36

## 2017-01-01 RX ADMIN — Medication 5 ML: at 06:12

## 2017-01-01 RX ADMIN — DEXAMETHASONE 1 MG: 0.5 TABLET ORAL at 21:13

## 2017-01-01 RX ADMIN — OXYCODONE HYDROCHLORIDE 5 MG: 5 TABLET ORAL at 18:48

## 2017-01-01 RX ADMIN — HEPARIN SODIUM 5000 UNITS: 5000 INJECTION, SOLUTION INTRAVENOUS; SUBCUTANEOUS at 22:00

## 2017-01-01 RX ADMIN — SENNOSIDES AND DOCUSATE SODIUM 1 TABLET: 8.6; 5 TABLET ORAL at 20:25

## 2017-01-01 RX ADMIN — SENNOSIDES AND DOCUSATE SODIUM 2 TABLET: 8.6; 5 TABLET ORAL at 10:00

## 2017-01-01 RX ADMIN — ATORVASTATIN CALCIUM 40 MG: 40 TABLET, FILM COATED ORAL at 21:18

## 2017-01-01 RX ADMIN — GLIPIZIDE 5 MG: 5 TABLET ORAL at 07:50

## 2017-01-01 RX ADMIN — Medication 10 ML: at 21:23

## 2017-01-01 RX ADMIN — CARVEDILOL 6.25 MG: 6.25 TABLET, FILM COATED ORAL at 17:29

## 2017-01-01 RX ADMIN — Medication 10 ML: at 22:08

## 2017-01-01 RX ADMIN — SENNOSIDES AND DOCUSATE SODIUM 2 TABLET: 8.6; 5 TABLET ORAL at 09:17

## 2017-01-01 RX ADMIN — FUROSEMIDE 20 MG: 10 INJECTION, SOLUTION INTRAMUSCULAR; INTRAVENOUS at 07:23

## 2017-01-01 RX ADMIN — Medication 5 ML: at 22:00

## 2017-01-01 RX ADMIN — METFORMIN HYDROCHLORIDE 500 MG: 500 TABLET ORAL at 08:36

## 2017-01-01 RX ADMIN — ATORVASTATIN CALCIUM 40 MG: 20 TABLET, FILM COATED ORAL at 18:15

## 2017-01-01 RX ADMIN — MIRTAZAPINE 15 MG: 15 TABLET, FILM COATED ORAL at 20:34

## 2017-01-01 RX ADMIN — Medication 10 ML: at 05:33

## 2017-01-01 RX ADMIN — INSULIN GLARGINE 12 UNITS: 100 INJECTION, SOLUTION SUBCUTANEOUS at 21:51

## 2017-01-01 RX ADMIN — LEVOFLOXACIN 500 MG: 500 TABLET, FILM COATED ORAL at 08:35

## 2017-01-01 RX ADMIN — INSULIN GLARGINE 12 UNITS: 100 INJECTION, SOLUTION SUBCUTANEOUS at 21:54

## 2017-01-01 RX ADMIN — HEPARIN SODIUM 5000 UNITS: 5000 INJECTION, SOLUTION INTRAVENOUS; SUBCUTANEOUS at 09:38

## 2017-01-01 RX ADMIN — NYSTATIN 500000 UNITS: 100000 SUSPENSION ORAL at 09:00

## 2017-01-01 RX ADMIN — FUROSEMIDE 20 MG: 10 INJECTION, SOLUTION INTRAMUSCULAR; INTRAVENOUS at 17:21

## 2017-01-01 RX ADMIN — CALCITONIN SALMON 100 INT'L UNITS: 200 INJECTION, SOLUTION INTRAMUSCULAR; SUBCUTANEOUS at 14:20

## 2017-01-01 RX ADMIN — MULTIPLE VITAMINS W/ MINERALS TAB 1 TABLET: TAB at 08:56

## 2017-01-01 RX ADMIN — MEGESTROL ACETATE 200 MG: 40 SUSPENSION ORAL at 09:25

## 2017-01-01 RX ADMIN — NYSTATIN 500000 UNITS: 100000 SUSPENSION ORAL at 12:29

## 2017-01-01 RX ADMIN — ALPRAZOLAM 0.25 MG: 0.25 TABLET ORAL at 21:01

## 2017-01-01 RX ADMIN — NYSTATIN 500000 UNITS: 100000 SUSPENSION ORAL at 20:20

## 2017-01-01 RX ADMIN — Medication 80 MCG: at 13:07

## 2017-01-01 RX ADMIN — MIRTAZAPINE 15 MG: 15 TABLET, FILM COATED ORAL at 21:52

## 2017-01-01 RX ADMIN — MEROPENEM 500 MG: 500 INJECTION, POWDER, FOR SOLUTION INTRAVENOUS at 09:55

## 2017-01-01 RX ADMIN — INSULIN GLARGINE 12 UNITS: 100 INJECTION, SOLUTION SUBCUTANEOUS at 20:13

## 2017-01-01 RX ADMIN — CARVEDILOL 6.25 MG: 6.25 TABLET, FILM COATED ORAL at 07:50

## 2017-01-01 RX ADMIN — SERTRALINE HYDROCHLORIDE 25 MG: 50 TABLET ORAL at 08:35

## 2017-01-01 RX ADMIN — GADOBUTROL 5 ML: 604.72 INJECTION INTRAVENOUS at 18:54

## 2017-01-01 RX ADMIN — GLIPIZIDE 5 MG: 5 TABLET ORAL at 13:09

## 2017-01-01 RX ADMIN — SENNOSIDES AND DOCUSATE SODIUM 1 TABLET: 8.6; 5 TABLET ORAL at 20:21

## 2017-01-01 RX ADMIN — ATORVASTATIN CALCIUM 40 MG: 20 TABLET, FILM COATED ORAL at 18:48

## 2017-01-01 RX ADMIN — ATORVASTATIN CALCIUM 40 MG: 40 TABLET, FILM COATED ORAL at 20:13

## 2017-01-01 RX ADMIN — DEXAMETHASONE 1 MG: 1 TABLET ORAL at 22:38

## 2017-01-01 RX ADMIN — CARVEDILOL 6.25 MG: 6.25 TABLET, FILM COATED ORAL at 17:33

## 2017-01-01 RX ADMIN — Medication 10 ML: at 22:03

## 2017-01-01 RX ADMIN — METOPROLOL TARTRATE 25 MG: 25 TABLET ORAL at 18:15

## 2017-01-01 RX ADMIN — SODIUM CHLORIDE 50 ML/HR: 900 INJECTION, SOLUTION INTRAVENOUS at 00:39

## 2017-01-01 RX ADMIN — FERROUS SULFATE TAB 325 MG (65 MG ELEMENTAL FE) 325 MG: 325 (65 FE) TAB at 08:36

## 2017-01-01 RX ADMIN — DEXAMETHASONE 1 MG: 0.5 TABLET ORAL at 20:21

## 2017-01-01 RX ADMIN — NYSTATIN 500000 UNITS: 100000 SUSPENSION ORAL at 21:53

## 2017-01-01 RX ADMIN — POTASSIUM CHLORIDE 20 MEQ: 750 TABLET, FILM COATED, EXTENDED RELEASE ORAL at 09:58

## 2017-01-01 RX ADMIN — ATORVASTATIN CALCIUM 40 MG: 40 TABLET, FILM COATED ORAL at 21:42

## 2017-01-01 RX ADMIN — Medication 80 MCG: at 13:01

## 2017-01-01 RX ADMIN — ATORVASTATIN CALCIUM 40 MG: 20 TABLET, FILM COATED ORAL at 19:28

## 2017-01-01 RX ADMIN — ASPIRIN 81 MG 81 MG: 81 TABLET ORAL at 08:52

## 2017-01-01 RX ADMIN — ASPIRIN 81 MG 81 MG: 81 TABLET ORAL at 10:24

## 2017-01-01 RX ADMIN — THERA TABS 1 TABLET: TAB at 09:38

## 2017-01-01 RX ADMIN — ALPRAZOLAM 0.25 MG: 0.25 TABLET ORAL at 21:23

## 2017-01-01 RX ADMIN — ALPRAZOLAM 0.25 MG: 0.25 TABLET ORAL at 22:03

## 2017-01-01 RX ADMIN — ACETAMINOPHEN 1000 MG: 10 INJECTION, SOLUTION INTRAVENOUS at 12:52

## 2017-01-01 RX ADMIN — Medication 5 ML: at 21:02

## 2017-01-01 RX ADMIN — DEXAMETHASONE 1 MG: 0.5 TABLET ORAL at 09:16

## 2017-01-01 RX ADMIN — PANTOPRAZOLE SODIUM 40 MG: 40 TABLET, DELAYED RELEASE ORAL at 08:44

## 2017-01-01 RX ADMIN — ALPRAZOLAM 0.25 MG: 0.25 TABLET ORAL at 21:43

## 2017-01-01 RX ADMIN — HEPARIN SODIUM 5000 UNITS: 5000 INJECTION, SOLUTION INTRAVENOUS; SUBCUTANEOUS at 09:58

## 2017-01-01 RX ADMIN — Medication 10 ML: at 21:04

## 2017-01-01 RX ADMIN — Medication 10 ML: at 07:12

## 2017-01-01 RX ADMIN — INSULIN LISPRO 2 UNITS: 100 INJECTION, SOLUTION INTRAVENOUS; SUBCUTANEOUS at 22:01

## 2017-01-01 RX ADMIN — Medication 5 ML: at 22:14

## 2017-01-01 RX ADMIN — ATORVASTATIN CALCIUM 40 MG: 40 TABLET, FILM COATED ORAL at 21:01

## 2017-01-01 RX ADMIN — ROCURONIUM BROMIDE 5 MG: 10 INJECTION, SOLUTION INTRAVENOUS at 12:39

## 2017-01-01 RX ADMIN — DEXAMETHASONE 1 MG: 0.5 TABLET ORAL at 21:51

## 2017-01-01 RX ADMIN — MIRTAZAPINE 15 MG: 15 TABLET, FILM COATED ORAL at 21:43

## 2017-01-01 RX ADMIN — HYDROMORPHONE HYDROCHLORIDE 1 MG: 1 INJECTION, SOLUTION INTRAMUSCULAR; INTRAVENOUS; SUBCUTANEOUS at 00:39

## 2017-01-01 RX ADMIN — NYSTATIN 500000 UNITS: 100000 SUSPENSION ORAL at 13:00

## 2017-01-01 RX ADMIN — Medication 5 ML: at 14:00

## 2017-01-01 RX ADMIN — DEXAMETHASONE 1 MG: 0.5 TABLET ORAL at 08:09

## 2017-01-01 RX ADMIN — Medication 10 ML: at 14:19

## 2017-01-01 RX ADMIN — SODIUM CHLORIDE 75 ML/HR: 900 INJECTION, SOLUTION INTRAVENOUS at 21:03

## 2017-01-01 RX ADMIN — Medication 80 MCG: at 12:55

## 2017-01-01 RX ADMIN — ALPRAZOLAM 0.25 MG: 0.25 TABLET ORAL at 21:51

## 2017-01-01 RX ADMIN — NYSTATIN 500000 UNITS: 100000 SUSPENSION ORAL at 13:09

## 2017-01-01 RX ADMIN — Medication 10 ML: at 14:16

## 2017-01-01 RX ADMIN — Medication 10 ML: at 07:13

## 2017-01-01 RX ADMIN — POTASSIUM CHLORIDE 20 MEQ: 750 TABLET, FILM COATED, EXTENDED RELEASE ORAL at 08:55

## 2017-01-01 RX ADMIN — MULTIPLE VITAMINS W/ MINERALS TAB 1 TABLET: TAB at 08:25

## 2017-01-01 RX ADMIN — DEXAMETHASONE 1 MG: 0.5 TABLET ORAL at 20:24

## 2017-01-01 RX ADMIN — DEXAMETHASONE 1 MG: 0.5 TABLET ORAL at 21:50

## 2017-01-01 RX ADMIN — ALPRAZOLAM 0.25 MG: 0.25 TABLET ORAL at 20:55

## 2017-01-01 RX ADMIN — INSULIN LISPRO 3 UNITS: 100 INJECTION, SOLUTION INTRAVENOUS; SUBCUTANEOUS at 09:25

## 2017-01-01 RX ADMIN — POTASSIUM CHLORIDE 10 MEQ: 750 TABLET, FILM COATED, EXTENDED RELEASE ORAL at 08:25

## 2017-01-01 RX ADMIN — FUROSEMIDE 20 MG: 40 TABLET ORAL at 10:22

## 2017-01-01 RX ADMIN — POTASSIUM CHLORIDE 20 MEQ: 750 TABLET, FILM COATED, EXTENDED RELEASE ORAL at 09:16

## 2017-01-01 RX ADMIN — INSULIN LISPRO 2 UNITS: 100 INJECTION, SOLUTION INTRAVENOUS; SUBCUTANEOUS at 18:15

## 2017-01-01 RX ADMIN — Medication 10 ML: at 21:05

## 2017-01-01 RX ADMIN — DEXTROSE MONOHYDRATE 75 ML/HR: 5 INJECTION, SOLUTION INTRAVENOUS at 19:31

## 2017-01-01 RX ADMIN — Medication 5 ML: at 06:07

## 2017-01-01 RX ADMIN — THERA TABS 1 TABLET: TAB at 09:56

## 2017-01-01 RX ADMIN — Medication 10 ML: at 22:02

## 2017-01-01 RX ADMIN — CEFTRIAXONE 2 G: 2 INJECTION, POWDER, FOR SOLUTION INTRAMUSCULAR; INTRAVENOUS at 14:14

## 2017-01-01 RX ADMIN — PANTOPRAZOLE SODIUM 40 MG: 40 TABLET, DELAYED RELEASE ORAL at 08:52

## 2017-01-01 RX ADMIN — Medication 5 ML: at 14:01

## 2017-01-01 RX ADMIN — Medication 5 ML: at 19:00

## 2017-01-01 RX ADMIN — ALPRAZOLAM 0.25 MG: 0.25 TABLET ORAL at 22:12

## 2017-01-01 RX ADMIN — ASPIRIN 325 MG: 325 TABLET ORAL at 19:28

## 2017-01-01 RX ADMIN — ALPRAZOLAM 0.25 MG: 0.25 TABLET ORAL at 14:14

## 2017-01-01 RX ADMIN — DIAZEPAM 5 MG: 5 TABLET ORAL at 21:43

## 2017-01-01 RX ADMIN — ASPIRIN 81 MG 81 MG: 81 TABLET ORAL at 10:00

## 2017-01-01 RX ADMIN — LOPERAMIDE HYDROCHLORIDE 2 MG: 2 CAPSULE ORAL at 12:32

## 2017-01-01 RX ADMIN — MEGESTROL ACETATE 200 MG: 40 SUSPENSION ORAL at 10:01

## 2017-01-01 RX ADMIN — DEXAMETHASONE 1 MG: 1 TABLET ORAL at 09:16

## 2017-01-01 RX ADMIN — FERROUS SULFATE TAB 325 MG (65 MG ELEMENTAL FE) 325 MG: 325 (65 FE) TAB at 08:52

## 2017-01-01 RX ADMIN — Medication 10 ML: at 07:17

## 2017-01-01 RX ADMIN — FERROUS SULFATE TAB 325 MG (65 MG ELEMENTAL FE) 325 MG: 325 (65 FE) TAB at 08:25

## 2017-01-01 RX ADMIN — ALPRAZOLAM 0.25 MG: 0.25 TABLET ORAL at 22:30

## 2017-01-01 RX ADMIN — CALCITONIN SALMON 100 INT'L UNITS: 200 INJECTION, SOLUTION INTRAMUSCULAR; SUBCUTANEOUS at 22:05

## 2017-01-01 RX ADMIN — MAGNESIUM SULFATE IN DEXTROSE 1 G: 10 INJECTION, SOLUTION INTRAVENOUS at 12:10

## 2017-01-01 RX ADMIN — INSULIN LISPRO 2 UNITS: 100 INJECTION, SOLUTION INTRAVENOUS; SUBCUTANEOUS at 19:32

## 2017-01-01 RX ADMIN — CARVEDILOL 6.25 MG: 6.25 TABLET, FILM COATED ORAL at 08:36

## 2017-01-01 RX ADMIN — GADOTERIDOL 10 ML: 279.3 INJECTION, SOLUTION INTRAVENOUS at 09:59

## 2017-01-01 RX ADMIN — FERROUS SULFATE TAB 325 MG (65 MG ELEMENTAL FE) 325 MG: 325 (65 FE) TAB at 10:24

## 2017-01-01 RX ADMIN — ONDANSETRON 4 MG: 2 INJECTION INTRAMUSCULAR; INTRAVENOUS at 21:23

## 2017-01-01 RX ADMIN — Medication 5 ML: at 06:27

## 2017-01-01 RX ADMIN — MIRTAZAPINE 15 MG: 15 TABLET, FILM COATED ORAL at 20:21

## 2017-01-01 RX ADMIN — Medication 10 ML: at 15:10

## 2017-01-01 RX ADMIN — SERTRALINE HYDROCHLORIDE 50 MG: 50 TABLET ORAL at 09:25

## 2017-01-01 RX ADMIN — LEVOFLOXACIN 500 MG: 500 TABLET, FILM COATED ORAL at 08:56

## 2017-01-01 RX ADMIN — Medication 1 CAPSULE: at 09:24

## 2017-01-01 RX ADMIN — HEPARIN SODIUM 5000 UNITS: 5000 INJECTION, SOLUTION INTRAVENOUS; SUBCUTANEOUS at 10:05

## 2017-01-01 RX ADMIN — ALPRAZOLAM 0.25 MG: 0.25 TABLET ORAL at 20:25

## 2017-01-01 RX ADMIN — CARVEDILOL 6.25 MG: 6.25 TABLET, FILM COATED ORAL at 16:41

## 2017-01-01 RX ADMIN — NYSTATIN 500000 UNITS: 100000 SUSPENSION ORAL at 14:11

## 2017-01-01 RX ADMIN — CARVEDILOL 6.25 MG: 6.25 TABLET, FILM COATED ORAL at 07:23

## 2017-01-01 RX ADMIN — INSULIN LISPRO 2 UNITS: 100 INJECTION, SOLUTION INTRAVENOUS; SUBCUTANEOUS at 06:56

## 2017-01-01 RX ADMIN — DEXAMETHASONE 1 MG: 0.5 TABLET ORAL at 09:24

## 2017-01-01 RX ADMIN — Medication 10 ML: at 05:05

## 2017-01-01 RX ADMIN — POTASSIUM CHLORIDE 40 MEQ: 750 TABLET, FILM COATED, EXTENDED RELEASE ORAL at 12:57

## 2017-01-01 RX ADMIN — FERROUS SULFATE TAB 325 MG (65 MG ELEMENTAL FE) 325 MG: 325 (65 FE) TAB at 17:24

## 2017-01-01 RX ADMIN — NYSTATIN 500000 UNITS: 100000 SUSPENSION ORAL at 08:50

## 2017-01-01 RX ADMIN — DEXAMETHASONE 1 MG: 0.5 TABLET ORAL at 10:24

## 2017-01-01 RX ADMIN — SENNOSIDES AND DOCUSATE SODIUM 2 TABLET: 8.6; 5 TABLET ORAL at 11:09

## 2017-01-01 RX ADMIN — PROPOFOL 30 MG: 10 INJECTION, EMULSION INTRAVENOUS at 13:34

## 2017-01-01 RX ADMIN — ATORVASTATIN CALCIUM 40 MG: 40 TABLET, FILM COATED ORAL at 21:51

## 2017-01-01 RX ADMIN — FERROUS SULFATE TAB 325 MG (65 MG ELEMENTAL FE) 325 MG: 325 (65 FE) TAB at 08:43

## 2017-01-01 RX ADMIN — CARVEDILOL 6.25 MG: 6.25 TABLET, FILM COATED ORAL at 08:49

## 2017-01-01 RX ADMIN — SENNOSIDES AND DOCUSATE SODIUM 1 TABLET: 8.6; 5 TABLET ORAL at 08:43

## 2017-01-01 RX ADMIN — NYSTATIN 500000 UNITS: 100000 SUSPENSION ORAL at 12:57

## 2017-01-01 RX ADMIN — MEGESTROL ACETATE 200 MG: 40 SUSPENSION ORAL at 11:39

## 2017-01-01 RX ADMIN — MULTIPLE VITAMINS W/ MINERALS TAB 1 TABLET: TAB at 08:52

## 2017-01-01 RX ADMIN — ASPIRIN 81 MG 81 MG: 81 TABLET ORAL at 08:55

## 2017-01-01 RX ADMIN — Medication 10 ML: at 21:24

## 2017-01-01 RX ADMIN — HEPARIN SODIUM 5000 UNITS: 5000 INJECTION, SOLUTION INTRAVENOUS; SUBCUTANEOUS at 10:34

## 2017-01-01 RX ADMIN — INSULIN GLARGINE 12 UNITS: 100 INJECTION, SOLUTION SUBCUTANEOUS at 21:50

## 2017-01-01 RX ADMIN — NYSTATIN 500000 UNITS: 100000 SUSPENSION ORAL at 17:54

## 2017-01-01 RX ADMIN — THERA TABS 1 TABLET: TAB at 09:17

## 2017-01-01 RX ADMIN — CARVEDILOL 6.25 MG: 6.25 TABLET, FILM COATED ORAL at 09:25

## 2017-01-01 RX ADMIN — OXYCODONE HYDROCHLORIDE 5 MG: 5 TABLET ORAL at 21:50

## 2017-01-01 RX ADMIN — Medication 1 CAPSULE: at 08:52

## 2017-01-01 RX ADMIN — HYDROCODONE BITARTRATE AND ACETAMINOPHEN 1 TABLET: 5; 325 TABLET ORAL at 11:51

## 2017-01-01 RX ADMIN — DEXAMETHASONE 1 MG: 0.5 TABLET ORAL at 20:54

## 2017-01-01 RX ADMIN — FERROUS SULFATE TAB 325 MG (65 MG ELEMENTAL FE) 325 MG: 325 (65 FE) TAB at 17:33

## 2017-01-01 RX ADMIN — POTASSIUM CHLORIDE 10 MEQ: 750 TABLET, FILM COATED, EXTENDED RELEASE ORAL at 08:56

## 2017-01-01 RX ADMIN — GLIPIZIDE 5 MG: 5 TABLET ORAL at 08:55

## 2017-01-01 RX ADMIN — Medication 10 ML: at 07:26

## 2017-01-01 RX ADMIN — DEXTROSE MONOHYDRATE 75 ML/HR: 5 INJECTION, SOLUTION INTRAVENOUS at 06:02

## 2017-01-01 RX ADMIN — CARVEDILOL 6.25 MG: 6.25 TABLET, FILM COATED ORAL at 08:55

## 2017-01-01 RX ADMIN — HYDROMORPHONE HYDROCHLORIDE 1 MG: 1 INJECTION, SOLUTION INTRAMUSCULAR; INTRAVENOUS; SUBCUTANEOUS at 21:51

## 2017-01-01 RX ADMIN — DEXAMETHASONE 1 MG: 0.5 TABLET ORAL at 08:25

## 2017-01-01 RX ADMIN — INSULIN LISPRO 2 UNITS: 100 INJECTION, SOLUTION INTRAVENOUS; SUBCUTANEOUS at 11:53

## 2017-01-01 RX ADMIN — MAGNESIUM SULFATE HEPTAHYDRATE 2 G: 40 INJECTION, SOLUTION INTRAVENOUS at 09:58

## 2017-01-01 RX ADMIN — MEGESTROL ACETATE 200 MG: 40 SUSPENSION ORAL at 08:50

## 2017-01-01 RX ADMIN — POTASSIUM CHLORIDE 20 MEQ: 750 TABLET, FILM COATED, EXTENDED RELEASE ORAL at 08:56

## 2017-01-01 RX ADMIN — Medication 1 CAPSULE: at 08:25

## 2017-01-01 RX ADMIN — INSULIN GLARGINE 12 UNITS: 100 INJECTION, SOLUTION SUBCUTANEOUS at 21:11

## 2017-01-01 RX ADMIN — INSULIN LISPRO 3 UNITS: 100 INJECTION, SOLUTION INTRAVENOUS; SUBCUTANEOUS at 12:52

## 2017-01-01 RX ADMIN — Medication 1 CAPSULE: at 09:16

## 2017-01-01 RX ADMIN — POTASSIUM CHLORIDE 20 MEQ: 750 TABLET, FILM COATED, EXTENDED RELEASE ORAL at 08:44

## 2017-01-01 RX ADMIN — ASPIRIN 81 MG 81 MG: 81 TABLET ORAL at 08:35

## 2017-01-01 RX ADMIN — MIRTAZAPINE 15 MG: 15 TABLET, FILM COATED ORAL at 22:12

## 2017-01-01 RX ADMIN — OXYCODONE HYDROCHLORIDE 5 MG: 5 TABLET ORAL at 20:25

## 2017-01-01 RX ADMIN — DEXAMETHASONE 1 MG: 0.5 TABLET ORAL at 21:37

## 2017-01-01 RX ADMIN — Medication 10 ML: at 05:45

## 2017-01-01 RX ADMIN — CARVEDILOL 6.25 MG: 6.25 TABLET, FILM COATED ORAL at 17:28

## 2017-01-01 RX ADMIN — ACETAMINOPHEN 1000 MG: 500 TABLET ORAL at 01:06

## 2017-01-01 RX ADMIN — HYDROMORPHONE HYDROCHLORIDE 1 MG: 1 INJECTION, SOLUTION INTRAMUSCULAR; INTRAVENOUS; SUBCUTANEOUS at 17:07

## 2017-01-01 RX ADMIN — HEPARIN SODIUM 5000 UNITS: 5000 INJECTION, SOLUTION INTRAVENOUS; SUBCUTANEOUS at 22:09

## 2017-01-01 RX ADMIN — SENNOSIDES AND DOCUSATE SODIUM 2 TABLET: 8.6; 5 TABLET ORAL at 10:05

## 2017-01-01 RX ADMIN — DEXAMETHASONE 1 MG: 0.5 TABLET ORAL at 20:33

## 2017-01-01 RX ADMIN — MULTIPLE VITAMINS W/ MINERALS TAB 1 TABLET: TAB at 10:23

## 2017-01-01 RX ADMIN — CARVEDILOL 6.25 MG: 6.25 TABLET, FILM COATED ORAL at 17:31

## 2017-01-01 RX ADMIN — NYSTATIN 500000 UNITS: 100000 SUSPENSION ORAL at 10:05

## 2017-01-01 RX ADMIN — INSULIN GLARGINE 12 UNITS: 100 INJECTION, SOLUTION SUBCUTANEOUS at 20:24

## 2017-01-01 RX ADMIN — INSULIN LISPRO 2 UNITS: 100 INJECTION, SOLUTION INTRAVENOUS; SUBCUTANEOUS at 17:20

## 2017-01-01 RX ADMIN — CARVEDILOL 6.25 MG: 6.25 TABLET, FILM COATED ORAL at 07:24

## 2017-01-01 RX ADMIN — SODIUM CHLORIDE 40 MG: 9 INJECTION INTRAMUSCULAR; INTRAVENOUS; SUBCUTANEOUS at 09:30

## 2017-01-01 RX ADMIN — PANTOPRAZOLE SODIUM 40 MG: 40 TABLET, DELAYED RELEASE ORAL at 08:56

## 2017-01-01 RX ADMIN — ALBUMIN (HUMAN) 12.5 G: 0.25 INJECTION, SOLUTION INTRAVENOUS at 12:38

## 2017-01-01 RX ADMIN — HEPARIN SODIUM 5000 UNITS: 5000 INJECTION, SOLUTION INTRAVENOUS; SUBCUTANEOUS at 09:05

## 2017-01-01 RX ADMIN — PANTOPRAZOLE SODIUM 40 MG: 40 TABLET, DELAYED RELEASE ORAL at 08:34

## 2017-01-01 RX ADMIN — ALBUMIN (HUMAN) 25 G: 0.25 INJECTION, SOLUTION INTRAVENOUS at 05:33

## 2017-01-01 RX ADMIN — FUROSEMIDE 80 MG: 40 TABLET ORAL at 08:55

## 2017-01-01 RX ADMIN — ATORVASTATIN CALCIUM 40 MG: 20 TABLET, FILM COATED ORAL at 17:10

## 2017-01-01 RX ADMIN — PANTOPRAZOLE SODIUM 40 MG: 40 TABLET, DELAYED RELEASE ORAL at 08:25

## 2017-01-01 RX ADMIN — MEROPENEM 500 MG: 500 INJECTION, POWDER, FOR SOLUTION INTRAVENOUS at 09:37

## 2017-01-01 RX ADMIN — HEPARIN SODIUM 5000 UNITS: 5000 INJECTION, SOLUTION INTRAVENOUS; SUBCUTANEOUS at 09:59

## 2017-01-01 RX ADMIN — INSULIN GLARGINE 12 UNITS: 100 INJECTION, SOLUTION SUBCUTANEOUS at 22:10

## 2017-01-01 RX ADMIN — OXYCODONE HYDROCHLORIDE 5 MG: 5 TABLET ORAL at 21:02

## 2017-01-01 RX ADMIN — DIAZEPAM 5 MG: 5 TABLET ORAL at 21:01

## 2017-01-01 RX ADMIN — POTASSIUM CHLORIDE 10 MEQ: 750 TABLET, FILM COATED, EXTENDED RELEASE ORAL at 08:34

## 2017-01-01 RX ADMIN — MULTIPLE VITAMINS W/ MINERALS TAB 1 TABLET: TAB at 09:25

## 2017-01-01 RX ADMIN — SODIUM CHLORIDE, SODIUM LACTATE, POTASSIUM CHLORIDE, AND CALCIUM CHLORIDE 25 ML/HR: 600; 310; 30; 20 INJECTION, SOLUTION INTRAVENOUS at 11:42

## 2017-01-01 RX ADMIN — DEXTROSE MONOHYDRATE 25 G: 500 INJECTION PARENTERAL at 07:22

## 2017-01-01 RX ADMIN — MULTIPLE VITAMINS W/ MINERALS TAB 1 TABLET: TAB at 09:16

## 2017-01-01 RX ADMIN — ASPIRIN 81 MG 81 MG: 81 TABLET ORAL at 10:34

## 2017-01-01 RX ADMIN — SODIUM CHLORIDE 50 ML/HR: 900 INJECTION, SOLUTION INTRAVENOUS at 09:20

## 2017-01-01 RX ADMIN — INSULIN GLARGINE 12 UNITS: 100 INJECTION, SOLUTION SUBCUTANEOUS at 22:01

## 2017-01-01 RX ADMIN — HUMAN INSULIN 10 UNITS: 100 INJECTION, SOLUTION SUBCUTANEOUS at 19:25

## 2017-01-01 RX ADMIN — Medication 10 ML: at 18:55

## 2017-01-01 RX ADMIN — ASPIRIN 81 MG 81 MG: 81 TABLET ORAL at 10:05

## 2017-01-01 RX ADMIN — INSULIN GLARGINE 8 UNITS: 100 INJECTION, SOLUTION SUBCUTANEOUS at 21:22

## 2017-01-01 RX ADMIN — MULTIPLE VITAMINS W/ MINERALS TAB 1 TABLET: TAB at 08:55

## 2017-01-01 RX ADMIN — MEGESTROL ACETATE 200 MG: 40 SUSPENSION ORAL at 08:37

## 2017-01-01 RX ADMIN — FUROSEMIDE 20 MG: 10 INJECTION, SOLUTION INTRAMUSCULAR; INTRAVENOUS at 17:10

## 2017-01-01 RX ADMIN — INSULIN LISPRO 3 UNITS: 100 INJECTION, SOLUTION INTRAVENOUS; SUBCUTANEOUS at 17:29

## 2017-01-01 RX ADMIN — METOPROLOL TARTRATE 25 MG: 25 TABLET ORAL at 09:56

## 2017-01-01 RX ADMIN — INSULIN GLARGINE 12 UNITS: 100 INJECTION, SOLUTION SUBCUTANEOUS at 22:18

## 2017-01-01 RX ADMIN — LEVOFLOXACIN 500 MG: 500 TABLET, FILM COATED ORAL at 08:10

## 2017-01-01 RX ADMIN — MIDAZOLAM HYDROCHLORIDE 2 MG: 1 INJECTION, SOLUTION INTRAMUSCULAR; INTRAVENOUS at 12:26

## 2017-01-01 RX ADMIN — Medication 5 ML: at 06:28

## 2017-01-01 RX ADMIN — DEXAMETHASONE 1 MG: 0.5 TABLET ORAL at 08:52

## 2017-01-01 RX ADMIN — CALCITONIN SALMON 100 INT'L UNITS: 200 INJECTION, SOLUTION INTRAMUSCULAR; SUBCUTANEOUS at 22:17

## 2017-01-01 RX ADMIN — CEFTRIAXONE 2 G: 2 INJECTION, POWDER, FOR SOLUTION INTRAMUSCULAR; INTRAVENOUS at 14:44

## 2017-01-01 RX ADMIN — SENNOSIDES AND DOCUSATE SODIUM 1 TABLET: 8.6; 5 TABLET ORAL at 08:56

## 2017-01-01 RX ADMIN — CARVEDILOL 6.25 MG: 6.25 TABLET, FILM COATED ORAL at 08:53

## 2017-01-01 RX ADMIN — OXYCODONE HYDROCHLORIDE 5 MG: 5 TABLET ORAL at 10:00

## 2017-01-01 RX ADMIN — ATORVASTATIN CALCIUM 40 MG: 40 TABLET, FILM COATED ORAL at 20:33

## 2017-01-01 RX ADMIN — INSULIN LISPRO 2 UNITS: 100 INJECTION, SOLUTION INTRAVENOUS; SUBCUTANEOUS at 09:24

## 2017-01-01 RX ADMIN — NYSTATIN 500000 UNITS: 100000 SUSPENSION ORAL at 14:44

## 2017-01-01 RX ADMIN — HYDROMORPHONE HYDROCHLORIDE 1 MG: 1 INJECTION, SOLUTION INTRAMUSCULAR; INTRAVENOUS; SUBCUTANEOUS at 19:59

## 2017-01-01 RX ADMIN — GLIPIZIDE 5 MG: 5 TABLET ORAL at 17:10

## 2017-01-01 RX ADMIN — OXYCODONE HYDROCHLORIDE 5 MG: 5 TABLET ORAL at 21:36

## 2017-01-01 RX ADMIN — CALCITONIN SALMON 100 INT'L UNITS: 200 INJECTION, SOLUTION INTRAMUSCULAR; SUBCUTANEOUS at 21:56

## 2017-01-01 RX ADMIN — HYDROMORPHONE HYDROCHLORIDE 1 MG: 1 INJECTION, SOLUTION INTRAMUSCULAR; INTRAVENOUS; SUBCUTANEOUS at 16:27

## 2017-01-01 RX ADMIN — SODIUM POLYSTYRENE SULFONATE 30 G: 15 SUSPENSION ORAL; RECTAL at 19:29

## 2017-01-01 RX ADMIN — CEFTRIAXONE 2 G: 2 INJECTION, POWDER, FOR SOLUTION INTRAMUSCULAR; INTRAVENOUS at 15:07

## 2017-01-01 RX ADMIN — FUROSEMIDE 20 MG: 10 INJECTION, SOLUTION INTRAMUSCULAR; INTRAVENOUS at 07:50

## 2017-01-01 RX ADMIN — ASPIRIN 81 MG 81 MG: 81 TABLET ORAL at 08:36

## 2017-01-01 RX ADMIN — MAGNESIUM SULFATE HEPTAHYDRATE 2 G: 40 INJECTION, SOLUTION INTRAVENOUS at 11:51

## 2017-01-01 RX ADMIN — ALPRAZOLAM 0.25 MG: 0.25 TABLET ORAL at 23:09

## 2017-01-01 RX ADMIN — DEXAMETHASONE 1 MG: 1 TABLET ORAL at 10:04

## 2017-01-01 RX ADMIN — THERA TABS 1 TABLET: TAB at 08:46

## 2017-01-01 RX ADMIN — MULTIPLE VITAMINS W/ MINERALS TAB 1 TABLET: TAB at 08:44

## 2017-01-01 RX ADMIN — Medication 80 MCG: at 12:47

## 2017-01-01 RX ADMIN — MIRTAZAPINE 15 MG: 15 TABLET, FILM COATED ORAL at 20:27

## 2017-01-01 RX ADMIN — LEVOFLOXACIN 500 MG: 500 TABLET, FILM COATED ORAL at 08:44

## 2017-01-01 RX ADMIN — Medication 1 CAPSULE: at 10:23

## 2017-01-01 RX ADMIN — INSULIN LISPRO 2 UNITS: 100 INJECTION, SOLUTION INTRAVENOUS; SUBCUTANEOUS at 17:09

## 2017-01-01 RX ADMIN — ALPRAZOLAM 0.25 MG: 0.25 TABLET ORAL at 20:13

## 2017-01-01 RX ADMIN — ALBUMIN (HUMAN) 25 G: 0.25 INJECTION, SOLUTION INTRAVENOUS at 17:23

## 2017-01-01 RX ADMIN — MAGNESIUM SULFATE IN DEXTROSE 1 G: 10 INJECTION, SOLUTION INTRAVENOUS at 13:58

## 2017-01-01 RX ADMIN — POTASSIUM CHLORIDE 10 MEQ: 750 TABLET, FILM COATED, EXTENDED RELEASE ORAL at 10:23

## 2017-01-01 RX ADMIN — OXYCODONE HYDROCHLORIDE 5 MG: 5 TABLET ORAL at 20:21

## 2017-01-01 RX ADMIN — GLIPIZIDE 5 MG: 5 TABLET ORAL at 08:09

## 2017-01-01 RX ADMIN — MULTIPLE VITAMINS W/ MINERALS TAB 1 TABLET: TAB at 08:09

## 2017-01-01 RX ADMIN — HEPARIN SODIUM 5000 UNITS: 5000 INJECTION, SOLUTION INTRAVENOUS; SUBCUTANEOUS at 21:50

## 2017-01-01 RX ADMIN — Medication 5 ML: at 06:00

## 2017-01-01 RX ADMIN — FENTANYL CITRATE 50 MCG: 50 INJECTION, SOLUTION INTRAMUSCULAR; INTRAVENOUS at 12:34

## 2017-01-01 RX ADMIN — ASPIRIN 81 MG 81 MG: 81 TABLET ORAL at 11:09

## 2017-01-01 RX ADMIN — POTASSIUM CHLORIDE 20 MEQ: 750 TABLET, FILM COATED, EXTENDED RELEASE ORAL at 08:08

## 2017-01-01 RX ADMIN — FERROUS SULFATE TAB 325 MG (65 MG ELEMENTAL FE) 325 MG: 325 (65 FE) TAB at 08:55

## 2017-01-01 RX ADMIN — ALPRAZOLAM 0.25 MG: 0.25 TABLET ORAL at 20:35

## 2017-01-01 RX ADMIN — SERTRALINE HYDROCHLORIDE 25 MG: 50 TABLET ORAL at 08:52

## 2017-01-01 RX ADMIN — FUROSEMIDE 80 MG: 40 TABLET ORAL at 08:56

## 2017-01-01 RX ADMIN — CALCITONIN SALMON 100 INT'L UNITS: 200 INJECTION, SOLUTION INTRAMUSCULAR; SUBCUTANEOUS at 10:00

## 2017-01-01 RX ADMIN — ATORVASTATIN CALCIUM 40 MG: 20 TABLET, FILM COATED ORAL at 17:54

## 2017-01-01 RX ADMIN — CARVEDILOL 6.25 MG: 6.25 TABLET, FILM COATED ORAL at 08:34

## 2017-01-01 RX ADMIN — INSULIN GLARGINE 12 UNITS: 100 INJECTION, SOLUTION SUBCUTANEOUS at 22:06

## 2017-01-01 RX ADMIN — MIRTAZAPINE 15 MG: 15 TABLET, FILM COATED ORAL at 20:20

## 2017-01-01 RX ADMIN — DEXAMETHASONE 1 MG: 0.5 TABLET ORAL at 21:01

## 2017-01-01 RX ADMIN — ATORVASTATIN CALCIUM 40 MG: 20 TABLET, FILM COATED ORAL at 17:23

## 2017-01-01 RX ADMIN — OXYCODONE HYDROCHLORIDE 5 MG: 5 TABLET ORAL at 20:54

## 2017-01-01 RX ADMIN — DEXAMETHASONE 1 MG: 0.5 TABLET ORAL at 21:42

## 2017-01-01 RX ADMIN — INSULIN LISPRO 3 UNITS: 100 INJECTION, SOLUTION INTRAVENOUS; SUBCUTANEOUS at 12:20

## 2017-01-01 RX ADMIN — SODIUM CHLORIDE 50 ML/HR: 900 INJECTION, SOLUTION INTRAVENOUS at 07:12

## 2017-01-01 RX ADMIN — CEFTRIAXONE 2 G: 2 INJECTION, POWDER, FOR SOLUTION INTRAMUSCULAR; INTRAVENOUS at 14:03

## 2017-01-01 RX ADMIN — CEFTRIAXONE 2 G: 2 INJECTION, POWDER, FOR SOLUTION INTRAMUSCULAR; INTRAVENOUS at 13:51

## 2017-01-01 RX ADMIN — CARVEDILOL 6.25 MG: 6.25 TABLET, FILM COATED ORAL at 18:58

## 2017-01-01 RX ADMIN — Medication 5 ML: at 13:09

## 2017-01-01 RX ADMIN — MEGESTROL ACETATE 200 MG: 40 SUSPENSION ORAL at 08:56

## 2017-01-01 RX ADMIN — THERA TABS 1 TABLET: TAB at 11:09

## 2017-01-01 RX ADMIN — Medication 1 CAPSULE: at 08:36

## 2017-01-01 RX ADMIN — DEXAMETHASONE 1 MG: 0.5 TABLET ORAL at 08:57

## 2017-01-01 RX ADMIN — DIAZEPAM 5 MG: 5 TABLET ORAL at 21:13

## 2017-01-01 RX ADMIN — SODIUM CHLORIDE AND POTASSIUM CHLORIDE: 9; 2.98 INJECTION, SOLUTION INTRAVENOUS at 11:08

## 2017-01-01 RX ADMIN — FUROSEMIDE 80 MG: 40 TABLET ORAL at 09:16

## 2017-01-01 RX ADMIN — INSULIN LISPRO 2 UNITS: 100 INJECTION, SOLUTION INTRAVENOUS; SUBCUTANEOUS at 17:55

## 2017-01-01 RX ADMIN — LOPERAMIDE HYDROCHLORIDE 2 MG: 2 CAPSULE ORAL at 17:47

## 2017-01-01 RX ADMIN — CEFTRIAXONE 2 G: 2 INJECTION, POWDER, FOR SOLUTION INTRAMUSCULAR; INTRAVENOUS at 14:11

## 2017-01-01 RX ADMIN — ONDANSETRON 4 MG: 2 INJECTION INTRAMUSCULAR; INTRAVENOUS at 13:00

## 2017-01-01 RX ADMIN — OXYCODONE HYDROCHLORIDE 5 MG: 5 TABLET ORAL at 18:03

## 2017-01-01 RX ADMIN — Medication 1 CAPSULE: at 08:09

## 2017-01-01 RX ADMIN — POTASSIUM CHLORIDE 40 MEQ: 750 TABLET, FILM COATED, EXTENDED RELEASE ORAL at 11:09

## 2017-01-01 RX ADMIN — CARVEDILOL 6.25 MG: 6.25 TABLET, FILM COATED ORAL at 17:19

## 2017-01-01 RX ADMIN — HYDROMORPHONE HYDROCHLORIDE 1 MG: 1 INJECTION, SOLUTION INTRAMUSCULAR; INTRAVENOUS; SUBCUTANEOUS at 14:15

## 2017-01-01 RX ADMIN — PIPERACILLIN SODIUM,TAZOBACTAM SODIUM 3.38 G: 3; .375 INJECTION, POWDER, FOR SOLUTION INTRAVENOUS at 21:04

## 2017-01-01 RX ADMIN — INSULIN GLARGINE 12 UNITS: 100 INJECTION, SOLUTION SUBCUTANEOUS at 22:12

## 2017-01-01 RX ADMIN — Medication 10 ML: at 08:54

## 2017-01-01 RX ADMIN — INSULIN GLARGINE 12 UNITS: 100 INJECTION, SOLUTION SUBCUTANEOUS at 20:20

## 2017-01-01 RX ADMIN — Medication 10 ML: at 21:51

## 2017-01-01 RX ADMIN — HEPARIN SODIUM 5000 UNITS: 5000 INJECTION, SOLUTION INTRAVENOUS; SUBCUTANEOUS at 22:17

## 2017-01-01 RX ADMIN — ASPIRIN 81 MG 81 MG: 81 TABLET ORAL at 09:10

## 2017-01-01 RX ADMIN — MULTIPLE VITAMINS W/ MINERALS TAB 1 TABLET: TAB at 08:37

## 2017-01-01 RX ADMIN — Medication 10 ML: at 14:20

## 2017-01-01 RX ADMIN — FERROUS SULFATE TAB 325 MG (65 MG ELEMENTAL FE) 325 MG: 325 (65 FE) TAB at 09:24

## 2017-01-01 RX ADMIN — FERROUS SULFATE TAB 325 MG (65 MG ELEMENTAL FE) 325 MG: 325 (65 FE) TAB at 18:58

## 2017-01-01 RX ADMIN — Medication 5 ML: at 21:37

## 2017-01-01 RX ADMIN — Medication 10 ML: at 13:55

## 2017-01-01 RX ADMIN — Medication 10 ML: at 18:58

## 2017-01-01 RX ADMIN — CALCITONIN SALMON 100 INT'L UNITS: 200 INJECTION, SOLUTION INTRAMUSCULAR; SUBCUTANEOUS at 11:15

## 2017-01-01 RX ADMIN — CARVEDILOL 6.25 MG: 6.25 TABLET, FILM COATED ORAL at 17:54

## 2017-01-01 RX ADMIN — THERA TABS 1 TABLET: TAB at 10:05

## 2017-01-01 RX ADMIN — Medication 5 ML: at 12:45

## 2017-01-01 RX ADMIN — NYSTATIN 500000 UNITS: 100000 SUSPENSION ORAL at 09:18

## 2017-01-01 RX ADMIN — NYSTATIN 500000 UNITS: 100000 SUSPENSION ORAL at 22:04

## 2017-01-01 RX ADMIN — Medication 5 ML: at 20:15

## 2017-01-01 RX ADMIN — ALPRAZOLAM 0.25 MG: 0.25 TABLET ORAL at 11:51

## 2017-01-01 RX ADMIN — FERROUS SULFATE TAB 325 MG (65 MG ELEMENTAL FE) 325 MG: 325 (65 FE) TAB at 12:29

## 2017-01-01 RX ADMIN — FUROSEMIDE 40 MG: 40 TABLET ORAL at 08:26

## 2017-01-01 RX ADMIN — FUROSEMIDE 20 MG: 10 INJECTION, SOLUTION INTRAMUSCULAR; INTRAVENOUS at 10:04

## 2017-01-01 RX ADMIN — INSULIN LISPRO 2 UNITS: 100 INJECTION, SOLUTION INTRAVENOUS; SUBCUTANEOUS at 17:07

## 2017-01-01 RX ADMIN — HEPARIN SODIUM 5000 UNITS: 5000 INJECTION, SOLUTION INTRAVENOUS; SUBCUTANEOUS at 21:23

## 2017-01-01 RX ADMIN — DEXAMETHASONE 1 MG: 0.5 TABLET ORAL at 22:12

## 2017-01-01 RX ADMIN — SODIUM CHLORIDE 75 ML/HR: 900 INJECTION, SOLUTION INTRAVENOUS at 14:32

## 2017-01-01 RX ADMIN — FUROSEMIDE 20 MG: 40 TABLET ORAL at 08:35

## 2017-01-01 RX ADMIN — SODIUM CHLORIDE, SODIUM LACTATE, POTASSIUM CHLORIDE, AND CALCIUM CHLORIDE: 600; 310; 30; 20 INJECTION, SOLUTION INTRAVENOUS at 13:41

## 2017-01-01 RX ADMIN — DEXAMETHASONE 1 MG: 0.5 TABLET ORAL at 08:44

## 2017-01-01 RX ADMIN — HEPARIN SODIUM 5000 UNITS: 5000 INJECTION, SOLUTION INTRAVENOUS; SUBCUTANEOUS at 10:28

## 2017-01-01 RX ADMIN — MIRTAZAPINE 15 MG: 15 TABLET, FILM COATED ORAL at 21:18

## 2017-01-01 RX ADMIN — Medication 10 ML: at 14:11

## 2017-01-01 RX ADMIN — Medication 1 CAPSULE: at 08:57

## 2017-01-01 RX ADMIN — NEOSTIGMINE METHYLSULFATE 3 MG: 1 INJECTION INTRAVENOUS at 13:10

## 2017-01-01 RX ADMIN — PANTOPRAZOLE SODIUM 40 MG: 40 TABLET, DELAYED RELEASE ORAL at 10:24

## 2017-01-01 RX ADMIN — INSULIN LISPRO 3 UNITS: 100 INJECTION, SOLUTION INTRAVENOUS; SUBCUTANEOUS at 17:34

## 2017-01-01 RX ADMIN — MIRTAZAPINE 15 MG: 15 TABLET, FILM COATED ORAL at 21:50

## 2017-01-01 RX ADMIN — NYSTATIN 500000 UNITS: 100000 SUSPENSION ORAL at 22:17

## 2017-01-01 RX ADMIN — SERTRALINE HYDROCHLORIDE 50 MG: 50 TABLET ORAL at 09:24

## 2017-01-01 RX ADMIN — Medication 10 ML: at 08:10

## 2017-01-01 RX ADMIN — INSULIN LISPRO 2 UNITS: 100 INJECTION, SOLUTION INTRAVENOUS; SUBCUTANEOUS at 16:41

## 2017-01-01 RX ADMIN — ACETAMINOPHEN 1000 MG: 500 TABLET ORAL at 17:18

## 2017-01-01 RX ADMIN — NYSTATIN 500000 UNITS: 100000 SUSPENSION ORAL at 13:55

## 2017-01-01 RX ADMIN — IOPAMIDOL 100 ML: 755 INJECTION, SOLUTION INTRAVENOUS at 14:16

## 2017-01-01 RX ADMIN — ASPIRIN 81 MG 81 MG: 81 TABLET ORAL at 08:09

## 2017-01-01 RX ADMIN — NYSTATIN 500000 UNITS: 100000 SUSPENSION ORAL at 21:51

## 2017-01-01 RX ADMIN — Medication 10 ML: at 06:02

## 2017-01-01 RX ADMIN — INSULIN LISPRO 2 UNITS: 100 INJECTION, SOLUTION INTRAVENOUS; SUBCUTANEOUS at 13:05

## 2017-01-01 RX ADMIN — Medication 5 ML: at 21:18

## 2017-01-01 RX ADMIN — PIPERACILLIN SODIUM,TAZOBACTAM SODIUM 3.38 G: 3; .375 INJECTION, POWDER, FOR SOLUTION INTRAVENOUS at 05:04

## 2017-01-01 RX ADMIN — NYSTATIN 500000 UNITS: 100000 SUSPENSION ORAL at 09:17

## 2017-01-01 RX ADMIN — INSULIN LISPRO 2 UNITS: 100 INJECTION, SOLUTION INTRAVENOUS; SUBCUTANEOUS at 17:18

## 2017-01-01 RX ADMIN — NYSTATIN 500000 UNITS: 100000 SUSPENSION ORAL at 17:18

## 2017-01-01 RX ADMIN — MEROPENEM 500 MG: 500 INJECTION, POWDER, FOR SOLUTION INTRAVENOUS at 21:20

## 2017-01-01 RX ADMIN — OXYBUTYNIN CHLORIDE 5 MG: 5 TABLET, FILM COATED, EXTENDED RELEASE ORAL at 21:37

## 2017-01-01 RX ADMIN — INSULIN LISPRO 2 UNITS: 100 INJECTION, SOLUTION INTRAVENOUS; SUBCUTANEOUS at 23:58

## 2017-01-01 RX ADMIN — MIRTAZAPINE 15 MG: 15 TABLET, FILM COATED ORAL at 20:55

## 2017-01-01 RX ADMIN — Medication 80 MCG: at 12:51

## 2017-01-01 RX ADMIN — FERROUS SULFATE TAB 325 MG (65 MG ELEMENTAL FE) 325 MG: 325 (65 FE) TAB at 08:56

## 2017-01-01 RX ADMIN — ALPRAZOLAM 0.25 MG: 0.25 TABLET ORAL at 22:07

## 2017-01-01 RX ADMIN — METFORMIN HYDROCHLORIDE 500 MG: 500 TABLET ORAL at 10:23

## 2017-01-01 RX ADMIN — SODIUM CHLORIDE 1000 ML: 900 INJECTION, SOLUTION INTRAVENOUS at 16:08

## 2017-01-01 RX ADMIN — Medication 1 CAPSULE: at 08:46

## 2017-01-01 RX ADMIN — ALPRAZOLAM 0.25 MG: 0.25 TABLET ORAL at 16:08

## 2017-01-01 RX ADMIN — Medication 5 ML: at 06:18

## 2017-01-01 RX ADMIN — Medication 5 ML: at 14:28

## 2017-01-01 RX ADMIN — DEXTROSE MONOHYDRATE 75 ML/HR: 5 INJECTION, SOLUTION INTRAVENOUS at 12:25

## 2017-01-01 RX ADMIN — INSULIN LISPRO 2 UNITS: 100 INJECTION, SOLUTION INTRAVENOUS; SUBCUTANEOUS at 07:05

## 2017-01-01 RX ADMIN — METFORMIN HYDROCHLORIDE 500 MG: 500 TABLET ORAL at 08:09

## 2017-01-01 RX ADMIN — HEPARIN SODIUM 5000 UNITS: 5000 INJECTION, SOLUTION INTRAVENOUS; SUBCUTANEOUS at 21:10

## 2017-01-01 RX ADMIN — DIAZEPAM 5 MG: 5 TABLET ORAL at 20:35

## 2017-01-01 RX ADMIN — MEGESTROL ACETATE 200 MG: 40 SUSPENSION ORAL at 09:18

## 2017-01-01 RX ADMIN — CARVEDILOL 6.25 MG: 6.25 TABLET, FILM COATED ORAL at 07:16

## 2017-01-01 RX ADMIN — INSULIN LISPRO 3 UNITS: 100 INJECTION, SOLUTION INTRAVENOUS; SUBCUTANEOUS at 08:36

## 2017-01-01 RX ADMIN — SODIUM CHLORIDE 100 ML/HR: 900 INJECTION, SOLUTION INTRAVENOUS at 00:00

## 2017-01-01 RX ADMIN — THERA TABS 1 TABLET: TAB at 09:30

## 2017-01-01 RX ADMIN — SENNOSIDES AND DOCUSATE SODIUM 1 TABLET: 8.6; 5 TABLET ORAL at 09:16

## 2017-01-01 RX ADMIN — NYSTATIN 500000 UNITS: 100000 SUSPENSION ORAL at 09:01

## 2017-01-01 RX ADMIN — MEGESTROL ACETATE 200 MG: 40 SUSPENSION ORAL at 08:30

## 2017-01-01 RX ADMIN — FERROUS SULFATE TAB 325 MG (65 MG ELEMENTAL FE) 325 MG: 325 (65 FE) TAB at 08:09

## 2017-01-01 RX ADMIN — Medication 1 CAPSULE: at 09:23

## 2017-01-01 RX ADMIN — HEPARIN SODIUM 5000 UNITS: 5000 INJECTION, SOLUTION INTRAVENOUS; SUBCUTANEOUS at 09:17

## 2017-01-01 RX ADMIN — ALBUMIN (HUMAN) 25 G: 0.25 INJECTION, SOLUTION INTRAVENOUS at 23:19

## 2017-01-01 RX ADMIN — HYDROMORPHONE HYDROCHLORIDE 1 MG: 1 INJECTION, SOLUTION INTRAMUSCULAR; INTRAVENOUS; SUBCUTANEOUS at 16:43

## 2017-01-01 RX ADMIN — MIRTAZAPINE 15 MG: 15 TABLET, FILM COATED ORAL at 21:09

## 2017-01-01 RX ADMIN — INSULIN LISPRO 2 UNITS: 100 INJECTION, SOLUTION INTRAVENOUS; SUBCUTANEOUS at 12:32

## 2017-01-01 RX ADMIN — THERA TABS 1 TABLET: TAB at 10:27

## 2017-01-01 RX ADMIN — CARVEDILOL 6.25 MG: 6.25 TABLET, FILM COATED ORAL at 09:11

## 2017-01-01 RX ADMIN — CARVEDILOL 6.25 MG: 6.25 TABLET, FILM COATED ORAL at 09:23

## 2017-01-01 RX ADMIN — SENNOSIDES AND DOCUSATE SODIUM 2 TABLET: 8.6; 5 TABLET ORAL at 08:45

## 2017-01-01 RX ADMIN — CARVEDILOL 6.25 MG: 6.25 TABLET, FILM COATED ORAL at 17:10

## 2017-01-01 RX ADMIN — ATORVASTATIN CALCIUM 40 MG: 40 TABLET, FILM COATED ORAL at 21:36

## 2017-01-01 RX ADMIN — MEGESTROL ACETATE 200 MG: 40 SUSPENSION ORAL at 08:55

## 2017-01-01 RX ADMIN — ALPRAZOLAM 0.25 MG: 0.25 TABLET ORAL at 06:26

## 2017-01-01 RX ADMIN — CARVEDILOL 6.25 MG: 6.25 TABLET, FILM COATED ORAL at 17:24

## 2017-01-01 RX ADMIN — NYSTATIN 500000 UNITS: 100000 SUSPENSION ORAL at 22:09

## 2017-01-01 RX ADMIN — NYSTATIN 500000 UNITS: 100000 SUSPENSION ORAL at 19:59

## 2017-01-01 RX ADMIN — DEXAMETHASONE 1 MG: 0.5 TABLET ORAL at 08:36

## 2017-01-01 RX ADMIN — Medication 5 ML: at 13:07

## 2017-01-01 RX ADMIN — MEROPENEM 500 MG: 500 INJECTION, POWDER, FOR SOLUTION INTRAVENOUS at 10:33

## 2017-01-01 RX ADMIN — ATORVASTATIN CALCIUM 40 MG: 20 TABLET, FILM COATED ORAL at 19:33

## 2017-01-01 RX ADMIN — ATORVASTATIN CALCIUM 40 MG: 40 TABLET, FILM COATED ORAL at 20:54

## 2017-01-01 RX ADMIN — FUROSEMIDE 40 MG: 40 TABLET ORAL at 08:56

## 2017-01-01 RX ADMIN — INSULIN LISPRO 2 UNITS: 100 INJECTION, SOLUTION INTRAVENOUS; SUBCUTANEOUS at 17:24

## 2017-01-01 RX ADMIN — CALCITONIN SALMON 100 INT'L UNITS: 200 INJECTION, SOLUTION INTRAMUSCULAR; SUBCUTANEOUS at 11:57

## 2017-01-01 RX ADMIN — Medication 4 TABLET: at 17:48

## 2017-01-01 RX ADMIN — ALPRAZOLAM 0.25 MG: 0.25 TABLET ORAL at 11:27

## 2017-01-01 RX ADMIN — METFORMIN HYDROCHLORIDE 500 MG: 500 TABLET ORAL at 08:55

## 2017-01-01 RX ADMIN — OXYCODONE HYDROCHLORIDE 5 MG: 5 TABLET ORAL at 21:43

## 2017-01-01 RX ADMIN — CARVEDILOL 6.25 MG: 6.25 TABLET, FILM COATED ORAL at 08:26

## 2017-01-01 RX ADMIN — INSULIN LISPRO 3 UNITS: 100 INJECTION, SOLUTION INTRAVENOUS; SUBCUTANEOUS at 16:26

## 2017-01-01 RX ADMIN — ROCURONIUM BROMIDE 35 MG: 10 INJECTION, SOLUTION INTRAVENOUS at 12:40

## 2017-01-01 RX ADMIN — CARVEDILOL 6.25 MG: 6.25 TABLET, FILM COATED ORAL at 08:56

## 2017-01-01 RX ADMIN — NYSTATIN 500000 UNITS: 100000 SUSPENSION ORAL at 21:07

## 2017-01-01 RX ADMIN — KETOROLAC TROMETHAMINE 15 MG: 30 INJECTION, SOLUTION INTRAMUSCULAR; INTRAVENOUS at 13:43

## 2017-01-01 RX ADMIN — Medication 5 ML: at 04:57

## 2017-01-01 RX ADMIN — METFORMIN HYDROCHLORIDE 500 MG: 500 TABLET ORAL at 13:09

## 2017-01-01 RX ADMIN — MEGESTROL ACETATE 200 MG: 40 SUSPENSION ORAL at 08:10

## 2017-01-01 RX ADMIN — MAGNESIUM SULFATE HEPTAHYDRATE 2 G: 40 INJECTION, SOLUTION INTRAVENOUS at 11:50

## 2017-01-01 RX ADMIN — CARVEDILOL 6.25 MG: 6.25 TABLET, FILM COATED ORAL at 10:23

## 2017-01-01 RX ADMIN — MIRTAZAPINE 15 MG: 15 TABLET, FILM COATED ORAL at 21:01

## 2017-01-01 RX ADMIN — ALPRAZOLAM 0.25 MG: 0.25 TABLET ORAL at 08:46

## 2017-01-01 RX ADMIN — ASPIRIN 81 MG 81 MG: 81 TABLET ORAL at 10:27

## 2017-01-01 RX ADMIN — Medication 10 ML: at 09:20

## 2017-01-01 RX ADMIN — INSULIN LISPRO 2 UNITS: 100 INJECTION, SOLUTION INTRAVENOUS; SUBCUTANEOUS at 12:30

## 2017-01-01 RX ADMIN — DEXAMETHASONE 1 MG: 1 TABLET ORAL at 21:11

## 2017-01-01 RX ADMIN — SODIUM CHLORIDE 50 ML/HR: 900 INJECTION, SOLUTION INTRAVENOUS at 14:16

## 2017-01-01 RX ADMIN — PANTOPRAZOLE SODIUM 40 MG: 40 TABLET, DELAYED RELEASE ORAL at 08:36

## 2017-01-01 RX ADMIN — SODIUM CHLORIDE 50 ML/HR: 900 INJECTION, SOLUTION INTRAVENOUS at 08:54

## 2017-01-01 RX ADMIN — MEROPENEM 500 MG: 500 INJECTION, POWDER, FOR SOLUTION INTRAVENOUS at 22:19

## 2017-01-01 RX ADMIN — ASPIRIN 81 MG 81 MG: 81 TABLET ORAL at 08:44

## 2017-01-01 RX ADMIN — INSULIN LISPRO 3 UNITS: 100 INJECTION, SOLUTION INTRAVENOUS; SUBCUTANEOUS at 13:10

## 2017-01-01 RX ADMIN — MIRTAZAPINE 15 MG: 15 TABLET, FILM COATED ORAL at 21:13

## 2017-01-01 RX ADMIN — LEVOFLOXACIN 500 MG: 500 TABLET, FILM COATED ORAL at 09:15

## 2017-01-01 RX ADMIN — NYSTATIN 500000 UNITS: 100000 SUSPENSION ORAL at 22:07

## 2017-01-01 RX ADMIN — ASPIRIN 81 MG 81 MG: 81 TABLET ORAL at 08:25

## 2017-01-01 RX ADMIN — ATORVASTATIN CALCIUM 40 MG: 20 TABLET, FILM COATED ORAL at 17:29

## 2017-01-01 RX ADMIN — FUROSEMIDE 80 MG: 40 TABLET ORAL at 08:36

## 2017-01-01 RX ADMIN — ALPRAZOLAM 0.25 MG: 0.25 TABLET ORAL at 21:37

## 2017-01-01 RX ADMIN — ATORVASTATIN CALCIUM 40 MG: 40 TABLET, FILM COATED ORAL at 20:25

## 2017-01-01 RX ADMIN — PANTOPRAZOLE SODIUM 40 MG: 40 TABLET, DELAYED RELEASE ORAL at 08:09

## 2017-01-01 RX ADMIN — OXYCODONE HYDROCHLORIDE 5 MG: 5 TABLET ORAL at 21:56

## 2017-01-01 RX ADMIN — FUROSEMIDE 80 MG: 40 TABLET ORAL at 08:08

## 2017-01-01 RX ADMIN — DEXAMETHASONE 1 MG: 0.5 TABLET ORAL at 20:12

## 2017-01-01 RX ADMIN — ATORVASTATIN CALCIUM 40 MG: 40 TABLET, FILM COATED ORAL at 22:12

## 2017-01-01 RX ADMIN — NYSTATIN 500000 UNITS: 100000 SUSPENSION ORAL at 18:48

## 2017-01-01 RX ADMIN — MULTIPLE VITAMINS W/ MINERALS TAB 1 TABLET: TAB at 08:34

## 2017-01-01 RX ADMIN — DEXAMETHASONE 1 MG: 0.5 TABLET ORAL at 08:35

## 2017-01-01 RX ADMIN — NYSTATIN 500000 UNITS: 100000 SUSPENSION ORAL at 17:30

## 2017-01-01 RX ADMIN — ASPIRIN 81 MG 81 MG: 81 TABLET ORAL at 08:49

## 2017-01-01 RX ADMIN — ALPRAZOLAM 0.25 MG: 0.25 TABLET ORAL at 10:12

## 2017-01-01 RX ADMIN — HYDROMORPHONE HYDROCHLORIDE 1 MG: 1 INJECTION, SOLUTION INTRAMUSCULAR; INTRAVENOUS; SUBCUTANEOUS at 07:40

## 2017-01-01 RX ADMIN — DIAZEPAM 5 MG: 5 TABLET ORAL at 21:18

## 2017-01-01 RX ADMIN — NYSTATIN 500000 UNITS: 100000 SUSPENSION ORAL at 09:13

## 2017-01-01 RX ADMIN — CARVEDILOL 6.25 MG: 6.25 TABLET, FILM COATED ORAL at 08:10

## 2017-01-01 RX ADMIN — GLIPIZIDE 5 MG: 5 TABLET ORAL at 08:36

## 2017-01-01 RX ADMIN — HEPARIN SODIUM 5000 UNITS: 5000 INJECTION, SOLUTION INTRAVENOUS; SUBCUTANEOUS at 09:56

## 2017-01-01 RX ADMIN — ALPRAZOLAM 0.25 MG: 0.25 TABLET ORAL at 21:50

## 2017-01-01 RX ADMIN — NYSTATIN 500000 UNITS: 100000 SUSPENSION ORAL at 21:50

## 2017-01-01 RX ADMIN — DEXAMETHASONE 1 MG: 0.5 TABLET ORAL at 21:18

## 2017-01-01 RX ADMIN — HYDROMORPHONE HYDROCHLORIDE 1 MG: 1 INJECTION, SOLUTION INTRAMUSCULAR; INTRAVENOUS; SUBCUTANEOUS at 23:02

## 2017-01-01 RX ADMIN — SODIUM CHLORIDE 1000 ML: 900 INJECTION, SOLUTION INTRAVENOUS at 15:30

## 2017-01-01 RX ADMIN — IOPAMIDOL 100 ML: 755 INJECTION, SOLUTION INTRAVENOUS at 09:20

## 2017-01-01 RX ADMIN — METFORMIN HYDROCHLORIDE 500 MG: 500 TABLET ORAL at 08:25

## 2017-01-01 RX ADMIN — INSULIN LISPRO 3 UNITS: 100 INJECTION, SOLUTION INTRAVENOUS; SUBCUTANEOUS at 05:14

## 2017-01-01 RX ADMIN — OXYBUTYNIN CHLORIDE 5 MG: 5 TABLET, FILM COATED, EXTENDED RELEASE ORAL at 20:54

## 2017-01-01 RX ADMIN — MIRTAZAPINE 15 MG: 15 TABLET, FILM COATED ORAL at 21:37

## 2017-01-01 RX ADMIN — HEPARIN SODIUM 5000 UNITS: 5000 INJECTION, SOLUTION INTRAVENOUS; SUBCUTANEOUS at 22:01

## 2017-01-01 RX ADMIN — CEFAZOLIN 2 G: 10 INJECTION, POWDER, FOR SOLUTION INTRAVENOUS; PARENTERAL at 12:24

## 2017-01-01 RX ADMIN — HYDROMORPHONE HYDROCHLORIDE 1 MG: 1 INJECTION, SOLUTION INTRAMUSCULAR; INTRAVENOUS; SUBCUTANEOUS at 06:38

## 2017-01-01 RX ADMIN — PROPOFOL 80 MG: 10 INJECTION, EMULSION INTRAVENOUS at 12:39

## 2017-01-01 RX ADMIN — CEFTRIAXONE 2 G: 2 INJECTION, POWDER, FOR SOLUTION INTRAMUSCULAR; INTRAVENOUS at 14:19

## 2017-01-01 RX ADMIN — THERA TABS 1 TABLET: TAB at 09:10

## 2017-01-01 RX ADMIN — FUROSEMIDE 20 MG: 40 TABLET ORAL at 08:52

## 2017-01-01 RX ADMIN — CARVEDILOL 6.25 MG: 6.25 TABLET, FILM COATED ORAL at 06:38

## 2017-01-01 RX ADMIN — ATORVASTATIN CALCIUM 40 MG: 40 TABLET, FILM COATED ORAL at 20:21

## 2017-01-01 RX ADMIN — GLYCOPYRROLATE 0.4 MG: 0.2 INJECTION INTRAMUSCULAR; INTRAVENOUS at 13:10

## 2017-01-01 RX ADMIN — CARVEDILOL 6.25 MG: 6.25 TABLET, FILM COATED ORAL at 18:32

## 2017-01-01 RX ADMIN — CEFTRIAXONE 1 G: 1 INJECTION, POWDER, FOR SOLUTION INTRAMUSCULAR; INTRAVENOUS at 18:04

## 2017-01-01 RX ADMIN — METFORMIN HYDROCHLORIDE 500 MG: 500 TABLET ORAL at 09:25

## 2017-01-01 RX ADMIN — METFORMIN HYDROCHLORIDE 500 MG: 500 TABLET ORAL at 08:57

## 2017-01-01 RX ADMIN — MEGESTROL ACETATE 200 MG: 40 SUSPENSION ORAL at 10:32

## 2017-01-01 RX ADMIN — INSULIN LISPRO 2 UNITS: 100 INJECTION, SOLUTION INTRAVENOUS; SUBCUTANEOUS at 12:36

## 2017-01-01 RX ADMIN — FUROSEMIDE 80 MG: 40 TABLET ORAL at 08:50

## 2017-01-01 RX ADMIN — ALPRAZOLAM 0.25 MG: 0.25 TABLET ORAL at 21:18

## 2017-01-01 RX ADMIN — HEPARIN SODIUM 5000 UNITS: 5000 INJECTION, SOLUTION INTRAVENOUS; SUBCUTANEOUS at 22:08

## 2017-01-01 RX ADMIN — METFORMIN HYDROCHLORIDE 500 MG: 500 TABLET ORAL at 18:58

## 2017-01-01 RX ADMIN — ATORVASTATIN CALCIUM 40 MG: 40 TABLET, FILM COATED ORAL at 21:13

## 2017-01-01 RX ADMIN — ALPRAZOLAM 0.25 MG: 0.25 TABLET ORAL at 21:13

## 2017-01-01 RX ADMIN — METFORMIN HYDROCHLORIDE 500 MG: 500 TABLET ORAL at 08:52

## 2017-01-01 RX ADMIN — OXYCODONE HYDROCHLORIDE 5 MG: 5 TABLET ORAL at 14:44

## 2017-01-01 RX ADMIN — THERA TABS 1 TABLET: TAB at 10:00

## 2017-01-01 RX ADMIN — ASPIRIN 81 MG 81 MG: 81 TABLET ORAL at 09:38

## 2017-01-01 RX ADMIN — Medication 5 ML: at 17:19

## 2017-01-01 RX ADMIN — LIDOCAINE HYDROCHLORIDE 80 MG: 20 INJECTION, SOLUTION EPIDURAL; INFILTRATION; INTRACAUDAL; PERINEURAL at 12:39

## 2017-01-01 RX ADMIN — ASPIRIN 81 MG 81 MG: 81 TABLET ORAL at 09:17

## 2017-01-01 RX ADMIN — INSULIN LISPRO 2 UNITS: 100 INJECTION, SOLUTION INTRAVENOUS; SUBCUTANEOUS at 06:54

## 2017-01-01 RX ADMIN — FERROUS SULFATE TAB 325 MG (65 MG ELEMENTAL FE) 325 MG: 325 (65 FE) TAB at 17:31

## 2017-01-01 RX ADMIN — SODIUM CHLORIDE 500 ML: 900 INJECTION, SOLUTION INTRAVENOUS at 13:00

## 2017-01-01 RX ADMIN — FERROUS SULFATE TAB 325 MG (65 MG ELEMENTAL FE) 325 MG: 325 (65 FE) TAB at 08:35

## 2017-01-01 RX ADMIN — ATORVASTATIN CALCIUM 40 MG: 40 TABLET, FILM COATED ORAL at 21:50

## 2017-01-01 RX ADMIN — Medication 10 ML: at 17:33

## 2017-07-31 PROBLEM — Z79.899 ON STATIN THERAPY: Status: ACTIVE | Noted: 2017-01-01

## 2017-07-31 PROBLEM — Z78.0 POST-MENOPAUSAL: Status: ACTIVE | Noted: 2017-01-01

## 2017-07-31 PROBLEM — R53.83 FATIGUE: Status: ACTIVE | Noted: 2017-01-01

## 2017-07-31 PROBLEM — M54.16 SPINAL STENOSIS OF LUMBAR REGION WITH RADICULOPATHY: Status: ACTIVE | Noted: 2017-01-01

## 2017-07-31 PROBLEM — M81.0 OSTEOPOROSIS: Status: ACTIVE | Noted: 2017-01-01

## 2017-07-31 PROBLEM — E78.00 HYPERCHOLESTEROLEMIA: Status: ACTIVE | Noted: 2017-01-01

## 2017-07-31 PROBLEM — G95.9 CERVICAL MYELOPATHY (HCC): Status: ACTIVE | Noted: 2017-01-01

## 2017-07-31 PROBLEM — R73.9 HYPERGLYCEMIA: Status: ACTIVE | Noted: 2017-01-01

## 2017-07-31 PROBLEM — I10 HYPERTENSION: Status: ACTIVE | Noted: 2017-01-01

## 2017-07-31 PROBLEM — B37.31 VAGINITIS DUE TO CANDIDA: Status: ACTIVE | Noted: 2017-01-01

## 2017-07-31 PROBLEM — E11.9 CONTROLLED DIABETES MELLITUS (HCC): Status: ACTIVE | Noted: 2017-01-01

## 2017-07-31 PROBLEM — F41.9 ANXIETY: Status: ACTIVE | Noted: 2017-01-01

## 2017-07-31 PROBLEM — M48.061 SPINAL STENOSIS OF LUMBAR REGION WITH RADICULOPATHY: Status: ACTIVE | Noted: 2017-01-01

## 2017-07-31 NOTE — MR AVS SNAPSHOT
Visit Information Date & Time Provider Department Dept. Phone Encounter #  
 7/31/2017  3:00 PM Frank Michele NP [facility] [phone] [id number] Follow-up Instructions Return if symptoms worsen or fail to improve. Your Appointments 8/25/2017  9:30 AM  
Follow Up with [facility] ([facility]) Appt Note: 6 mo  
 [address] [id number] [address] [address] Upcoming Health Maintenance Date Due Hepatitis C Screening 1950 HEMOGLOBIN A1C Q6M 1950 LIPID PANEL Q1 1950 FOOT EXAM Q1 1/3/1958 MICROALBUMIN Q1 1/3/1958 EYE EXAM RETINAL OR DILATED Q1 1/3/1958 DTaP/Tdap/Td series (1 - Tdap) 1/3/1969 FOBT Q 1 YEAR AGE 50-75 1/3/1998 ZOSTER VACCINE AGE 60> 11/3/2007 GLAUCOMA SCREENING Q2Y 1/3/2013 OSTEOPOROSIS SCREENING (DEXA) 1/3/2013 Pneumococcal 65+ Low/Medium Risk (1 of 2 - PCV13) 1/3/2013 MEDICARE YEARLY EXAM 1/3/2013 INFLUENZA AGE 9 TO ADULT 8/1/2017 BREAST CANCER SCRN MAMMOGRAM 3/17/2019 Allergies as of 7/31/2017  Review Complete On: 7/31/2017 By: Eb Dacosta No Known Allergies Current Immunizations  Never Reviewed No immunizations on file. Not reviewed this visit You Were Diagnosed With   
  
 Codes Comments Mass of right side of neck    -  Primary ICD-10-CM: R22.1 ICD-9-CM: 883. 2 Abnormal chest x-ray     ICD-10-CM: R93.8 ICD-9-CM: 793.2 Vitals BP Pulse Temp Height(growth percentile) Weight(growth percentile) SpO2  
 166/78 (BP 1 Location: Left arm, BP Patient Position: Sitting) 94 99 °F (37.2 °C) (Oral) 5' (1.524 m) 122 lb (55.3 kg) 95% BMI OB Status 23.83 kg/m2 Postmenopausal      
 Vitals History BMI and BSA Data Body Mass Index Body Surface Area 23.83 kg/m 2 1.53 m 2 Your Updated Medication List  
  
   
This list is accurate as of: 7/31/17  5:03 PM.  Always use your most recent med list.  
  
  
  
  
 ACCU-CHEK TOREY PLUS TEST STRP strip Generic drug:  glucose blood VI test strips  
by Does Not Apply route See Admin Instructions. ALPRAZolam 0.25 mg tablet Commonly known as:  Yeimi Belling Take  by mouth. CALCIUM + D PO Take  by mouth. glipiZIDE-metFORMIN 5-500 mg per tablet Commonly known as:  METAGLIP Take 1 Tab by mouth Before breakfast and dinner. LASIX 80 mg tablet Generic drug:  furosemide Take  by mouth daily. LIPITOR 20 mg tablet Generic drug:  atorvastatin Take  by mouth daily. MULTIVITAMIN PO Take  by mouth. [place] Take  by mouth. POTASSIUM CHLORIDE PO Take  by mouth. We Performed the Following AMB POC COMPLETE CBC,AUTOMATED ENTER [id number] CPT(R)] AMB POC COMPREHENSIVE METABOLIC PANEL [72201 CPT(R)] Follow-up Instructions Return if symptoms worsen or fail to improve. To-Do List   
 07/31/2017 Imaging:  CT CHEST W WO CONT   
  
 07/31/2017 Imaging:  US GUIDE FINE NDL ASP W IMAGE   
  
 07/31/2017 Imaging:  US HEAD NECK SOFT TISSUE   
  
 07/31/2017 Imaging:  XR CHEST PA LAT   
  
 08/01/2017 9:30 AM  
  Appointment with [facility] CT 1 at [facility] RAD CT ([phone]) CONTRAST STUDY: 1. The patient should not eat solid food four hours before the appointment but should be encouraged to drink clear liquids. 2. The patient will require IV access for contrast administration. 3. The patient should not take  Ibuprofen (Advil, Motrin, etc.) and Naproxen Sodium (Aleve, etc.)  on the day of the exam. Stopping non-steroidal anti-inflammatory agents (NSAIDs) like Ibuprofen decreases the risk of kidney damage from the x-ray contrast (dye).  4. Bring any [facility] facility films/images pertaining to the area of interest with you on the day of appointment. 5. Bring current lab work if available(within last 90 days CMP) ***[facility], labs will need to be done before appointment*** 6. Check in at registration at least 30 minutes before appt time unless you were instructed to do otherwise. 7. If you have to drink oral contrast please pick it up any weekday prior to your appointment, if you cannot please check in 2 hrs  before appt time. 08/01/2017 3:30 PM  
  Appointment with Dimple Bolivar at [facility] Ultrasound ([phone]) No Prep  GENERAL INSTRUCTIONS 1. Bring any non [facility] facility films/reports pertaining to the area being studied with you on the day of appointment. 2. A written order with a valid diagnosis and Physicians signature is required for all scheduled tests. 3. Check in at registration 30 minutes before your appointment time unless you were instructed to do otherwise. Patient Instructions Noninsulin Medicines for Type 2 Diabetes: Care Instructions Your Care Instructions There are different types of noninsulin medicines for diabetes. Each works in a different way. But they all help you control your blood sugar. Some types help your body make insulin to lower your blood sugar. Others lower how much insulin your body needs. Some can slow how fast your body digests sugars. And some can remove extra glucose through your urine. · Alpha-glucosidase inhibitors. These keep starches from breaking down. This means that they lower the amount of glucose absorbed when you eat. They don't help your body make more insulin. So they will not cause low blood sugar unless you use them with other medicines for diabetes. They include acarbose and miglitol. · DPP-4 inhibitors.  These help your body raise the level of insulin after you eat. They also help your body make less of a hormone that raises blood sugar. They include linagliptin, saxagliptin, and sitagliptin. · Incretin hormones (GLP-1 receptor agonists). Your body makes a protein that can raise your insulin level. It also can lower your blood sugar and make you less hungry. You can get shots of hormones that work the same way. They include exenatide and liraglutide. · Meglitinides. These help your body release insulin. They also help slow how your body digests sugars. So they can keep your blood sugar from rising too fast after you eat. They include nateglinide and repaglinide. · Metformin. This lowers how much glucose your liver makes. And it helps you respond better to insulin. It also lowers the amount of stored sugar that your liver releases when you are not eating. · SGLT2 inhibitors. These help to remove extra glucose through your urine. They may also help some people lose weight. They include canagliflozin, dapagliflozin, and empagliflozin. · Sulfonylureas. These help your body release more insulin. Some work for many hours. They can cause low blood sugar if you don't eat as you planned. They include glipizide and glyburide. · Thiazolidinediones. These reduce the amount of blood glucose. They also help you respond better to insulin. They include pioglitazone and rosiglitazone. You may need to take more than one medicine for diabetes. Two or more medicines may work better to lower your blood sugar level than just one does. Follow-up care is a key part of your treatment and safety. Be sure to make and go to all appointments, and call your doctor if you are having problems. It's also a good idea to know your test results and keep a list of the medicines you take. How can you care for yourself at home? · Eat a healthy diet. Get some exercise each day. This may help you to reduce how much medicine you need. · Do not take other prescription or over-the-counter medicines, vitamins, herbal products, or supplements without talking to your doctor first. Some medicines for type 2 diabetes can cause problems with other medicines or supplements. · Tell your doctor if you plan to get pregnant. Some of these drugs are not safe for pregnant women. · Be safe with medicines. Take your medicines exactly as prescribed. Meglitinides and sulfonylureas can cause your blood sugar to drop very low. Call your doctor if you think you are having a problem with your medicine. · Check your blood sugar often. You can use a glucose monitor. Keeping track can help you know how certain foods, activities, and medicines affect your blood sugar. And it can help you keep your blood sugar from getting so low that it's not safe. When should you call for help? Call 911 anytime you think you may need emergency care. For example, call if: 
· You passed out (lost consciousness). · You are confused or cannot think clearly. · Your blood sugar is very high or very low. Watch closely for changes in your health, and be sure to contact your doctor if: 
· Your blood sugar stays outside the level your doctor set for you. · You have any problems. Where can you learn more? Go to http://rustam-jurgen.info/. Enter H153 in the search box to learn more about \"Noninsulin Medicines for Type 2 Diabetes: Care Instructions. \" Current as of: March 13, 2017 Content Version: 11.3 © 1902-8078 [employer]. Care instructions adapted under license by [employer] (which disclaims liability or warranty for this information). If you have questions about a medical condition or this instruction, always ask your healthcare professional. [place] [address] any warranty or liability for your use of this information. Introducing [facility]! [employer] introduces Algorithmia patient portal. Now you can access parts of your medical record, email your doctor's office, and request medication refills online. 1. In your internet browser, go to [email or web address] 2. Click on the First Time User? Click Here link in the Sign In box. You will see the New Member Sign Up page. 3. Enter your Algorithmia Access Code exactly as it appears below. You will not need to use this code after youve completed the sign-up process. If you do not sign up before the expiration date, you must request a new code. · Algorithmia Access Code: [email or web address] Expires: 10/29/2017  2:47 PM 
 
4. Enter the last four digits of your Social Security Number (xxxx) and Date of Birth (mm/dd/yyyy) as indicated and click Submit. You will be taken to the next sign-up page. 5. Create a Algorithmia ID. This will be your Algorithmia login ID and cannot be changed, so think of one that is secure and easy to remember. 6. Create a Algorithmia password. You can change your password at any time. 7. Enter your Password Reset Question and Answer. This can be used at a later time if you forget your password. 8. Enter your e-mail address. You will receive e-mail notification when new information is available in [address] 9. Click Sign Up. You can now view and download portions of your medical record. 10. Click the Download Summary menu link to download a portable copy of your medical information. If you have questions, please visit the Frequently Asked Questions section of the Algorithmia website. Remember, Algorithmia is NOT to be used for urgent needs. For medical emergencies, dial 911. Now available from your iPhone and Android! Please provide this summary of care documentation to your next provider. Your primary care clinician is listed as RONY Bermeo. If you have any questions after today's visit, please call [phone].

## 2017-07-31 NOTE — PROGRESS NOTES
Devi Watts presents with   Chief Complaint   Patient presents with    Neck Swelling    Weight Loss    Abdominal Pain    Fatigue     Patient of [employer] here with complaint of a lump that has come up on her neck iurping, cough & blan the last week. Patient also has complaint of weight loss, fatigue, abdominal pain, bloating, balance issues. 1. Have you been to the ER, urgent care clinic since your last visit? Hospitalized since your last visit? No    2. Have you seen or consulted any other health care providers outside of the [address] [place] [address] since your last visit? Include any pap smears or colon screening.  No

## 2017-07-31 NOTE — PROGRESS NOTES
Subjective:  Ms. Kerri Freeman is a very pleasant 69year old lady who comes in today complaining of a one week history of a mass on the right side of her neck. It is painless. She denies any trouble swallowing. Denies any chills or fever. She denies any headaches, dizziness or blurred vision. She denies any chest pain or palpitation. She denies any shortness of breath, but does have an occasional dry cough. She denies any wheezing, hemoptysis, PND or orthopnea. Her appetite is good and she denies any recent weight loss. Denies any previous history of cancer. She is a nonsmoker. Physical Examination:  GENERAL:  On exam she is a pleasant lady in no acute distress. She is alert and oriented. VITALS:  Blood pressure is 166/78, pulse 94 and regular, temperature 99, O2 sat 95. HEENT:  Normocephalic, atraumatic. TMs normal.  Mouth mucosa pink. Tongue midline. Pharynx normal.  NECK:  She has a large mass in the supraclavicular area that is fixed and non mobile. It is non tender. There is no other adenopathy noted. CHEST:  Lungs were clear to auscultation, no rales or wheezes. CARDIAC:  Heart regular rhythm without murmur or gallop. ABDOMEN:  Bowel sounds present, soft, non tender. No organomegaly. EXTREMITIES:  No edema or calf tenderness. Distal pulses were present. Studies:  AP and lateral views of the chest reveal multiple pulmonary nodules in the left lung field. Impression:  1. Right sided neck mass. 2. Abnormal chest xray. Plan:  1. She is scheduled for a CT with contrast of the chest along with an ultrasound of her neck. 2. While here we did a CBC and comprehensive metabolic. 3. All of these studies have been scheduled for 8:30 tomorrow morning, which will be 08/31/17. I will call her as soon as I have the results.

## 2017-07-31 NOTE — PATIENT INSTRUCTIONS
Noninsulin Medicines for Type 2 Diabetes: Care Instructions  Your Care Instructions  There are different types of noninsulin medicines for diabetes. Each works in a different way. But they all help you control your blood sugar. Some types help your body make insulin to lower your blood sugar. Others lower how much insulin your body needs. Some can slow how fast your body digests sugars. And some can remove extra glucose through your urine. · Alpha-glucosidase inhibitors. These keep starches from breaking down. This means that they lower the amount of glucose absorbed when you eat. They don't help your body make more insulin. So they will not cause low blood sugar unless you use them with other medicines for diabetes. They include acarbose and miglitol. · DPP-4 inhibitors. These help your body raise the level of insulin after you eat. They also help your body make less of a hormone that raises blood sugar. They include linagliptin, saxagliptin, and sitagliptin. · Incretin hormones (GLP-1 receptor agonists). Your body makes a protein that can raise your insulin level. It also can lower your blood sugar and make you less hungry. You can get shots of hormones that work the same way. They include exenatide and liraglutide. · Meglitinides. These help your body release insulin. They also help slow how your body digests sugars. So they can keep your blood sugar from rising too fast after you eat. They include nateglinide and repaglinide. · Metformin. This lowers how much glucose your liver makes. And it helps you respond better to insulin. It also lowers the amount of stored sugar that your liver releases when you are not eating. · SGLT2 inhibitors. These help to remove extra glucose through your urine. They may also help some people lose weight. They include canagliflozin, dapagliflozin, and empagliflozin. · Sulfonylureas. These help your body release more insulin. Some work for many hours.  They can cause low blood sugar if you don't eat as you planned. They include glipizide and glyburide. · Thiazolidinediones. These reduce the amount of blood glucose. They also help you respond better to insulin. They include pioglitazone and rosiglitazone. You may need to take more than one medicine for diabetes. Two or more medicines may work better to lower your blood sugar level than just one does. Follow-up care is a key part of your treatment and safety. Be sure to make and go to all appointments, and call your doctor if you are having problems. It's also a good idea to know your test results and keep a list of the medicines you take. How can you care for yourself at home? · Eat a healthy diet. Get some exercise each day. This may help you to reduce how much medicine you need. · Do not take other prescription or over-the-counter medicines, vitamins, herbal products, or supplements without talking to your doctor first. Some medicines for type 2 diabetes can cause problems with other medicines or supplements. · Tell your doctor if you plan to get pregnant. Some of these drugs are not safe for pregnant women. · Be safe with medicines. Take your medicines exactly as prescribed. Meglitinides and sulfonylureas can cause your blood sugar to drop very low. Call your doctor if you think you are having a problem with your medicine. · Check your blood sugar often. You can use a glucose monitor. Keeping track can help you know how certain foods, activities, and medicines affect your blood sugar. And it can help you keep your blood sugar from getting so low that it's not safe. When should you call for help? Call 911 anytime you think you may need emergency care. For example, call if:  · You passed out (lost consciousness). · You are confused or cannot think clearly. · Your blood sugar is very high or very low.   Watch closely for changes in your health, and be sure to contact your doctor if:  · Your blood sugar stays outside the level your doctor set for you. · You have any problems. Where can you learn more? Go to http://rustam-jurgen.info/. Enter H153 in the search box to learn more about \"Noninsulin Medicines for Type 2 Diabetes: Care Instructions. \"  Current as of: March 13, 2017  Content Version: 11.3  © 2908-4871 "[employer]. Care instructions adapted under license by [employer] (which disclaims liability or warranty for this information). If you have questions about a medical condition or this instruction, always ask your healthcare professional. [address] any warranty or liability for your use of this information.

## 2017-08-10 NOTE — PROGRESS NOTES
The patient is a 69 y.o. woman referred by Erica Munguia NP regarding a right neck mass. The patient had noted the mass in the neck by palpation. She did not otherwise have symptoms. She was seen in the primary care physician's office and had CT scan ordered and had ultrasound directed needle aspiration. CT scan showed marked adenopathy at the base of the right neck in the periclavicular region extending into superior mediastinum. There is also right hilar adenopathy or mass and multiple lung nodules some of which were pleural based. The needle aspiration came back as nondiagnostic. She is now referred for surgical biopsy. The patient has a history of cervical fusion for cervical myelopathy, hypercholesterolemia, hyperglycemia, hypertension, lumbar spinal stenosis. The patient has never smoked. She does not use alcohol. The patient has no history of shortness of breath. The patient denies history of anginal chest pain or irregular heart beat. She has no history of coronary intervention. Physical Examination:    GENERAL: On examination the patient is an alert woman in no acute distress. Visit Vitals    /82 (BP 1 Location: Right arm, BP Patient Position: Sitting)    Pulse 98    Ht 5' (1.524 m)    Wt 53.4 kg (117 lb 12.8 oz)    SpO2 100%    BMI 23.01 kg/m2   HEAD/NECK:  Exam showed a firm unmoving mass base of right neck in supraclavicular area. It is nontender. No other masses were noted. LUNGS:  Clear bilaterally without rales, rhonchi or wheezes. HEART:  Regular without murmur, gallop or rub. NEURO:  Patient is alert and oriented and moves all extremities equally. Facial movement is symmetrical. Speech was normal.      I reviewed the CT images. The patient has right neck mass suspicious for malignancy. I will plan for open surgical incisional biopsy of the mass. This can be done as an outpatient. I reviewed with the patient the risks vs benefits of surgery. Risks would include bleeding, infection, injury to adjacent structures including nerves or vessels, potential for nondiagnostic results, risks of anesthesia, etc.  The patient understands and wishes to proceed. Final Diagnosis:  Mass right neck.     Fannie/zulema     cc: Darrell Ly NP

## 2017-08-10 NOTE — MR AVS SNAPSHOT
Visit Information Date & Time Provider Department Dept. Phone Encounter #  
 8/10/2017  2:20 PM Lily Kyle MD [facility] [address] [phone] [id number] Your Appointments 8/25/2017  9:30 AM  
Follow Up with MD BRANDY Brantley [facility] ([address]) Appt Note: 6 mo; 6 mo  
 [address] [id number] [address] [address] Upcoming Health Maintenance Date Due Hepatitis C Screening 1950 HEMOGLOBIN A1C Q6M 1950 LIPID PANEL Q1 1950 FOOT EXAM Q1 1/3/1958 MICROALBUMIN Q1 1/3/1958 EYE EXAM RETINAL OR DILATED Q1 1/3/1958 DTaP/Tdap/Td series (1 - Tdap) 1/3/1969 FOBT Q 1 YEAR AGE 50-75 1/3/1998 ZOSTER VACCINE AGE 60> 11/3/2007 GLAUCOMA SCREENING Q2Y 1/3/2013 Pneumococcal 65+ Low/Medium Risk (1 of 2 - PCV13) 1/3/2013 MEDICARE YEARLY EXAM 1/3/2013 INFLUENZA AGE 9 TO ADULT 8/1/2017 BREAST CANCER SCRN MAMMOGRAM 3/17/2019 Allergies as of 8/10/2017  Review Complete On: 8/10/2017 By: Maximiliano Shore LPN No Known Allergies Current Immunizations  Never Reviewed No immunizations on file. Not reviewed this visit You Were Diagnosed With   
  
 Codes Comments Neck mass    -  Primary ICD-10-CM: R22.1 ICD-9-CM: 753. 2 Vitals BP Pulse Height(growth percentile) Weight(growth percentile) SpO2 BMI  
 158/82 (BP 1 Location: Right arm, BP Patient Position: Sitting) 98 5' (1.524 m) 117 lb 12.8 oz (53.4 kg) 100% 23.01 kg/m2 OB Status Smoking Status Postmenopausal Never Smoker Vitals History BMI and BSA Data Body Mass Index Body Surface Area 23.01 kg/m 2 1.5 m 2 Your Updated Medication List  
  
   
This list is accurate as of: 8/10/17  4:05 PM.  Always use your most recent med list.  
  
  
  
  
 ACCU-CHEK TOREY PLUS TEST STRP strip Generic drug:  glucose blood VI test strips  
by Does Not Apply route See Admin Instructions. ALPRAZolam 0.25 mg tablet Commonly known as:  [place] Take  by mouth. CALCIUM + D PO Take  by mouth. glipiZIDE-metFORMIN 5-500 mg per tablet Commonly known as:  METAGLIP Take 1 Tab by mouth Before breakfast and dinner. LASIX 80 mg tablet Generic drug:  furosemide Take  by mouth daily. LIPITOR 20 mg tablet Generic drug:  atorvastatin Take  by mouth daily. MULTIVITAMIN PO Take  by mouth. [place] Take  by mouth. POTASSIUM CHLORIDE PO Take  by mouth. To-Do List   
 08/10/2017 ECG:  EKG, 12 LEAD, INITIAL Introducing [facility]! Rocío Busch introduces [employer] patient portal. Now you can access parts of your medical record, email your doctor's office, and request medication refills online. 1. In your internet browser, go to [email or web address] 2. Click on the First Time User? Click Here link in the Sign In box. You will see the New Member Sign Up page. 3. Enter your BackerKit Access Code exactly as it appears below. You will not need to use this code after youve completed the sign-up process. If you do not sign up before the expiration date, you must request a new code. · BackerKit Access Code: [email or web address] Expires: 10/29/2017  2:47 PM 
 
4. Enter the last four digits of your Social Security Number (xxxx) and Date of Birth (mm/dd/yyyy) as indicated and click Submit. You will be taken to the next sign-up page. 5. Create a BackerKit ID. This will be your BackerKit login ID and cannot be changed, so think of one that is secure and easy to remember. 6. Create a Back[employer] password. You can change your password at any time. 7. Enter your Password Reset Question and Answer. This can be used at a later time if you forget your password. 8. Enter your e-mail address. You will receive e-mail notification when new information is available in [address] 9. Click Sign Up. You can now view and download portions of your medical record. 10. Click the Download Summary menu link to download a portable copy of your medical information. If you have questions, please visit the Frequently Asked Questions section of the [employer] website. Remember, [employer]ance is NOT to be used for urgent needs. For medical emergencies, dial 911. Now available from your iPhone and Android! Please provide this summary of care documentation to your next provider. Your primary care clinician is listed as RONY Fontaine. If you have any questions after today's visit, please call [phone].

## 2017-08-14 NOTE — PERIOP NOTES
[facility]  Preoperative Instructions        Surgery Date 8/16/17       Time of Arrival 10:30am    1. On the day of your surgery, please report to the Surgical Services Registration Desk and sign in at your designated time. The Surgery Center is located to the right of the Emergency Room. 2. You must have someone with you to drive you home. You should not drive a car for 24 hours following surgery. Please make arrangements for a friend or family member to stay with you for the first 24 hours after your surgery. 3. Do not have anything to eat or drink (including water, gum, mints, coffee, juice) after midnight 8/15/17?? Earlene Belch ? This may not apply to medications prescribed by your physician. ?(Please note below the special instructions with medications to take the morning of your procedure.)    4. We recommend you do not drink any alcoholic beverages for 24 hours before and after your surgery. 5. Stop all Aspirin, non-steroidal anti-inflammatory drugs (i.e. Advil, Aleve), vitamins, and supplements?as directed by your surgeon's office. ? **If you are currently taking Plavix, Coumadin, or other blood-thinning agents, contact your surgeon for instructions. **    6. Wear comfortable clothes. Wear glasses instead of contacts. Do not bring any money or jewelry. Please bring picture ID, insurance card, and any prearranged co-payment or hospital payment. Do not wear make-up, particularly mascara the morning of your surgery. Do not wear nail polish, particularly if you are having foot /hand surgery. Wear your hair loose or down, no ponytails, buns, frida pins or clips. All body piercings must be removed. Please shower with antibacterial soap for three consecutive days before and on the morning of surgery, but do not apply any lotions, powders or deodorants after the shower on the day of surgery. Please use a fresh towels after each shower.  Please sleep in clean clothes and change bed linens the night before surgery. Please do not shave for 48 hours prior to surgery. Shaving of the face is acceptable. 7. You should understand that if you do not follow these instructions your surgery may be cancelled. If your physical condition changes (I.e. fever, cold or flu) please contact your surgeon as soon as possible. 8. It is important that you be on time. If a situation occurs where you may be late, please call [phone] (OR Holding Area). 9. If you have any questions and or problems, please call [phone] (Pre-admission Testing). 10. Your surgery time may be subject to change. You will receive a phone call the evening prior if your time changes. 11.  If having outpatient surgery, you must have someone to drive you here, stay with you during the duration of your stay, and to drive you home at time of discharge. Special Instructions:    MEDICATIONS TO TAKE THE MORNING OF SURGERY WITH A SIP OF WATER:Xanax if needed      I understand a pre-operative phone call will be made to verify my surgery time. In the event that I am not available, I give permission for a message to be left on my answering service and/or with another person?  Yes          ___________________      __________   _________    (Signature of Patient)             (Witness)                (Date and Time)

## 2017-08-14 NOTE — TELEPHONE ENCOUNTER
Spoke with patient and informed her of date of surgery 8/16/17 and she needs to arrive at 10:30 am to surgery center. Needs to be NPO after midnight on 8/15/17and she will receive a phone call from pre admit nurse informing her what medications she can take.

## 2017-08-16 NOTE — H&P
Surgery    The patient was seen and examined on 8/16/2017. There were no changes from the office History and Physical of 8/10/2017. That note is as follows: The patient is a 69 y.o. woman referred by Aarti Bentley NP regarding a right neck mass. The patient had noted the mass in the neck by palpation. She did not otherwise have symptoms. She was seen in the primary care physician's office and had CT scan ordered and had ultrasound directed needle aspiration. CT scan showed marked adenopathy at the base of the right neck in the periclavicular region extending into superior mediastinum. There is also right hilar adenopathy or mass and multiple lung nodules some of which were pleural based.       The needle aspiration came back as nondiagnostic. She is now referred for surgical biopsy.     The patient has a history of cervical fusion for cervical myelopathy, hypercholesterolemia, hyperglycemia, hypertension, lumbar spinal stenosis. The patient has never smoked. She does not use alcohol.       The patient has no history of shortness of breath. The patient denies history of anginal chest pain or irregular heart beat. She has no history of coronary intervention.       Physical Examination:    GENERAL: On examination the patient is an alert woman in no acute distress. Visit Vitals    /82 (BP 1 Location: Right arm, BP Patient Position: Sitting)    Pulse 98    Ht 5' (1.524 m)    Wt 53.4 kg (117 lb 12.8 oz)    SpO2 100%    BMI 23.01 kg/m2   HEAD/NECK:  Exam showed a firm unmoving mass base of right neck in supraclavicular area. It is nontender. No other masses were noted. LUNGS:  Clear bilaterally without rales, rhonchi or wheezes. HEART:  Regular without murmur, gallop or rub. NEURO:  Patient is alert and oriented and moves all extremities equally.   Facial movement is symmetrical. Speech was normal.       I reviewed the CT images.     The patient has right neck mass suspicious for malignancy. I will plan for open surgical incisional biopsy of the mass. This can be done as an outpatient.       I reviewed with the patient the risks vs benefits of surgery. Risks would include bleeding, infection, injury to adjacent structures including nerves or vessels, potential for nondiagnostic results, risks of anesthesia, etc.  The patient understands and wishes to proceed.        Final Diagnosis:  Mass right neck.             MICHAEL Daniels MD

## 2017-08-16 NOTE — ANESTHESIA PREPROCEDURE EVALUATION
Anesthetic History   No history of anesthetic complications            Review of Systems / Medical History  Patient summary reviewed, nursing notes reviewed and pertinent labs reviewed    Pulmonary  Within defined limits                 Neuro/Psych             Comments: Spinal stenosis of lumbar region with radiculopathy   Cervical myelopathy   Anxiety  Cardiovascular    Hypertension          Hyperlipidemia    Exercise tolerance: >4 METS     GI/Hepatic/Renal  Within defined limits              Endo/Other    Diabetes    Anemia     Other Findings   Comments: RIGHT NECK MASS  Osteoporosis          Physical Exam    Airway  Mallampati: III    Neck ROM: normal range of motion   Mouth opening: Normal     Cardiovascular  Regular rate and rhythm,  S1 and S2 normal,  no murmur, click, rub, or gallop             Dental    Dentition: Upper partial plate     Pulmonary  Breath sounds clear to auscultation               Abdominal  GI exam deferred       Other Findings            Anesthetic Plan    ASA: 2  Anesthesia type: general          Induction: Intravenous  Anesthetic plan and risks discussed with: Patient

## 2017-08-16 NOTE — PERIOP NOTES
Handoff Report from Operating Room to PACU    Report received from Bernice Jenkins RN and AMPARO Medina CRNA regarding Carron Railing. Surgeon(s):  Paolo Mackey MD  And Procedure(s) (LRB):  BIOPSY OF RIGHT NECK MASS (Right)  confirmed   with dressings discussed. Anesthesia type, drugs, patient history, complications, estimated blood loss, vital signs, intake and output, and last pain medication, lines and temperature were reviewed.

## 2017-08-16 NOTE — DISCHARGE INSTRUCTIONS
Discharge Instructions Following Right Neck Biopsy        Keep incision dry until tomorrow, then OK to shower. There will be some swelling and numbness in the area of the surgery. Leave Dermabond (plastic coating) in place until it falls off. This usually occurs in about 2 weeks. Take pain medicines as prescribed as needed for pain. Tylenol or Advil may be sufficient for pain. Do not drive  while taking narcotic pain medicines. See Dr Crys Soriano in follow up in the office next week  -  [phone]. Pathology report will be available in about 5-6 days. You can call the office if you have not received notice of the results. If you have any problems, call Dr. Crys Soriano at [phone] or [phone] (after hours). Pramod Irene MD  DISCHARGE SUMMARY from Nurse    The following personal items are in your possession at time of discharge:    Dental Appliances: Uppers, With patient  Visual Aid: Glasses, At home  Hearing Aids/Status: Does not own  Home Medications: None  Jewelry: None  Clothing: Undergarments, With patient (home clothing)  Other Valuables: None  Personal Items Sent to Safe: No valuables to send to security          PATIENT INSTRUCTIONS:    After general anesthesia or intravenous sedation, for 24 hours or while taking prescription Narcotics:  · Limit your activities  · Do not drive and operate hazardous machinery  · Do not make important personal or business decisions  · Do  not drink alcoholic beverages  · If you have not urinated within 8 hours after discharge, please contact your surgeon on call.     Report the following to your surgeon:  · Excessive pain, swelling, redness or odor of or around the surgical area  · Temperature over 100.5  · Nausea and vomiting lasting longer than 4 hours or if unable to take medications  · Any signs of decreased circulation or nerve impairment to extremity: change in color, persistent  numbness, tingling, coldness or increase pain  · Any questions        What to do at Home:  A common side effect of anesthesia following surgery is nausea and/or vomiting. In order to decrease symptoms, it is wise to avoid foods that are high in fat, greasy foods, milk products, and spicy foods for the first 24 hours. Acceptable foods for the first 24 hours following surgery include but are not limited to:     soup   broth    toast    crackers    applesauce    bananas    mashed potatoes,   soft or scrambled eggs   oatmeal    jello    It is important to eat when taking your pain medication. This will help to prevent nausea. If possible, please try to time your meals with your medications. It is very important to stay hydrated following surgery. Sip fluids frequently while awake. Avoid acidic drinks such as citrus juices and soda for 24 hours. Carbonated beverages may cause bloating and gas. Acceptable fluids include:    - water (flavor packets may add variety)  - coffee or tea (in moderation)  - Gatorade  - Tommie-aid  - apple juice  - cranberry juice    You are encouraged to cough and deep breathe every hour when awake. This will help to prevent respiratory complications following anesthesia. You may want to hug a pillow when coughing and sneezing to add additional support to the surgical area and to decrease discomfort if you had abdominal or chest surgery. If you are discharged home with support stockings, you may remove them after 24 hours. Support stockings are used to help prevent blood clots in the legs following surgery. Please take time to review all of your Home Care Instructions and Medication Information sheets provided in your discharge packet. If you have any questions, please contact your surgeons office. Thank you. How to Care for Your Wound After Its Treated With  DERMABOND* Topical Skin Adhesive  DERMABOND* Topical Skin Adhesive (2-octyl cyanoacrylate) is a sterile, liquid skin adhesive  that holds wound edges together. The film will usually remain in place for 5 to 10 days, then  naturally fall off your skin. The following will answer some of your questions and provide instructions for proper care for your  wound while it is healing:    CHECK WOUND APPEARANCE   Some swelling, redness, and pain are common with all wounds and normally will go away as the  wound heals. If swelling, redness, or pain increases or if the wound feels warm to the touch,  contact a doctor. Also contact a doctor if the wound edges reopen or separate. REPLACE BANDAGES   If your wound is bandaged, keep the bandage dry.  Replace the dressing daily until the adhesive film has fallen off or if the  bandage should become wet, unless otherwise instructed by your  physician.  When changing the dressing, do not place tape directly over the  DERMABOND adhesive film, because removing the tape later may also  remove the film. AVOID TOPICAL MEDICATIONS   Do not apply liquid or ointment medications or any other product to your wound while the  DERMABOND adhesive film is in place. These may loosen the film before your wound is healed. KEEP WOUND DRY AND PROTECTED   You may occasionally and briefly wet your wound in the shower or bath. Do not soak or scrub  your wound, do not swim, and avoid periods of heavy perspiration until the DERMABOND  adhesive has naturally fallen off. After showering or bathing, gently blot your wound dry with a  soft towel. If a protective dressing is being used, apply a fresh, dry bandage, being sure to keep  the tape off the DERMABOND adhesive film.  Apply a clean, dry bandage over the wound if necessary to protect it.  Protect your wound from injury until the skin has had sufficient time to heal.   Do not scratch, rub, or pick at the DERMABOND adhesive film. This may loosen the film before  your wound is healed.  Protect the wound from prolonged exposure to sunlight or tanning lamps while the film is in  place.   If you have any questions or concerns about this product, please consult your doctor. *Trademark ©[employer] 2002  Please carry medication information at all times in case of emergency situations. Hydrocodone/Acetaminophen (Vicodin, Norco, Lortab) - (By mouth)   Why this medicine is used:   Treats pain. Contact a nurse or doctor right away if you have:  · Blistering, peeling, red skin rash  · Fast or slow heartbeat, shallow breathing, blue lips, fingernails, or skin  · Anxiety, restlessness, muscle spasms, twitching, seeing or hearing things that are not there  · Dark urine or pale stools, yellow skin or eyes  · Extreme weakness, sweating, seizures, cold or clammy skin  · Lightheadedness, dizziness, fainting, fever, sweating     Common side effects:  · Constipation, nausea, vomiting, loss of appetite, stomach pain  · Tiredness or sleepiness  © 2017 [address] Information is for End User's use only and may not be sold, redistributed or otherwise used for commercial purposes. These are general instructions for a healthy lifestyle:    No smoking/ No tobacco products/ Avoid exposure to second hand smoke    Surgeon General's Warning:  Quitting smoking now greatly reduces serious risk to your health. Obesity, smoking, and sedentary lifestyle greatly increases your risk for illness    A healthy diet, regular physical exercise & weight monitoring are important for maintaining a healthy lifestyle    You may be retaining fluid if you have a history of heart failure or if you experience any of the following symptoms:  Weight gain of 3 pounds or more overnight or 5 pounds in a week, increased swelling in our hands or feet or shortness of breath while lying flat in bed. Please call your doctor as soon as you notice any of these symptoms; do not wait until your next office visit.     Recognize signs and symptoms of STROKE:    F-face looks uneven    A-arms unable to move or move unevenly    S-speech slurred or non-existent    T-time-call 911 as soon as signs and symptoms begin-DO NOT go       Back to bed or wait to see if you get better-TIME IS BRAIN. Warning Signs of HEART ATTACK     Call 911 if you have these symptoms:   Chest discomfort. Most heart attacks involve discomfort in the center of the chest that lasts more than a few minutes, or that goes away and comes back. It can feel like uncomfortable pressure, squeezing, fullness, or pain.  Discomfort in other areas of the upper body. Symptoms can include pain or discomfort in one or both arms, the back, neck, jaw, or stomach.  Shortness of breath with or without chest discomfort.  Other signs may include breaking out in a cold sweat, nausea, or lightheadedness. Don't wait more than five minutes to call 911 - MINUTES MATTER! Fast action can save your life. Calling 911 is almost always the fastest way to get lifesaving treatment. Emergency Medical Services staff can begin treatment when they arrive -- up to an hour sooner than if someone gets to the hospital by car. The discharge information has been reviewed with the patient and caregiver. The patient and caregiver verbalized understanding. Discharge medications reviewed with the patient and caregiver and appropriate educational materials and side effects teaching were provided.

## 2017-08-16 NOTE — BRIEF OP NOTE
BRIEF OPERATIVE NOTE    Date of Procedure: 8/16/2017   Preoperative Diagnosis: RIGHT NECK MASS  Postoperative Diagnosis: RIGHT NECK MASS    Procedure(s):  INCISIONAL BIOPSY OF RIGHT NECK MASS  Surgeon(s) and Role:     * Francisca De La Rosa MD - Primary         Assistant Staff:       Surgical Staff:  Circ-1: Leonie Miles RN  Scrub RN-1: Cooper Velazquez  Event Time In   Incision Start 1255   Incision Close 1341     Anesthesia: General   Estimated Blood Loss: 10 ML  Specimens:   ID Type Source Tests Collected by Time Destination   1 : RIGHT NECK MASS  Fresh Neck  Francisca De La Rosa MD 8/16/2017 1258 Pathology      Findings: Mass right neck, likely matted nodes.    Complications: none  Implants: * No implants in log *

## 2017-08-16 NOTE — ANESTHESIA POSTPROCEDURE EVALUATION
Post-Anesthesia Evaluation and Assessment    Patient: Dilcia Camara MRN: [id number]  SSN: [id number]    YOB: 1950  Age: 69 y.o. Sex: female       Cardiovascular Function/Vital Signs  Visit Vitals    /42    Pulse 88    Temp 36.7 °C (98 °F)    Resp 20    Ht 5' (1.524 m)    Wt 50.6 kg (111 lb 8.8 oz)    SpO2 96%    BMI 21.79 kg/m2       Patient is status post general anesthesia for Procedure(s):  BIOPSY OF RIGHT NECK MASS. Nausea/Vomiting: None    Postoperative hydration reviewed and adequate. Pain:  Pain Scale 1: Numeric (0 - 10) (08/16/17 1420)  Pain Intensity 1: 0 (08/16/17 1420)   Managed    Neurological Status:   Neuro (WDL): Exceptions to WDL (08/16/17 1144)  Neuro  Neurologic State: Alert (08/16/17 1144)  Orientation Level: Oriented X4 (08/16/17 1144)  Cognition: Appropriate decision making; Appropriate for age attention/concentration; Appropriate safety awareness (08/16/17 1144)  Speech: Clear (08/16/17 1144)  LUE Motor Response: Numbness; Purposeful (08/16/17 1144)  LLE Motor Response: Numbness; Purposeful (08/16/17 1144)  RUE Motor Response: Numbness; Purposeful (08/16/17 1144)  RLE Motor Response: Purposeful (08/16/17 1144)   At baseline    Mental Status and Level of Consciousness: Arousable    Pulmonary Status:   O2 Device: Room air (08/16/17 1420)   Adequate oxygenation and airway patent    Complications related to anesthesia: None    Post-anesthesia assessment completed.  No concerns    Signed By: Paul Yadav MD     August 16, 2017

## 2017-08-16 NOTE — IP AVS SNAPSHOT
[address] [facility] 
[phone] Patient: Efrem Holland MRN: [id number] You are allergic to the following No active allergies Recent Documentation Height Weight BMI OB Status Smoking Status 1.524 m 50.6 kg 21.79 kg/m2 Postmenopausal Never Smoker Emergency Contacts Name Discharge Info Relation Home Work Mobile Amelia Scott  Spouse [3] [phone] About your hospitalization You were admitted on:  August 16, 2017 You last received care in the:  [facility] PACU You were discharged on:  August 16, 2017 Unit phone number:  [phone] Why you were hospitalized Your primary diagnosis was:  Not on File Providers Seen During Your Hospitalizations Provider Role Specialty Primary office phone Cas Jung MD Attending Provider General Surgery [phone] Your Primary Care Physician (PCP) Primary Care Physician Office Phone Office Fax Geovanny Trotter  Follow-up Information Follow up With Details Comments Contact Info MD Sam Horner [address] [facility] 
[phone] Your Appointments Friday August 25, 2017  9:30 AM EDT Follow Up with MD Lukas Horner 26 ([facility]) Sam [address] [address] [phone] Current Discharge Medication List  
  
START taking these medications Dose & Instructions Dispensing Information Comments Morning Noon Evening Bedtime HYDROcodone-acetaminophen 5-325 mg per tablet Commonly known as:  Sonia Alcantara Your last dose was: Your next dose is:    
   
   
 Dose:  1 Tab Take 1 Tab by mouth every four (4) hours as needed for Pain. Max Daily Amount: 6 Tabs. Quantity:  20 Tab Refills:  0 CONTINUE these medications which have NOT CHANGED Dose & Instructions Dispensing Information Comments Morning Noon Evening Bedtime ACCU-CHEK TOREY PLUS TEST STRP strip Generic drug:  glucose blood VI test strips Your last dose was: Your next dose is:    
   
   
 by Does Not Apply route See Admin Instructions. Refills:  0 ALPRAZolam 0.25 mg tablet Commonly known as:  Johnice Salts Your last dose was: Your next dose is: Take  by mouth. Refills:  0  
     
   
   
   
  
 CALCIUM + D PO Your last dose was: Your next dose is: Take  by mouth. Refills:  0  
     
   
   
   
  
 glipiZIDE-metFORMIN 5-500 mg per tablet Commonly known as:  METAGLIP Your last dose was: Your next dose is:    
   
   
 Dose:  1 Tab Take 1 Tab by mouth Before breakfast and dinner. Refills:  0  
     
   
   
   
  
 LASIX 80 mg tablet Generic drug:  furosemide Your last dose was: Your next dose is: Take  by mouth daily. Refills:  0 LIPITOR 20 mg tablet Generic drug:  atorvastatin Your last dose was: Your next dose is: Take  by mouth daily. Refills:  0 MULTIVITAMIN PO Your last dose was: Your next dose is: Take  by mouth. Refills:  0  
     
   
   
   
  
 OCUVITE EYE HEALTH PO Your last dose was: Your next dose is: Take  by mouth. Refills:  0 POTASSIUM CHLORIDE PO Your last dose was: Your next dose is: Take  by mouth. Refills:  0 Where to Get Your Medications Information on where to get these meds will be given to you by the nurse or doctor. ! Ask your nurse or doctor about these medications HYDROcodone-acetaminophen 5-325 mg per tablet Discharge Instructions Discharge Instructions Following Right Neck Biopsy Keep incision dry until tomorrow, then OK to shower. There will be some swelling and numbness in the area of the surgery. Leave Dermabond (plastic coating) in place until it falls off. This usually occurs in about 2 weeks. Take pain medicines as prescribed as needed for pain. Tylenol or Advil may be sufficient for pain. Do not drive  while taking narcotic pain medicines. See Dr Katy Gaitan in follow up in the office next week  -  [phone]. Pathology report will be available in about 5-6 days. You can call the office if you have not received notice of the results. If you have any problems, call Dr. Katy Gaitan at [phone] or [phone] (after hours). Stacy Reid MD 
DISCHARGE SUMMARY from Nurse The following personal items are in your possession at time of discharge: 
 
Dental Appliances: Uppers, With patient Visual Aid: Glasses, At home Hearing Aids/Status: Does not own Home Medications: None Jewelry: None Clothing: Undergarments, With patient (home clothing) Other Valuables: None Personal Items Sent to Safe: No valuables to send to security PATIENT INSTRUCTIONS: 
 
After general anesthesia or intravenous sedation, for 24 hours or while taking prescription Narcotics: · Limit your activities · Do not drive and operate hazardous machinery · Do not make important personal or business decisions · Do  not drink alcoholic beverages · If you have not urinated within 8 hours after discharge, please contact your surgeon on call. Report the following to your surgeon: 
· Excessive pain, swelling, redness or odor of or around the surgical area · Temperature over 100.5 · Nausea and vomiting lasting longer than 4 hours or if unable to take medications · Any signs of decreased circulation or nerve impairment to extremity: change in color, persistent  numbness, tingling, coldness or increase pain · Any questions What to do at Home: A common side effect of anesthesia following surgery is nausea and/or vomiting. In order to decrease symptoms, it is wise to avoid foods that are high in fat, greasy foods, milk products, and spicy foods for the first 24 hours. Acceptable foods for the first 24 hours following surgery include but are not limited to: 
 
? soup 
? broth 
?  toast  
? crackers ? applesauce 
? bananas  
? mashed potatoes, 
? soft or scrambled eggs 
? oatmeal 
?  jello It is important to eat when taking your pain medication. This will help to prevent nausea. If possible, please try to time your meals with your medications. It is very important to stay hydrated following surgery. Sip fluids frequently while awake. Avoid acidic drinks such as citrus juices and soda for 24 hours. Carbonated beverages may cause bloating and gas. Acceptable fluids include: 
 
? water (flavor packets may add variety) ? coffee or tea (in moderation) ? Gatorade ? Laney Luria ? apple juice 
? cranberry juice You are encouraged to cough and deep breathe every hour when awake. This will help to prevent respiratory complications following anesthesia. You may want to hug a pillow when coughing and sneezing to add additional support to the surgical area and to decrease discomfort if you had abdominal or chest surgery. If you are discharged home with support stockings, you may remove them after 24 hours. Support stockings are used to help prevent blood clots in the legs following surgery. Please take time to review all of your Home Care Instructions and Medication Information sheets provided in your discharge packet. If you have any questions, please contact your surgeons office. Thank you. How to Care for Your Wound After Its Treated With DERMABOND* Topical Skin Adhesive DERMABOND* Topical Skin Adhesive (2-octyl cyanoacrylate) is a sterile, liquid skin adhesive 
that holds wound edges together. The film will usually remain in place for 5 to 10 days, then 
naturally fall off your skin. The following will answer some of your questions and provide instructions for proper care for your 
wound while it is healing: CHECK WOUND APPEARANCE 
 Some swelling, redness, and pain are common with all wounds and normally will go away as the 
wound heals. If swelling, redness, or pain increases or if the wound feels warm to the touch, 
contact a doctor. Also contact a doctor if the wound edges reopen or separate. REPLACE BANDAGES 
 If your wound is bandaged, keep the bandage dry.  Replace the dressing daily until the adhesive film has fallen off or if the 
bandage should become wet, unless otherwise instructed by your 
physician.  When changing the dressing, do not place tape directly over the DERMABOND adhesive film, because removing the tape later may also 
remove the film. AVOID TOPICAL MEDICATIONS  Do not apply liquid or ointment medications or any other product to your wound while the DERMABOND adhesive film is in place. These may loosen the film before your wound is healed. KEEP WOUND DRY AND PROTECTED  You may occasionally and briefly wet your wound in the shower or bath. Do not soak or scrub 
your wound, do not swim, and avoid periods of heavy perspiration until the DERMABOND 
adhesive has naturally fallen off. After showering or bathing, gently blot your wound dry with a 
soft towel. If a protective dressing is being used, apply a fresh, dry bandage, being sure to keep 
the tape off the DERMABOND adhesive film.  Apply a clean, dry bandage over the wound if necessary to protect it.  Protect your wound from injury until the skin has had sufficient time to heal. 
 Do not scratch, rub, or pick at the DERMABOND adhesive film. This may loosen the film before your wound is healed.  Protect the wound from prolonged exposure to sunlight or tanning lamps while the film is in 
place. If you have any questions or concerns about this product, please consult your doctor. *Trademark ©[employer] 2002  Please carry medication information at all times in case of emergency situations. Hydrocodone/Acetaminophen (Vicodin, Norco, Lortab) - (By mouth) Why this medicine is used:  
Treats pain. Contact a nurse or doctor right away if you have: · Blistering, peeling, red skin rash · Fast or slow heartbeat, shallow breathing, blue lips, fingernails, or skin · Anxiety, restlessness, muscle spasms, twitching, seeing or hearing things that are not there · Dark urine or pale stools, yellow skin or eyes · Extreme weakness, sweating, seizures, cold or clammy skin · Lightheadedness, dizziness, fainting, fever, sweating Common side effects: 
· Constipation, nausea, vomiting, loss of appetite, stomach pain · Tiredness or sleepiness © 2017 [facility] Information is for End User's use only and may not be sold, redistributed or otherwise used for commercial purposes. These are general instructions for a healthy lifestyle: No smoking/ No tobacco products/ Avoid exposure to second hand smoke Surgeon General's Warning:  Quitting smoking now greatly reduces serious risk to your health. Obesity, smoking, and sedentary lifestyle greatly increases your risk for illness A healthy diet, regular physical exercise & weight monitoring are important for maintaining a healthy lifestyle You may be retaining fluid if you have a history of heart failure or if you experience any of the following symptoms:  Weight gain of 3 pounds or more overnight or 5 pounds in a week, increased swelling in our hands or feet or shortness of breath while lying flat in bed.   Please call your doctor as soon as you notice any of these symptoms; do not wait until your next office visit. Recognize signs and symptoms of STROKE: 
 
F-face looks uneven A-arms unable to move or move unevenly S-speech slurred or non-existent T-time-call 911 as soon as signs and symptoms begin-DO NOT go Back to bed or wait to see if you get better-TIME IS BRAIN. Warning Signs of HEART ATTACK Call 911 if you have these symptoms: 
? Chest discomfort. Most heart attacks involve discomfort in the center of the chest that lasts more than a few minutes, or that goes away and comes back. It can feel like uncomfortable pressure, squeezing, fullness, or pain. ? Discomfort in other areas of the upper body. Symptoms can include pain or discomfort in one or both arms, the back, neck, jaw, or stomach. ? Shortness of breath with or without chest discomfort. ? Other signs may include breaking out in a cold sweat, nausea, or lightheadedness. Don't wait more than five minutes to call [address]! Fast action can save your life. Calling 911 is almost always the fastest way to get lifesaving treatment. Emergency Medical Services staff can begin treatment when they arrive  up to an hour sooner than if someone gets to the hospital by car. The discharge information has been reviewed with the patient and caregiver. The patient and caregiver verbalized understanding. Discharge medications reviewed with the patient and caregiver and appropriate educational materials and side effects teaching were provided. Discharge Orders None Introducing [facility] SERVICES! Dave Johnson introduces [email or web address] patient portal. Now you can access parts of your medical record, email your doctor's office, and request medication refills online. 1. In your internet browser, go to [email or web address] 2. Click on the First Time User? Click Here link in the Sign In box. You will see the New Member Sign Up page. 3. Enter your [employer] Access Code exactly as it appears below. You will not need to use this code after youve completed the sign-up process. If you do not sign up before the expiration date, you must request a new code. · [employer] Access Code: [email or web address] Expires: 10/29/2017  2:47 PM 
 
4. Enter the last four digits of your Social Security Number (xxxx) and Date of Birth (mm/dd/yyyy) as indicated and click Submit. You will be taken to the next sign-up page. 5. Create a Lo[employer]la ID. This will be your [employer] login ID and cannot be changed, so think of one that is secure and easy to remember. 6. Create a [employer] password. You can change your password at any time. 7. Enter your Password Reset Question and Answer. This can be used at a later time if you forget your password. 8. Enter your e-mail address. You will receive e-mail notification when new information is available in [address] 9. Click Sign Up. You can now view and download portions of your medical record. 10. Click the Download Summary menu link to download a portable copy of your medical information. If you have questions, please visit the Frequently Asked Questions section of the [employer] website. Remember, [employer] is NOT to be used for urgent needs. For medical emergencies, dial 911. Now available from your iPhone and Android! General Information Please provide this summary of care documentation to your next provider. Patient Signature:  ____________________________________________________________ Date:  ____________________________________________________________  
  
[facility] Signature:  ____________________________________________________________ Date:  ____________________________________________________________

## 2017-08-17 NOTE — OP NOTES
[address], [facility]   OP NOTE       Name:  Ammy Han   MR#:  [id number]   :  1950   Account #:  [de-identified]    Surgery Date:  2017   Date of Adm:  2017       PREOPERATIVE DIAGNOSIS: Right neck mass. POSTOPERATIVE DIAGNOSIS: Right neck mass. PROCEDURES PERFORMED: Incisional biopsy of right neck mass. SURGEON: Floresita Villarreal MD.    ANESTHESIA:  general    DESCRIPTION OF PROCEDURE: The patient was taken to the   operating room, placed on the operating table in supine position. The   patient's right and anterior neck and upper chest were sterilely   prepared and draped. The patient had a firm relatively fixed mass that   was to palpation about 3.5 cm diameter above the clavicle on the base   of the right neck. An incision was made along the skin lines there and   carried down through subcutaneous tissue. The platysma muscle was   divided. Underlying fat was drawn aside. The clavicular head of the   sternocleidomastoid muscle was retracted anteriorly. A mass was   reached, which probably consisted of firm, enlarged confluent   nodes. The mass was inspected with ultrasound to confirm there were   no large vessels in it. Two incisional biopsies were then taken, each   yielding a portion of tissue about 1.5 cm x 2.5 cm  x1 cm in size. The   tissue was sent fresh for pathology after consultation with the   pathologist. A few small bleeding points were then treated by   electrocautery. Muscle was reapproximated with interrupted 3-0 Vicryl. The platysma muscle was reapproximated with 3-0 Vicryl. Several   small cutaneous nerves were divided in the process of reaching   the mass. The dermis was reapproximated with interrupted 3-0   chromic, and the skin was closed with running intracuticular suture of   4-0 Vicryl. Dermabond was applied to the wound.  The patient tolerated   the surgery and was taken to the recovery room in stable condition. SPECIMENS: Portions of the mass base of the right neck. DRAINS: None. ESTIMATED BLOOD LOSS: 30 mL.          MD PARRIS Turner / Win.Naomi   D:  08/16/2017   19:07   T:  08/17/2017   07:34   Job #:  [id number]

## 2017-08-21 NOTE — TELEPHONE ENCOUNTER
Per Dr Andrea Fleming, scheduled CT abdomen & pelvis with IV contrast only. CT scheduled for Tuesday, 8/22/17. Directions & instructions given to pt.

## 2017-08-22 NOTE — TELEPHONE ENCOUNTER
Surgery    Discussed CT findings with the patient - large left renal mass seen, likely primary.     She will see me in the office in AM.    Charmayne Agee, MD

## 2017-08-23 NOTE — PROGRESS NOTES
[employer]  Social Work Navigator Encounter     Patient Name:  Duke Linares    Medical History: dx metastatic bladder cancer, diabetic     Advance Directives:  Not on file. Did not discuss - pt very anxious     Narrative: Pt , 1 [id number] old-works at [employer] \[facility] [place] [facility]\"" -insurance- pt mother  yrs ago. Pt father 96-lives alone, cooks/does for him self- can't hear. Pt mother-in-law has dementia and is in a home- has bills to pay. Pt has 1 sister, 1 brother(who works). Treatment-MRI-if she can- 1 xanax before leaving home,1 when she arrives- to investigate liver. Oncologist to make appt with Dr. Naylor/urologist/surgeon.   Pt is diabetic and has had shots to her eye to help with vision     Barriers to Care:     Plan:

## 2017-08-23 NOTE — PATIENT INSTRUCTIONS
[facility]    [address] [facility], [address] [facility], [address] [facility], [address] [place] [address]    [phone]

## 2017-08-23 NOTE — PROGRESS NOTES
Oncology Consultation Note         Patient: Travis De La Paz MRN: [id number]  SSN: [id number]    YOB: 1950  Age: 69 y.o. Sex: female      Subjective:     Travis De La Paz is a 69 y.o. female who I am seeing in consultation for a new diagnosis of clear cell renal cell carcinoma. She noticed a lump coming up in the right side of the chest and shoulder. A CT followed by biopsy of the LN reveals the diagnosis of kidney cancer. CT of the abdomen shows a large exophytic mass in the left kidney, pleural based lung nodules and mediastinal adenopathy. She denies weight loss, pain, hematuria, bone pains etc. She is very anxious about the presumed diagnosis of cancer. She is accompanied by her . She has a grown up daughter who works at [employer]. She is suffering with uncontrolled DM and diabetic eye complication.        Review of Systems:    Constitutional: negative  Eyes: negative  Ears, Nose, Mouth, Throat, and Face: negative  Respiratory: negative  Cardiovascular: negative  Gastrointestinal: negative  Genitourinary:negative  Integument/Breast: negative  Hematologic/Lymphatic: negative  Musculoskeletal:negative  Neurological: negative    Past Medical History:   Diagnosis Date    Anxiety 7/31/2017    2/3/17:Under a tremendous amount of stress, will continue Alprazolam prn, if using regularly consider changing to an SSRI or counseling    Cervical myelopathy ([facility] 7/31/2017    2/3/17:Cervical fusion     Controlled diabetes mellitus ([facility] [address] 7/31/2017    Fatigue 7/31/2017    Hypercholesterolemia 7/31/2017    Hyperglycemia 7/31/2017    Hypertension 7/31/2017    2/3/17:Poorly controlled, lifestyle changes, repeat /90 will follow    On statin therapy 7/31/2017    Osteoporosis 7/31/2017    Post-menopausal 7/31/2017    Spinal stenosis of lumbar region with radiculopathy 7/31/2017    2/3/17:Symptoms c/w this diagnosis discussed with pt., if progresses then need MRI L spine and follow up    Vaginitis due to Candida 7/31/2017    2/3/17:Associated with antibiotic use, requests prescription med     Past Surgical History:   Procedure Laterality Date    HX ORTHOPAEDIC      2009 spinal cord surgery     HX ORTHOPAEDIC Left     knee procedure    HX OTHER SURGICAL  08/16/2017    incisional biopsy right neck mass      Family History   Problem Relation Age of Onset    Heart Disease Mother      Social History   Substance Use Topics    Smoking status: Never Smoker    Smokeless tobacco: Never Used    Alcohol use No      Prior to Admission medications    Medication Sig Start Date End Date Taking? Authorizing Provider   KLOR-CON M20 20 mEq tablet  6/16/17  Yes Historical Provider   ondansetron (ZOFRAN ODT) 4 mg disintegrating tablet Take 1 Tab by mouth every eight (8) hours as needed for Nausea. 8/23/17  Yes Felisha Alexander MD   prochlorperazine (COMPAZINE) 10 mg tablet Take 0.5 Tabs by mouth every six (6) hours as needed for up to 7 days. 8/23/17 8/30/17 Yes Felisha Alexander MD   glucose blood VI test strips (ACCU-CHEK TOREY PLUS TEST STRP) strip by Does Not Apply route See Admin Instructions. Yes Historical Provider   furosemide (LASIX) 80 mg tablet Take  by mouth daily. Yes Historical Provider   atorvastatin (LIPITOR) 20 mg tablet Take  by mouth daily. Yes Historical Provider   ALPRAZolam (XANAX) 0.25 mg tablet Take  by mouth. Yes Historical Provider   CALCIUM CARBONATE/VITAMIN D3 (CALCIUM + D PO) Take  by mouth. Yes Historical Provider   MULTIVITAMIN PO Take  by mouth. Yes Historical Provider   VIT C/E/ZINC/LUTEIN/ZEAXANTHIN ([address] PO) Take  by mouth. Yes Historical Provider   HYDROcodone-acetaminophen (NORCO) 5-325 mg per tablet Take 1 Tab by mouth every four (4) hours as needed for Pain. Max Daily Amount: 6 Tabs. 8/16/17   Sandra Vivas MD   POTASSIUM CHLORIDE PO Take  by mouth.     Historical Provider   glipiZIDE-metFORMIN (METAGLIP) 5-500 mg per tablet Take 2 Tabs by mouth Before breakfast and dinner. Historical Provider              No Known Allergies        Objective:     Vitals:    08/23/17 0926   BP: 130/77   Pulse: 76   Resp: 18   Temp: 97.8 °F (36.6 °C)   TempSrc: Oral   SpO2: 100%   Weight: 114 lb 12.8 oz (52.1 kg)   Height: 5' (1.524 m)            Physical Exam:  GENERAL: alert, cooperative, no distress, appears stated age  EYE: negative  LYMPHATIC: Cervical, supraclavicular, and axillary nodes normal.   THROAT & NECK: normal and no erythema or exudates noted. LUNG: clear to auscultation bilaterally  HEART: regular rate and rhythm  ABDOMEN: soft, non-tender  EXTREMITIES:  no edema  SKIN: Normal.  NEUROLOGIC: negative      CT Results (most recent):    Results from Hospital Encounter encounter on 08/22/17   CT ABD PELV W WO CONT   Narrative EXAM:  CT ABD PELV W WO CONT    INDICATION:   Right neck mass biopsy showing carcinoma, metastatic with clear  cell features, 8/16/2017. Extensive intrathoracic tumor as well. COMPARISON: None. CONTRAST:  100 mL of Isovue-370. TECHNIQUE:    CT abdomen was performed without IV contrast followed by CT abdomen and pelvis  with IV contrast. Enhanced images of the abdomen include arterial phase and  portal venous phase. CT dose reduction was achieved through use of a  standardized protocol tailored for this examination and automatic exposure  control for dose modulation. FINDINGS:  There is a bulky exophytic mass protruding from the lower pole of the left  kidney extending into the pararenal space, with tumor and/or clot extending into  and expanding the left renal vein, sparing the IVC, with left perirenal  varicosities. There are small left perirenal hyperenhancing masses. There are multiple small enhancing bilateral renal cortical masses. There is a  left adrenal nodule, likely metastasis. Right adrenal is unremarkable.     Liver contains several subcentimeter nonenhancing fluid attenuation round foci  compatible with cysts. There is cholelithiasis without inflammation. Bile and  pancreatic ducts are not enlarged. Pancreatic duct is unremarkable. Spleen is  unremarkable. Aorta is calcified without aneurysm. There are colonic diverticula. Bladder is  midline. There is no pelvic mass or significant adenopathy. There is no ascites. Impression IMPRESSION:  1. Left renal bulky malignancy with left renal vein and pararenal extension. 2. Bilateral small renal cortical hyperenhancing tumors/metastases. 3. Left adrenal metastasis. Assessment:     1. Metastatic clear cell kidney cancer    Lung nodules, mediastinal adenopathy  Left kidney primary  Left adrenal mass    ECOG PS 0  Intent of treatment - palliative    I will refer her to Urology for consideration of cytoreductive nephrectomy. I spent 65 minute with the patient in a face-to-face encounter. I explained her the stage of the disease, pathophysiology of the disease and the treatment approaches. I answered all her questions. More than 50% of the time was utilized in education, counseling and co-ordination of care. I recommended starting Pazopanib. I counseled the patient regarding the therapy. Discussions included side-effect, toxicity, benefit and risks of herapy. She understood the expected side-effect which includes nausea, fatigue, rash, diarrhea, changes in LFT, anemia, need for transfusion among other things. After weighing the benefit and risks, she agreed to proceed with therapy. She understands that the alternative to this treatment is no therapy or Sutent. The standard of care for frontline treatment for metastatic clear cell renal cell carcinoma is tyrosine kinase inhibitors e.g Sunitinib or Pazopanib.  Pazopanib (an oral angiogenesis inhibitor targeting vascular endothelial growth factor receptor, platelet-derived growth factor receptor, and c-Kit) has also been studied in randomized, double-blind, placebo-controlled phase III study for efficacy and safety in the treatment-naive and cytokine-pretreated patients with advanced renal cell carcinoma (RCC). In the treatment-naive subpopulation PFS was significantly prolonged with pazopanib compared with placebo (median PFS 11.1 v 2.8 months; HR, 0.40; 95% CI, 0.27 to 0.60; P < 0.0001). The objective response rate was 30% with pazopanib compared with 3% with placebo (P < 0.001). Sunitinib and Pazopanib has been compared in randomized trial, COMPARZ (presented at [facility] 2012). COMPARZ demonstrated the noninferiority of pazopanib versus sunitinib for progression-free survival.  The study randomized 1110 patients with mRCC to either pazopanib or sunitinib. Baseline demographic and disease characteristics were well balanced between the two arms. Median age was 64 years, about 72% were male, and 83% had prior nephrectomy. All risk groups were included in the open label trial, and treatment arms were well balanced in this regard; the majority were intermediate risk and about 12% were poor risk. The primary endpoint was progression-free survival (PFS) assessed by independent review of scans by radiologists blinded to treatment arm. Median PFS was 8.4 months with pazopanib versus 9.5 months with sunitinib, a nonsignificant difference for noninferiority. Pazopanib was felt to be better tolerated than Sunitinib. The patient's emotional well being was addressed during this office visit and patient seems to be coping well with the diagnosis and the treatment. Patient will be meeting with navigation services to discuss any financial barriers to care/estimated cost of care. Plan:       1. Bone scan  2. MRI liver  3. Labs  4. Start Pazopanib  5. Refer to Dr. Daria Nuñez, Urology  6. Return in 3 weeks        Signed By: Sally Clark MD     August 23, 2017           CC. Parmjit Patel. Chris Prajapati MD  CC. Sherry Kiser MD  CC.  Jae Key MD (Urology)

## 2017-08-23 NOTE — MR AVS SNAPSHOT
Visit Information Date & Time Provider Department Dept. Phone Encounter #  
 8/23/2017  9:00 AM Kathy Camara MD [address] [facility] at [facility] [phone] [id number] Your Appointments 8/25/2017  9:30 AM  
Follow Up with MD ALICIA [facility] ([place]li [place]) Appt Note: 6 mo; 6 mo  
 [address] [address] [address] [address] Upcoming Health Maintenance Date Due Hepatitis C Screening 1950 HEMOGLOBIN A1C Q6M 1950 LIPID PANEL Q1 1950 FOOT EXAM Q1 1/3/1958 MICROALBUMIN Q1 1/3/1958 EYE EXAM RETINAL OR DILATED Q1 1/3/1958 DTaP/Tdap/Td series (1 - Tdap) 1/3/1969 FOBT Q 1 YEAR AGE 50-75 1/3/1998 ZOSTER VACCINE AGE 60> 11/3/2007 GLAUCOMA SCREENING Q2Y 1/3/2013 Pneumococcal 65+ High/Highest Risk (1 of 2 - PCV13) 1/3/2013 MEDICARE YEARLY EXAM 1/3/2013 INFLUENZA AGE 9 TO ADULT 8/1/2017 BREAST CANCER SCRN MAMMOGRAM 3/17/2019 Allergies as of 8/23/2017  Review Complete On: 8/23/2017 By: Juan Brito LPN No Known Allergies Current Immunizations  Never Reviewed No immunizations on file. Not reviewed this visit You Were Diagnosed With   
  
 Codes Comments Renal cell carcinoma, left (HCC)    -  Primary ICD-10-CM: C64.2 ICD-9-CM: 189. 0 Vitals BP Pulse Temp Resp Height(growth percentile) Weight(growth percentile) 130/77 (BP 1 Location: Left arm, BP Patient Position: Sitting) 76 97.8 °F (36.6 °C) (Oral) 18 5' (1.524 m) 114 lb 12.8 oz (52.1 kg) SpO2 BMI OB Status Smoking Status 100% 22.42 kg/m2 Postmenopausal Never Smoker Vitals History BMI and BSA Data Body Mass Index Body Surface Area  
 22.42 kg/m 2 1.49 m 2 Preferred Pharmacy Pharmacy Name Phone [address] [phone] Your Updated Medication List  
  
   
This list is accurate as of: 8/23/17  9:56 AM.  Always use your most recent med list.  
  
  
  
  
 ACCU-CHEK TOREY PLUS TEST STRP strip Generic drug:  glucose blood VI test strips  
by Does Not Apply route See Admin Instructions. ALPRAZolam 0.25 mg tablet Commonly known as:  Jestine Pies Take  by mouth. CALCIUM + D PO Take  by mouth. glipiZIDE-metFORMIN 5-500 mg per tablet Commonly known as:  METAGLIP Take 2 Tabs by mouth Before breakfast and dinner. HYDROcodone-acetaminophen 5-325 mg per tablet Commonly known as:  Clotilde Iron Take 1 Tab by mouth every four (4) hours as needed for Pain. Max Daily Amount: 6 Tabs. LASIX 80 mg tablet Generic drug:  furosemide Take  by mouth daily. LIPITOR 20 mg tablet Generic drug:  atorvastatin Take  by mouth daily. MULTIVITAMIN PO Take  by mouth. [place] Take  by mouth. ondansetron 4 mg disintegrating tablet Commonly known as:  ZOFRAN ODT Take 1 Tab by mouth every eight (8) hours as needed for Nausea. * POTASSIUM CHLORIDE PO Take  by mouth. * KLOR-CON M20 20 mEq tablet Generic drug:  potassium chloride  
  
 prochlorperazine 10 mg tablet Commonly known as:  COMPAZINE Take 0.5 Tabs by mouth every six (6) hours as needed for up to 7 days. * Notice: This list has 2 medication(s) that are the same as other medications prescribed for you. Read the directions carefully, and ask your doctor or other care provider to review them with you. Prescriptions Sent to Pharmacy Refills  
 ondansetron (ZOFRAN ODT) 4 mg disintegrating tablet 1 Sig: Take 1 Tab by mouth every eight (8) hours as needed for Nausea. Class: Normal  
 [facility] [address] #: [phone] Route: Oral  
 prochlorperazine (COMPAZINE) 10 mg tablet 1 Sig: Take 0.5 Tabs by mouth every six (6) hours as needed for up to 7 days. Class: Normal  
 Pharmacy: [address] #: [phone] Route: Oral  
  
We Performed the Following CBC WITH AUTOMATED DIFF [38217 CPT(R)]   
 LD [64349 CPT(R)] METABOLIC PANEL, COMPREHENSIVE [79738 CPT(R)] Shimon Bernal [[id number] Custom] TSH REFLEX TO T4 [[id number] Custom] To-Do List   
 08/23/2017 Imaging:  MRI ABD W WO CONT Patient Instructions You have been diagnosed with renal cell carcinoma. We will start you on an oral medication to treat your cancer, Pazopanib or Votrient. You will take this medication once a day. It should be taken on an empty stomach, at least one hour before or two hours after eating. This medication will be sent to you directly from a specialty pharmacy. They will contact you directly to arrange for delivery. Please call our office when you receive the medication. You will need labs done prior to starting the medication. We will also get an MRI of your abdomen to stage the disease. We will arrange for you to see a urologist to determine if the diseased kidney should be removed. Two prescriptions of anti-nausea medications will be sent to your pharmacy. If for any reason any of the prescriptions are extremely expensive please notify our office. You will be given long acting anti-nausea medications through your port prior to your treatments. We prescribe at home medications incase you are to experience nausea. Please call our office if not effective. Please make sure you have imodium OTC at home prior to starting treatments. This is incase you are to experience diarrhea. If this is not effective please call our office. [phone]. Introducing [address]! Yecenia Kessler introduces [employer] patient portal. Now you can access parts of your medical record, email your doctor's office, and request medication refills online. 1. In your internet browser, go to [email or web address] 2. Click on the First Time User? Click Here link in the Sign In box. You will see the New Member Sign Up page. 3. Enter your [employer] Access Code exactly as it appears below. You will not need to use this code after youve completed the sign-up process. If you do not sign up before the expiration date, you must request a new code. · [employer] Access Code: [email or web address] Expires: 10/29/2017  2:47 PM 
 
4. Enter the last four digits of your Social Security Number (xxxx) and Date of Birth (mm/dd/yyyy) as indicated and click Submit. You will be taken to the next sign-up page. 5. Create a [employer] ID. This will be your [employer] login ID and cannot be changed, so think of one that is secure and easy to remember. 6. Create a [employer] password. You can change your password at any time. 7. Enter your Password Reset Question and Answer. This can be used at a later time if you forget your password. 8. Enter your e-mail address. You will receive e-mail notification when new information is available in [address] 9. Click Sign Up. You can now view and download portions of your medical record. 10. Click the Download Summary menu link to download a portable copy of your medical information. If you have questions, please visit the Frequently Asked Questions section of the [employer] website. Remember, [employer] is NOT to be used for urgent needs. For medical emergencies, dial 911. Now available from your iPhone and Android! Please provide this summary of care documentation to your next provider. Your primary care clinician is listed as RONY Vincent. If you have any questions after today's visit, please call [phone].

## 2017-08-23 NOTE — PROGRESS NOTES
The patient had biopsy of mass base of right neck which confirmed malignancy although site of origin was not apparent even after additional immuno stains were done. A CT of the abdomen and pelvis was obtained yesterday which did confirm the presence of a large mass involving the left kidney with evidence of spread into the renal vein, suggesting renal cell carcinoma. I have contacted Dr. Yulissa Mccurdy office at [facility] and have made arrangements for the patient to be seen there immediately following the appointment this morning. On examination the patient's incision site base of right neck is healing nicely. I spoke with the patient and the patient's  who is present in the office today regarding findings of pathology and findings of recent CT scan. She will proceed directly now to the medical oncology office. Final Diagnosis:  Renal cancer with metastasis.     Fannie/zulema     cc: Maryse Bautista NP

## 2017-08-23 NOTE — MR AVS SNAPSHOT
Visit Information Date & Time Provider Department Dept. Phone Encounter #  
 8/23/2017  8:40 AM Delvin Gonzalez MD [facility] [address] [phone] [id number] Your Appointments 8/25/2017  9:30 AM  
Follow Up with MD ANTONIA Ellis [facility] ([facility]) Appt Note: 6 mo; 6 mo  
 [address] [address] [address] [address] Upcoming Health Maintenance Date Due Hepatitis C Screening 1950 HEMOGLOBIN A1C Q6M 1950 LIPID PANEL Q1 1950 FOOT EXAM Q1 1/3/1958 MICROALBUMIN Q1 1/3/1958 EYE EXAM RETINAL OR DILATED Q1 1/3/1958 DTaP/Tdap/Td series (1 - Tdap) 1/3/1969 FOBT Q 1 YEAR AGE 50-75 1/3/1998 ZOSTER VACCINE AGE 60> 11/3/2007 GLAUCOMA SCREENING Q2Y 1/3/2013 Pneumococcal 65+ High/Highest Risk (1 of 2 - PCV13) 1/3/2013 MEDICARE YEARLY EXAM 1/3/2013 INFLUENZA AGE 9 TO ADULT 8/1/2017 BREAST CANCER SCRN MAMMOGRAM 3/17/2019 Allergies as of 8/23/2017  Review Complete On: 8/23/2017 By: Shawna Gregory LPN No Known Allergies Current Immunizations  Never Reviewed No immunizations on file. Not reviewed this visit Vitals BP Pulse Height(growth percentile) Weight(growth percentile) SpO2 BMI  
 131/72 79 5' (1.524 m) 115 lb (52.2 kg) 100% 22.46 kg/m2 OB Status Smoking Status Postmenopausal Never Smoker Vitals History BMI and BSA Data Body Mass Index Body Surface Area  
 22.46 kg/m 2 1.49 m 2 Your Updated Medication List  
  
   
This list is accurate as of: 8/23/17  8:59 AM.  Always use your most recent med list.  
  
  
  
  
 ACCU-CHEK TOREY PLUS TEST STRP strip Generic drug:  glucose blood VI test strips  
by Does Not Apply route See Admin Instructions. ALPRAZolam 0.25 mg tablet Commonly known as:  Sanchez Banegas Take  by mouth. CALCIUM + D PO Take  by mouth. glipiZIDE-metFORMIN 5-500 mg per tablet Commonly known as:  METAGLIP Take 1 Tab by mouth Before breakfast and dinner. HYDROcodone-acetaminophen 5-325 mg per tablet Commonly known as:  Anushka Cobb Take 1 Tab by mouth every four (4) hours as needed for Pain. Max Daily Amount: 6 Tabs. LASIX 80 mg tablet Generic drug:  furosemide Take  by mouth daily. LIPITOR 20 mg tablet Generic drug:  atorvastatin Take  by mouth daily. MULTIVITAMIN PO Take  by mouth. [place] Take  by mouth. POTASSIUM CHLORIDE PO Take  by mouth. Patient Instructions [facility] [address] [facility], [facility], [address] [facility], [address] [place] [address] 
 
[phone] Introducing [facility]! Gaston Goode introduces [employer] patient portal. Now you can access parts of your medical record, email your doctor's office, and request medication refills online. 1. In your internet browser, go to [email or web address] 2. Click on the First Time User? Click Here link in the Sign In box. You will see the New Member Sign Up page. 3. Enter your [employer] Access Code exactly as it appears below. You will not need to use this code after youve completed the sign-up process. If you do not sign up before the expiration date, you must request a new code. · [employer] Access Code: [email or web address] Expires: 10/29/2017  2:47 PM 
 
4. Enter the last four digits of your Social Security Number (xxxx) and Date of Birth (mm/dd/yyyy) as indicated and click Submit. You will be taken to the next sign-up page. 5. Create a [employer] ID. This will be your [employer] login ID and cannot be changed, so think of one that is secure and easy to remember. 6. Create a [employer] password. You can change your password at any time. 7. Enter your Password Reset Question and Answer.  This can be used at a later time if you forget your password. 8. Enter your e-mail address. You will receive e-mail notification when new information is available in [address] 9. Click Sign Up. You can now view and download portions of your medical record. 10. Click the Download Summary menu link to download a portable copy of your medical information. If you have questions, please visit the Frequently Asked Questions section of the [employer] website. Remember, [employer] is NOT to be used for urgent needs. For medical emergencies, dial 911. Now available from your iPhone and Android! Please provide this summary of care documentation to your next provider. Your primary care clinician is listed as RONY Doherty. If you have any questions after today's visit, please call [phone].

## 2017-08-23 NOTE — PATIENT INSTRUCTIONS
You have been diagnosed with renal cell carcinoma. We will start you on an oral medication to treat your cancer, Pazopanib or Votrient. You will take this medication once a day. It should be taken on an empty stomach, at least one hour before or two hours after eating. This medication will be sent to you directly from a specialty pharmacy. They will contact you directly to arrange for delivery. Please call our office when you receive the medication. You will need labs done prior to starting the medication. We will also get an MRI of your abdomen to stage the disease. We will arrange for you to see a urologist to determine if the diseased kidney should be removed. Two prescriptions of anti-nausea medications will be sent to your pharmacy. If for any reason any of the prescriptions are extremely expensive please notify our office. You will be given long acting anti-nausea medications through your port prior to your treatments. We prescribe at home medications incase you are to experience nausea. Please call our office if not effective. Please make sure you have imodium OTC at home prior to starting treatments. This is incase you are to experience diarrhea. If this is not effective please call our office. [phone].

## 2017-08-25 NOTE — PROGRESS NOTES
Teddy Hall is a 69 y.o. female presenting for Diabetes (Rm 1 - - non fasting - ) and Cholesterol Problem  . 1. Have you been to the ER, urgent care clinic since your last visit? Hospitalized since your last visit? No    2. Have you seen or consulted any other health care providers outside of the [address] [place] [address] since your last visit? Include any pap smears or colon screening.  No

## 2017-08-25 NOTE — MR AVS SNAPSHOT
Visit Information Date & Time Provider Department Dept. Phone Encounter #  
 8/25/2017  9:30 AM RONY Woods MD [address] [place] [address] [facility] [phone] [id number] Follow-up Instructions Return in about 3 months (around 11/25/2017) for follow up. Your Appointments 9/13/2017  9:45 AM  
Any with Rodolfo Romo, NP [address] Oncology at [facility]) Appt Note: 3 wk f/u  
 [address] [facility] [address] [address]  
[phone]  
  
   
 [address]  
  
    
 11/27/2017 10:20 AM  
Follow Up with RONY Woods MD  
[facility] ([address]) Appt Note: [address] [id number] [address] [phone] Upcoming Health Maintenance Date Due Hepatitis C Screening 1950 HEMOGLOBIN A1C Q6M 1950 LIPID PANEL Q1 1950 FOOT EXAM Q1 1/3/1958 MICROALBUMIN Q1 1/3/1958 EYE EXAM RETINAL OR DILATED Q1 1/3/1958 DTaP/Tdap/Td series (1 - Tdap) 1/3/1969 FOBT Q 1 YEAR AGE 50-75 1/3/1998 ZOSTER VACCINE AGE 60> 11/3/2007 GLAUCOMA SCREENING Q2Y 1/3/2013 Pneumococcal 65+ High/Highest Risk (1 of 2 - PCV13) 1/3/2013 MEDICARE YEARLY EXAM 1/3/2013 INFLUENZA AGE 9 TO ADULT 8/1/2017 BREAST CANCER SCRN MAMMOGRAM 3/17/2019 Allergies as of 8/25/2017  Review Complete On: 8/25/2017 By: Kenzie Forte MD  
 No Known Allergies Current Immunizations  Never Reviewed No immunizations on file. Not reviewed this visit You Were Diagnosed With   
  
 Codes Comments Controlled type 2 diabetes mellitus without complication, without long-term current use of insulin ([facility]    -  Primary ICD-10-CM: E11.9 ICD-9-CM: 250.00 On statin therapy     ICD-10-CM: Z79.899 ICD-9-CM: V58.69 Hypercholesterolemia     ICD-10-CM: E78.00 ICD-9-CM: 272.0 Renal cell cancer, left (HCC)     ICD-10-CM: C64.2 ICD-9-CM: 189. 0 Vitals BP Height(growth percentile) Weight(growth percentile) BMI OB Status Smoking Status 138/70 (BP 1 Location: Right arm, BP Patient Position: Sitting) 5' (1.524 m) 113 lb (51.3 kg) 22.07 kg/m2 Postmenopausal Never Smoker BMI and BSA Data Body Mass Index Body Surface Area 22.07 kg/m 2 1.47 m 2 Preferred Pharmacy Pharmacy Name Phone [facility] [address] [phone] Your Updated Medication List  
  
   
This list is accurate as of: 8/25/17 10:11 AM.  Always use your most recent med list.  
  
  
  
  
 ACCU-CHEK TOREY PLUS TEST STRP strip Generic drug:  glucose blood VI test strips  
by Does Not Apply route See Admin Instructions. ALPRAZolam 0.25 mg tablet Commonly known as:  [place] Take  by mouth. CALCIUM + D PO Take  by mouth. glipiZIDE-metFORMIN 5-500 mg per tablet Commonly known as:  METAGLIP Take 2 Tabs by mouth Before breakfast and dinner. KLOR-CON M20 20 mEq tablet Generic drug:  potassium chloride Take 20 mEq by mouth daily. LASIX 80 mg tablet Generic drug:  furosemide Take  by mouth daily. LIPITOR 20 mg tablet Generic drug:  atorvastatin Take  by mouth daily. MULTIVITAMIN PO Take  by mouth. [place] Take  by mouth. ondansetron 4 mg disintegrating tablet Commonly known as:  ZOFRAN ODT Take 1 Tab by mouth every eight (8) hours as needed for Nausea. prochlorperazine 10 mg tablet Commonly known as:  COMPAZINE Take 0.5 Tabs by mouth every six (6) hours as needed for up to 7 days. Follow-up Instructions Return in about 3 months (around 11/25/2017) for follow up.   
  
To-Do List   
 08/25/2017 6:45 PM  
 Appointment with [facility] 1 at [facility] ([phone]) **NPO Nothing to eat or drink 4-6 hours prior to your exam.**  1. Please bring a list or a bag of your current medications to your appointment 2. Please be sure to remove ALL hair clips, pins, extensions, etc., prior to arriving for your MRI procedure. 3. If you have any medical implants or devices, please bring associated medical card with you. 4. Bring any non [facility] films or CDs pertaining to the area being imaged with you on the day of appointment. 5. A written order with a valid diagnosis and Physicians signature is required for all scheduled tests. 6. Check in at registration 30min before your appointment time unless you were instructed to do otherwise. Introducing [facility] SERVICES! [employer] introduces [employer] patient portal. Now you can access parts of your medical record, email your doctor's office, and request medication refills online. 1. In your internet browser, go to [email or web address] [email or web address] [email or web address] easily [email or web address] 2. Click on the First Time User? Click Here link in the Sign In box. You will see the New Member Sign Up page. 3. Enter your [employer] Access Code exactly as it appears below. You will not need to use this code after youve completed the sign-up process. If you do not sign up before the expiration date, you must request a new code. · [employer] Access Code: [email or web address] Expires: 10/29/2017  2:47 PM 
 
4. Enter the last four digits of your Social Security Number (xxxx) and Date of Birth (mm/dd/yyyy) as indicated and click Submit. You will be taken to the next sign-up page. 5. Create a [employer] ID. This will be your [employer] login ID and cannot be changed, so think of one that is secure and easy to remember. 6. Create a [employer] password. You can change your password at any time. 7. Enter your Password Reset Question and Answer. This can be used at a later time if you forget your password. 8. Enter your e-mail address. You will receive e-mail notification when new information is available in [address] 9. Click Sign Up. You can now view and download portions of your medical record. 10. Click the Download Summary menu link to download a portable copy of your medical information. If you have questions, please visit the Frequently Asked Questions section of the [employer] website. Remember, [employer] is NOT to be used for urgent needs. For medical emergencies, dial 911. Now available from your iPhone and Android! Please provide this summary of care documentation to your next provider. Your primary care clinician is listed as RONY Beckett. If you have any questions after today's visit, please call [phone].

## 2017-08-28 PROBLEM — C64.9 CLEAR CELL RENAL CELL CARCINOMA (HCC): Status: ACTIVE | Noted: 2017-01-01

## 2017-08-28 NOTE — PROGRESS NOTES
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    Barriers to Care:     Plan:   1. PT states she can get a ride Tuesday morning around 9:30. Application in envelop with PSR.

## 2017-08-28 NOTE — TELEPHONE ENCOUNTER
[id number] [facility] Nutrition Therapy   [phone]    Nutrition Referral    Referral received from Natalya Beach NP. Called and spoke with Ms. Oquendo, explained that RD is available to address nutrition throughout the spectrum of care. She reports decrease in appetite, but is attempting to eat something every few hours to maintain good BG levels. So far today she has had vegetable soup and cheese and crackers. Discussed importance of nutrition and adequate calories in effort to fuel the body. Discussed importance of eating frequently and not going long periods without eating due to diabetes. She is difficult to keep on task, often changing the subject to other stresses (family, cleaning the house, meeting with , tests, money). Encouraged her to reach out for any questions or concerns. Provided contact information and days available at oncology office.       Abran Guzman, [address], [address] [place] [address] , [address]

## 2017-08-28 NOTE — PROGRESS NOTES
This note will not be viewable in [address] Roldan Calvillo is a 69 y.o. female and presents with Diabetes (Rm 1 - non fasting - ) and Cholesterol Problem  . Subjective:    Mrs. Brandon Youssef presents today for follow-up of diabetes and hyperlipidemia. Most recently she was diagnosed with metastatic renal cell carcinoma. She presented to the office with swelling of a cervical lymph node and biopsy indicated clear cells. Scanning reveals metastatic disease to chest and locally advanced to the left adrenal gland and left renal vein. She has lost weight but denies any ongoing pain. Her blood sugars remain under reasonable control with morning blood sugars around 100. She has only had one episode of hypoglycemia and thinks this may have been related to not eating.   Past Medical History:   Diagnosis Date    Anxiety 7/31/2017    2/3/17:Under a tremendous amount of stress, will continue Alprazolam prn, if using regularly consider changing to an SSRI or counseling    Cervical myelopathy ([facility] 7/31/2017    2/3/17:Cervical fusion     Controlled diabetes mellitus ([facility] 7/31/2017    Fatigue 7/31/2017    Hypercholesterolemia 7/31/2017    Hyperglycemia 7/31/2017    Hypertension 7/31/2017    2/3/17:Poorly controlled, lifestyle changes, repeat /90 will follow    On statin therapy 7/31/2017    Osteoporosis 7/31/2017    Post-menopausal 7/31/2017    Spinal stenosis of lumbar region with radiculopathy 7/31/2017    2/3/17:Symptoms c/w this diagnosis discussed with pt., if progresses then need MRI L spine and follow up    Vaginitis due to Candida 7/31/2017    2/3/17:Associated with antibiotic use, requests prescription med     Past Surgical History:   Procedure Laterality Date    HX ORTHOPAEDIC      2009 spinal cord surgery     HX ORTHOPAEDIC Left     knee procedure    HX OTHER SURGICAL  08/16/2017    incisional biopsy right neck mass     No Known Allergies  Current Outpatient Prescriptions   Medication Sig Dispense Refill    KLOR-CON M20 20 mEq tablet Take 20 mEq by mouth daily.  glucose blood VI test strips (ACCU-CHEK TOREY PLUS TEST STRP) strip by Does Not Apply route See Admin Instructions.  furosemide (LASIX) 80 mg tablet Take  by mouth daily.  atorvastatin (LIPITOR) 20 mg tablet Take  by mouth daily.  glipiZIDE-metFORMIN (METAGLIP) 5-500 mg per tablet Take 2 Tabs by mouth Before breakfast and dinner.  ALPRAZolam (XANAX) 0.25 mg tablet Take  by mouth.  CALCIUM CARBONATE/VITAMIN D3 (CALCIUM + D PO) Take  by mouth.  MULTIVITAMIN PO Take  by mouth.  VIT C/E/ZINC/LUTEIN/ZEAXANTHIN ([address] PO) Take  by mouth.  ondansetron (ZOFRAN ODT) 4 mg disintegrating tablet Take 1 Tab by mouth every eight (8) hours as needed for Nausea. 40 Tab 1    prochlorperazine (COMPAZINE) 10 mg tablet Take 0.5 Tabs by mouth every six (6) hours as needed for up to 7 days. 36 Tab 1     Social History     Social History    Marital status:      Spouse name: N/A    Number of children: N/A    Years of education: N/A     Social History Main Topics    Smoking status: Never Smoker    Smokeless tobacco: Never Used    Alcohol use No    Drug use: No    Sexual activity: Not Asked     Other Topics Concern    None     Social History Narrative     Family History   Problem Relation Age of Onset    Heart Disease Mother        Review of Systems  Constitutional:  negative for fevers, chills, anorexia  Eyes:    negative for visual disturbance and irritation  ENT:    negative for tinnitus,sore throat,nasal congestion,ear pains. hoarseness  Respiratory:     negative for cough, hemoptysis, dyspnea,wheezing  CV:    negative for chest pain, palpitations, lower extremity edema  GI:    negative for nausea, vomiting, diarrhea, abdominal pain,melena  Endo:               negative for polyuria,polydipsia,polyphagia,heat intolerance  Genitourinary : negative for frequency, dysuria and hematuria  Integumentary: negative for rash and pruritus  Hematologic:   negative for easy bruising and gum/nose bleeding  Musculoskel:  negative for myalgias, arthralgias, back pain, muscle weakness, joint pain  Neurological:   negative for headaches, dizziness, vertigo, memory problems and gait   Behavl/Psych:  negative for feelings of anxiety, depression, mood changes  ROS otherwise negative      Objective:  Visit Vitals    /70 (BP 1 Location: Right arm, BP Patient Position: Sitting)    Ht 5' (1.524 m)    Wt 113 lb (51.3 kg)    BMI 22.07 kg/m2     Physical Exam:   General appearance - alert, well appearing, and in no distress  Mental status - alert, oriented to person, place, and time  EYE-EDITH, EOMI, fundi normal, corneas normal, no foreign bodies  ENT-ENT exam normal, no neck nodes or sinus tenderness  Nose - normal and patent, no erythema, discharge or polyps  Mouth - mucous membranes moist, pharynx normal without lesions  Neck - supple, no significant adenopathy   Chest - clear to auscultation, no wheezes, rales or rhonchi, symmetric air entry   Heart - normal rate, regular rhythm, normal S1, S2, no murmurs, rubs, clicks or gallops   Abdomen - soft, nontender, nondistended, no masses or organomegaly  Lymph- no adenopathy palpable  Ext-peripheral pulses normal, no pedal edema, no clubbing or cyanosis  Skin-Warm and dry. no hyperpigmentation, vitiligo, or suspicious lesions  Neuro -alert, oriented, normal speech, no focal findings or movement disorder noted      Assessment/Plan:  Diagnoses and all orders for this visit:    1. Controlled type 2 diabetes mellitus without complication, without long-term current use of insulin ([facility]    2. On statin therapy    3. Hypercholesterolemia    4. Renal cell cancer, left ([facility]          ICD-10-CM ICD-9-CM    1. Controlled type 2 diabetes mellitus without complication, without long-term current use of insulin (HCC) E11.9 250.00    2.  On statin therapy Z79.899 V58. 69    3. Hypercholesterolemia E78.00 272.0    4. Renal cell cancer, left (HCC) C64.2 189.0      Plan:    Patient most recently had extensive lab work done and this was reviewed with her today. Hemoglobin A1c can be obtained on her follow-up labs. Cholesterol and glucose are under good control. If she does develop any more hypoglycemia may need to adjust her medications accordingly as discussed. She has follow-up oncology and is scheduled to start chemotherapy. Follow-up Disposition:  Return in about 3 months (around 11/25/2017) for follow up. I have reviewed with the patient details of the assessment and plan and all questions were answered. Relevent patient education was performed. Verbal and/or written instructions (see AVS) provided. The most recent lab findings were reviewed with the patient. An After Visit Summary was printed and given to the patient.     Shadi Garcia MD

## 2017-08-29 PROBLEM — C79.9 METASTATIC ADENOCARCINOMA (HCC): Status: ACTIVE | Noted: 2017-01-01

## 2017-08-30 NOTE — PROGRESS NOTES
Pt called to inform her Votrient has been approved, Pt did not answer phone, left message. Also spoke with daughter. She reports the patient ( her Mother) has not been eating well, drinking and refusing to go to any more appointments. Pt's daughter spoke with  and nurse, assured daughter that her mother \"is not dying\" and can be treated. Offered assistance, support and encouraged to call with any further questions or problems.

## 2017-08-31 PROBLEM — I21.4 NSTEMI (NON-ST ELEVATED MYOCARDIAL INFARCTION) (HCC): Status: ACTIVE | Noted: 2017-01-01

## 2017-08-31 NOTE — ED TRIAGE NOTES
Pt reports generalized weakness, no appetite for 2 days. Denies N/V, CP, or SOB. Recently diagnosed with renal cell carcinoma. Hypotensive, pale, lightheaded in triage, SBP in 80s. Pt brought back to stretcher.

## 2017-08-31 NOTE — CONSULTS
Cardiology Consultation Note     Subjective:      Travis De La Paz is a 69 y.o. patient who is seen for evaluation of troponin elevation 6.6  She presented with weakness poor appetite  Her BP initially was reported 80 mmHg  She had ECG done around 3 15 pm and I was called in the evening when troponin was elevated  ECG showed sinus tachycardia with anterolateral ST elevation but also Q waves  She did not have chest pain  Her record reviewed showed recent diagnosis of metastatic renal cell cancer  She has undergone imaging studies and biopsies for staging  She is not yet seen by urologist  She has diabetes mellitus and and said she was active, able to mow grass and never had chest pain or dyspnea on exertion  Her  said she is a worrier but was taking care of 79 yo father    Patient Active Problem List   Diagnosis Code    Controlled diabetes mellitus ([facility] E11.9    On statin therapy Z79.899    Fatigue R53.83    Hypercholesterolemia E78.00    Hypertension I10    Osteoporosis M81.0    Spinal stenosis of lumbar region with radiculopathy M48.06, M54.16    Cervical myelopathy (HCC) G95.9    Anxiety F41.9    Vaginitis due to Candida B37.3    Hyperglycemia R73.9    Post-menopausal Z78.0    Clear cell renal cell carcinoma (HCC) C64.9    Metastatic adenocarcinoma (HCC) C79.9    NSTEMI (non-ST elevated myocardial infarction) ([facility] I21.4     Current Facility-Administered Medications   Medication Dose Route Frequency Provider Last Rate Last Dose    insulin regular (NOVOLIN R, HUMULIN R) injection 10 Units  10 Units IntraVENous ONCE Viola Tamayo MD        dextrose (D50W) injection syrg 25 g  25 g IntraVENous ONCE Viola Tamayo MD        calcium gluconate 1 g in 0.9% sodium chloride 100 mL IVPB  1 g IntraVENous ONCE Viola Tamayo MD        sodium chloride (NS) flush 5-10 mL  5-10 mL IntraVENous Q8H Viola Tamayo MD        sodium chloride (NS) flush 5-10 mL  5-10 mL IntraVENous PRN Viola Tamayo MD        nitroglycerin (NITROSTAT) tablet 0.4 mg  0.4 mg SubLINGual Q5MIN PRN Sandy Hernandez MD        aspirin (ASPIRIN) tablet 325 mg  325 mg Oral ONCE Sandy Hernandez MD        atorvastatin (LIPITOR) tablet 40 mg  40 mg Oral QPM Sandy Hernandez MD        glucose chewable tablet 16 g  4 Tab Oral PRN Sandy Hernandez MD        dextrose (D50W) injection syrg 12.5-25 g  12.5-25 g IntraVENous PRN Sandy Hernandez MD        glucagon (GLUCAGEN) injection 1 mg  1 mg IntraMUSCular PRN Sandy Hernandez MD        insulin lispro (HUMALOG) injection   SubCUTAneous Q6H Sandy Hernandez MD        [START ON 9/1/2017] pantoprazole (PROTONIX) 40 mg in sodium chloride 0.9 % 10 mL injection  40 mg IntraVENous DAILY Sandy Hernandez MD        sodium polystyrene (KAYEXALATE) 15 gram/60 mL oral suspension 30 g  30 g Oral NOW Sandy Hernandez MD         Current Outpatient Prescriptions   Medication Sig Dispense Refill    calcium-vitamin D (OYSTER SHELL) 500 mg(1,250mg) -200 unit per tablet Take 1 Tab by mouth two (2) times daily (with meals).  therapeutic multivitamin (THERAGRAN) tablet Take 1 Tab by mouth daily.  vit a,c & e-lutein-minerals (OCUVITE) tablet Take 1 Tab by mouth daily.  ALPRAZolam (XANAX) 0.25 mg tablet Take 0.25 mg by mouth three (3) times daily as needed for Anxiety.  KLOR-CON M20 20 mEq tablet Take 20 mEq by mouth daily.  ondansetron (ZOFRAN ODT) 4 mg disintegrating tablet Take 1 Tab by mouth every eight (8) hours as needed for Nausea. 40 Tab 1    furosemide (LASIX) 80 mg tablet Take 80 mg by mouth daily.  atorvastatin (LIPITOR) 20 mg tablet Take 20 mg by mouth daily.  glipiZIDE-metFORMIN (METAGLIP) 5-500 mg per tablet Take 2 Tabs by mouth Before breakfast and dinner.        No Known Allergies  Past Medical History:   Diagnosis Date    Anxiety 7/31/2017    2/3/17:Under a tremendous amount of stress, will continue Alprazolam prn, if using regularly consider changing to an SSRI or counseling    Cancer ([facility] 08/2017    renal cell carcinoma    Cervical myelopathy ([facility] 7/31/2017    2/3/17:Cervical fusion     Controlled diabetes mellitus ([facility] 7/31/2017    Fatigue 7/31/2017    Hypercholesterolemia 7/31/2017    Hyperglycemia 7/31/2017    Hypertension 7/31/2017    2/3/17:Poorly controlled, lifestyle changes, repeat /90 will follow    On statin therapy 7/31/2017    Osteoporosis 7/31/2017    Post-menopausal 7/31/2017    Spinal stenosis of lumbar region with radiculopathy 7/31/2017    2/3/17:Symptoms c/w this diagnosis discussed with pt., if progresses then need MRI L spine and follow up    Vaginitis due to Candida 7/31/2017    2/3/17:Associated with antibiotic use, requests prescription med     Past Surgical History:   Procedure Laterality Date    HX ORTHOPAEDIC      2009 spinal cord surgery     HX ORTHOPAEDIC Left     knee procedure    HX OTHER SURGICAL  08/16/2017    incisional biopsy right neck mass     Family History   Problem Relation Age of Onset    Heart Disease Mother      Social History   Substance Use Topics    Smoking status: Never Smoker    Smokeless tobacco: Never Used    Alcohol use No        Review of Systems:   Constitutional: Negative for fever, chills,+ weight loss, malaise/fatigue. HEENT: Negative for nosebleeds, vision changes. Respiratory: Negative for cough, hemoptysis  Cardiovascular: Negative for chest pain, palpitations, orthopnea, claudication, leg swelling, syncope, and PND. Gastrointestinal: Negative for nausea, vomiting, diarrhea, blood in stool and melena. Genitourinary: Negative for dysuria, and hematuria. Musculoskeletal: Negative for myalgias, arthralgia. Skin: Negative for rash. Heme: Does not bleed or bruise easily.    Neurological: Negative for speech change and focal weakness     Objective:     Visit Vitals    /50    Pulse (!) 105  Comment: pt unable to take oral temp due to heightened gag reflex    Temp 98.5 °F (36.9 °C)    Resp 29    Ht 5' (1.524 m)    Wt 100 lb (45.4 kg)  Comment: unable to stand r/t weakness    SpO2 96%    BMI 19.53 kg/m2      Physical Exam:   Constitutional: well-developed and well-nourished. No respiratory distress. Head: Normocephalic and atraumatic. Eyes: Pupils are equal, round  ENT: hearing normal  Neck: supple. No JVD present. Cardiovascular: regular rhythm. Exam reveals no gallop and no friction rub. No murmur heard. Pulmonary/Chest: Effort normal and breath sounds normal. No wheezes. Abdominal: Soft, no tenderness. Musculoskeletal: no edema. Neurological: alert,oriented. Skin: Skin is warm and dry  Psychiatric: normal mood and affect. Behavior is normal. Judgment and thought content normal.        Assessment/Plan:   Subacute ST elevation MI, anterolateral wall: ECG already showed q wave development and it was unknown when it was started. She does not have angina or shortness of breaths   She has acute renal failure  WBC is elevated and platelet is high with mild anemia  She has possible UTI and metastatic renal cell cancer    Therefore I recommend and discussed with patient and her  medical management, no cardiac cath at this time  Echo in am for LVEF  Aspirin and statin drug  Bp has been better  She appears hypovolumic and can get some IV fluid    I will ask Dr Brad Darby to follow up and assume care of her tomorrow     Thank you for involving me in this patient's care and please call with further concerns or questions. Kyler Paulson M.D.   Electrophysiology/Cardiology  [facility]  [address], [address] , [address]  [address]  [phone]

## 2017-08-31 NOTE — ROUTINE PROCESS
TRANSFER - OUT REPORT:    Verbal report given to Lubna Gentile RN(name) on Mary Pelaez  being transferred to CCU 24(unit) for routine progression of care       Report consisted of patients Situation, Background, Assessment and   Recommendations(SBAR). Information from the following report(s) SBAR, ED Summary, MAR, Recent Results and Cardiac Rhythm sinus tach was reviewed with the receiving nurse. Lines:   Peripheral IV 08/31/17 Left Wrist (Active)   Site Assessment Clean, dry, & intact 8/31/2017  3:34 PM   Phlebitis Assessment 0 8/31/2017  3:34 PM   Infiltration Assessment 0 8/31/2017  3:34 PM   Dressing Status Clean, dry, & intact 8/31/2017  3:34 PM   Dressing Type Transparent 8/31/2017  3:34 PM   Hub Color/Line Status Patent; Flushed 8/31/2017  3:34 PM   Action Taken Blood drawn 8/31/2017  3:34 PM       Peripheral IV 08/31/17 Left Antecubital (Active)   Site Assessment Clean, dry, & intact 8/31/2017  4:52 PM   Phlebitis Assessment 0 8/31/2017  4:52 PM   Infiltration Assessment 0 8/31/2017  4:52 PM   Dressing Status Clean, dry, & intact 8/31/2017  4:52 PM   Hub Color/Line Status Pink 8/31/2017  4:52 PM        Opportunity for questions and clarification was provided.       Patient transported with:   Monitor  Registered Nurse

## 2017-08-31 NOTE — PROGRESS NOTES
Admission Medication Reconciliation:    Information obtained from: patient, rx query, patient's , paperwork from 8/23 doctor's appt    Significant PMH/Disease States:   Past Medical History:   Diagnosis Date    Anxiety 7/31/2017    2/3/17:Under a tremendous amount of stress, will continue Alprazolam prn, if using regularly consider changing to an SSRI or counseling    Cancer ([facility] [address] 08/2017    renal cell carcinoma    Cervical myelopathy ([facility] [address] 7/31/2017    2/3/17:Cervical fusion     Controlled diabetes mellitus ([facility] [address] 7/31/2017    Fatigue 7/31/2017    Hypercholesterolemia 7/31/2017    Hyperglycemia 7/31/2017    Hypertension 7/31/2017    2/3/17:Poorly controlled, lifestyle changes, repeat /90 will follow    On statin therapy 7/31/2017    Osteoporosis 7/31/2017    Post-menopausal 7/31/2017    Spinal stenosis of lumbar region with radiculopathy 7/31/2017    2/3/17:Symptoms c/w this diagnosis discussed with pt., if progresses then need MRI L spine and follow up    Vaginitis due to Candida 7/31/2017    2/3/17:Associated with antibiotic use, requests prescription med       Chief Complaint for this Admission:  fatigue/hypotension    Allergies:  Review of patient's allergies indicates no known allergies. Prior to Admission Medications:   Prior to Admission Medications   Prescriptions Last Dose Informant Patient Reported? Taking? ALPRAZolam (XANAX) 0.25 mg tablet   Yes Yes   Sig: Take 0.25 mg by mouth three (3) times daily as needed for Anxiety. KLOR-CON M20 20 mEq tablet   Yes No   Sig: Take 20 mEq by mouth daily. atorvastatin (LIPITOR) 20 mg tablet   Yes No   Sig: Take 20 mg by mouth daily. calcium-vitamin D (OYSTER SHELL) 500 mg(1,250mg) -200 unit per tablet   Yes Yes   Sig: Take 1 Tab by mouth two (2) times daily (with meals). furosemide (LASIX) 80 mg tablet   Yes No   Sig: Take 80 mg by mouth daily.    glipiZIDE-metFORMIN (METAGLIP) 5-500 mg per tablet   Yes No   Sig: Take 2 Tabs by mouth Before breakfast and dinner. ondansetron (ZOFRAN ODT) 4 mg disintegrating tablet   No No   Sig: Take 1 Tab by mouth every eight (8) hours as needed for Nausea. prochlorperazine (COMPAZINE) 10 mg tablet   No No   Sig: Take 0.5 Tabs by mouth every six (6) hours as needed for up to 7 days. therapeutic multivitamin [facility]) tablet   Yes Yes   Sig: Take 1 Tab by mouth daily. vit a,c & e-lutein-minerals (OCUVITE) tablet   Yes Yes   Sig: Take 1 Tab by mouth daily. Facility-Administered Medications: None       Comments/Recommendations: Discussed home medications with patient and utilized recent paperwork from [facility] provider. No updates to the above medication list required. The patient did not have any questions at this time and confirmed that she has NKDA.     Agustin Bachelor, PharmD  ED EXT [id number]

## 2017-08-31 NOTE — ED NOTES
Pt stated she was unable to urinate. Malorie Ambriz, NP notified. And  1000mL bolus NS was  ordered.

## 2017-08-31 NOTE — ED PROVIDER NOTES
HPI Comments: Drea Carney is a 70 yo WF presenting to ED via car with c/o generalized weakness, no appetite for 2 days. Denies N/V, CP, or SOB. Recently diagnosed with renal cell carcinoma along with nodules in her lungs. The patient and her  c/o not being able to eat and not having an appetite. In triage, the patient was hypotensive, pale, lightheaded in triage, SBP in 80s. Now the patient is A&O x 3 and feeling better. PCP: Arnaldo Worthington MD    There were no other complaints, changes, physical findings at this time. The history is provided by the patient and the spouse. No  was used.         Past Medical History:   Diagnosis Date    Anxiety 7/31/2017    2/3/17:Under a tremendous amount of stress, will continue Alprazolam prn, if using regularly consider changing to an SSRI or counseling    Cancer ([facility] 08/2017    renal cell carcinoma    Cervical myelopathy ([facility] [address] 7/31/2017    2/3/17:Cervical fusion     Controlled diabetes mellitus ([facility] [address] 7/31/2017    Fatigue 7/31/2017    Hypercholesterolemia 7/31/2017    Hyperglycemia 7/31/2017    Hypertension 7/31/2017    2/3/17:Poorly controlled, lifestyle changes, repeat /90 will follow    On statin therapy 7/31/2017    Osteoporosis 7/31/2017    Post-menopausal 7/31/2017    Spinal stenosis of lumbar region with radiculopathy 7/31/2017    2/3/17:Symptoms c/w this diagnosis discussed with pt., if progresses then need MRI L spine and follow up    Vaginitis due to Candida 7/31/2017    2/3/17:Associated with antibiotic use, requests prescription med       Past Surgical History:   Procedure Laterality Date    HX ORTHOPAEDIC      2009 spinal cord surgery     HX ORTHOPAEDIC Left     knee procedure    HX OTHER SURGICAL  08/16/2017    incisional biopsy right neck mass         Family History:   Problem Relation Age of Onset    Heart Disease Mother        Social History     Social History    Marital status:  Spouse name: N/A    Number of children: N/A    Years of education: N/A     Occupational History    Not on file. Social History Main Topics    Smoking status: Never Smoker    Smokeless tobacco: Never Used    Alcohol use No    Drug use: No    Sexual activity: Not on file     Other Topics Concern    Not on file     Social History Narrative         ALLERGIES: Review of patient's allergies indicates no known allergies. Review of Systems   Constitutional: Positive for activity change, appetite change and fever. Negative for chills and diaphoresis. HENT: Negative. Negative for congestion, rhinorrhea and trouble swallowing. Eyes: Negative. Respiratory: Negative. Cardiovascular: Negative. Gastrointestinal: Negative. Negative for abdominal pain. Endocrine: Negative. Musculoskeletal: Negative for arthralgias, myalgias, neck pain and neck stiffness. Skin: Negative. Negative for rash. Allergic/Immunologic: Negative. Neurological: Negative. Negative for dizziness and syncope. Hematological: Negative. Psychiatric/Behavioral: Negative. Vitals:    08/31/17 1528 08/31/17 1545 08/31/17 1700 08/31/17 1745   BP:  116/47 117/46 116/52   Pulse:  100 (!) 102 (!) 103   Resp:  25 22 28   Temp:       SpO2: 97%   95%   Weight:       Height:                Physical Exam   Constitutional: She is oriented to person, place, and time. She appears well-developed and well-nourished. Non-toxic appearance. She does not have a sickly appearance. She does not appear ill. HENT:   Head: Normocephalic and atraumatic. Eyes: Conjunctivae, EOM and lids are normal. Pupils are equal, round, and reactive to light. Neck: Trachea normal, normal range of motion and full passive range of motion without pain. Neck supple. Cardiovascular: Normal rate, regular rhythm, normal heart sounds and normal pulses.     Pulmonary/Chest: Effort normal. She has decreased breath sounds in the right lower field and the left lower field. Abdominal: Soft. Normal appearance and bowel sounds are normal. There is generalized tenderness. Musculoskeletal: Normal range of motion. Neurological: She is alert and oriented to person, place, and time. She has normal strength. GCS eye subscore is 4. GCS verbal subscore is 5. GCS motor subscore is 6. Skin: Skin is warm, dry and intact. Psychiatric: She has a normal mood and affect. Her speech is normal and behavior is normal. Judgment and thought content normal. Cognition and memory are normal.   Nursing note and vitals reviewed. MDM  Number of Diagnoses or Management Options     Amount and/or Complexity of Data Reviewed  Clinical lab tests: ordered  Tests in the radiology section of CPT®: ordered  Discuss the patient with other providers: yes    Risk of Complications, Morbidity, and/or Mortality  Presenting problems: moderate  Diagnostic procedures: moderate  Management options: moderate    Patient Progress  Patient progress: stable    ED Course       Procedures    4:12 PM  I have just reevaluated the patient. I have reviewed Her vital signs and determined there is currently no worsening in their condition or physical exam.  Results have been reviewed with them and their questions have been answered. We will continue to review further results as they come available.       Bridger Gupta Pagé FNP-BC    Critical Care Note  4:27 PM    Impending deterioration: Airway / Respiratory / Cardiovascular / CNS / Metabolic / Renal     Associated Risk Factors: Hypotension / Shock / Hypoxia / Metabolic changes / Dehydration / Vascular compromise     Management:   Bedside assessment            Interpretation:/ ECG / blood pressure           Interventions: hemodynamic mngmt/ vascular control               Case Review:  Hospitalist     Treatment response: Stable    Performed by:  Self     Notes:    I have spent 39 minutes of critical care time involved in lab review, consultations with specialist, family decision-making, bedside attention and documentation. During this entire length of time I was immediately available to the patient. Miriam Yu NP      4:33 PM  I have just reevaluated the patient. I have reviewed Her vital signs and determined there is currently no worsening in their condition or physical exam.  Results have been reviewed with them and their questions have been answered. We will continue to review further results as they come available. Beatrice Boast Pagé FNP-BC    CONSULT NOTE:   4:43 PM  Beatrice Boast. Libra FNP-BC spoke with Dr Kristel Zimmerman,   Specialty: Hospitalist  Discussed pt's hx, disposition, and available diagnostic and imaging results. Reviewed care plans. Consultant agrees with plans as outlined. Will Admit and please call Cards.      LABORATORY TESTS:  Recent Results (from the past 12 hour(s))   EKG, 12 LEAD, INITIAL    Collection Time: 08/31/17  3:15 PM   Result Value Ref Range    Ventricular Rate 113 BPM    Atrial Rate 113 BPM    P-R Interval 128 ms    QRS Duration 82 ms    Q-T Interval 328 ms    QTC Calculation (Bezet) 449 ms    Calculated P Axis -11 degrees    Calculated R Axis -27 degrees    Calculated T Axis 155 degrees    Diagnosis       Sinus tachycardia  Left anterior mariajose-block  Septal infarct , age undetermined  T wave abnormality, consider lateral ischemia  When compared with ECG of 11-AUG-2017 08:12,  ST elevation, consider anterolateral injury or acute infarct  Septal infarct is now present  T wave inversion now evident in Lateral leads  Confirmed by Cm Law MD, Edilberto Spaulding ([id number]) on 8/31/2017 4:49:34 PM     CBC WITH AUTOMATED DIFF    Collection Time: 08/31/17  3:19 PM   Result Value Ref Range    WBC 31.6 (H) 3.6 - 11.0 K/uL    RBC 4.13 3.80 - 5.20 M/uL    HGB 10.6 (L) 11.5 - 16.0 g/dL    HCT 31.7 (L) 35.0 - 47.0 %    MCV 76.8 (L) 80.0 - 99.0 FL    MCH 25.7 (L) 26.0 - 34.0 PG    MCHC 33.4 30.0 - 36.5 g/dL    RDW 14.5 11.5 - 14.5 %    PLATELET 226 (H) 186 - 400 K/uL NEUTROPHILS 86 (H) 32 - 75 %    BAND NEUTROPHILS 10 (H) 0 - 6 %    LYMPHOCYTES 1 (L) 12 - 49 %    MONOCYTES 3 (L) 5 - 13 %    EOSINOPHILS 0 0 - 7 %    BASOPHILS 0 0 - 1 %    ABS. NEUTROPHILS 30.4 (H) 1.8 - 8.0 K/UL    ABS. LYMPHOCYTES 0.3 (L) 0.8 - 3.5 K/UL    ABS. MONOCYTES 0.9 0.0 - 1.0 K/UL    ABS. EOSINOPHILS 0.0 0.0 - 0.4 K/UL    ABS. BASOPHILS 0.0 0.0 - 0.1 K/UL    DF MANUAL      PLATELET COMMENTS LARGE PLATELETS      RBC COMMENTS NORMOCYTIC, NORMOCHROMIC     METABOLIC PANEL, COMPREHENSIVE    Collection Time: 08/31/17  3:19 PM   Result Value Ref Range    Sodium 125 (L) 136 - 145 mmol/L    Potassium 5.7 (H) 3.5 - 5.1 mmol/L    Chloride 89 (L) 97 - 108 mmol/L    CO2 20 (L) 21 - 32 mmol/L    Anion gap 16 (H) 5 - 15 mmol/L    Glucose 269 (H) 65 - 100 mg/dL    BUN 54 (H) 6 - 20 MG/DL    Creatinine 3.40 (H) 0.55 - 1.02 MG/DL    BUN/Creatinine ratio 16 12 - 20      GFR est AA 16 (L) >60 ml/min/1.73m2    GFR est non-AA 13 (L) >60 ml/min/1.73m2    Calcium 9.4 8.5 - 10.1 MG/DL    Bilirubin, total 0.3 0.2 - 1.0 MG/DL    ALT (SGPT) 12 12 - 78 U/L    AST (SGOT) 16 15 - 37 U/L    Alk.  phosphatase 150 (H) 45 - 117 U/L    Protein, total 7.4 6.4 - 8.2 g/dL    Albumin 1.9 (L) 3.5 - 5.0 g/dL    Globulin 5.5 (H) 2.0 - 4.0 g/dL    A-G Ratio 0.3 (L) 1.1 - 2.2     TROPONIN I    Collection Time: 08/31/17  3:19 PM   Result Value Ref Range    Troponin-I, Qt. 6.61 (H) <0.05 ng/mL   GLUCOSE, POC    Collection Time: 08/31/17  3:32 PM   Result Value Ref Range    Glucose (POC) 292 (H) 65 - 100 mg/dL    Performed by ZACK STEPHENS    LACTIC ACID    Collection Time: 08/31/17  4:47 PM   Result Value Ref Range    Lactic acid 1.9 0.4 - 2.0 MMOL/L   URINALYSIS W/MICROSCOPIC    Collection Time: 08/31/17  5:19 PM   Result Value Ref Range    Color YELLOW/STRAW      Appearance TURBID (A) CLEAR      Specific gravity 1.017 1.003 - 1.030      pH (UA) 5.5 5.0 - 8.0      Protein 300 (A) NEG mg/dL    Glucose 100 (A) NEG mg/dL    Ketone TRACE (A) NEG mg/dL Bilirubin NEGATIVE  NEG      Blood LARGE (A) NEG      Urobilinogen 0.2 0.2 - 1.0 EU/dL    Nitrites NEGATIVE  NEG      Leukocyte Esterase LARGE (A) NEG      WBC >100 (H) 0 - 4 /hpf    RBC 0-5 0 - 5 /hpf    Epithelial cells FEW FEW /lpf    Bacteria 3+ (A) NEG /hpf   URINE CULTURE HOLD SAMPLE    Collection Time: 08/31/17  5:19 PM   Result Value Ref Range    Urine culture hold URINE ON HOLD IN MICROBIOLOGY DEPT FOR 3 DAYS         IMAGING RESULTS:    CT Results  (Last 48 hours)    None        PFT Results  (Last 48 hours)    None        Echo Results  (Last 48 hours)    None        CXR Results  (Last 48 hours)    None        VENOUS DOPPLER results  (Last 48 hours)    None            MEDICATIONS GIVEN:  Medications   cefTRIAXone (ROCEPHIN) 1 g in 0.9% sodium chloride (MBP/ADV) 50 mL (1 g IntraVENous New Bag 8/31/17 1804)   sodium chloride 0.9 % bolus infusion 1,000 mL (0 mL IntraVENous IV Completed 8/31/17 1725)   sodium chloride 0.9 % bolus infusion 1,000 mL (0 mL IntraVENous IV Completed 8/31/17 1725)       IMPRESSION:  1. Bacterial UTI        PLAN:  1.  ADMIT

## 2017-08-31 NOTE — TELEPHONE ENCOUNTER
Pt's  called reporting that his wife is refusing to eat, drink, or leave the house. Pt's  wanted her to be admited. .  This nurse spoke to the NP and Darvin Schroeder, the Pt's pcp. Per Harsha Henderson, Dr Alexi Pritchett nurse, Mr Adriane Vivas can take her to the hospital for a psychiatric eval but no medical reason to admit her. Pt's  called and all this info was relayed. Pt's  reports he will try to get her there if she will go. It was explained to the  we cannot force her to do anything she doesn't want to, but it would be best to get her evaluated. He verbalized understanding.

## 2017-09-01 NOTE — PROGRESS NOTES
Hospitalist Progress Note  Azalia Curtis MD  Office: [phone]  Cell: [phone]      Date of Service:  2017  NAME:  Leopold Darting  :  1950  MRN:  [id number]      Admission Summary:    The patient is a 78-year-old female with past medical history of diabetes, history of hyperlipidemia, anxiety, hypertension, currently diet-controlled, osteoporosis, spinal stenosis, who recently diagnosed with renal cell carcinoma. The patient had some swelling in her cervical lymph nodes and biopsy indicated clear cell carcinoma. The patient is followed up with [address] [place] [address] Oncology with Dr Amy Erickson, and has not started chemotherapy as of yet. The patient also was noted to have metastases of the disease to the left to the pleura and mediastinum. Today, the patient presents because she reports that she has been having generalized weakness, no appetite for 2 days, some dry heaving, but no vomiting, and just has been feeling \"very lethargic. \" The patient was found to be hypotensive, pale and lightheaded in the triage, with systolic blood pressure of 80. The patient was placed in the ER, was started on fluid resuscitation. Her was elevated troponin and was found to be in acute renal failure and was found to be septic and was requested to be admitted under the hospitalist service.     Interval history / Subjective:   Generalized weakness, no chest pain     Assessment & Plan:     Sepsis likely due to UTI POA   -on zosyn and normal saline  -hypotensive BP low normal this morining  -blood cx on  grow gram negative rods in both bottles, follow up with identification  -patient immunocompromised due to her metastatic renal cell carcinoma, will change iv antibiotics to merrem, consult ID  -urine cx pending  -leukocytosis     Subacute STEMI  -no chest pain  -on lipitor, add aspirin 81 mg   -troponin initial was 6.61, then 5.12 and 4.47   -repeated ekg on 8/31 sinus tachy vent rate 113 st elevation in lead I, aVL, inverted t wave  -echo was done this morning, result pending  -cardiologist    BONG possible due to pre renal and sepsis   -creatinine trending up  -nephrologist on board    Hyperkalemia  -received kayexalate, insulin and dextrose  -resolved now    Hyponatremia   -improving  -TSH was normal a week ago    Mild anion gap Metabolic acidosis due to BONG  -improved    DM-II  -finger stick glucose 215-205 since admission, add lantus 8 units, sliding scale and monitor finger stick glucose   -home glipizide and metformin is held    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 There is also  right hilar adenopathy/mass. Multiple lung nodules as described above some of which are pleural-based. Left adrenal nodule. Left lobe liver lesion. .    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   -on heparin 5000 q 12 hrs renally adjusted for deep vein thrombosis   -consult to hematologist    Anemia related to renal cell carcinoma  -monitor H/H    Hx of HTN  -Hypotension improving    Hx of hyperlipidemia  -continue lipitor    Generalized weakness related to underlying illness  -supportive care    Full Code, at risk for decompensation     Code status: Full Code  DVT prophylaxis: heparin    Care Plan discussed with: Patient/Family, Nurse and   Disposition: TBD     Hospital Problems  Date Reviewed: 8/31/2017          Codes Class Noted POA    * (Principal)NSTEMI (non-ST elevated myocardial infarction) ([facility] [address] ICD-10-CM: I21.4  ICD-9-CM: 410.70  8/31/2017 Unknown                Vital Signs:    Last 24hrs VS reviewed since prior progress note. Most recent are:  Visit Vitals    /47 (BP 1 Location: Left arm, BP Patient Position: Supine)    Pulse 100    Temp 98.4 °F (36.9 °C)    Resp 26    Ht 5' (1.524 m)    Wt 52.6 kg (115 lb 15.4 oz)    SpO2 94%    Breastfeeding No    BMI 22.65 kg/m2         Intake/Output Summary (Last 24 hours) at 09/01/17 0732  Last data filed at 09/01/17 0600   Gross per 24 hour   Intake           871.25 ml   Output                0 ml   Net           871.25 ml        Physical Examination:             Constitutional:  No acute distress, cooperative, pleasant    ENT:  Oral mucous moist, oropharynx benign. Neck supple,    Resp:  CTA bilaterally. No wheezing/rhonchi/rales. No accessory muscle use   CV:  Regular rhythm, normal rate, no murmurs, gallops, rubs    GI:  Soft, non distended, non tender. normoactive bowel sounds, no hepatosplenomegaly     Musculoskeletal:  No edema    Neurologic:  Moves all extremities. AAOx3, CN II-XII reviewed     Psych:  Good insight, Not anxious nor agitated. Data Review:    Review and/or order of clinical lab test      Labs:     Recent Labs      09/01/17   0321  08/31/17   1519   WBC  23.5*  31.6*   HGB  8.9*  10.6*   HCT  26.8*  31.7*   PLT  453*  556*     Recent Labs      09/01/17   0321  08/31/17   2255  08/31/17   1847   NA  134*  134*  130*   K  4.4  4.4  5.3*   CL  99  99  98   CO2  22  19*  20*   BUN  56*  54*  51*   CREA  3.31*  3.17*  2.99*   GLU  175*  219*  216*   CA  8.8  8.6  8.2*   MG  1.2*  1.1*  1.2*   PHOS  3.5  3.3  3.2     Recent Labs      08/31/17   1519   SGOT  16   ALT  12   AP  150*   TBILI  0.3   TP  7.4   ALB  1.9*   GLOB  5.5*     No results for input(s): INR, PTP, APTT in the last 72 hours. No lab exists for component: INREXT   No results for input(s): FE, TIBC, PSAT, FERR in the last 72 hours. No results found for: FOL, RBCF   No results for input(s): PH, PCO2, PO2 in the last 72 hours.   Recent Labs      08/31/17   0160  08/31/17 1847  08/31/17   1519   CPK   --   49   --    TROIQ  4.47*  5.12*  6.61*     No results found for: CHOL, CHOLX, CHLST, CHOLV, HDL, LDL, LDLC, DLDLP, TGLX, TRIGL, TRIGP, CHHD, CHHDX  Lab Results   Component Value Date/Time    Glucose (POC) 205 09/01/2017 05:11 AM    Glucose (POC) 215 08/31/2017 11:54 PM    Glucose (POC) 247 08/31/2017 08:35 PM    Glucose (POC) 276 08/31/2017 08:02 PM    Glucose (POC) 292 08/31/2017 03:32 PM     Lab Results   Component Value Date/Time    Color YELLOW/STRAW 08/31/2017 05:19 PM    Appearance TURBID 08/31/2017 05:19 PM    Specific gravity 1.017 08/31/2017 05:19 PM    pH (UA) 5.5 08/31/2017 05:19 PM    Protein 300 08/31/2017 05:19 PM    Glucose 100 08/31/2017 05:19 PM    Ketone TRACE 08/31/2017 05:19 PM    Bilirubin NEGATIVE  08/31/2017 05:19 PM    Urobilinogen 0.2 08/31/2017 05:19 PM    Nitrites NEGATIVE  08/31/2017 05:19 PM    Leukocyte Esterase LARGE 08/31/2017 05:19 PM    Epithelial cells FEW 08/31/2017 05:19 PM    Bacteria 3+ 08/31/2017 05:19 PM    WBC >100 08/31/2017 05:19 PM    RBC 0-5 08/31/2017 05:19 PM         Medications Reviewed:     Current Facility-Administered Medications   Medication Dose Route Frequency    ALPRAZolam (XANAX) tablet 0.25 mg  0.25 mg Oral TID PRN    therapeutic multivitamin (THERAGRAN) tablet 1 Tab  1 Tab Oral DAILY    sodium chloride (NS) flush 5-10 mL  5-10 mL IntraVENous Q8H    sodium chloride (NS) flush 5-10 mL  5-10 mL IntraVENous PRN    sodium chloride (NS) flush 5-10 mL  5-10 mL IntraVENous PRN    0.9% sodium chloride infusion  100 mL/hr IntraVENous CONTINUOUS    0.9% sodium chloride infusion  75 mL/hr IntraVENous CONTINUOUS    sodium chloride (NS) flush 5-10 mL  5-10 mL IntraVENous Q8H    sodium chloride (NS) flush 5-10 mL  5-10 mL IntraVENous PRN    nitroglycerin (NITROSTAT) tablet 0.4 mg  0.4 mg SubLINGual Q5MIN PRN    atorvastatin (LIPITOR) tablet 40 mg  40 mg Oral QPM    glucose chewable tablet 16 g  4 Tab Oral PRN    dextrose (D50W) injection syrg 12.5-25 g  12.5-25 g IntraVENous PRN    glucagon (GLUCAGEN) injection 1 mg  1 mg IntraMUSCular PRN    insulin lispro (HUMALOG) injection   SubCUTAneous Q6H    pantoprazole (PROTONIX) 40 mg in sodium chloride 0.9 % 10 mL injection  40 mg IntraVENous DAILY    piperacillin-tazobactam (ZOSYN) 3.375 g in 0.9% sodium chloride (MBP/ADV) 100 mL  3.375 g IntraVENous Q12H    ondansetron (ZOFRAN) injection 4 mg  4 mg IntraVENous Q8H PRN     ______________________________________________________________________  EXPECTED LENGTH OF STAY: - - -  ACTUAL LENGTH OF STAY:          1                 Nelli Salomon MD

## 2017-09-01 NOTE — INTERDISCIPLINARY ROUNDS
IDR/SLIDR Summary          Patient: Ange Tello MRN: [id number]    Age: 69 y.o. YOB: 1950 Room/Bed: [address] [place] [address]   Admit Diagnosis: NSTEMI (non-ST elevated myocardial infarction) ([facility]  Principal Diagnosis: NSTEMI (non-ST elevated myocardial infarction) ([facility]   Goals: Cardiac stability, rehydration  Readmission: NO  Quality Measure: AMI  VTE Prophylaxis: Mechanical  Influenza Vaccine screening completed? YES  Pneumococcal Vaccine screening completed? YES  Mobility needs: No   Nutrition plan:Yes  Consults:Case Management    Financial concerns:Yes  Escalated to CM? YES  RRAT Score: [place]   Testing due for pt today? YES  LOS: 0 days Expected length of stay 4 days  Discharge plan: To be decided   PCP: Dagoberto Mendoza MD  Transportation needs: No    Days before discharge:two or more days before discharge   Discharge disposition: Home    Signed:     Jerrod Lam  8/31/2017  10:29 PM

## 2017-09-01 NOTE — DIABETES MGMT
DTC Consult Note    Recommendations/ Comments:  BG's > 200 mg/dL  Elevated creatinine noted - acute RF, sepsis    Please consider the following: Add Lantus 10 units daily  Change lispro to high sensitivity correction scale due to renal status  Add diabetic carb consistent to current diet order  Document po intake    Upon Discharge   Oral agent Metformin not appropriate due to renal status    DTC will continue to follow patient as needed. ____________________________    Consult received for:   []           Hospital Medication Recommendations                 [x]           Hospital Blood Glucose Management    Chart reviewed and initial evaluation complete on Stacy Stroud. Patient is a 69 y.o. female with known DM on Metaglip 5-500 mg (two) tabs BID at home. Recent dx renal cell CA    A1c:   Lab Results   Component Value Date/Time    Hemoglobin A1c 7.5 08/31/2017 03:19 PM       Recent Glucose Results:   Lab Results   Component Value Date/Time     (H) 09/01/2017 03:21 AM     (H) 08/31/2017 10:55 PM     (H) 08/31/2017 06:47 PM    GLUCPOC 205 (H) 09/01/2017 05:11 AM    GLUCPOC 215 (H) 08/31/2017 11:54 PM    GLUCPOC 247 (H) 08/31/2017 08:35 PM        Lab Results   Component Value Date/Time    Creatinine 3.31 09/01/2017 03:21 AM       Active Orders   Diet    DIET RENAL Regular        PO intake: No data found. Current hospital DM medication: Lispro normal sensitivity correction scale    Thank you.   Go Morrison RN, CDE

## 2017-09-01 NOTE — PROGRESS NOTES
0730- Bedside report received from [place]; pt currently in bed stating feels tired but other than that she is ok. 1100- Blood drawn and sent to lab  1200- MIV fluids changed to D5 r/t sodium levels. [address]- , Daniele Roberts, called to inform of room change. 1830- TRANSFER - OUT REPORT:    Verbal report given to MICHELLE Rajput(name) on Le Yeung  being transferred to CVSU(unit) for routine progression of care       Report consisted of patients Situation, Background, Assessment and   Recommendations(SBAR). Information from the following report(s) SBAR, ED Summary, Procedure Summary, Intake/Output, MAR, Recent Results and Cardiac Rhythm NSR was reviewed with the receiving nurse. Lines:   Peripheral IV 08/31/17 Left Wrist (Active)   Site Assessment Clean, dry, & intact 9/1/2017  8:00 AM   Phlebitis Assessment 0 9/1/2017  8:00 AM   Infiltration Assessment 0 9/1/2017  8:00 AM   Dressing Status Clean, dry, & intact 9/1/2017  8:00 AM   Dressing Type Transparent 9/1/2017  8:00 AM   Hub Color/Line Status Pink;Capped 9/1/2017  8:00 AM   Action Taken Blood drawn 8/31/2017  3:34 PM   Alcohol Cap Used Yes 9/1/2017  8:00 AM       Peripheral IV 08/31/17 Left Antecubital (Active)   Site Assessment Clean, dry, & intact 9/1/2017  8:00 AM   Phlebitis Assessment 0 9/1/2017  8:00 AM   Infiltration Assessment 0 9/1/2017  8:00 AM   Dressing Status Clean, dry, & intact 9/1/2017  8:00 AM   Dressing Type Transparent 9/1/2017  8:00 AM   Hub Color/Line Status Pink; Infusing 9/1/2017  8:00 AM   Action Taken Open ports on tubing capped 9/1/2017  8:00 AM   Alcohol Cap Used Yes 9/1/2017  8:00 AM        Opportunity for questions and clarification was provided.       Patient transported with:   Monitor  Registered Nurse

## 2017-09-01 NOTE — CONSULTS
[address]   [address]   [facility]       Name:  Lucia Ramirez   MR#:  [id number]   :  1950   Account #:  [de-identified]    Date of Consultation:  2017   Date of Adm:  2017       REQUESTING PHYSICIAN: Dr. Maribell Rasmussen: Gram-negative ruddy bacteremia. HISTORY OF PRESENT ILLNESS: The patient is a 61-year-old   woman whose medical history is significant for recently diagnosed   metastatic renal cell carcinoma. This was diagnosed with a cervical   lymph node biopsy. CT scan showed a bulky mass on the left kidney. Over the past few days, she has been having generalized weakness. She has had anorexia and dry heaving. Because of this, she went to   the emergency room where she was found to be hypotensive with a   systolic blood pressure in the 80s. She was found also to be in acute   renal failure and her white blood cell count was markedly elevated at   31,000. She was also found to be hyponatremic as well as   hyperkalemic. Her urinalysis showed greater than 100 white blood   cells. Surprisingly, she had no dysuria or frequency. She had no fever   or chills. She did not have any abdominal pain. Her blood cultures are   now growing 4/4 gram-negative rods, and we are now being asked to   see her in consult. ALLERGIES: NO KNOWN DRUG ALLERGIES. CURRENT MEDICATIONS:   1. Aspirin. 2. Lipitor. 3. Heparin. 4. Lantus. 5. Humalog. 6. Magnesium sulfate. 7. Merrem. 8. Metoprolol. 9. Multivitamin. REVIEW OF SYSTEMS: Aside from the things mentioned in the   history, the rest of review of systems is negative. PAST MEDICAL HISTORY:   1. Metastatic renal cell carcinoma. 2. Osteoporosis. 3. Spinal stenosis. 4. High blood pressure. 5. Hyperlipidemia. 6. Diabetes. 7. Anxiety. SURGICAL HISTORY: Cervical lymph node biopsy and history of back   surgery.     FAMILY HISTORY: Her mother had heart disease. SOCIAL HISTORY: She does not smoke tobacco, drink alcohol or   engage in recreational drug use. PHYSICAL EXAMINATION:   GENERAL: She is not in respiratory distress. VITAL SIGNS: Temperature 98 degrees, pulse 89, blood pressure is   114/53, saturating at 94% on room air. HEAD AND NECK: She has pink conjunctivae, anicteric sclerae. No   JVD. External ears are normal.   LUNGS: Clear to auscultation. No rales, wheezes, or rhonchi. HEART: No murmurs, rubs, or clicks. ABDOMEN: Soft and nontender. No guarding or rebound. GENITOURINARY: No flank tenderness. MUSCULOSKELETAL: Knees and ankles are not warm and are not   tender. INTEGUMENT: Did not notice any rash. PSYCHIATRIC: She seems to be a little withdrawn. NEUROLOGIC: Tongue midline. No facial asymmetry. Hand  is   5/5. LABORATORY DATA: White blood cell count is 20.2, hemoglobin 8.3,   platelet 125. Urinalysis shows greater than 100 white blood cells. Creatinine 3.2, AST 16, ALT 12, alkaline phosphatase 150, total   bilirubin is 0.3. Ultrasound shows marked heterogeneity of the left   kidney. Blood cultures growing 4/4 gram-negative rods. Chest x-ray shows right superior mediastinal and right hilar   lymphadenopathy. There are also pulmonary nodules. IMPRESSION:   1. Gram-negative ruddy bacteremia. 2. Urinary tract infection. 3. Metastatic renal cell carcinoma. 4. Renal failure. 5. Myocardial infarction. 6. Osteoporosis. 7. Diabetes. PLAN: I would continue Merrem for now. Once the sensitivities are   known, I think we can probably de-escalate. She will need 14 days of   therapy from the first negative blood culture. She can be shifted to oral   therapy if the isolate is sensitive. Thank you for this consult.         MD Keira Bland / [employer] [facility]   D:  09/01/2017   18:12   T:  09/01/2017   19:28   Job #:  [id number]

## 2017-09-01 NOTE — PROGRESS NOTES
NUTRITION COMPLETE ASSESSMENT    RECOMMENDATIONS:   1. Encourage intake of PO at meals and with supplements   - can increase frequency of supplements over the weekend if pt enjoys and consuming    2. Monitor BG with insulin adjustment PRN   - prefer correction with insulin vs diet restriction with poor PO   - DTC recommending increase of lantus to 10 units per note review    3. Daily weights     Interventions/Plan:   Food/Nutrient Delivery:   (liberalize diet to 2gm Na, consistent CHO; soft solids) Commercial supplement (Trial Magic Cup and Ensure Clear)        Nutrition Education:    handouts given    Assessment:   Reason for Assessment:[x]BPA/MST Referral (unsure wt loss)    Diet: Renal  Supplements: none  Nutritionally Significant Medications: [x] Reviewed & Includes: lantus (8 units), SSI, mag sulfate, merrem, zosyn, MVI, D5 @ 75ml/hr  Meal Intake:   Patient Vitals for the past 100 hrs:   % Diet Eaten   09/01/17 1225 0 %   09/01/17 0900 0 %     Pre-Hospitalization:  Usual Appetite: Poor  Diet at Home: diabetic  Vitamins/Supplements: No    Current Hospitalization:   Appetite: Poor  PO Ability: Independent Average po intake:Less than 25%  Average supplements intake:        Subjective:  \"Well what can I eat, I have DM. .. I just don't want to eat, oonly drink right now. And even that goes right through me at the moment. \"    Objective:  Pt admitted for NSTEMI. PMHx: renal CA w/ lung nodules, anxiety, DM, hypercholesterolemia, HTN. Sepsis also noted, likely d/t UTI. Abx rx. Cardiology following. BONG with hyperkalemia and hyponatremia on admit. Kayexalate given,  IVF started and renal following. Recent cancer diagnosis with mets earlier this month noted - has not started chemo, prognosis noted as poor per chart review. Poor appetite x 2 days PTA. Severe wt loss of 6% x 1 month noted.    Wt Readings:   09/01/17 52.6 kg (115 lb 15.4 oz)   08/25/17 51.3 kg (113 lb)   08/23/17 52.1 kg (114 lb 12.8 oz)   08/16/17 50.6 kg (111 lb 8.8 oz)   08/10/17 53.4 kg (117 lb 12.8 oz)   07/31/17 55.3 kg (122 lb)     Patient meets criteria for Severe Acute Protein Calorie Malnutrition as evidenced by:   ASPEN Malnutrition Criteria  Acute Illness, Chronic Illness, or Social/Enviornmental: Acute illness  Energy Intake: <50% est energy req for > 5 days  Weight Loss: Greater than 5% x 1 mo  ASPEN Malnutrition Score - Acute Illness: 12  Acute Illness - Malnutrition Diagnosis: Severe malnutrition. BG elevated with oral DM meds held d/t renal function. Seen by DTC, recommending lantus (10 units/day). Will add consistent CHO to diet order to help with mgmt. Edentulous with dentures at home since they had been causing gagging, diet adjusted to dental soft - can be further down graded or adjusted based on tolerance. No swallowing issues. Pt and  visited. Pt reports minimal intake at home for past few days. Diarrhea since kayexalate yesterday. No lunch or breakfast consumed today. Agreeable to trial of Ensure Clear (240kcal, 8g protein) and Magic Cup (290kcal, 9g protein) but does not like Ensure/Boost milkshakes. Handouts for increasing calorie/protein intake provided but will defer education until feeling better. Plan to follow for PO intake, supplement acceptance, and plan of care. Estimated Nutrition Needs:   Kcals/day: 1575 Kcals/day (1575-1825kcal)  Protein: 63 g (63-74g (1.2-1.4g/kg))  Fluid: 1575 ml (1ml/kcal)  Based On: [place] (x 1.4 + 250kcal)  Weight Used: Actual wt (52.6kg)    Pt expected to meet estimated nutrient needs:  []   Yes     []  No [x] Unable to predict at this time  Nutrition Diagnosis:   1. Unintended weight loss related to inadequate protein/energy intake; increased energy expenditure as evidenced by 6% wt loss x 1 month with poor appeite; renal CA w/ mets    2.  Altered nutrition-related lab values related to DM as evidenced by hyperglycemia with adjustment to medications    Goals:     Consumption of at least 50% of meals and 2-3 supplements/day in 5-7 days; wt maintance     Monitoring & Evaluation:    - Total energy intake, Liquid meal replacement, Protein intake   - Weight/weight change, Glucose profile     Previous Nutrition Goals Met:   N/A  Previous Recommendations:    N/A    Education & Discharge Needs:   [] None Identified   [] Identified and addressed    [] Participated in care plan, discharge planning, and/or interdisciplinary rounds        Cultural, Jainism and ethnic food preferences identified: None    Skin Integrity: [x]Intact  []Other  Edema: [x]None []Other  Last BM: 9/1 - watery  Food Allergies: [x]None []Other  Diet Restrictions: Food Dislikes:  (Ensure/Boost milkshakes)  Cultural/Zoroastrian Preference(s): None     Anthropometrics:    Weight Loss Metrics 9/1/2017 8/31/2017 8/25/2017 8/23/2017 8/23/2017 8/16/2017 8/10/2017   Today's Wt 115 lb 15.4 oz - 113 lb 114 lb 12.8 oz 115 lb 111 lb 8.8 oz 117 lb 12.8 oz   BMI - 22.65 kg/m2 22.07 kg/m2 22.42 kg/m2 22.46 kg/m2 21.79 kg/m2 23.01 kg/m2      Weight Source: Bed  Height: 5' (152.4 cm),    Body mass index is 22.65 kg/(m^2).   IBW : 45.4 kg (100 lb), % IBW (Calculated): 115.96 %  Usual Body Weight: 55.3 kg (122 lb),      Labs:    Lab Results   Component Value Date/Time    Sodium 135 09/01/2017 11:01 AM    Potassium 4.0 09/01/2017 11:01 AM    Chloride 99 09/01/2017 11:01 AM    CO2 21 09/01/2017 11:01 AM    Glucose 181 09/01/2017 11:01 AM    BUN 58 09/01/2017 11:01 AM    Creatinine 3.24 09/01/2017 11:01 AM    Calcium 8.6 09/01/2017 11:01 AM    Magnesium 1.2 09/01/2017 03:21 AM    Phosphorus 3.5 09/01/2017 03:21 AM    Albumin 1.9 08/31/2017 03:19 PM     Lab Results   Component Value Date/Time    Hemoglobin A1c 7.5 08/31/2017 03:19 PM     Eber Leon RD

## 2017-09-01 NOTE — CONSULTS
[address]   [address] [facility], [address] [place] [address] Ave   [address] [facility] [address]       Name:  Noemy Allred   MR#:  [id number]   :  1950   Account #:  [de-identified]    Date of Consultation:  2017   Date of Adm:  2017       REQUESTING PHYSICIAN: Viola Tamayo MD.    REASON FOR CONSULTATION: For evaluation and management of   acute kidney injury. HISTORY OF PRESENT ILLNESS: The patient is a 49-year-old   female with past medical history significant for diabetes,   hyperlipidemia, osteoporosis, spinal stenosis, who was recently   diagnosed with renal cell carcinoma. She has mets to pleura, left   adrenal gland, as well as renal vein extension. 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 She has been   seen by Dr. Rhona Sanchez and has not started chemotherapy yet. She presents to ED with complaints of generalized weakness, poor   appetite, dry heaves, but no vomiting, and just feeling very tired and   weak. She was hypotensive, tachycardic, pale and lightheaded in the   ED. She was also found to have elevated troponin and admission labs   notable for advanced renal failure. Her BUN/creatinine were 54 and   3.4. Baseline creatinine a week ago was normal. She also had   associated hyponatremia and hyperkalemia. She denies any diarrhea   or blood in the stool. She took only 1 dose of Advil last night. Her   diabetes is under fair control. She was not on any new antibiotics   recently. No skin rash. She was started on IV fluids and her blood   pressures have improved with supportive care. REVIEW OF SYSTEMS: As noted in HPI. In addition, she denies any   blood in the urine. No foamy or cloudy urine. No chest pain. No   abdominal pain. No dyspnea, cough, recent travel, sick contacts, falls   or injuries. PAST MEDICAL HISTORY: As noted above. ALLERGIES: NO KNOWN DRUG ALLERGIES. HOME MEDICATIONS: Include:   1.  Calcium with vitamin D.   2. Therapeutic multivitamin. 3. Xanax 0.25 mg t.i.d. p.r.n.   4. Potassium chloride. 5. Compazine. 6. Lasix 80 mg daily. 7. Lipitor 20 mg at bedtime. 8. Glipizide/metformin 2 tablets by mouth with breakfast and dinner. SOCIAL HISTORY: She does not smoke or drink alcohol. FAMILY HISTORY: Mother had history of heart disease. No known   kidney disorders. PHYSICAL EXAMINATION:   GENERAL: Elderly female, somewhat ill-appearing, in no acute   distress. RECENT VITALS: She is febrile, tachycardic with pulse of 102-109, BP   has improved to 121/53 most recent, respiration of 22, saturating 96%   on room air, weight is 52.6 kg. HEENT: Head is atraumatic, normocephalic. Conjunctivae are pink. Mucous membranes are slightly dry. Mucous membranes slightly dry. No scleral icterus. NECK: No JVD. She has right-sided cervical lymphadenopathy. Dressing in place from recent biopsy. LUNGS: Have diminished breath sounds at both bases. Mild   tachypnea towards the end of sentences. No overt respiratory distress. HEART: Regular rate and rhythm with tachycardia. No murmur or rub. ABDOMEN: Soft, nontender, active bowel sounds. EXTREMITIES: No clubbing, cyanosis, or edema. CNS: She is alert, awake. Seems slightly confused, but able to answer   most questions appropriately. Grossly nonfocal.   SKIN: Without any rashes. DATA: Repeat lab shows BUN down to 51, creatinine is also better at   2.99. Sodium is up to 130, potassium down to 5.3, bicarbonate of 20. Troponin-I trending down at 5.12, initial value was 6.61. A1c of 7.5. UA   has proteinuria, large blood, but minimal RBCs, and pyuria. Her lactic   acid was normal. Do not have a CK level. There is discrepancy in the   UA between dipstick and microscopic blood findings.      CT scan done 8 days ago showed left renal bulky malignancy with left   renal vein and pararenal extension, bilateral small renal cortical hyper-  enhancing tumor/metastasis, as well as left adrenal metastasis. No   hydronephrosis noted. Chest x-ray from today shows right superior mediastinal and right hilar   lymphadenopathy, as well as pulmonary nodules. Renal ultrasound is   pending. ASSESSMENT:   1. Acute kidney injury. This is most likely prerenal azotemia in the   setting of nausea, poor intake, and hypotension. She may be septic   and may have early ATN. Obstruction is unlikely, but needs to be   excluded. 2. Sepsis, with most likely source to be urinary source. 3. Hyperkalemia. 4. Hyponatremia. 5. Acidosis. 6. Non-ST-elevation myocardial infarction. 7. Metastatic renal cell cancer with metastases to pleura, lymph nodes,   adrenal glands, and renal vasculature. Prognosis appears to be poor. RECOMMENDATIONS:   1. Isotonic IV fluid as you are.   2. Pressors if needed. Keep MAP greater than 65 mmHg. 3. Avoid nephrotoxins. Hold diuretics. 4. Hold metformin. 5. No emergent indication for dialysis. 6. Watch for over-correction of sodium. 7. If her acidosis gets worse, she may need bicarb-containing IV fluids. 8. Empiric antibiotics as per primary team. Follow up cultures. Prognosis is guarded/poor. Consider palliative care team consult. Discussed with the patient, nurse, and Dr. Reinaldo Tidwell. Thanks for renal consultation. Will follow patient with you.         MD BERE Powell / Parish Dominguez   D:  08/31/2017   21:12   T:  08/31/2017   22:08   Job #:  [id number]

## 2017-09-01 NOTE — H&P
[address]   [address] [place] [address]   HISTORY AND PHYSICAL       Name:  Mayelin Delcid   MR#:  [id number]   :  1950   Account #:  [de-identified]        Date of Adm:  2017       ATTENDING PHYSICIAN: Gracie Chavez MD    CHIEF COMPLAINT: Weakness. HISTORY OF PRESENT ILLNESS: The patient is a 75-year-old   female with past medical history of diabetes, history of hyperlipidemia,   anxiety, hypertension, currently diet-controlled, osteoporosis, spinal   stenosis, who recently diagnosed with renal cell carcinoma. The   patient had some swelling in her cervical lymph nodes and biopsy   indicated clear cell carcinoma. The patient is followed up with [address] [place] [address] with Dr. Giovanni Aburto,  and   has not started chemotherapy as of yet. The patient also was noted to   have metastases of the disease to the left to the pleura and   mediastinum. Today, the patient presents because she reports that   she has been having generalized weakness, no appetite for 2 days,   some dry heaving, but no vomiting, and just has been feeling \"very   lethargic. \" The patient was found to be hypotensive, pale and   lightheaded in the triage, with systolic blood pressure of 80. The   patient was placed in the ER, was started on fluid resuscitation and   was found to have elevated troponin and was found to be in acute   renal failure and was found to be septic and was requested to be   admitted under the hospitalist service. The patient denies any   headache, blurry vision, sore throat, trouble swallowing, trouble with   speech, any chest pain, shortness of breath, cough, urinary symptoms,   constipation, diarrhea, hematemesis, melena, hemoptysis, focal or   generalized neurological weakness, recent travel, sick contacts, falls,   injuries or any other concerns or problems. The patient denies starting   chemotherapy as of yet.  Denies any other complaints or problems. PAST MEDICAL HISTORY: See above. HOME MEDICATIONS: Currently the patient is on:   1. Calcium with vitamin D.   2. Therapeutic multivitamin. 3. Xanax 0.25 mg t.i.d. as needed. 4. Klor-Con. 5. Compazine. 6. Furosemide 80 mg daily. 7. Lipitor 20 mg daily. 8. Glipizide/Metformin 2 tablets by mouth with breakfast and dinner. REVIEW OF SYSTEMS: All systems were reviewed, and were found   to be essentially negative except for the symptoms mentioned above. CODE STATUS: FULL CODE. ALLERGIES: NO KNOWN DRUG ALLERGIES. SOCIAL HISTORY: Never a smoker. Denies alcohol use, denies IV   drug abuse. FAMILY HISTORY: Mother had history of heart disease. PHYSICAL EXAMINATION     VITAL SIGNS: Temperature 98.5, pulse 105, respiratory rate 29, blood   pressure 116/60, pulse oximetry 96% on room air. On arrival, the   patient had a blood pressure 89/54 and heart rate of 105. GENERAL: Alert x2, awake, chronically ill-appearing female, appears   to be stated age, moderately distressed. HEENT: Pupils equal and reactive to light. Dry mucous membranes. Tympanic membranes clear. NECK: Supple. CHEST: Clear to auscultation bilaterally. CORONARY: S1, S2 were heard. ABDOMEN: Soft, nontender, nondistended. Bowel sounds are   physiological.   EXTREMITIES: No clubbing, no cyanosis, no edema. NEUROPSYCHIATRIC: Pleasant mood and affect. Cranial nerves 2-  12 grossly intact. Sensory grossly within normal limits. DTR 2+. Strength 5/5. SKIN: Warm. LABORATORY DATA: White count 31.6, hemoglobin 10.6, hematocrit   31.7, platelets 527. Urine shows large amount of leukocyte esterase,   greater than 100 WBCs, 0-5 RBCs, 2+ bacteria. Sodium 125,   potassium 5.7, chloride 89, bicarbonate 20, anion gap 16, glucose 269,   BUN 54, creatinine 3.40, calcium 9.4, bilirubin total 0.3, protein 7.4,   ALT 12, AST 16, alkaline phosphatase 150, lactic acid 1.9. Troponin   6.6. Blood cultures have been pending. Urine cultures have been   pending. EKG shows sinus tachycardia with nonspecific ST changes and T-  wave abnormality. ASSESSMENT AND PLAN   1. Sepsis, most likely secondary to urinary source. The patient will be   admitted on a critical care bed. Will start the patient on IV Zosyn, IV   fluids. Blood cultures and urine cultures have been ordered. Lactic acid   every 4 hours and will continue to closely monitor. The patient appears   to have had a cardiac event, and I am concerned, secondary to fluid or   fluid overload. The patient did receive a 2 Liters of bolus in the ER   which complies with the sepsis protocol. May consider getting an ID   consult. Await urine culture and further intervention will be per hospital   course. We will reassess as needed. Continue to monitor. Provide   supportive care. 2. Non-ST elevated myocardial infarction. Cardiology has been   consulted, Dr. Leticia Nix is at the bedside evaluating the patient at this point   of time. Will start the patient on aspirin and statin. Will cycle cardiac   enzymes, pain control. No nitroglycerin and beta blocker as the patient   is hypotensive at this point of time. Will get an echocardiogram   and defer to Cardiology to start anticoagulation. Further intervention   will be per hospital course and will reassess as needed. Continue to   monitor. Any acute changes will mandate emergent intervention. The   patient may need a cardiac catheterization. Will defer to Cardiology for   the same. 3. Acute renal failure, most likely prerenal. Will start the patient on IV   hydration. I spoke with Dr. Cisco Burton, who will evaluate the patient   and his recommendations will be incorporated. I will get a renal   ultrasound. Will avoid nephrotoxic medications and provide gentle IV   hydration and continue to monitor. Further intervention will be per   hospital course. 4. Hyperkalemia, mild.  The patient was not treated for the same in the   ER, and thus will provide calcium gluconate and insulin with D50. Will   provide Kayexalate and repeat potassium in 4 hours and continue to   closely monitor. Further intervention will be per hospital course. 5. Hyponatremia, most likely secondary to sepsis. The patient will be   on normal saline. BMP every 4 hours. Optimize correction of   potassium. Nephrology on board. Further intervention will be per   hospital course. Will provide neurovascular checks and continue to   monitor. Will continue to monitor. Will obtain FENA, and will reassess   as needed. 6. Gastrointestinal/deep venous thrombosis prophylaxis. The patient   will be on sequential compression devices.         MD Lindsay Gerber / Joey Russell   D:  08/31/2017   18:50   T:  08/31/2017   20:12   Job #:  [id number]

## 2017-09-01 NOTE — ED NOTES
Pt 20G IV L-AC is patent and saline locked. Pt 20G IV L forearm is patent and saline locked. Both sites are clean dry and intact. VSS. O2 sats 97% on RA. Pt A&O x4. POC . Pt in stable condition at the time of transport inpatient.  Pt transported on monitor with RN to CCU 24

## 2017-09-01 NOTE — PROGRESS NOTES
Problem: Falls - Risk of  Goal: *Absence of Falls  Document Deedee Fall Risk and appropriate interventions in the flowsheet.   Outcome: Progressing Towards Goal  Fall Risk Interventions:  Mobility Interventions: Patient to call before getting OOB                 Elimination Interventions: Call light in reach

## 2017-09-01 NOTE — CDMP QUERY
Please clarify if this patient is being treated/managed for:    =>Thrombosis of left renal vein (POA)  in the setting of renal cancer treated with ultrasound  =>Other Explanation of clinical findings  =>Unable to Determine (no explanation of clinical findings)    The medical record reflects the following clinical findings, treatment, and risk factors:    Risk Factors: renal Ca    Clinical Indicators:   US  IMPRESSION: Marked heterogeneity of the left kidney compatible with the known  neoplasm. Thrombosis of the left renal vein. Treatment: ultrasound    Please clarify and document your clinical opinion in the progress notes and discharge summary including the definitive and/or presumptive diagnosis, (suspected or probable), related to the above clinical findings. Please include clinical findings supporting your diagnosis.     Thank Kendra Hensley [facility]  [phone]

## 2017-09-01 NOTE — PROGRESS NOTES
NAME: Patty Bower        :  1950        MRN:  [id number]        Assessment :    Plan:  --BONG- p early ATN    Sepsis- likely urinary source    Hyperkalemia - better    Hyponatremia     Acidosis-mild; lactate nl    Proteinuria    NSTEMI    Metastatic Renal cell cancer- with mets to pleura, mediastinum, LN, L adrenal, kidneys    DM-2 with proteinuria --Creatinine creeping up since last pm.  Not consistent with prerenal.  U/S does not mention any hydro. Suspect ATN    Sodium mildly overcorrected at this point. Repeat BMP now and adjust fluids as needed. Prognosis appears to be quite poor. Addendum:  Sodium up to 135. Switch to hypotonic fluids. BMP later today. Subjective:     Chief Complaint:  \" I can't get any sleep! \"  Tired. No N/V. Denies any pain. No dyspnea. Review of Systems:    Symptom Y/N Comments  Symptom Y/N Comments   Fever/Chills    Chest Pain     Poor Appetite    Edema     Cough    Abdominal Pain     Sputum    Joint Pain     SOB/GARCIA    Pruritis/Rash     Nausea/vomit    Tolerating PT/OT     Diarrhea    Tolerating Diet     Constipation    Other       Could not obtain due to:      Objective:     VITALS:   Last 24hrs VS reviewed since prior progress note.  Most recent are:  Visit Vitals    /50    Pulse 97    Temp 98.4 °F (36.9 °C)    Resp 26    Ht 5' (1.524 m)    Wt 52.6 kg (115 lb 15.4 oz)    SpO2 95%    Breastfeeding No    BMI 22.65 kg/m2       Intake/Output Summary (Last 24 hours) at 17 1023  Last data filed at 17 0900   Gross per 24 hour   Intake          1096.25 ml   Output                0 ml   Net          1096.25 ml      Telemetry Reviewed:     PHYSICAL EXAM:  General: NAD  Few rhonchi  abd soft  No edema      Lab Data Reviewed: (see below)    Medications Reviewed: (see below)    PMH/SH reviewed - no change compared to H&P  ________________________________________________________________________  Care Plan discussed with:  Patient     Family      RN     Care Manager                    Consultant:          Comments   >50% of visit spent in counseling and coordination of care       ________________________________________________________________________  Denisse Jeronimo MD     Procedures: see electronic medical records for all procedures/Xrays and details which  were not copied into this note but were reviewed prior to creation of Plan. LABS:  Recent Labs      09/01/17 0321 08/31/17   1519   WBC  23.5*  31.6*   HGB  8.9*  10.6*   HCT  26.8*  31.7*   PLT  453*  556*     Recent Labs      09/01/17 0321 08/31/17   2255  08/31/17 1847   NA  134*  134*  130*   K  4.4  4.4  5.3*   CL  99  99  98   CO2  22  19*  20*   BUN  56*  54*  51*   CREA  3.31*  3.17*  2.99*   GLU  175*  219*  216*   CA  8.8  8.6  8.2*   MG  1.2*  1.1*  1.2*   PHOS  3.5  3.3  3.2     Recent Labs      08/31/17   1519   SGOT  16   AP  150*   TP  7.4   ALB  1.9*   GLOB  5.5*     No results for input(s): INR, PTP, APTT in the last 72 hours. No lab exists for component: INREXT   No results for input(s): FE, TIBC, PSAT, FERR in the last 72 hours. No results found for: FOL, RBCF   No results for input(s): PH, PCO2, PO2 in the last 72 hours.   Recent Labs      08/31/17   1847   CPK  49     No components found for: [place]  Lab Results   Component Value Date/Time    Color YELLOW/STRAW 08/31/2017 05:19 PM    Appearance TURBID 08/31/2017 05:19 PM    Specific gravity 1.017 08/31/2017 05:19 PM    pH (UA) 5.5 08/31/2017 05:19 PM    Protein 300 08/31/2017 05:19 PM    Glucose 100 08/31/2017 05:19 PM    Ketone TRACE 08/31/2017 05:19 PM    Bilirubin NEGATIVE  08/31/2017 05:19 PM    Urobilinogen 0.2 08/31/2017 05:19 PM    Nitrites NEGATIVE  08/31/2017 05:19 PM    Leukocyte Esterase LARGE 08/31/2017 05:19 PM    Epithelial cells FEW 08/31/2017 05:19 PM    Bacteria 3+ 08/31/2017 05:19 PM    WBC >100 08/31/2017 05:19 PM    RBC 0-5 08/31/2017 05:19 PM       MEDICATIONS:  Current Facility-Administered Medications   Medication Dose Route Frequency    insulin glargine (LANTUS) injection 8 Units  8 Units SubCUTAneous QHS    aspirin chewable tablet 81 mg  81 mg Oral DAILY    meropenem (MERREM) 500 mg in 0.9% sodium chloride (MBP/ADV) 50 mL  0.5 g IntraVENous Q12H    heparin (porcine) injection 5,000 Units  5,000 Units SubCUTAneous Q12H    ALPRAZolam (XANAX) tablet 0.25 mg  0.25 mg Oral TID PRN    therapeutic multivitamin (THERAGRAN) tablet 1 Tab  1 Tab Oral DAILY    sodium chloride (NS) flush 5-10 mL  5-10 mL IntraVENous Q8H    sodium chloride (NS) flush 5-10 mL  5-10 mL IntraVENous PRN    sodium chloride (NS) flush 5-10 mL  5-10 mL IntraVENous PRN    0.9% sodium chloride infusion  100 mL/hr IntraVENous CONTINUOUS    0.9% sodium chloride infusion  75 mL/hr IntraVENous CONTINUOUS    sodium chloride (NS) flush 5-10 mL  5-10 mL IntraVENous Q8H    sodium chloride (NS) flush 5-10 mL  5-10 mL IntraVENous PRN    nitroglycerin (NITROSTAT) tablet 0.4 mg  0.4 mg SubLINGual Q5MIN PRN    atorvastatin (LIPITOR) tablet 40 mg  40 mg Oral QPM    glucose chewable tablet 16 g  4 Tab Oral PRN    dextrose (D50W) injection syrg 12.5-25 g  12.5-25 g IntraVENous PRN    glucagon (GLUCAGEN) injection 1 mg  1 mg IntraMUSCular PRN    insulin lispro (HUMALOG) injection   SubCUTAneous Q6H    ondansetron (ZOFRAN) injection 4 mg  4 mg IntraVENous Q8H PRN

## 2017-09-01 NOTE — PROGRESS NOTES
Problem: AMI: Day 2  Goal: *Optimal pain control at patients stated goal  Outcome: Progressing Towards Goal  Pt has no complaints of pain   Goal: *Hemodynamically stable  Outcome: Progressing Towards Goal  BP more stable today; MAPs typically in the mid 60s  Goal: *Demonstrates progressive activity  Outcome: Not Progressing Towards Goal  Pt states she is still feeling weak; not willing to get out of bed at this time  Goal: *Tolerating diet  Outcome: Not Progressing Towards Goal  RD to see pt to discuss dietary supplements    Problem: Falls - Risk of  Goal: *Absence of Falls  Document Deedee Fall Risk and appropriate interventions in the flowsheet.    Outcome: Progressing Towards Goal  Fall Risk Interventions:  Mobility Interventions: Patient to call before getting OOB                 Elimination Interventions: Call light in reach, Toileting schedule/hourly rounds, Patient to call for help with toileting needs

## 2017-09-01 NOTE — PROGRESS NOTES
1935 Bedside shift change report given to Pallavi RN by Stephen Burger RN. Report included the following information SBAR, Kardex, Intake/Output, MAR, Recent Results, Med Rec Status and Cardiac Rhythm Sinus tach to sinus rhythm.

## 2017-09-01 NOTE — PROGRESS NOTES
Patient seen and examined    IMPRESSION    1. GNR bacteremia    2. UTI    3. Metastatic renal cell carcinoma     4. Renal failure     5. MI     6. Osteoporosis     7. DM      PLAN    1. Continue merrem for now. Once sensitivities are known, I think we can deescalate. She will need 14 days of therapy from the first negative blood culture. She can be shifted to oral therapy if the isolate is quinolone sensitive.

## 2017-09-01 NOTE — PROGRESS NOTES
Cardiology Progress Note            Admit Date: 8/31/2017  Admit Diagnosis: NSTEMI (non-ST elevated myocardial infarction) [facility])  Date: 9/1/2017     Time: 3:11 PM    HPI: Pt with PMH of DM, HTN, HLD, osteoporosis, DM, depression, anxiety and recently diagnosed metastatic renal cell cancer. Admitted 8/31 with generalized weakness, no appetite. Found to have BONG, sepsis due UTI and bacteremia, and subacute STEMI. Cardiology following for subacute STEMI. Assessment and Plan     1. Subacute ST elevation MI: anterolateral wall. Q wave development with ST elevation anterolateral leads. Troponin trending down 4.47 yesterday evening from 5.12  From 6.61on admission. -EKG from today pending.   -Continue ASA 91 mg daily   -Lipitor 40 mg daily   -Beta blocker not initiated yet- bp low normal.   -TTE pending.   -Medical management at this time due to sepsis/bacteremia, metastatic renal CA    2. Sepsis/UTI/Bacteremia:  Gram neg. Rods. WBC trending down (23.5 from 31.6)   -BP improved. -ID following    3. BONG: suspected ATN per nephrology. Creatinine 3.24 from 3.17 yesterday. GFR 14    4. Hx of HLD:  Continue lipitor   -lipid panel in a.m.    5. Metastatic renal cancer:    6. Anemia:  hgb 8.9 from 10.6 (was 8.6 on 8/23/17)- monitor closely    Pt with subacute STEMI. Troponins trended down. 12 lead EKG pending for today and will follow up TTE read. Medical management. Dr. Roberto Addison to follow tomorrow. Cardiology Attending:Patient seen and examined. I agree with NP assessment and plans. Severe diarrhea today, not a cath candidate at present. Pelon Yeung MD 9/1/2017 4:03 PM         Subjective:   Teddy Hall denies chest pain, palpitations, SOB. States she just feels tired and wants to sleep. Had diarrhea last night due to K-exalate. Had brief run of SVT this afternoon- asymptomatic.       Objective:      Physical Exam: Visit Vitals    /48    Pulse 92    Temp 98.2 °F (36.8 °C)    Resp 25    Ht 5' (1.524 m)    Wt 52.6 kg (115 lb 15.4 oz)    SpO2 95%    Breastfeeding No    BMI 22.65 kg/m2          General Appearance:   Well developed, pale, appears weak,  oriented x 3,NAD. Ears/Nose/Mouth/Throat:    Hearing grossly normal.         Neck:  Supple. Chest:    Lungs clear to auscultation bilaterally. Cardiovascular:    Regular rate and rhythm, S1, S2 normal, no murmur. Abdomen:    Soft, non-distended bowel sounds are active. Extremities:  No edema bilaterally. Skin:  Warm and dry. Telemetry: NSR:  Had brief run of SVT.           Data Review:    Labs:    Recent Results (from the past 24 hour(s))   EKG, 12 LEAD, INITIAL    Collection Time: 08/31/17  3:15 PM   Result Value Ref Range    Ventricular Rate 113 BPM    Atrial Rate 113 BPM    P-R Interval 128 ms    QRS Duration 82 ms    Q-T Interval 328 ms    QTC Calculation (Bezet) 449 ms    Calculated P Axis -11 degrees    Calculated R Axis -27 degrees    Calculated T Axis 155 degrees    Diagnosis       Sinus tachycardia  Left anterior mariajose-block  Septal infarct , age undetermined  T wave abnormality, consider lateral ischemia  When compared with ECG of 11-AUG-2017 08:12,  ST elevation, consider anterolateral injury or acute infarct  Septal infarct is now present  T wave inversion now evident in Lateral leads  Confirmed by Zaina Tamayo MD, Aniket Brody ([id number]) on 8/31/2017 4:49:34 PM     CBC WITH AUTOMATED DIFF    Collection Time: 08/31/17  3:19 PM   Result Value Ref Range    WBC 31.6 (H) 3.6 - 11.0 K/uL    RBC 4.13 3.80 - 5.20 M/uL    HGB 10.6 (L) 11.5 - 16.0 g/dL    HCT 31.7 (L) 35.0 - 47.0 %    MCV 76.8 (L) 80.0 - 99.0 FL    MCH 25.7 (L) 26.0 - 34.0 PG    MCHC 33.4 30.0 - 36.5 g/dL    RDW 14.5 11.5 - 14.5 %    PLATELET 285 (H) 893 - 400 K/uL    NEUTROPHILS 86 (H) 32 - 75 %    BAND NEUTROPHILS 10 (H) 0 - 6 %    LYMPHOCYTES 1 (L) 12 - 49 %    MONOCYTES 3 (L) 5 - 13 % EOSINOPHILS 0 0 - 7 %    BASOPHILS 0 0 - 1 %    ABS. NEUTROPHILS 30.4 (H) 1.8 - 8.0 K/UL    ABS. LYMPHOCYTES 0.3 (L) 0.8 - 3.5 K/UL    ABS. MONOCYTES 0.9 0.0 - 1.0 K/UL    ABS. EOSINOPHILS 0.0 0.0 - 0.4 K/UL    ABS. BASOPHILS 0.0 0.0 - 0.1 K/UL    DF MANUAL      PLATELET COMMENTS LARGE PLATELETS      RBC COMMENTS NORMOCYTIC, NORMOCHROMIC      Pathologist review       THERE IS A MICROCYTIC, NORMOCHROMIC ANEMIA. GRANULOCYTES ARE INCREASED AND SHIFTED TO THE BAND STAGE. LYMPHOCYTES ARE REDUCED AND MATURE. PLATELETS ARE INCREASED AND NO BLASTS ARE SEEN. THE FINDINGS ARE COMPATIBLE WITH A REACTIVE PROCESS. IRON STUDIES MAY BE BENEFICIAL. SMEAR REVIEWED BY DR. ANGELINA SANCHEZ, PATHOLOGIST   METABOLIC PANEL, COMPREHENSIVE    Collection Time: 08/31/17  3:19 PM   Result Value Ref Range    Sodium 125 (L) 136 - 145 mmol/L    Potassium 5.7 (H) 3.5 - 5.1 mmol/L    Chloride 89 (L) 97 - 108 mmol/L    CO2 20 (L) 21 - 32 mmol/L    Anion gap 16 (H) 5 - 15 mmol/L    Glucose 269 (H) 65 - 100 mg/dL    BUN 54 (H) 6 - 20 MG/DL    Creatinine 3.40 (H) 0.55 - 1.02 MG/DL    BUN/Creatinine ratio 16 12 - 20      GFR est AA 16 (L) >60 ml/min/1.73m2    GFR est non-AA 13 (L) >60 ml/min/1.73m2    Calcium 9.4 8.5 - 10.1 MG/DL    Bilirubin, total 0.3 0.2 - 1.0 MG/DL    ALT (SGPT) 12 12 - 78 U/L    AST (SGOT) 16 15 - 37 U/L    Alk.  phosphatase 150 (H) 45 - 117 U/L    Protein, total 7.4 6.4 - 8.2 g/dL    Albumin 1.9 (L) 3.5 - 5.0 g/dL    Globulin 5.5 (H) 2.0 - 4.0 g/dL    A-G Ratio 0.3 (L) 1.1 - 2.2     TROPONIN I    Collection Time: 08/31/17  3:19 PM   Result Value Ref Range    Troponin-I, Qt. 6.61 (H) <0.05 ng/mL   HEMOGLOBIN A1C WITH EAG    Collection Time: 08/31/17  3:19 PM   Result Value Ref Range    Hemoglobin A1c 7.5 (H) 4.2 - 6.3 %    Est. average glucose 169 mg/dL   GLUCOSE, POC    Collection Time: 08/31/17  3:32 PM   Result Value Ref Range    Glucose (POC) 292 (H) 65 - 100 mg/dL    Performed by Chilo Minaya, BLOOD    Collection Time: 08/31/17  4:47 PM   Result Value Ref Range    Special Requests: NO SPECIAL REQUESTS      Culture result: (A)       GRAM NEGATIVE RODS GROWING IN BOTH BOTTLES DRAWN (SITE = L )    Culture result: (A)       PRELIMINARY REPORT OF GRAM NEGATIVE RODS GROWING IN 1 OF 2 BOTTLES DRAWN CALLED TO AND READ BACK BY Andie Roca RN ON 9/1/17 AT 0647 TA. LACTIC ACID    Collection Time: 08/31/17  4:47 PM   Result Value Ref Range    Lactic acid 1.9 0.4 - 2.0 MMOL/L   CULTURE, BLOOD    Collection Time: 08/31/17  5:09 PM   Result Value Ref Range    Special Requests: NO SPECIAL REQUESTS      Culture result: (A)       GRAM NEGATIVE RODS GROWING IN BOTH BOTTLES DRAWN (SITE = L )    Culture result: (A)       PRELIMINARY REPORT OF GRAM NEGATIVE RODS GROWING IN 1 OF 2 BOTTLES DRAWN CALLED TO AND READ BACK BY Andie Roca RN ON 9/1/17 AT 0647 TA.    URINALYSIS W/MICROSCOPIC    Collection Time: 08/31/17  5:19 PM   Result Value Ref Range    Color YELLOW/STRAW      Appearance TURBID (A) CLEAR      Specific gravity 1.017 1.003 - 1.030      pH (UA) 5.5 5.0 - 8.0      Protein 300 (A) NEG mg/dL    Glucose 100 (A) NEG mg/dL    Ketone TRACE (A) NEG mg/dL    Bilirubin NEGATIVE  NEG      Blood LARGE (A) NEG      Urobilinogen 0.2 0.2 - 1.0 EU/dL    Nitrites NEGATIVE  NEG      Leukocyte Esterase LARGE (A) NEG      WBC >100 (H) 0 - 4 /hpf    RBC 0-5 0 - 5 /hpf    Epithelial cells FEW FEW /lpf    Bacteria 3+ (A) NEG /hpf   URINE CULTURE HOLD SAMPLE    Collection Time: 08/31/17  5:19 PM   Result Value Ref Range    Urine culture hold URINE ON HOLD IN MICROBIOLOGY DEPT FOR 3 DAYS     CULTURE, URINE    Collection Time: 08/31/17  5:19 PM   Result Value Ref Range    Special Requests: NO SPECIAL REQUESTS      Ace Count >100,000  COLONIES/mL        Culture result: GRAM NEGATIVE RODS (A)     TROPONIN I    Collection Time: 08/31/17  6:47 PM   Result Value Ref Range    Troponin-I, Qt. 5.12 (H) <2.01 ng/mL   METABOLIC PANEL, BASIC    Collection Time: 08/31/17  6:47 PM   Result Value Ref Range    Sodium 130 (L) 136 - 145 mmol/L    Potassium 5.3 (H) 3.5 - 5.1 mmol/L    Chloride 98 97 - 108 mmol/L    CO2 20 (L) 21 - 32 mmol/L    Anion gap 12 5 - 15 mmol/L    Glucose 216 (H) 65 - 100 mg/dL    BUN 51 (H) 6 - 20 MG/DL    Creatinine 2.99 (H) 0.55 - 1.02 MG/DL    BUN/Creatinine ratio 17 12 - 20      GFR est AA 19 (L) >60 ml/min/1.73m2    GFR est non-AA 16 (L) >60 ml/min/1.73m2    Calcium 8.2 (L) 8.5 - 10.1 MG/DL   MAGNESIUM    Collection Time: 08/31/17  6:47 PM   Result Value Ref Range    Magnesium 1.2 (L) 1.6 - 2.4 mg/dL   PHOSPHORUS    Collection Time: 08/31/17  6:47 PM   Result Value Ref Range    Phosphorus 3.2 2.6 - 4.7 MG/DL   CK    Collection Time: 08/31/17  6:47 PM   Result Value Ref Range    CK 49 26 - 192 U/L   SODIUM, UR, RANDOM    Collection Time: 08/31/17  7:11 PM   Result Value Ref Range    Sodium urine, random 64 MMOL/L   CREATININE, UR, RANDOM    Collection Time: 08/31/17  7:11 PM   Result Value Ref Range    Creatinine, urine 77.30 mg/dL   GLUCOSE, POC    Collection Time: 08/31/17  8:02 PM   Result Value Ref Range    Glucose (POC) 276 (H) 65 - 100 mg/dL    Performed by [facility]    GLUCOSE, POC    Collection Time: 08/31/17  8:35 PM   Result Value Ref Range    Glucose (POC) 247 (H) 65 - 100 mg/dL    Performed by Porsha Gil    POC G3 - PUL    Collection Time: 08/31/17  8:58 PM   Result Value Ref Range    pH (POC) 7.414 7.35 - 7.45      pCO2 (POC) 28.3 (L) 35.0 - 45.0 MMHG    pO2 (POC) 74 (L) 80 - 100 MMHG    HCO3 (POC) 18.1 (L) 22 - 26 MMOL/L    sO2 (POC) 95 92 - 97 %    Base deficit (POC) 6 mmol/L    Site LEFT BRACHIAL      Device: ROOM AIR      Allens test (POC) YES      Specimen type (POC) ARTERIAL     LACTIC ACID    Collection Time: 08/31/17 10:55 PM   Result Value Ref Range    Lactic acid 1.7 0.4 - 2.0 MMOL/L   METABOLIC PANEL, BASIC    Collection Time: 08/31/17 10:55 PM   Result Value Ref Range    Sodium 134 (L) 136 - 145 mmol/L Potassium 4.4 3.5 - 5.1 mmol/L    Chloride 99 97 - 108 mmol/L    CO2 19 (L) 21 - 32 mmol/L    Anion gap 16 (H) 5 - 15 mmol/L    Glucose 219 (H) 65 - 100 mg/dL    BUN 54 (H) 6 - 20 MG/DL    Creatinine 3.17 (H) 0.55 - 1.02 MG/DL    BUN/Creatinine ratio 17 12 - 20      GFR est AA 18 (L) >60 ml/min/1.73m2    GFR est non-AA 15 (L) >60 ml/min/1.73m2    Calcium 8.6 8.5 - 10.1 MG/DL   TROPONIN I    Collection Time: 08/31/17 10:55 PM   Result Value Ref Range    Troponin-I, Qt. 4.47 (H) <0.05 ng/mL   MAGNESIUM    Collection Time: 08/31/17 10:55 PM   Result Value Ref Range    Magnesium 1.1 (L) 1.6 - 2.4 mg/dL   PHOSPHORUS    Collection Time: 08/31/17 10:55 PM   Result Value Ref Range    Phosphorus 3.3 2.6 - 4.7 MG/DL   GLUCOSE, POC    Collection Time: 08/31/17 11:54 PM   Result Value Ref Range    Glucose (POC) 215 (H) 65 - 100 mg/dL    Performed by Tray Mix (TRAVELER)    CBC WITH AUTOMATED DIFF    Collection Time: 09/01/17  3:21 AM   Result Value Ref Range    WBC 23.5 (H) 3.6 - 11.0 K/uL    RBC 3.51 (L) 3.80 - 5.20 M/uL    HGB 8.9 (L) 11.5 - 16.0 g/dL    HCT 26.8 (L) 35.0 - 47.0 %    MCV 76.4 (L) 80.0 - 99.0 FL    MCH 25.4 (L) 26.0 - 34.0 PG    MCHC 33.2 30.0 - 36.5 g/dL    RDW 14.5 11.5 - 14.5 %    PLATELET 130 (H) 121 - 400 K/uL    NEUTROPHILS 83 (H) 32 - 75 %    BAND NEUTROPHILS 15 (H) 0 - 6 %    LYMPHOCYTES 2 (L) 12 - 49 %    MONOCYTES 0 (L) 5 - 13 %    EOSINOPHILS 0 0 - 7 %    BASOPHILS 0 0 - 1 %    ABS. NEUTROPHILS 23.0 (H) 1.8 - 8.0 K/UL    ABS. LYMPHOCYTES 0.5 (L) 0.8 - 3.5 K/UL    ABS. MONOCYTES 0.0 0.0 - 1.0 K/UL    ABS. EOSINOPHILS 0.0 0.0 - 0.4 K/UL    ABS.  BASOPHILS 0.0 0.0 - 0.1 K/UL    DF MANUAL      PLATELET COMMENTS LARGE PLATELETS      RBC COMMENTS ANISOCYTOSIS  1+        RBC COMMENTS MICROCYTOSIS  1+        RBC COMMENTS POLYCHROMASIA  1+        RBC COMMENTS JAY CELLS  PRESENT       METABOLIC PANEL, BASIC    Collection Time: 09/01/17  3:21 AM   Result Value Ref Range    Sodium 134 (L) 136 - 145 mmol/L Potassium 4.4 3.5 - 5.1 mmol/L    Chloride 99 97 - 108 mmol/L    CO2 22 21 - 32 mmol/L    Anion gap 13 5 - 15 mmol/L    Glucose 175 (H) 65 - 100 mg/dL    BUN 56 (H) 6 - 20 MG/DL    Creatinine 3.31 (H) 0.55 - 1.02 MG/DL    BUN/Creatinine ratio 17 12 - 20      GFR est AA 17 (L) >60 ml/min/1.73m2    GFR est non-AA 14 (L) >60 ml/min/1.73m2    Calcium 8.8 8.5 - 10.1 MG/DL   MAGNESIUM    Collection Time: 09/01/17  3:21 AM   Result Value Ref Range    Magnesium 1.2 (L) 1.6 - 2.4 mg/dL   PHOSPHORUS    Collection Time: 09/01/17  3:21 AM   Result Value Ref Range    Phosphorus 3.5 2.6 - 4.7 MG/DL   GLUCOSE, POC    Collection Time: 09/01/17  5:11 AM   Result Value Ref Range    Glucose (POC) 205 (H) 65 - 100 mg/dL    Performed by Francisco James ([facility])    METABOLIC PANEL, BASIC    Collection Time: 09/01/17 11:01 AM   Result Value Ref Range    Sodium 135 (L) 136 - 145 mmol/L    Potassium 4.0 3.5 - 5.1 mmol/L    Chloride 99 97 - 108 mmol/L    CO2 21 21 - 32 mmol/L    Anion gap 15 5 - 15 mmol/L    Glucose 181 (H) 65 - 100 mg/dL    BUN 58 (H) 6 - 20 MG/DL    Creatinine 3.24 (H) 0.55 - 1.02 MG/DL    BUN/Creatinine ratio 18 12 - 20      GFR est AA 17 (L) >60 ml/min/1.73m2    GFR est non-AA 14 (L) >60 ml/min/1.73m2    Calcium 8.6 8.5 - 10.1 MG/DL   GLUCOSE, POC    Collection Time: 09/01/17 11:58 AM   Result Value Ref Range    Glucose (POC) 185 (H) 65 - 100 mg/dL    Performed by Jolie Tuttle           Radiology:        Current Facility-Administered Medications   Medication Dose Route Frequency    insulin glargine (LANTUS) injection 8 Units  8 Units SubCUTAneous QHS    aspirin chewable tablet 81 mg  81 mg Oral DAILY    meropenem (MERREM) 500 mg in 0.9% sodium chloride (MBP/ADV) 50 mL  0.5 g IntraVENous Q12H    heparin (porcine) injection 5,000 Units  5,000 Units SubCUTAneous Q12H    dextrose 5% infusion  75 mL/hr IntraVENous CONTINUOUS    ALPRAZolam (XANAX) tablet 0.25 mg  0.25 mg Oral TID PRN    therapeutic multivitamin [facility]) tablet 1 Tab  1 Tab Oral DAILY    sodium chloride (NS) flush 5-10 mL  5-10 mL IntraVENous Q8H    sodium chloride (NS) flush 5-10 mL  5-10 mL IntraVENous PRN    sodium chloride (NS) flush 5-10 mL  5-10 mL IntraVENous PRN    sodium chloride (NS) flush 5-10 mL  5-10 mL IntraVENous Q8H    sodium chloride (NS) flush 5-10 mL  5-10 mL IntraVENous PRN    nitroglycerin (NITROSTAT) tablet 0.4 mg  0.4 mg SubLINGual Q5MIN PRN    atorvastatin (LIPITOR) tablet 40 mg  40 mg Oral QPM    glucose chewable tablet 16 g  4 Tab Oral PRN    dextrose (D50W) injection syrg 12.5-25 g  12.5-25 g IntraVENous PRN    glucagon (GLUCAGEN) injection 1 mg  1 mg IntraMUSCular PRN    insulin lispro (HUMALOG) injection   SubCUTAneous Q6H    ondansetron (ZOFRAN) injection 4 mg  4 mg IntraVENous Q8H PRN          Delmi Leary.  JARROD Horner     [facility] [address] [place] [address] [id number]   Lidia Vera   [phone]

## 2017-09-01 NOTE — PROGRESS NOTES
1950 TRANSFER - IN REPORT:    Verbal report received from Jensen RN (from ER on [place]  being received from ER for routine progression of care      Report consisted of patients Situation, Background, Assessment and   Recommendations(SBAR). Information from the following report(s) SBAR, Kardex, ED Summary, Intake/Output, MAR, Recent Results, Med Rec Status and Cardiac Rhythm Sinus tach was reviewed with the receiving nurse. Opportunity for questions and clarification was provided. Assessment completed upon patients arrival to unit and care assumed.

## 2017-09-01 NOTE — CONSULTS
[facility] ([facility])         Nephrology and Hypertension         Patient seen and examined. Full consult [id number]     ASSESSMENT:  BONG- pre-renal vs early ATN; r/o obstruction  Sepsis- likely urinary source  Hyperkalemia  Hyponatremia  Acidosis-mild; lactate nl  Proteinuria  Discrepancy on UA between dipstick and microscopic blood- r/o rhabdo  NSTEMI  Metastatic Renal cell cancer- with mets to pleura, mediastinum, LN, L adrenal, kidneys  DM-2 with proteinuria    PLAN:  Isotonic IVF as you are  Empiric IV ABx- follow up cultures  Pressors if needed; keep MAP >65 mm Hg  Watch for rapid correction of Na- initial Na of 125 is partly pseudohyponatremia from high glucose  Renal diet  Hold lasix and metormin  No need for dialysis; if needed, she is poor candidate given her metastatic carcinoma  Consider palliative eval  IOs, daily labs  Check CK level with am labs  Pt is quite unhappy with frequent labs; will minimize the frequency  FeNa  Renal US  Will add prn IV Zofran for n/v    D/W pt  Thanks for Renal consult. We will follow patient with you.     Signed by:  Sary White MD  Nephrology and HTN

## 2017-09-02 NOTE — PROGRESS NOTES
Hospitalist Progress Note  Juany Dennison MD  Office: [phone]  Cell:       Date of Service:  2017  NAME:  Teddy Hall  :  1950  MRN:  [id number]      Admission Summary:    The patient is a 49-year-old female with past medical history of diabetes, history of hyperlipidemia, anxiety, hypertension, currently diet-controlled, osteoporosis, spinal stenosis, who recently diagnosed with renal cell carcinoma. The patient had some swelling in her cervical lymph nodes and biopsy indicated clear cell carcinoma. The patient is followed up with [address] [place] [address] Oncology with Dr Ancil Schaumann, and has not started chemotherapy as of yet. The patient also was noted to have metastases of the disease to the left to the pleura and mediastinum. Today, the patient presents because she reports that she has been having generalized weakness, no appetite for 2 days, some dry heaving, but no vomiting, and just has been feeling \"very lethargic. \" The patient was found to be hypotensive, pale and lightheaded in the triage, with systolic blood pressure of 80. The patient was placed in the ER, was started on fluid resuscitation. Her was elevated troponin and was found to be in acute renal failure and was found to be septic and was requested to be admitted under the hospitalist service.     Interval history / Subjective:   Generalized weakness, insomnia, no chest pain, watery diarrhea,      Assessment & Plan:     Sepsis likely due to UTI POA   -on iv merrem, zosyn discontinued on   -hypotensive this morning, give 500 ml bolus, start albumin, if it doesn't improve will consider pressor  -blood and urine cx on  grow gram negative rods in both bottles, follow up with identification  -leukocytosis improving  -ID on board    Subacute STEMI  -no chest pain  -on lipitor and  aspirin 81 mg   -troponin initial was 6.61, then 5.12 and 4.47   -repeated ekg on 8/31 sinus tachy vent rate 113 st elevation in lead I, aVL, inverted t wave  -echo result pending  -cardiologist on bord    BONG possible due to pre renal and sepsis   -creatinine improving  -nephrologist on board    Hyperkalemia  -received kayexalate, insulin and dextrose  -resolved now    Mild Hyponatremia   -improving  -TSH was normal a week ago    Mild anion gap Metabolic acidosis due to BONG  -resolved    DM-II  -finger stick glucose 215-205 since admission, increase lantus 12 units, sliding scale and monitor finger stick glucose   -home glipizide and metformin is held    Metastatic clear cell renal carcinoma  -CT abdomen pelvis on 8/22 left renal bulky malignancy with left renal vein and pararenal extension, bilateral small renal cortical tumors/metastasis, left adrenal metastasis  -CT on 8/1 there is marked adenopathy/mass in the base of the right neck in the  periclavicular region which extends into the superior mediastinum. There is also  right hilar adenopathy/mass. Multiple lung nodules as described above some of which are pleural-based. Left adrenal nodule. Left lobe liver lesion.     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   -on heparin 5000 q 12 hrs renally adjusted for deep vein thrombosis   -consult to hematologist    Anemia related to renal cell carcinoma  -monitor H/H    Hx of HTN  -Hypotension improved, BP normal now    Hx of hyperlipidemia  -continue lipitor    Generalized weakness related to underlying illness  -supportive care    Diarrhea  -may be due to contrast  -check stool for c difficile    Full Code, at risk for decompensation, consult to palliative care team    Code status: Full Code  DVT prophylaxis: heparin    Care Plan discussed with: Patient/Family, Nurse and   Disposition: TBD     Hospital Problems  Date Reviewed: 8/31/2017          Codes Class Noted POA    * (Principal)NSTEMI (non-ST elevated myocardial infarction) ([facility] [address] ICD-10-CM: I21.4  ICD-9-CM: 410.70 8/31/2017 Unknown                Vital Signs:    Last 24hrs VS reviewed since prior progress note. Most recent are:  Visit Vitals    /53 (BP 1 Location: Right arm, BP Patient Position: At rest)    Pulse 76    Temp 97 °F (36.1 °C)    Resp 20    Ht 5' (1.524 m)    Wt 52.4 kg (115 lb 9.6 oz)    SpO2 95%    Breastfeeding No    BMI 22.58 kg/m2         Intake/Output Summary (Last 24 hours) at 09/02/17 1111  Last data filed at 09/02/17 0608   Gross per 24 hour   Intake           1937.5 ml   Output              600 ml   Net           1337.5 ml        Physical Examination:             Constitutional:  No acute distress, cooperative, looks anxious   ENT:  Oral mucous moist, oropharynx benign. Neck supple,    Resp:  Decrease bronchial breath sound bilaterally. No wheezing/rhonchi/rales. No accessory muscle use   CV:  Regular rhythm, normal rate, no murmurs, gallops, rubs    GI:  Soft, non distended, non tender. normoactive bowel sounds, no hepatosplenomegaly     Musculoskeletal:  No edema    Neurologic:  Moves all extremities. AAOx3, CN II-XII reviewed     Psych: looks anxious nor agitated. Data Review:    Review and/or order of clinical lab test      Labs:     Recent Labs      09/02/17   0333  09/01/17   1617   WBC  17.9*  20.2*   HGB  8.5*  8.3*   HCT  25.3*  25.4*   PLT  420*  439*     Recent Labs      09/02/17   0333  09/01/17   1617  09/01/17   1101  09/01/17   0321  08/31/17   2255   NA  132*  132*  135*  134*  134*   K  3.4*  3.5  4.0  4.4  4.4   CL  97  97  99  99  99   CO2  21  23  21  22  19*   BUN  57*  58*  58*  56*  54*   CREA  3.06*  3.20*  3.24*  3.31*  3.17*   GLU  175*  178*  181*  175*  219*   CA  9.2  8.4*  8.6  8.8  8.6   MG  2.0   --    --   1.2*  1.1*   PHOS  3.2   --    --   3.5  3.3     Recent Labs      08/31/17   1519   SGOT  16   ALT  12   AP  150*   TBILI  0.3   TP  7.4   ALB  1.9*   GLOB  5.5*     No results for input(s): INR, PTP, APTT in the last 72 hours.     No lab exists for component: INREXT, INREXT   No results for input(s): FE, TIBC, PSAT, FERR in the last 72 hours. No results found for: FOL, RBCF   No results for input(s): PH, PCO2, PO2 in the last 72 hours.   Recent Labs      08/31/17   2255  08/31/17   1847  08/31/17   1519   CPK   --   49   --    TROIQ  4.47*  5.12*  6.61*     Lab Results   Component Value Date/Time    Cholesterol, total 119 09/02/2017 03:33 AM    HDL Cholesterol 17 09/02/2017 03:33 AM    LDL, calculated 32 09/02/2017 03:33 AM    Triglyceride 350 09/02/2017 03:33 AM    CHOL/HDL Ratio 7.0 09/02/2017 03:33 AM     Lab Results   Component Value Date/Time    Glucose (POC) 196 09/02/2017 06:39 AM    Glucose (POC) 178 09/01/2017 09:20 PM    Glucose (POC) 186 09/01/2017 06:00 PM    Glucose (POC) 185 09/01/2017 11:58 AM    Glucose (POC) 205 09/01/2017 05:11 AM     Lab Results   Component Value Date/Time    Color YELLOW/STRAW 08/31/2017 05:19 PM    Appearance TURBID 08/31/2017 05:19 PM    Specific gravity 1.017 08/31/2017 05:19 PM    pH (UA) 5.5 08/31/2017 05:19 PM    Protein 300 08/31/2017 05:19 PM    Glucose 100 08/31/2017 05:19 PM    Ketone TRACE 08/31/2017 05:19 PM    Bilirubin NEGATIVE  08/31/2017 05:19 PM    Urobilinogen 0.2 08/31/2017 05:19 PM    Nitrites NEGATIVE  08/31/2017 05:19 PM    Leukocyte Esterase LARGE 08/31/2017 05:19 PM    Epithelial cells FEW 08/31/2017 05:19 PM    Bacteria 3+ 08/31/2017 05:19 PM    WBC >100 08/31/2017 05:19 PM    RBC 0-5 08/31/2017 05:19 PM         Medications Reviewed:     Current Facility-Administered Medications   Medication Dose Route Frequency    insulin glargine (LANTUS) injection 8 Units  8 Units SubCUTAneous QHS    aspirin chewable tablet 81 mg  81 mg Oral DAILY    meropenem (MERREM) 500 mg in 0.9% sodium chloride (MBP/ADV) 50 mL  0.5 g IntraVENous Q12H    heparin (porcine) injection 5,000 Units  5,000 Units SubCUTAneous Q12H    dextrose 5% infusion  75 mL/hr IntraVENous CONTINUOUS    metoprolol tartrate (LOPRESSOR) tablet 25 mg  25 mg Oral BID    insulin lispro (HUMALOG) injection   SubCUTAneous AC&HS    ALPRAZolam (XANAX) tablet 0.25 mg  0.25 mg Oral TID PRN    therapeutic multivitamin (THERAGRAN) tablet 1 Tab  1 Tab Oral DAILY    sodium chloride (NS) flush 5-10 mL  5-10 mL IntraVENous Q8H    sodium chloride (NS) flush 5-10 mL  5-10 mL IntraVENous PRN    sodium chloride (NS) flush 5-10 mL  5-10 mL IntraVENous PRN    sodium chloride (NS) flush 5-10 mL  5-10 mL IntraVENous Q8H    sodium chloride (NS) flush 5-10 mL  5-10 mL IntraVENous PRN    nitroglycerin (NITROSTAT) tablet 0.4 mg  0.4 mg SubLINGual Q5MIN PRN    atorvastatin (LIPITOR) tablet 40 mg  40 mg Oral QPM    glucose chewable tablet 16 g  4 Tab Oral PRN    dextrose (D50W) injection syrg 12.5-25 g  12.5-25 g IntraVENous PRN    glucagon (GLUCAGEN) injection 1 mg  1 mg IntraMUSCular PRN    ondansetron (ZOFRAN) injection 4 mg  4 mg IntraVENous Q8H PRN     ______________________________________________________________________  EXPECTED LENGTH OF STAY: 4d 21h  ACTUAL LENGTH OF STAY:          2                 Geneva Luna MD

## 2017-09-02 NOTE — PROGRESS NOTES
NAME: Lamont Dior        :  1950        MRN:  [id number]        Assessment :    Plan:  --BONG- p early ATN    Sepsis- likely urinary source    Hyperkalemia - better    Hyponatremia     Acidosis-mild; lactate nl    Proteinuria    NSTEMI    Metastatic Renal cell cancer- with mets to pleura, mediastinum, LN, L adrenal, kidneys    DM-2 with proteinuria --Creatinine  Better . Suspect ATN    Sodium stable  NS IVF    Prognosis appears to be quite poor. Subjective:     Chief Complaint:  Complaining of dysuria    Objective:     VITALS:   Last 24hrs VS reviewed since prior progress note. Most recent are:  Visit Vitals    BP (!) 87/36 (BP 1 Location: Right arm, BP Patient Position: At rest)    Pulse 76    Temp 97.7 °F (36.5 °C)    Resp 18    Ht 5' (1.524 m)    Wt 52.4 kg (115 lb 9.6 oz)    SpO2 97%    Breastfeeding No    BMI 22.58 kg/m2       Intake/Output Summary (Last 24 hours) at 17 1209  Last data filed at 17 0608   Gross per 24 hour   Intake           1812.5 ml   Output              600 ml   Net           1212.5 ml      Telemetry Reviewed:     PHYSICAL EXAM:  General: NAD  Few rhonchi  abd soft  No edema       ________________________________________________________________________  Zuly Silva MD     Procedures: see electronic medical records for all procedures/Xrays and details which  were not copied into this note but were reviewed prior to creation of Plan.       LABS:  Recent Labs      17   0333  09/01/17   161   WBC  17.9*  20.2*   HGB  8.5*  8.3*   HCT  25.3*  25.4*   PLT  420*  439*     Recent Labs      17   0333  17   1617  17   1101  17   0321  17   2255   NA  132*  132*  135*  134*  134*   K  3.4*  3.5  4.0  4.4  4.4   CL  97  97  99  99  99   CO2  21  23  21  22  19*   BUN  57*  58*  58*  56*  54*   CREA  3.06*  3.20*  3.24*  3.31*  3.17*   GLU  175* 178*  181*  175*  219*   CA  9.2  8.4*  8.6  8.8  8.6   MG  2.0   --    --   1.2*  1.1*   PHOS  3.2   --    --   3.5  3.3     Recent Labs      08/31/17   1519   SGOT  16   AP  150*   TP  7.4   ALB  1.9*   GLOB  5.5*     No results for input(s): INR, PTP, APTT in the last 72 hours. No lab exists for component: INREXT, INREXT   No results for input(s): FE, TIBC, PSAT, FERR in the last 72 hours. No results found for: FOL, RBCF   No results for input(s): PH, PCO2, PO2 in the last 72 hours.   Recent Labs      08/31/17   1847   CPK  49     No components found for: [place]  Lab Results   Component Value Date/Time    Color YELLOW/STRAW 08/31/2017 05:19 PM    Appearance TURBID 08/31/2017 05:19 PM    Specific gravity 1.017 08/31/2017 05:19 PM    pH (UA) 5.5 08/31/2017 05:19 PM    Protein 300 08/31/2017 05:19 PM    Glucose 100 08/31/2017 05:19 PM    Ketone TRACE 08/31/2017 05:19 PM    Bilirubin NEGATIVE  08/31/2017 05:19 PM    Urobilinogen 0.2 08/31/2017 05:19 PM    Nitrites NEGATIVE  08/31/2017 05:19 PM    Leukocyte Esterase LARGE 08/31/2017 05:19 PM    Epithelial cells FEW 08/31/2017 05:19 PM    Bacteria 3+ 08/31/2017 05:19 PM    WBC >100 08/31/2017 05:19 PM    RBC 0-5 08/31/2017 05:19 PM       MEDICATIONS:  Current Facility-Administered Medications   Medication Dose Route Frequency    insulin glargine (LANTUS) injection 12 Units  12 Units SubCUTAneous QHS    ondansetron (ZOFRAN) injection 4 mg  4 mg IntraVENous Q4H PRN    ALPRAZolam (XANAX) tablet 0.25 mg  0.25 mg Oral BID PRN    0.9% sodium chloride infusion 500 mL  500 mL IntraVENous ONCE    albumin human 25% (BUMINATE) solution 25 g  25 g IntraVENous Q6H    carvedilol (COREG) tablet 6.25 mg  6.25 mg Oral BID WITH MEALS    aspirin chewable tablet 81 mg  81 mg Oral DAILY    meropenem (MERREM) 500 mg in 0.9% sodium chloride (MBP/ADV) 50 mL  0.5 g IntraVENous Q12H    heparin (porcine) injection 5,000 Units  5,000 Units SubCUTAneous Q12H    dextrose 5% infusion 75 mL/hr IntraVENous CONTINUOUS    insulin lispro (HUMALOG) injection   SubCUTAneous AC&HS    therapeutic multivitamin (THERAGRAN) tablet 1 Tab  1 Tab Oral DAILY    sodium chloride (NS) flush 5-10 mL  5-10 mL IntraVENous Q8H    sodium chloride (NS) flush 5-10 mL  5-10 mL IntraVENous PRN    sodium chloride (NS) flush 5-10 mL  5-10 mL IntraVENous PRN    sodium chloride (NS) flush 5-10 mL  5-10 mL IntraVENous Q8H    sodium chloride (NS) flush 5-10 mL  5-10 mL IntraVENous PRN    nitroglycerin (NITROSTAT) tablet 0.4 mg  0.4 mg SubLINGual Q5MIN PRN    atorvastatin (LIPITOR) tablet 40 mg  40 mg Oral QPM    glucose chewable tablet 16 g  4 Tab Oral PRN    dextrose (D50W) injection syrg 12.5-25 g  12.5-25 g IntraVENous PRN    glucagon (GLUCAGEN) injection 1 mg  1 mg IntraMUSCular PRN

## 2017-09-02 NOTE — PROGRESS NOTES
1840:  Received report from [facility], [address] from CCU. Will call CCU when room is cleaned and ready. 1926:  Pt arrived to [facility] from CCU.     1953: Bedside and Verbal shift change report given to Stanford Ibanez RN (oncoming nurse) by Pelon Mckeon RN (offgoing nurse). Report included the following information SBAR, Kardex, ED Summary, Procedure Summary, Intake/Output, Recent Results and Cardiac Rhythm NSR.

## 2017-09-02 NOTE — PROGRESS NOTES
ID Progress Note  2017    Subjective:     Afebrile. No complaints. Objective:     Vitals:   Visit Vitals    /45    Pulse 76    Temp 97.7 °F (36.5 °C)    Resp 18    Ht 5' (1.524 m)    Wt 52.4 kg (115 lb 9.6 oz)    SpO2 97%    Breastfeeding No    BMI 22.58 kg/m2        Tmax:  Temp (24hrs), Av.6 °F (36.4 °C), Min:97 °F (36.1 °C), Max:98 °F (36.7 °C)      Exam:    Not in distress  Lungs: clear to auscultation  Heart: s1, s2, no murmurs   Abdomen: soft, nontender      Labs:   Lab Results   Component Value Date/Time    WBC 17.9 2017 03:33 AM    Hemoglobin (POC) 11.2 2017 11:42 AM    HGB 8.5 2017 03:33 AM    Hematocrit (POC) 33 2017 11:42 AM    HCT 25.3 2017 03:33 AM    PLATELET 942  03:33 AM    MCV 76.0 2017 03:33 AM     Lab Results   Component Value Date/Time    Sodium 132 2017 03:33 AM    Potassium 3.4 2017 03:33 AM    Chloride 97 2017 03:33 AM    CO2 21 2017 03:33 AM    Anion gap 14 2017 03:33 AM    Glucose 175 2017 03:33 AM    BUN 57 2017 03:33 AM    Creatinine 3.06 2017 03:33 AM    BUN/Creatinine ratio 19 2017 03:33 AM    GFR est AA 18 2017 03:33 AM    GFR est non-AA 15 2017 03:33 AM    Calcium 9.2 2017 03:33 AM    Bilirubin, total 0.3 2017 03:19 PM    AST (SGOT) 16 2017 03:19 PM    Alk. phosphatase 150 2017 03:19 PM    Protein, total 7.4 2017 03:19 PM    Albumin 1.9 2017 03:19 PM    Globulin 5.5 2017 03:19 PM    A-G Ratio 0.3 2017 03:19 PM    ALT (SGPT) 12 2017 03:19 PM         Assessment:          1. GNR bacteremia     2. UTI from serratia      3. Metastatic renal cell carcinoma      4. Renal failure      5. MI      6. Osteoporosis      7. DM        PLAN         Recommendations:     1. Continue merrem for now. Once the blood sensitivities are known, I think we can deescalate.  She will need 14 days of therapy from the first negative blood culture. She can be shifted to oral therapy if the blood isolate is quinolone sensitive.      Edith Alcazar MD

## 2017-09-02 NOTE — PROGRESS NOTES
Problem: Falls - Risk of  Goal: *Absence of Falls  Document Deedee Fall Risk and appropriate interventions in the flowsheet.    Outcome: Progressing Towards Goal  Fall Risk Interventions:  Mobility Interventions: Communicate number of staff needed for ambulation/transfer, Patient to call before getting OOB           Medication Interventions: Assess postural VS orthostatic hypotension, Evaluate medications/consider consulting pharmacy, Patient to call before getting OOB, Teach patient to arise slowly     Elimination Interventions: Call light in reach, Patient to call for help with toileting needs, Toileting schedule/hourly rounds

## 2017-09-02 NOTE — PROGRESS NOTES
Cardiology Progress Note            Admit Date: 8/31/2017  Admit Diagnosis: NSTEMI (non-ST elevated myocardial infarction) [facility])  Date: 9/2/2017     Time: 3:11 PM    HPI: Pt with PMH of DM, HTN, HLD, osteoporosis, DM, depression, anxiety and recently diagnosed metastatic renal cell cancer. Admitted 8/31 with generalized weakness, no appetite. Found to have BONG, sepsis due UTI and bacteremia, and subacute STEMI. Cardiology following for subacute STEMI. Assessment and Plan     1. Subacute ST elevation MI: anterolateral wall. Admit EKG 8/31/17 Q wave development with ST elevation anterolateral leads. Troponin trending down 4.47 8/31 from peak of 6.61 on admit. .   -No chest pain, SOB   -ASA 81 mg daily   -Lipitor 40 mg daily   -Lopressor 25 mg BID. -TTE read pending- will follow up.   -Medical management at this time due to sepsis/bacteremia, metastatic renal CA    2. Sepsis/UTI/Bacteremia: Urine & blood cx=  Gram neg. Rods. WBC trending down 17.9 (9/2) from 23.5 on 9/1   -BP normalized     -ID following    3. BONG: suspected ATN per nephrology. Creatinine 3.06  Today from 3.24 9/1. GFR 15   -Nephrology managing fluids    4. Hx of HLD:  Continue lipitor   -lipid panel add on    5. Metastatic renal cancer    6. Anemia:  hgb 8.5 today from 8.3 and 8.9 yesterday-     7. Hyponatremia:  Management per nephrology. 8. Hypokalemia: K= 3.4    9. Left renal vein thrombosis on renal US: hematology was consulted. Pt with subacute STEMI. Troponins trended down. Will follow up TTE read today. Continue medical management, not a cardiac cath candidate at this time. . Dr. Alyssa Russo to see pt today  I have seen and examined the patient and agree with N.P. assessment. She remain asymptomatic cardiac wise  Proceed with echo  Continue asa, statin and bblockers  Medical rx only given multiple co morbid conditions at this time  .     Stella Marte MD 9/2/2017 4:03 PM         Subjective:   Vaibhav Lee denies chest pain, palpitations, SOB. States she just feels extremely tired and wants uninterrupted sleep. Denies further diarrhea. No appetite. No further SVT noted on telemetry review. Objective:      Physical Exam:                Visit Vitals    /53 (BP 1 Location: Right arm, BP Patient Position: At rest)    Pulse 76    Temp 97 °F (36.1 °C)    Resp 20    Ht 5' (1.524 m)    Wt 52.4 kg (115 lb 9.6 oz)    SpO2 95%    Breastfeeding No    BMI 22.58 kg/m2          General Appearance:   Well developed, pale, appears weak,  oriented x 3,NAD. Ears/Nose/Mouth/Throat:    Hearing grossly normal.         Neck:  Supple. Chest:    Lungs clear to auscultation bilaterally. Cardiovascular:    Regular rate and rhythm, S1, S2 normal, no murmur. Abdomen:    Soft, non-distended bowel sounds are active. Extremities:  No edema bilaterally. Skin:  Warm and dry.      Telemetry: NSR with occasional PAC          Data Review:    Labs:    Recent Results (from the past 24 hour(s))   METABOLIC PANEL, BASIC    Collection Time: 09/01/17 11:01 AM   Result Value Ref Range    Sodium 135 (L) 136 - 145 mmol/L    Potassium 4.0 3.5 - 5.1 mmol/L    Chloride 99 97 - 108 mmol/L    CO2 21 21 - 32 mmol/L    Anion gap 15 5 - 15 mmol/L    Glucose 181 (H) 65 - 100 mg/dL    BUN 58 (H) 6 - 20 MG/DL    Creatinine 3.24 (H) 0.55 - 1.02 MG/DL    BUN/Creatinine ratio 18 12 - 20      GFR est AA 17 (L) >60 ml/min/1.73m2    GFR est non-AA 14 (L) >60 ml/min/1.73m2    Calcium 8.6 8.5 - 10.1 MG/DL   GLUCOSE, POC    Collection Time: 09/01/17 11:58 AM   Result Value Ref Range    Glucose (POC) 185 (H) 65 - 100 mg/dL    Performed by [facility]    EKG, 12 LEAD, INITIAL    Collection Time: 09/01/17  3:40 PM   Result Value Ref Range    Ventricular Rate 99 BPM    Atrial Rate 99 BPM    P-R Interval 130 ms    QRS Duration 84 ms    Q-T Interval 344 ms    QTC Calculation (Bezet) 441 ms Calculated P Axis 18 degrees    Calculated R Axis -51 degrees    Calculated T Axis 31 degrees    Diagnosis       Sinus rhythm with premature atrial complexes  Left anterior fascicular block  Possible Anterior infarct (cited on or before 31-AUG-2017)  When compared with ECG of 31-AUG-2017 15:15,  premature atrial complexes are now present  Questionable change in initial forces of Anteroseptal leads     METABOLIC PANEL, BASIC    Collection Time: 09/01/17  4:17 PM   Result Value Ref Range    Sodium 132 (L) 136 - 145 mmol/L    Potassium 3.5 3.5 - 5.1 mmol/L    Chloride 97 97 - 108 mmol/L    CO2 23 21 - 32 mmol/L    Anion gap 12 5 - 15 mmol/L    Glucose 178 (H) 65 - 100 mg/dL    BUN 58 (H) 6 - 20 MG/DL    Creatinine 3.20 (H) 0.55 - 1.02 MG/DL    BUN/Creatinine ratio 18 12 - 20      GFR est AA 17 (L) >60 ml/min/1.73m2    GFR est non-AA 14 (L) >60 ml/min/1.73m2    Calcium 8.4 (L) 8.5 - 10.1 MG/DL   CBC WITH AUTOMATED DIFF    Collection Time: 09/01/17  4:17 PM   Result Value Ref Range    WBC 20.2 (H) 3.6 - 11.0 K/uL    RBC 3.33 (L) 3.80 - 5.20 M/uL    HGB 8.3 (L) 11.5 - 16.0 g/dL    HCT 25.4 (L) 35.0 - 47.0 %    MCV 76.3 (L) 80.0 - 99.0 FL    MCH 24.9 (L) 26.0 - 34.0 PG    MCHC 32.7 30.0 - 36.5 g/dL    RDW 14.6 (H) 11.5 - 14.5 %    PLATELET 167 (H) 172 - 400 K/uL    NEUTROPHILS 93 (H) 32 - 75 %    LYMPHOCYTES 3 (L) 12 - 49 %    MONOCYTES 4 (L) 5 - 13 %    EOSINOPHILS 0 0 - 7 %    BASOPHILS 0 0 - 1 %    ABS. NEUTROPHILS 18.9 (H) 1.8 - 8.0 K/UL    ABS. LYMPHOCYTES 0.6 (L) 0.8 - 3.5 K/UL    ABS. MONOCYTES 0.7 0.0 - 1.0 K/UL    ABS. EOSINOPHILS 0.0 0.0 - 0.4 K/UL    ABS.  BASOPHILS 0.0 0.0 - 0.1 K/UL   GLUCOSE, POC    Collection Time: 09/01/17  6:00 PM   Result Value Ref Range    Glucose (POC) 186 (H) 65 - 100 mg/dL    Performed by Nadir Figueroa, POC    Collection Time: 09/01/17  9:20 PM   Result Value Ref Range    Glucose (POC) 178 (H) 65 - 100 mg/dL    Performed by Adelaida WEST    CBC W/O DIFF    Collection Time: 09/02/17  3:33 AM   Result Value Ref Range    WBC 17.9 (H) 3.6 - 11.0 K/uL    RBC 3.33 (L) 3.80 - 5.20 M/uL    HGB 8.5 (L) 11.5 - 16.0 g/dL    HCT 25.3 (L) 35.0 - 47.0 %    MCV 76.0 (L) 80.0 - 99.0 FL    MCH 25.5 (L) 26.0 - 34.0 PG    MCHC 33.6 30.0 - 36.5 g/dL    RDW 14.7 (H) 11.5 - 14.5 %    PLATELET 504 (H) 523 - 635 K/uL   METABOLIC PANEL, BASIC    Collection Time: 09/02/17  3:33 AM   Result Value Ref Range    Sodium 132 (L) 136 - 145 mmol/L    Potassium 3.4 (L) 3.5 - 5.1 mmol/L    Chloride 97 97 - 108 mmol/L    CO2 21 21 - 32 mmol/L    Anion gap 14 5 - 15 mmol/L    Glucose 175 (H) 65 - 100 mg/dL    BUN 57 (H) 6 - 20 MG/DL    Creatinine 3.06 (H) 0.55 - 1.02 MG/DL    BUN/Creatinine ratio 19 12 - 20      GFR est AA 18 (L) >60 ml/min/1.73m2    GFR est non-AA 15 (L) >60 ml/min/1.73m2    Calcium 9.2 8.5 - 10.1 MG/DL   PHOSPHORUS    Collection Time: 09/02/17  3:33 AM   Result Value Ref Range    Phosphorus 3.2 2.6 - 4.7 MG/DL   MAGNESIUM    Collection Time: 09/02/17  3:33 AM   Result Value Ref Range    Magnesium 2.0 1.6 - 2.4 mg/dL   GLUCOSE, POC    Collection Time: 09/02/17  6:39 AM   Result Value Ref Range    Glucose (POC) 196 (H) 65 - 100 mg/dL    Performed by Barbie Henriquez           Radiology:        Current Facility-Administered Medications   Medication Dose Route Frequency    insulin glargine (LANTUS) injection 8 Units  8 Units SubCUTAneous QHS    aspirin chewable tablet 81 mg  81 mg Oral DAILY    meropenem (MERREM) 500 mg in 0.9% sodium chloride (MBP/ADV) 50 mL  0.5 g IntraVENous Q12H    heparin (porcine) injection 5,000 Units  5,000 Units SubCUTAneous Q12H    dextrose 5% infusion  75 mL/hr IntraVENous CONTINUOUS    metoprolol tartrate (LOPRESSOR) tablet 25 mg  25 mg Oral BID    insulin lispro (HUMALOG) injection   SubCUTAneous AC&HS    ALPRAZolam (XANAX) tablet 0.25 mg  0.25 mg Oral TID PRN    therapeutic multivitamin (THERAGRAN) tablet 1 Tab  1 Tab Oral DAILY    sodium chloride (NS) flush 5-10 mL  5-10 mL IntraVENous Q8H    sodium chloride (NS) flush 5-10 mL  5-10 mL IntraVENous PRN    sodium chloride (NS) flush 5-10 mL  5-10 mL IntraVENous PRN    sodium chloride (NS) flush 5-10 mL  5-10 mL IntraVENous Q8H    sodium chloride (NS) flush 5-10 mL  5-10 mL IntraVENous PRN    nitroglycerin (NITROSTAT) tablet 0.4 mg  0.4 mg SubLINGual Q5MIN PRN    atorvastatin (LIPITOR) tablet 40 mg  40 mg Oral QPM    glucose chewable tablet 16 g  4 Tab Oral PRN    dextrose (D50W) injection syrg 12.5-25 g  12.5-25 g IntraVENous PRN    glucagon (GLUCAGEN) injection 1 mg  1 mg IntraMUSCular PRN    ondansetron (ZOFRAN) injection 4 mg  4 mg IntraVENous Q8H PRN          Edwina Echevarria MD     [facility] [address] [place] [address] 13, [address] [facility] [address]   [facility], [facility]   [phone]

## 2017-09-02 NOTE — CONSULTS
Hematology/Oncology Consult    REASON FOR CONSULT: Stage IV RCC and renal vein thrombosis  REQUESTED BY: Dr. Sina Cade: Ms. Selene Vargas is a 69 y.o. female with newly diagnosed L RCC with lung, LN and adrenal metastasis who sees Dr. Chadwick Aiken for palliative management and was slated to start votrient is currently admitted with GN sepsis. She was noted by her family to have progressive weakness in the last week with no appetite and inability to walk, dress or do much around the house. She was admitted to [facility] on 8/31/17 with these complains and found to have hypotension, acute renal failure and leucocytosis. She was subsequently found to have serratia UTI and GNR on blood cultures. Has been started on Meropenem. She has known L renal mass with thrombosis of the left renal vein extending to the level of the midline. She is withdrawn today, has no pain, no SOB, denies a HA, no hematuria or other bleeding, still tachycardic and hypotensive but not having fevers.  Is quite fatigued, has no appetite, denies, diarrhea or vomiting, no rashes, no CP and is making urine      Past Medical History:   Diagnosis Date    Anxiety 7/31/2017    2/3/17:Under a tremendous amount of stress, will continue Alprazolam prn, if using regularly consider changing to an SSRI or counseling    Cancer ([facility] [address] 08/2017    renal cell carcinoma    Cervical myelopathy ([facility] [address] 7/31/2017    2/3/17:Cervical fusion     Controlled diabetes mellitus ([facility] [address] 7/31/2017    Fatigue 7/31/2017    Hypercholesterolemia 7/31/2017    Hyperglycemia 7/31/2017    Hypertension 7/31/2017    2/3/17:Poorly controlled, lifestyle changes, repeat /90 will follow    On statin therapy 7/31/2017    Osteoporosis 7/31/2017    Post-menopausal 7/31/2017    Spinal stenosis of lumbar region with radiculopathy 7/31/2017    2/3/17:Symptoms c/w this diagnosis discussed with pt., if progresses then need MRI L spine and follow up    Vaginitis due to Candida 7/31/2017    2/3/17:Associated with antibiotic use, requests prescription med       Past Surgical History:   Procedure Laterality Date    HX ORTHOPAEDIC      2009 spinal cord surgery     HX ORTHOPAEDIC Left     knee procedure    HX OTHER SURGICAL  08/16/2017    incisional biopsy right neck mass       No Known Allergies    Current Facility-Administered Medications   Medication Dose Route Frequency Provider Last Rate Last Dose    insulin glargine (LANTUS) injection 12 Units  12 Units SubCUTAneous QHS Yasmine Mojica MD        ondansetron (ZOFRAN) injection 4 mg  4 mg IntraVENous Q4H PRN Yasmine Mojica MD        ALPRAZolam Brown Shaper) tablet 0.25 mg  0.25 mg Oral BID PRN Yasmine Mojica MD        0.9% sodium chloride infusion 500 mL  500 mL IntraVENous ONCE Yasmine Mojica MD        albumin human 25% (BUMINATE) solution 25 g  25 g IntraVENous Q6H Yasmine Mojica  mL/hr at 09/02/17 1238 12.5 g at 09/02/17 1238    carvedilol (COREG) tablet 6.25 mg  6.25 mg Oral BID WITH MEALS Samina Fuentes MD        0.9% sodium chloride infusion  75 mL/hr IntraVENous CONTINUOUS Sukhwinder Montgomery MD        aspirin chewable tablet 81 mg  81 mg Oral DAILY Yasmine Mojica MD   81 mg at 09/02/17 0956    meropenem (MERREM) 500 mg in 0.9% sodium chloride (MBP/ADV) 50 mL  0.5 g IntraVENous Q12H Yasmine Mojica  mL/hr at 09/02/17 0955 500 mg at 09/02/17 0955    heparin (porcine) injection 5,000 Units  5,000 Units SubCUTAneous Q12H Yasmine Mojica MD   5,000 Units at 09/02/17 0956    insulin lispro (HUMALOG) injection   SubCUTAneous AC&HS Yasmine Mojica MD   Stopped at 09/02/17 1130    therapeutic multivitamin (THERAGRAN) tablet 1 Tab  1 Tab Oral DAILY Neelima Norton MD   1 Tab at 09/02/17 0956    sodium chloride (NS) flush 5-10 mL  5-10 mL IntraVENous Q8H Neelima Norton MD   10 mL at 09/02/17 0602    sodium chloride (NS) flush 5-10 mL  5-10 mL IntraVENous PRN Ashley Esparza MD        sodium chloride (NS) flush 5-10 mL  5-10 mL IntraVENous PRN Ashley Esparza MD        sodium chloride (NS) flush 5-10 mL  5-10 mL IntraVENous Q8H Ashley Esparza MD   10 mL at 09/02/17 0602    sodium chloride (NS) flush 5-10 mL  5-10 mL IntraVENous PRN Ashley Esparza MD        nitroglycerin (NITROSTAT) tablet 0.4 mg  0.4 mg SubLINGual Q5MIN PRN Ashley Esparza MD        atorvastatin (LIPITOR) tablet 40 mg  40 mg Oral QPM Ashley Esparza MD   40 mg at 09/01/17 1815    glucose chewable tablet 16 g  4 Tab Oral PRN Ashley Esparza MD        dextrose (D50W) injection syrg 12.5-25 g  12.5-25 g IntraVENous PRN Ashley Esparza MD        glucagon (GLUCAGEN) injection 1 mg  1 mg IntraMUSCular PRN Ashley Esparza MD           Social History     Social History    Marital status:      Spouse name: N/A    Number of children: N/A    Years of education: N/A     Social History Main Topics    Smoking status: Never Smoker    Smokeless tobacco: Never Used    Alcohol use No    Drug use: No    Sexual activity: Not Asked     Other Topics Concern    None     Social History Narrative       Family History   Problem Relation Age of Onset    Heart Disease Mother        ROS  A 12 point review of systems was obtained and is negative except as listed in HPI.   ECOG PS is 3  Physical Examination:   Visit Vitals    /45    Pulse 76    Temp 97.7 °F (36.5 °C)    Resp 18    Ht 5' (1.524 m)    Wt 115 lb 9.6 oz (52.4 kg)    SpO2 97%    Breastfeeding No    BMI 22.58 kg/m2     General appearance - alert, pale and withdrawn  Mental status - oriented to person, place, and time  Mouth - mucous membranes dry  Neck - supple, no significant adenopathy  Lymphatics - no palpable lymphadenopathy, no hepatosplenomegaly  Chest - clear to auscultation, no wheezes, rales or rhonchi, symmetric air entry  Heart - tachycardic  Abdomen - soft, nontender, nondistended, no masses or organomegaly, bowel sounds present  Neurological - normal speech, no focal findings or movement disorder noted  Musculoskeletal - no joint tenderness, deformity or swelling  Extremities - peripheral pulses normal, no pedal edema, no clubbing or cyanosis  Skin - no rashes    LABS  Lab Results   Component Value Date/Time    WBC 17.9 09/02/2017 03:33 AM    HGB 8.5 09/02/2017 03:33 AM    HCT 25.3 09/02/2017 03:33 AM    PLATELET 747 86/94/3294 03:33 AM    MCV 76.0 09/02/2017 03:33 AM    ABS. NEUTROPHILS 18.9 09/01/2017 04:17 PM     Lab Results   Component Value Date/Time    Sodium 132 09/02/2017 03:33 AM    Potassium 3.4 09/02/2017 03:33 AM    Chloride 97 09/02/2017 03:33 AM    CO2 21 09/02/2017 03:33 AM    Glucose 175 09/02/2017 03:33 AM    BUN 57 09/02/2017 03:33 AM    Creatinine 3.06 09/02/2017 03:33 AM    GFR est AA 18 09/02/2017 03:33 AM    GFR est non-AA 15 09/02/2017 03:33 AM    Calcium 9.2 09/02/2017 03:33 AM    BUN (POC) 13 08/16/2017 11:42 AM    Calcium, ionized (POC) 1.49 08/16/2017 11:42 AM     Lab Results   Component Value Date/Time    AST (SGOT) 16 08/31/2017 03:19 PM    Alk. phosphatase 150 08/31/2017 03:19 PM    Protein, total 7.4 08/31/2017 03:19 PM    Albumin 1.9 08/31/2017 03:19 PM    Globulin 5.5 08/31/2017 03:19 PM    A-G Ratio 0.3 08/31/2017 03:19 PM       MRI ABDOMEN 8/25/17  IMPRESSION  IMPRESSION:   1. Dominant, bulky mass arising from the lower pole left kidney is  redemonstrated. Multiple other solid masses are redemonstrated in both kidneys. Metastatic lesions are also noted in the left perirenal fat. Left renal vein  thrombosis does not appear significantly changed. 2. Left adrenal metastasis and thoracic metastases redemonstrated. 3. Bulky mass arising from the lower pole left kidney extends through Gerota's  fascia and is nearly contiguous with portions of the descending colon. Associated peritoneal thickening and enhancement is noted in the left paracolic  gutter.    4. No metastatic lesions in the liver    cxr 8/31/17  IMPRESSION  IMPRESSION: Right superior mediastinal and right hilar lymphadenopathy. Pulmonary nodules, as described above.       ASSESSMENT  Ms. Moy Titus is a 69 y.o. female with  1. Metastatic poor risk RCC  2. Large L renal mass with likely tumor thrombus in the renal vein  3. GNR sepsis  4. UTI  5. Failure to thrive  6. ARF    DISCUSSION    Scans reviewed personally and discussed with Dr. Gabriel Williamson, patient, her daughter and     She is critically ill , whether or not sepsis improve is not clear though people with GNR may have life threatening outcomes    Goals of care discussed. They know cancer is not curable. They agree that its best that she stay DNR in the event she codes, though would hope she receives all other necessary care in the form of medications, fluids and pressors. As regards the RCC, palliative treatments are on hold      The tumor thrombus does not need anticoagulation    PLAN  - Continue management of sepsis  - Goals of care discussed- DNR orders placed. They are hoping for recovery so she could start Votrient but understand that if she deteriorates may be best at that point to make her comfortable  - No anticoagulation needed for Tumor thrombosis    Thank you for the consult, will follow    Albert Eldridge MD    Ms. Moy Titus has a reminder for a \"due or due soon\" health maintenance. I have asked that she contact her primary care provider for follow-up on this health maintenance.

## 2017-09-03 NOTE — PROGRESS NOTES
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 REQUESTED BY: Dr. Rosie Lainez: Ms. Clara Molina is a 69 y.o. female with newly diagnosed L RCC with lung, LN and adrenal metastasis who sees Dr. Gregg Sandhu for palliative management and was slated to start votrient is currently admitted with GN sepsis. Interval history  No fevers overnight, BP is normal, has had a walsh placed as she had some urinary retention, no hematuria, has had no nausea, emesis, HA, falls, CP or SOB.      Past Medical History:   Diagnosis Date    Anxiety 7/31/2017    2/3/17:Under a tremendous amount of stress, will continue Alprazolam prn, if using regularly consider changing to an SSRI or counseling    Cancer ([facility] [address] 08/2017    renal cell carcinoma    Cervical myelopathy ([facility] [address] 7/31/2017    2/3/17:Cervical fusion     Controlled diabetes mellitus ([facility] [address] 7/31/2017    Fatigue 7/31/2017    Hypercholesterolemia 7/31/2017    Hyperglycemia 7/31/2017    Hypertension 7/31/2017    2/3/17:Poorly controlled, lifestyle changes, repeat /90 will follow    On statin therapy 7/31/2017    Osteoporosis 7/31/2017    Post-menopausal 7/31/2017    Spinal stenosis of lumbar region with radiculopathy 7/31/2017    2/3/17:Symptoms c/w this diagnosis discussed with pt., if progresses then need MRI L spine and follow up    Vaginitis due to Candida 7/31/2017    2/3/17:Associated with antibiotic use, requests prescription med       Past Surgical History:   Procedure Laterality Date    HX ORTHOPAEDIC      2009 spinal cord surgery     HX ORTHOPAEDIC Left     knee procedure    HX OTHER SURGICAL  08/16/2017    incisional biopsy right neck mass       No Known Allergies    Current Facility-Administered Medications   Medication Dose Route Frequency Provider Last Rate Last Dose    potassium chloride SR (KLOR-CON 10) tablet 40 mEq  40 mEq Oral NOW Karron Oas, MD        insulin glargine (LANTUS) injection 12 Units  12 Units SubCUTAneous QHS Hansel Mendes MD   12 Units at 09/02/17 2218    ondansetron (ZOFRAN) injection 4 mg  4 mg IntraVENous Q4H PRN Hansel Mendes MD        ALPRAZolam Ifeanyi Demetrias) tablet 0.25 mg  0.25 mg Oral BID PRN Hansel Mendes MD   0.25 mg at 09/03/17 1127    carvedilol (COREG) tablet 6.25 mg  6.25 mg Oral BID WITH MEALS Naun Beach MD   Stopped at 09/02/17 1700    0.9% sodium chloride infusion  75 mL/hr IntraVENous CONTINUOUS Tika Silverman MD 75 mL/hr at 09/02/17 1432 75 mL/hr at 09/02/17 1432    aspirin chewable tablet 81 mg  81 mg Oral DAILY Hansel Mendes MD   81 mg at 09/03/17 0938    meropenem (MERREM) 500 mg in 0.9% sodium chloride (MBP/ADV) 50 mL  0.5 g IntraVENous Q12H Hansel Mendes  mL/hr at 09/03/17 0937 500 mg at 09/03/17 0937    heparin (porcine) injection 5,000 Units  5,000 Units SubCUTAneous Q12H Hansel Mendes MD   5,000 Units at 09/03/17 8896    insulin lispro (HUMALOG) injection   SubCUTAneous AC&HS Hansel Mendes MD   2 Units at 09/03/17 0705    therapeutic multivitamin [facility]) tablet 1 Tab  1 Tab Oral DAILY Gary Iniguez MD   1 Tab at 09/03/17 1888    sodium chloride (NS) flush 5-10 mL  5-10 mL IntraVENous Q8H Gary Iniguez MD   5 mL at 09/02/17 1400    sodium chloride (NS) flush 5-10 mL  5-10 mL IntraVENous PRN Gary Iniguez MD        sodium chloride (NS) flush 5-10 mL  5-10 mL IntraVENous PRN Gary Iniguez MD        sodium chloride (NS) flush 5-10 mL  5-10 mL IntraVENous Q8H Gary Iniguez MD   10 mL at 09/03/17 0533    sodium chloride (NS) flush 5-10 mL  5-10 mL IntraVENous PRN Gary Iniguez MD        nitroglycerin (NITROSTAT) tablet 0.4 mg  0.4 mg SubLINGual Q5MIN PRN Gary Iniguez MD        atorvastatin (LIPITOR) tablet 40 mg  40 mg Oral QPM Gary Iniguez MD   40 mg at 09/02/17 1723    glucose chewable tablet 16 g  4 Tab Oral PRN Gary Iniguez, MD        dextrose (D50W) injection syrg 12.5-25 g  12.5-25 g IntraVENous PRN Pretty Alexander MD        glucagon (GLUCAGEN) injection 1 mg  1 mg IntraMUSCular PRN Pretty Alexander MD           Social History     Social History    Marital status:      Spouse name: N/A    Number of children: N/A    Years of education: N/A     Social History Main Topics    Smoking status: Never Smoker    Smokeless tobacco: Never Used    Alcohol use No    Drug use: No    Sexual activity: Not Asked     Other Topics Concern    None     Social History Narrative       Family History   Problem Relation Age of Onset    Heart Disease Mother        ROS  A 12 point review of systems was obtained and is negative except as listed in HPI. ECOG PS is 3  Physical Examination:   Visit Vitals    /48 (BP 1 Location: Left arm, BP Patient Position: At rest)    Pulse 88    Temp 98 °F (36.7 °C)    Resp 18    Ht 5' (1.524 m)    Wt 116 lb 13.5 oz (53 kg)    SpO2 94%    Breastfeeding No    BMI 22.82 kg/m2     General appearance - sleepy and withdrawn  Mouth - mucous membranes- thrush  Neck - supple, no significant adenopathy  Lymphatics - no palpable lymphadenopathy, no hepatosplenomegaly  Chest - clear to auscultation, no wheezes, rales or rhonchi, symmetric air entry  Heart - RRR, no murmurs  Abdomen - soft  Extremities - peripheral pulses normal, no pedal edema, no clubbing or cyanosis  Skin - no rashes    LABS  Lab Results   Component Value Date/Time    WBC 11.3 09/03/2017 01:00 AM    HGB 7.2 09/03/2017 01:00 AM    HCT 22.0 09/03/2017 01:00 AM    PLATELET 000 14/96/2170 01:00 AM    MCV 76.1 09/03/2017 01:00 AM    ABS.  NEUTROPHILS 18.9 09/01/2017 04:17 PM     Lab Results   Component Value Date/Time    Sodium 132 09/03/2017 01:00 AM    Potassium 3.2 09/03/2017 01:00 AM    Chloride 99 09/03/2017 01:00 AM    CO2 21 09/03/2017 01:00 AM    Glucose 125 09/03/2017 01:00 AM    BUN 50 09/03/2017 01:00 AM    Creatinine 2.37 09/03/2017 01:00 AM    GFR est AA 25 09/03/2017 01:00 AM    GFR est non-AA 20 09/03/2017 01:00 AM    Calcium 9.2 09/03/2017 01:00 AM    BUN (POC) 13 08/16/2017 11:42 AM    Calcium, ionized (POC) 1.49 08/16/2017 11:42 AM     Lab Results   Component Value Date/Time    AST (SGOT) 8 09/03/2017 01:00 AM    Alk. phosphatase 90 09/03/2017 01:00 AM    Protein, total 5.7 09/03/2017 01:00 AM    Albumin 2.3 09/03/2017 01:00 AM    Globulin 3.4 09/03/2017 01:00 AM    A-G Ratio 0.7 09/03/2017 01:00 AM       MRI ABDOMEN 8/25/17  IMPRESSION  IMPRESSION:   1. Dominant, bulky mass arising from the lower pole left kidney is  redemonstrated. Multiple other solid masses are redemonstrated in both kidneys. Metastatic lesions are also noted in the left perirenal fat. Left renal vein  thrombosis does not appear significantly changed. 2. Left adrenal metastasis and thoracic metastases redemonstrated. 3. Bulky mass arising from the lower pole left kidney extends through Gerota's  fascia and is nearly contiguous with portions of the descending colon. Associated peritoneal thickening and enhancement is noted in the left paracolic  gutter. 4. No metastatic lesions in the liver    cxr 8/31/17  IMPRESSION  IMPRESSION: Right superior mediastinal and right hilar lymphadenopathy. Pulmonary nodules, as described above.       ASSESSMENT  Ms. Brandon Youssef is a 69 y.o. female with  1. Metastatic poor risk RCC  2. Large L renal mass with likely tumor thrombus in the renal vein  3. GNR sepsis  4. UTI  5. Failure to thrive  6. ARF    DISCUSSION  Today she is overall improved on antibiotics for serratia UTI and GNR bacteremia. Abefrile and blood cultures are negative in 23 hours. Renal injury improving. She remains weak and withdrawn    Spoke with her . ID input appreciated, once on PO antibiotics may be discharged with a follow up with her Oncologist      PLAN  - Continue management of per ID  - Goals of care discussed- DNR orders placed.  They are hoping for recovery so she could start Votrient but understand that if she deteriorates may be best at that point to make her comfortable  - No anticoagulation needed for Tumor thrombosis    Once discharged may follow with Dr. Rehana Perez in about a week.  They are to not start Votrient unless seen by Oncology    Guzman Hernandez MD

## 2017-09-03 NOTE — PROGRESS NOTES
NAME: Devonte Ness        :  1950        MRN:  [id number]        Assessment :    Plan:  --BONG- s/p early ATN    Sepsis- Serratia bacteremia/urosepsis    Hyperkalemia - better    Hyponatremia     Acidosis-mild; lactate nl    Proteinuria    NSTEMI    Metastatic Renal cell cancer- with mets to pleura, mediastinum, LN, L adrenal, kidneys    DM-2 with proteinuria -Renal indices slowly improving-> Cr 2.37mg/dl. Suspect ATN recovery    Sodium holding at 132  KCl already supplemented    Decrease IV NS. Encourage solid intake    Trend Hgb-> PRN PRBCS    Prognosis appears to be quite poor. Discussed with patient/       Subjective:     Chief Complaint:  No complaints at this time    Objective:     VITALS:   Last 24hrs VS reviewed since prior progress note. Most recent are:  Visit Vitals    /48 (BP 1 Location: Left arm, BP Patient Position: At rest)    Pulse 88    Temp 98 °F (36.7 °C)    Resp 18    Ht 5' (1.524 m)    Wt 53 kg (116 lb 13.5 oz)    SpO2 (!) 87%    Breastfeeding No    BMI 22.82 kg/m2       Intake/Output Summary (Last 24 hours) at 17 1439  Last data filed at 17 1100   Gross per 24 hour   Intake               50 ml   Output             2325 ml   Net            -2275 ml      Telemetry Reviewed:     PHYSICAL EXAM:  General: NAD  CTAB/L  abd soft  No edema       ________________________________________________________________________  Deep Janet Zapata MD     Procedures: see electronic medical records for all procedures/Xrays and details which  were not copied into this note but were reviewed prior to creation of Plan.       LABS:  Recent Labs      17   01017   0333   WBC  11.3*  17.9*   HGB  7.2*  8.5*   HCT  22.0*  25.3*   PLT  288  420*     Recent Labs      17   0100  17   0333  17   1617   17   0321  17   2255   NA  132*  132*  132*   < >  134*  134*   K 3. 2*  3.4*  3.5   < >  4.4  4.4   CL  99  97  97   < >  99  99   CO2  21  21  23   < >  22  19*   BUN  50*  57*  58*   < >  56*  54*   CREA  2.37*  3.06*  3.20*   < >  3.31*  3.17*   GLU  125*  175*  178*   < >  175*  219*   CA  9.2  9.2  8.4*   < >  8.8  8.6   MG   --   2.0   --    --   1.2*  1.1*   PHOS   --   3.2   --    --   3.5  3.3    < > = values in this interval not displayed. Recent Labs      09/03/17   0100  08/31/17   1519   SGOT  8*  16   AP  90  150*   TP  5.7*  7.4   ALB  2.3*  1.9*   GLOB  3.4  5.5*     No results for input(s): INR, PTP, APTT in the last 72 hours. No lab exists for component: INREXT, INREXT   No results for input(s): FE, TIBC, PSAT, FERR in the last 72 hours. No results found for: FOL, RBCF   No results for input(s): PH, PCO2, PO2 in the last 72 hours.   Recent Labs      08/31/17   1847   CPK  49     No components found for: [place]  Lab Results   Component Value Date/Time    Color YELLOW/STRAW 08/31/2017 05:19 PM    Appearance TURBID 08/31/2017 05:19 PM    Specific gravity 1.017 08/31/2017 05:19 PM    pH (UA) 5.5 08/31/2017 05:19 PM    Protein 300 08/31/2017 05:19 PM    Glucose 100 08/31/2017 05:19 PM    Ketone TRACE 08/31/2017 05:19 PM    Bilirubin NEGATIVE  08/31/2017 05:19 PM    Urobilinogen 0.2 08/31/2017 05:19 PM    Nitrites NEGATIVE  08/31/2017 05:19 PM    Leukocyte Esterase LARGE 08/31/2017 05:19 PM    Epithelial cells FEW 08/31/2017 05:19 PM    Bacteria 3+ 08/31/2017 05:19 PM    WBC >100 08/31/2017 05:19 PM    RBC 0-5 08/31/2017 05:19 PM       MEDICATIONS:  Current Facility-Administered Medications   Medication Dose Route Frequency    nystatin (MYCOSTATIN) 100,000 unit/mL oral suspension 500,000 Units  500,000 Units Oral QID    cefTRIAXone (ROCEPHIN) 2 g in 0.9% sodium chloride (MBP/ADV) 50 mL  2 g IntraVENous Q24H    insulin glargine (LANTUS) injection 12 Units  12 Units SubCUTAneous QHS    ondansetron (ZOFRAN) injection 4 mg  4 mg IntraVENous Q4H PRN    ALPRAZolam (XANAX) tablet 0.25 mg  0.25 mg Oral BID PRN    carvedilol (COREG) tablet 6.25 mg  6.25 mg Oral BID WITH MEALS    0.9% sodium chloride infusion  75 mL/hr IntraVENous CONTINUOUS    aspirin chewable tablet 81 mg  81 mg Oral DAILY    heparin (porcine) injection 5,000 Units  5,000 Units SubCUTAneous Q12H    insulin lispro (HUMALOG) injection   SubCUTAneous AC&HS    therapeutic multivitamin (THERAGRAN) tablet 1 Tab  1 Tab Oral DAILY    sodium chloride (NS) flush 5-10 mL  5-10 mL IntraVENous Q8H    sodium chloride (NS) flush 5-10 mL  5-10 mL IntraVENous PRN    sodium chloride (NS) flush 5-10 mL  5-10 mL IntraVENous PRN    sodium chloride (NS) flush 5-10 mL  5-10 mL IntraVENous Q8H    sodium chloride (NS) flush 5-10 mL  5-10 mL IntraVENous PRN    nitroglycerin (NITROSTAT) tablet 0.4 mg  0.4 mg SubLINGual Q5MIN PRN    atorvastatin (LIPITOR) tablet 40 mg  40 mg Oral QPM    glucose chewable tablet 16 g  4 Tab Oral PRN    dextrose (D50W) injection syrg 12.5-25 g  12.5-25 g IntraVENous PRN    glucagon (GLUCAGEN) injection 1 mg  1 mg IntraMUSCular PRN

## 2017-09-03 NOTE — PROGRESS NOTES
ID Progress Note  9/3/2017    Subjective:     Afebrile. No dyspnea, headache, sore throat, abdominal pain. Objective:     Vitals:   Visit Vitals    /48 (BP 1 Location: Left arm, BP Patient Position: At rest)    Pulse 88    Temp 98 °F (36.7 °C)    Resp 18    Ht 5' (1.524 m)    Wt 53 kg (116 lb 13.5 oz)    SpO2 94%    Breastfeeding No    BMI 22.82 kg/m2        Tmax:  Temp (24hrs), Av °F (36.7 °C), Min:97.7 °F (36.5 °C), Max:98.2 °F (36.8 °C)      Exam:    Not in distress  Anicteric sclerae  No cervical lympadenopathy   Lungs: clear to auscultation, no rales, wheezes or rhonchi   Heart: s1, s2, no murmurs, rubs or clicks   Abdomen: soft, nontender, no guarding or rebound  Knees not warm or tender       Labs:   Lab Results   Component Value Date/Time    WBC 11.3 2017 01:00 AM    Hemoglobin (POC) 11.2 2017 11:42 AM    HGB 7.2 2017 01:00 AM    Hematocrit (POC) 33 2017 11:42 AM    HCT 22.0 2017 01:00 AM    PLATELET 157  01:00 AM    MCV 76.1 2017 01:00 AM     Lab Results   Component Value Date/Time    Sodium 132 2017 01:00 AM    Potassium 3.2 2017 01:00 AM    Chloride 99 2017 01:00 AM    CO2 21 2017 01:00 AM    Anion gap 12 2017 01:00 AM    Glucose 125 2017 01:00 AM    BUN 50 2017 01:00 AM    Creatinine 2.37 2017 01:00 AM    BUN/Creatinine ratio 21 2017 01:00 AM    GFR est AA 25 2017 01:00 AM    GFR est non-AA 20 2017 01:00 AM    Calcium 9.2 2017 01:00 AM    Bilirubin, total 0.2 2017 01:00 AM    AST (SGOT) 8 2017 01:00 AM    Alk. phosphatase 90 2017 01:00 AM    Protein, total 5.7 2017 01:00 AM    Albumin 2.3 2017 01:00 AM    Globulin 3.4 2017 01:00 AM    A-G Ratio 0.7 2017 01:00 AM    ALT (SGPT) 8 2017 01:00 AM         Assessment:          1. GNR bacteremia     2. UTI from serratia      3.  Metastatic renal cell carcinoma      4. Renal failure      5. MI      6. Osteoporosis      7. DM        PLAN         Recommendations:     1. Switch merrem to ceftriaxone. The isolate is levaquin sensitive. She can be placed on this on discharge. Team available tomorrow if needed.      Mark Silva MD

## 2017-09-03 NOTE — PROGRESS NOTES
[facility]  -Progress Note     Kellie Aguirre [phone]- [phone]   9/3/2017      Marcial Hensley M.D. , F.A.C.C.   --------PCP:-RONY Alvarez MD   -----Subjective:   . Rennie Eves Rennie Eves Rennie Eves Leopold Darting is a 69 y.o. female    said she is having trouble getting comfortable in bed  Yesterday some lower bp and got  cc  Now feels comfortable at rest lying in bed, sleepy arousable  Denies cp, edema or heart palpitations   Patient Vitals for the past 12 hrs:   Temp Pulse Resp BP SpO2   09/03/17 1530 98.5 °F (36.9 °C) 85 18 124/42 94 %   09/03/17 1339 - - - - (!) 87 %   09/03/17 1100 98 °F (36.7 °C) 88 18 125/48 94 %   09/03/17 0710 98 °F (36.7 °C) 76 16 97/40 92 %        Discussion/Plans/Recs    Subacute ST elevation MI: anterolateral wall. Admit EKG 8/31/17 Q wave development with ST elevation anterolateral leads. Troponin trending down 4.47 8/31 from peak of 6.61 on admit. .                        -No chest pain, SOB                        -ASA 81 mg daily                        -Lipitor 40 mg daily  Holding coreg  ECHO with EF 40-45% HK at mid apical Ant, mid AS, mid apical INF, apex with dynamic obstuction at rest related to ROBERT, peak at 40; Moderate MR, aortic sclerosis without stenosis   Is trop up due to epicardial dz or due to transient ischemia with obstruction? ?  Given her situation with risk for mortality and morbidity-No cath planned, medical management        Sepsis/UTI/Bacteremia: Urine & blood cx=  Gram neg. Rods. WBC trending down   Ish with ATN renal following  Lab Results   Component Value Date/Time    Creatinine (POC) 0.9 08/16/2017 11:42 AM    Creatinine 2.37 09/03/2017 01:00 AM      Lipids on Lipitor  Metastatic renal cell ca with anemia  Now that bp stable, will try add Coreg tonight or am, nurses have been holding for low BP from her sepsis     Cardiac Studies/Hx:  No specialty comments available.          Past Medical History:   Diagnosis Date    Anxiety 7/31/2017    2/3/17:Under a tremendous amount of stress, will continue Alprazolam prn, if using regularly consider changing to an SSRI or counseling    Cancer ([facility] [address] 08/2017    renal cell carcinoma    Cervical myelopathy ([facility] [address] 7/31/2017    2/3/17:Cervical fusion     Controlled diabetes mellitus ([facility] [address] 7/31/2017    Fatigue 7/31/2017    Hypercholesterolemia 7/31/2017    Hyperglycemia 7/31/2017    Hypertension 7/31/2017    2/3/17:Poorly controlled, lifestyle changes, repeat /90 will follow    On statin therapy 7/31/2017    Osteoporosis 7/31/2017    Post-menopausal 7/31/2017    Spinal stenosis of lumbar region with radiculopathy 7/31/2017    2/3/17:Symptoms c/w this diagnosis discussed with pt., if progresses then need MRI L spine and follow up    Vaginitis due to Candida 7/31/2017    2/3/17:Associated with antibiotic use, requests prescription med      ROS-pertinents  negative except as above  The pertinent portions of the medical history,physician and nursing notes, meds,vitals , labs and Ins/Outs,are reviewed in the electronic record.     Results for orders placed or performed during the hospital encounter of 08/31/17   EKG, 12 LEAD, INITIAL   Result Value Ref Range    Ventricular Rate 99 BPM    Atrial Rate 99 BPM    P-R Interval 130 ms    QRS Duration 84 ms    Q-T Interval 344 ms    QTC Calculation (Bezet) 441 ms    Calculated P Axis 18 degrees    Calculated R Axis -51 degrees    Calculated T Axis 31 degrees    Diagnosis       Sinus rhythm with premature atrial complexes  Left anterior fascicular block  Possible Anterior infarct (cited on or before 31-AUG-2017)  When compared with ECG of 31-AUG-2017 15:15,  No significant change was found      Confirmed by Maegan Britton MD. ([id number]) on 9/2/2017 10:36:47 AM        Vitals:    09/03/17 0710 09/03/17 1100 09/03/17 1339 09/03/17 1530   BP: 97/40 125/48  124/42   BP 1 Location: Left arm Left arm  Left leg   BP Patient Position: At rest At rest  At rest Pulse: 76 88  85   Resp: 16 18  18   Temp: 98 °F (36.7 °C) 98 °F (36.7 °C)  98.5 °F (36.9 °C)   SpO2: 92% 94% (!) 87% 94%   Weight:       Height:           Objective:    Physical Exam:   Patient Vitals for the past 12 hrs:   Temp Pulse Resp BP SpO2   09/03/17 1530 98.5 °F (36.9 °C) 85 18 124/42 94 %   09/03/17 1339 - - - - (!) 87 %   09/03/17 1100 98 °F (36.7 °C) 88 18 125/48 94 %   09/03/17 0710 98 °F (36.7 °C) 76 16 97/40 92 %      General:  alert, fatigued, cooperative, no distress, appears stated age   ENT, Neck:  no jvd   Chest Wall: inspection normal - no chest wall deformities or tenderness, respiratory effort normal   Lung: clear to auscultation bilaterally   Heart:  normal rate and regular rhythm, no gallops noted, systolic harsh but medium pitched murmur 2/6 at 2nd left intercostal space and at 2nd right intercostal space, no JVD   Abdomen: nondistended   Extremities: extremities normal, atraumatic, no cyanosis or edema     Last 24hr Input/Output:    Intake/Output Summary (Last 24 hours) at 09/03/17 1640  Last data filed at 09/03/17 1100   Gross per 24 hour   Intake                0 ml   Output             2325 ml   Net            -2325 ml        Data Review:   Recent Results (from the past 24 hour(s))   GLUCOSE, POC    Collection Time: 09/02/17  5:02 PM   Result Value Ref Range    Glucose (POC) 140 (H) 65 - 100 mg/dL    Performed by Ruiz Ferrer, POC    Collection Time: 09/02/17  9:17 PM   Result Value Ref Range    Glucose (POC) 117 (H) 65 - 100 mg/dL    Performed by Constantino WEST    CBC W/O DIFF    Collection Time: 09/03/17  1:00 AM   Result Value Ref Range    WBC 11.3 (H) 3.6 - 11.0 K/uL    RBC 2.89 (L) 3.80 - 5.20 M/uL    HGB 7.2 (L) 11.5 - 16.0 g/dL    HCT 22.0 (L) 35.0 - 47.0 %    MCV 76.1 (L) 80.0 - 99.0 FL    MCH 24.9 (L) 26.0 - 34.0 PG    MCHC 32.7 30.0 - 36.5 g/dL    RDW 14.7 (H) 11.5 - 14.5 %    PLATELET 430 656 - 689 K/uL   METABOLIC PANEL, COMPREHENSIVE    Collection Time: 09/03/17  1:00 AM   Result Value Ref Range    Sodium 132 (L) 136 - 145 mmol/L    Potassium 3.2 (L) 3.5 - 5.1 mmol/L    Chloride 99 97 - 108 mmol/L    CO2 21 21 - 32 mmol/L    Anion gap 12 5 - 15 mmol/L    Glucose 125 (H) 65 - 100 mg/dL    BUN 50 (H) 6 - 20 MG/DL    Creatinine 2.37 (H) 0.55 - 1.02 MG/DL    BUN/Creatinine ratio 21 (H) 12 - 20      GFR est AA 25 (L) >60 ml/min/1.73m2    GFR est non-AA 20 (L) >60 ml/min/1.73m2    Calcium 9.2 8.5 - 10.1 MG/DL    Bilirubin, total 0.2 0.2 - 1.0 MG/DL    ALT (SGPT) 8 (L) 12 - 78 U/L    AST (SGOT) 8 (L) 15 - 37 U/L    Alk.  phosphatase 90 45 - 117 U/L    Protein, total 5.7 (L) 6.4 - 8.2 g/dL    Albumin 2.3 (L) 3.5 - 5.0 g/dL    Globulin 3.4 2.0 - 4.0 g/dL    A-G Ratio 0.7 (L) 1.1 - 2.2     GLUCOSE, POC    Collection Time: 09/03/17  6:45 AM   Result Value Ref Range    Glucose (POC) 172 (H) 65 - 100 mg/dL    Performed by [id number], POC    Collection Time: 09/03/17 11:21 AM   Result Value Ref Range    Glucose (POC) 143 (H) 65 - 100 mg/dL    Performed by [facility], POC    Collection Time: 09/03/17  4:31 PM   Result Value Ref Range    Glucose (POC) 187 (H) 65 - 100 mg/dL    Performed by Macy Henry MD 9/3/2017

## 2017-09-03 NOTE — PROGRESS NOTES
Hospitalist Progress Note  Azalia Curtis MD  Office: [phone]  Cell: [phone]      Date of Service:  9/3/2017  NAME:  Leopold Darting  :  1950  MRN:  [id number]      Admission Summary:    The patient is a 68-year-old female with past medical history of diabetes, history of hyperlipidemia, anxiety, hypertension, currently diet-controlled, osteoporosis, spinal stenosis, who recently diagnosed with renal cell carcinoma. The patient had some swelling in her cervical lymph nodes and biopsy indicated clear cell carcinoma. The patient is followed up with [address] [place] [address] Oncology with Dr Amy Erickson, and has not started chemotherapy as of yet. The patient also was noted to have metastases of the disease to the left to the pleura and mediastinum. Today, the patient presents because she reports that she has been having generalized weakness, no appetite for 2 days, some dry heaving, but no vomiting, and just has been feeling \"very lethargic. \" The patient was found to be hypotensive, pale and lightheaded in the triage, with systolic blood pressure of 80. The patient was placed in the ER, was started on fluid resuscitation. Her was elevated troponin and was found to be in acute renal failure and was found to be septic and was requested to be admitted under the hospitalist service.     Interval history / Subjective:   Generalized weakness, insomnia, no chest pain, diarrhea improved      Assessment & Plan:     Sepsis likely due to UTI POA   -on iv merrem and IVF  - zosyn discontinued on   -hypotensive improved with normal saline bolus and albumin on   -blood and urine cx on  grow  Serratia Marcescens sensitive to all antibiotics  -leukocytosis improving  -ID following    Subacute STEMI  -no chest pain  -on lipitor and  aspirin 81 mg   -troponin initial was 6.61, then 5.12 and 4.47   -repeated ekg on  sinus tachy vent rate 113 st elevation in lead I, aVL, inverted t wave  -echo result LV EF LV EF 40-45% hypokinesis of mid-apical anterior, mid-anteroseptal, mid-apical inferior, and apical wall, left atrium mildly dilated  -cardiologist on bord    BONG possible due to pre renal and sepsis   -creatinine improving  -monitor renal function  -nephrologist on board    Hyperkalemia  -received kayexalate, insulin and dextrose  -resolved, hypokalemia now    Hypokalemia  -replace with 40 meq po kcl x 1 and repeat k level    Mild Hyponatremia   -improving  -TSH was normal a week ago    Mild anion gap Metabolic acidosis due to BONG  -resolved    DM-II  -finger stick glucose 215-205 since admission, continue lantus 12 units, sliding scale and monitor finger stick glucose   -home glipizide and metformin is held    Metastatic clear cell renal carcinoma  -CT abdomen pelvis on 8/22 left renal bulky malignancy with left renal vein and pararenal extension, bilateral small renal cortical tumors/metastasis, left adrenal metastasis  -CT on 8/1 there is marked adenopathy/mass in the base of the right neck in the  periclavicular region which extends into the superior mediastinum. There is also  right hilar adenopathy/mass. Multiple lung nodules as described above some of which are pleural-based. Left adrenal nodule. Left lobe liver lesion.   -oncologist on board  -consult palliative care team    Left renal vein tumor thrombosis  -no anticoagulation needed  -appreciated hematologist input    Anemia related to renal cell carcinoma  -H/H trending down  -no evidence of active bleeding    Hx of HTN  -Hypotension improved, BP normal now    Hx of hyperlipidemia  -continue lipitor    Generalized weakness related to underlying illness  -supportive care    Diarrhea  -may be due to contrast, resolved now, stool for c diff cancelled    Oral thrush  -add nystatin suspension    Full Code, at risk for decompensation, consult to palliative care team    Code status: Full Code  DVT prophylaxis: heparin    Care Plan discussed with: Patient/Family, Nurse and   Disposition: TBD     Hospital Problems  Date Reviewed: 8/31/2017          Codes Class Noted POA    * (Principal)NSTEMI (non-ST elevated myocardial infarction) ([facility] [address] ICD-10-CM: I21.4  ICD-9-CM: 410.70  8/31/2017 Unknown                Vital Signs:    Last 24hrs VS reviewed since prior progress note. Most recent are:  Visit Vitals    BP 97/40 (BP 1 Location: Left arm, BP Patient Position: At rest)    Pulse 76    Temp 98 °F (36.7 °C)    Resp 16    Ht 5' (1.524 m)    Wt 53 kg (116 lb 13.5 oz)    SpO2 92%    Breastfeeding No    BMI 22.82 kg/m2         Intake/Output Summary (Last 24 hours) at 09/03/17 1109  Last data filed at 09/03/17 0710   Gross per 24 hour   Intake               50 ml   Output             1975 ml   Net            -1925 ml        Physical Examination:             Constitutional:  No acute distress, cooperative, looks anxious   ENT:  Oral mucous moist, oropharynx benign. Neck supple,    Resp:  Decrease bronchial breath sound bilaterally. No wheezing/rhonchi/rales. No accessory muscle use   CV:  Regular rhythm, normal rate, no murmurs, gallops, rubs    GI:  Soft, non distended, non tender. normoactive bowel sounds, no hepatosplenomegaly     Musculoskeletal:  No edema    Neurologic:  Moves all extremities. AAOx3, CN II-XII reviewed     Psych: looks anxious nor agitated.        Data Review:    Review and/or order of clinical lab test      Labs:     Recent Labs      09/03/17 0100 09/02/17 0333   WBC  11.3*  17.9*   HGB  7.2*  8.5*   HCT  22.0*  25.3*   PLT  288  420*     Recent Labs      09/03/17   0100  09/02/17   0333  09/01/17   1617   09/01/17   0321  08/31/17   2255   NA  132*  132*  132*   < >  134*  134*   K  3.2*  3.4*  3.5   < >  4.4  4.4   CL  99  97  97   < >  99  99   CO2  21  21  23   < >  22  19*   BUN  50*  57*  58*   < >  56*  54*   CREA  2.37*  3.06*  3.20*   < >  3.31* 3.17*   GLU  125*  175*  178*   < >  175*  219*   CA  9.2  9.2  8.4*   < >  8.8  8.6   MG   --   2.0   --    --   1.2*  1.1*   PHOS   --   3.2   --    --   3.5  3.3    < > = values in this interval not displayed. Recent Labs      09/03/17   0100  08/31/17   1519   SGOT  8*  16   ALT  8*  12   AP  90  150*   TBILI  0.2  0.3   TP  5.7*  7.4   ALB  2.3*  1.9*   GLOB  3.4  5.5*     No results for input(s): INR, PTP, APTT in the last 72 hours. No lab exists for component: INREXT, INREXT   No results for input(s): FE, TIBC, PSAT, FERR in the last 72 hours. No results found for: FOL, RBCF   No results for input(s): PH, PCO2, PO2 in the last 72 hours.   Recent Labs      08/31/17   2255  08/31/17   1847  08/31/17   1519   CPK   --   49   --    TROIQ  4.47*  5.12*  6.61*     Lab Results   Component Value Date/Time    Cholesterol, total 119 09/02/2017 03:33 AM    HDL Cholesterol 17 09/02/2017 03:33 AM    LDL, calculated 32 09/02/2017 03:33 AM    Triglyceride 350 09/02/2017 03:33 AM    CHOL/HDL Ratio 7.0 09/02/2017 03:33 AM     Lab Results   Component Value Date/Time    Glucose (POC) 172 09/03/2017 06:45 AM    Glucose (POC) 117 09/02/2017 09:17 PM    Glucose (POC) 140 09/02/2017 05:02 PM    Glucose (POC) 151 09/02/2017 11:54 AM    Glucose (POC) 196 09/02/2017 06:39 AM     Lab Results   Component Value Date/Time    Color YELLOW/STRAW 08/31/2017 05:19 PM    Appearance TURBID 08/31/2017 05:19 PM    Specific gravity 1.017 08/31/2017 05:19 PM    pH (UA) 5.5 08/31/2017 05:19 PM    Protein 300 08/31/2017 05:19 PM    Glucose 100 08/31/2017 05:19 PM    Ketone TRACE 08/31/2017 05:19 PM    Bilirubin NEGATIVE  08/31/2017 05:19 PM    Urobilinogen 0.2 08/31/2017 05:19 PM    Nitrites NEGATIVE  08/31/2017 05:19 PM    Leukocyte Esterase LARGE 08/31/2017 05:19 PM    Epithelial cells FEW 08/31/2017 05:19 PM    Bacteria 3+ 08/31/2017 05:19 PM    WBC >100 08/31/2017 05:19 PM    RBC 0-5 08/31/2017 05:19 PM         Medications Reviewed: Current Facility-Administered Medications   Medication Dose Route Frequency    insulin glargine (LANTUS) injection 12 Units  12 Units SubCUTAneous QHS    ondansetron (ZOFRAN) injection 4 mg  4 mg IntraVENous Q4H PRN    ALPRAZolam (XANAX) tablet 0.25 mg  0.25 mg Oral BID PRN    carvedilol (COREG) tablet 6.25 mg  6.25 mg Oral BID WITH MEALS    0.9% sodium chloride infusion  75 mL/hr IntraVENous CONTINUOUS    aspirin chewable tablet 81 mg  81 mg Oral DAILY    meropenem (MERREM) 500 mg in 0.9% sodium chloride (MBP/ADV) 50 mL  0.5 g IntraVENous Q12H    heparin (porcine) injection 5,000 Units  5,000 Units SubCUTAneous Q12H    insulin lispro (HUMALOG) injection   SubCUTAneous AC&HS    therapeutic multivitamin (THERAGRAN) tablet 1 Tab  1 Tab Oral DAILY    sodium chloride (NS) flush 5-10 mL  5-10 mL IntraVENous Q8H    sodium chloride (NS) flush 5-10 mL  5-10 mL IntraVENous PRN    sodium chloride (NS) flush 5-10 mL  5-10 mL IntraVENous PRN    sodium chloride (NS) flush 5-10 mL  5-10 mL IntraVENous Q8H    sodium chloride (NS) flush 5-10 mL  5-10 mL IntraVENous PRN    nitroglycerin (NITROSTAT) tablet 0.4 mg  0.4 mg SubLINGual Q5MIN PRN    atorvastatin (LIPITOR) tablet 40 mg  40 mg Oral QPM    glucose chewable tablet 16 g  4 Tab Oral PRN    dextrose (D50W) injection syrg 12.5-25 g  12.5-25 g IntraVENous PRN    glucagon (GLUCAGEN) injection 1 mg  1 mg IntraMUSCular PRN     ______________________________________________________________________  EXPECTED LENGTH OF STAY: 4d 21h  ACTUAL LENGTH OF STAY:          3                 Nallely Roper MD

## 2017-09-03 NOTE — PROGRESS NOTES
2230: xanax given to patient for anxiety and discomfort. 2315: patients oxygen on room air dropped to 86%, patient asleep and was just given xanax. Oxygen at 2 liters placed on patient, oxygen at 93%. 0315: patient bathed and bedding changed. Patient has been uncomfortable all night she states. Patient repositioned to the right side. Patient refuses nasal cannula, will continue to monitor oxygen on room air. 1: patient given xanax for anxiety and discomfort.

## 2017-09-04 NOTE — PROGRESS NOTES
[facility]  -Progress Note     Kellie Aguirre [phone]   9/4/2017      Anum Rock M.D. , F.A.C.C.   --------PCP:-RONY Saravia MD   -----Subjective:   . Frutoso Golder Frutoso Golder Yonitoso Adam Hall is a 69 y.o. female   Sleeping arousable  Denies cp, edema or heart palpitations   Patient Vitals for the past 12 hrs:   Temp Pulse Resp BP SpO2   09/04/17 0740 97.6 °F (36.4 °C) 78 18 131/43 93 %   09/04/17 0328 97.7 °F (36.5 °C) 81 18 126/54 94 %   09/03/17 2335 97.8 °F (36.6 °C) 84 18 111/61 95 %        Discussion/Plans/Recs    Subacute ST elevation MI: anterolateral wall. Admit EKG 8/31/17 Q wave development with ST elevation anterolateral leads. Troponin trending down 4.47 8/31 from peak of 6.61 on admit. .                        -No chest pain, SOB                        -ASA 81 mg daily                        -Lipitor 40 mg daily  Holding coreg  ECHO with EF 40-45% HK at mid apical Ant, mid AS, mid apical INF, apex with dynamic obstuction at rest related to ROBERT, peak at 40; Moderate MR, aortic sclerosis without stenosis   Is trop up due to epicardial dz or due to transient ischemia with obstruction? ?  Given her situation with risk for mortality and morbidity-No cath planned, medical management        Sepsis/UTI/Bacteremia: Urine & blood cx=  Gram neg. Rods. WBC trending down   Bong with ATN renal following  Lab Results   Component Value Date/Time    Creatinine (POC) 0.9 08/16/2017 11:42 AM    Creatinine 1.63 09/04/2017 03:39 AM     - ASA, Lipitor and Coreg - BP improving as sepsis resolving  - ABx for UTI bacteremia - WBC 12  - BONG from suspect ATN - Cr 1.63 and improving.       - Nephrology following  - Metastatic renal cell CA  - Anemia Hgb 7.8    Stable. BP doing well on Coreg. WBC and Cr improving on ABx. Continue with medical management for subacute STEMI and cath is on back burner.        Assessment/Plan/Discussion:Cardiology Attending:     Patient seen earlier today and examined and agree with Advance Practice Provider (HUYEN, NP,PA)  assessment and plans. Lamont Dior is a 69 y.o. female   More comfortable  Good air movement  No cp  Medical management of MI    Aniket Brody MD 9/4/2017           Cardiac Studies/Hx:  No specialty comments available. Past Medical History:   Diagnosis Date    Anxiety 7/31/2017    2/3/17:Under a tremendous amount of stress, will continue Alprazolam prn, if using regularly consider changing to an SSRI or counseling    Cancer ([facility] [address] 08/2017    renal cell carcinoma    Cervical myelopathy ([facility] 7/31/2017    2/3/17:Cervical fusion     Controlled diabetes mellitus ([facility] 7/31/2017    Fatigue 7/31/2017    Hypercholesterolemia 7/31/2017    Hyperglycemia 7/31/2017    Hypertension 7/31/2017    2/3/17:Poorly controlled, lifestyle changes, repeat /90 will follow    On statin therapy 7/31/2017    Osteoporosis 7/31/2017    Post-menopausal 7/31/2017    Spinal stenosis of lumbar region with radiculopathy 7/31/2017    2/3/17:Symptoms c/w this diagnosis discussed with pt., if progresses then need MRI L spine and follow up    Vaginitis due to Candida 7/31/2017    2/3/17:Associated with antibiotic use, requests prescription med      ROS-pertinents  negative except as above  The pertinent portions of the medical history,physician and nursing notes, meds,vitals , labs and Ins/Outs,are reviewed in the electronic record.     Results for orders placed or performed during the hospital encounter of 08/31/17   EKG, 12 LEAD, INITIAL   Result Value Ref Range    Ventricular Rate 99 BPM    Atrial Rate 99 BPM    P-R Interval 130 ms    QRS Duration 84 ms    Q-T Interval 344 ms    QTC Calculation (Bezet) 441 ms    Calculated P Axis 18 degrees    Calculated R Axis -51 degrees    Calculated T Axis 31 degrees    Diagnosis       Sinus rhythm with premature atrial complexes  Left anterior fascicular block  Possible Anterior infarct (cited on or before 31-AUG-2017)  When compared with ECG of 31-AUG-2017 15:15,  No significant change was found      Confirmed by Antonio Melgar MD. ([id number]) on 9/2/2017 10:36:47 AM        Vitals:    09/03/17 1950 09/03/17 2335 09/04/17 0328 09/04/17 0740   BP: 146/51 111/61 126/54 131/43   BP 1 Location: Right arm Right arm Right arm Right arm   BP Patient Position: At rest At rest At rest At rest;Lying left side   Pulse: 88 84 81 78   Resp: 18 18 18 18   Temp: 98.5 °F (36.9 °C) 97.8 °F (36.6 °C) 97.7 °F (36.5 °C) 97.6 °F (36.4 °C)   SpO2: 94% 95% 94% 93%   Weight:   56.2 kg (123 lb 14.4 oz)    Height:           Objective:    Physical Exam:   Patient Vitals for the past 12 hrs:   Temp Pulse Resp BP SpO2   09/04/17 0740 97.6 °F (36.4 °C) 78 18 131/43 93 %   09/04/17 0328 97.7 °F (36.5 °C) 81 18 126/54 94 %   09/03/17 2335 97.8 °F (36.6 °C) 84 18 111/61 95 %      General:  Arouses from sleep.  no distress, appears stated age   ENT, Neck:  no jvd   Chest Wall: inspection normal - no chest wall deformities or tenderness, respiratory effort normal   Lung: clear to auscultation bilaterally   Heart:  normal rate and regular rhythm, no gallops noted, systolic harsh but medium pitched murmur 2/6 at 2nd left intercostal space and at 2nd right intercostal space, no JVD   Abdomen: nondistended   Extremities: extremities normal, atraumatic, no cyanosis or edema     Last 24hr Input/Output:    Intake/Output Summary (Last 24 hours) at 09/04/17 0844  Last data filed at 09/04/17 0827   Gross per 24 hour   Intake           621.66 ml   Output             1250 ml   Net          -628.34 ml        Data Review:   Recent Results (from the past 24 hour(s))   GLUCOSE, POC    Collection Time: 09/03/17 11:21 AM   Result Value Ref Range    Glucose (POC) 143 (H) 65 - 100 mg/dL    Performed by [address], POC    Collection Time: 09/03/17  4:31 PM   Result Value Ref Range    Glucose (POC) 187 (H) 65 - 100 mg/dL    Performed by [address], POC    Collection Time: 09/03/17 10:03 PM   Result Value Ref Range    Glucose (POC) 163 (H) 65 - 100 mg/dL    Performed by Keller Bence    CBC W/O DIFF    Collection Time: 09/04/17  3:39 AM   Result Value Ref Range    WBC 12.0 (H) 3.6 - 11.0 K/uL    RBC 3.13 (L) 3.80 - 5.20 M/uL    HGB 7.8 (L) 11.5 - 16.0 g/dL    HCT 24.2 (L) 35.0 - 47.0 %    MCV 77.3 (L) 80.0 - 99.0 FL    MCH 24.9 (L) 26.0 - 34.0 PG    MCHC 32.2 30.0 - 36.5 g/dL    RDW 14.7 (H) 11.5 - 14.5 %    PLATELET 637 252 - 708 K/uL   MAGNESIUM    Collection Time: 09/04/17  3:39 AM   Result Value Ref Range    Magnesium 1.6 1.6 - 2.4 mg/dL   PHOSPHORUS    Collection Time: 09/04/17  3:39 AM   Result Value Ref Range    Phosphorus 2.7 2.6 - 4.7 MG/DL   METABOLIC PANEL, COMPREHENSIVE    Collection Time: 09/04/17  3:39 AM   Result Value Ref Range    Sodium 136 136 - 145 mmol/L    Potassium 3.7 3.5 - 5.1 mmol/L    Chloride 104 97 - 108 mmol/L    CO2 21 21 - 32 mmol/L    Anion gap 11 5 - 15 mmol/L    Glucose 126 (H) 65 - 100 mg/dL    BUN 35 (H) 6 - 20 MG/DL    Creatinine 1.63 (H) 0.55 - 1.02 MG/DL    BUN/Creatinine ratio 21 (H) 12 - 20      GFR est AA 38 (L) >60 ml/min/1.73m2    GFR est non-AA 31 (L) >60 ml/min/1.73m2    Calcium 9.3 8.5 - 10.1 MG/DL    Bilirubin, total 0.2 0.2 - 1.0 MG/DL    ALT (SGPT) 7 (L) 12 - 78 U/L    AST (SGOT) 8 (L) 15 - 37 U/L    Alk.  phosphatase 106 45 - 117 U/L    Protein, total 5.4 (L) 6.4 - 8.2 g/dL    Albumin 2.0 (L) 3.5 - 5.0 g/dL    Globulin 3.4 2.0 - 4.0 g/dL    A-G Ratio 0.6 (L) 1.1 - 2.2     GLUCOSE, POC    Collection Time: 09/04/17  6:45 AM   Result Value Ref Range    Glucose (POC) 111 (H) 65 - 100 mg/dL    Performed by Juan Alberto Amezquita PA-C 9/4/2017

## 2017-09-04 NOTE — PROGRESS NOTES
Bedside and Verbal shift change report given to Tariq Vail (oncoming nurse) by Kelsea Renee RN (offgoing nurse).  Report included the following information SBAR, Kardex, ED Summary, Procedure Summary, Intake/Output, MAR, Accordion, Recent Results and Cardiac Rhythm SR.

## 2017-09-04 NOTE — PROGRESS NOTES
Problem: Falls - Risk of  Goal: *Absence of Falls  Document Deedee Fall Risk and appropriate interventions in the flowsheet.    Outcome: Progressing Towards Goal  Fall Risk Interventions:  Mobility Interventions: Strengthening exercises (ROM-active/passive)           Medication Interventions: Evaluate medications/consider consulting pharmacy     Elimination Interventions: Call light in reach, Patient to call for help with toileting needs

## 2017-09-04 NOTE — CONSULTS
Palliative Medicine Consult  Farhan: [phone] ([phone])    Patient Name: Mary Pelaez  YOB: 1950    Date of Initial Consult: 9/4/17  Reason for Consult: Overwhelming symptoms. Requesting Provider: Geena Joshua MD  Primary Care Physician: Tito Willis MD      SUMMARY:   Mary Pelaez is a 69 y.o. Female with recent diagnosis of metastatic renal cell carcinoma with lung , lymph node and adrenal metastasis , who was to satrt palliative chemotherapy with Dr Rolando Vazquez, past medical history is notable for, diabetes diet controlled, htn, anxiety depressionwith a p who was admitted on 8/31/2017 from  Home with a diagnosis of weakness, lethargy, associated with loss of appetite for and dry heaving, found to have Gram negative sepsis from urine source and NSTEMI. Work up shows thrombosis of left Renal vein, does not need anticoagulation per oncology. Current medical issues leading to Palliative Medicine involvement include: stage 4 renal cell carcinoma with sepsis, overwhelming symptoms. PALLIATIVE DIAGNOSES:   1. Generalized weakness  2. Fatigue  3. Anorexia  4. Weight loss  5. Constipation   6. Insomnia         PLAN:   Met patient with her best friend at bed side. .    1. Introduce realm of Palliative Care service. 2. Generalized weakness and fatigue: We talked about conserving energy. 3. Anorexia and weight loss; encouraged nutritional supplements, she does not like ensure , magic cups are ok ,  4. Pain : possibly related to cancer, left side renal carcinoma with renal vein thrombosis, continue with dilaudid 1 mg I/v q 4 hrs prn , uses one dose at 1900(9/3/17) and one dose at 7 am today(9/4/17). 5. Insomnia:  patient is on benzodiazepine in out patient setting, will consider  tocontinue, suggest scheduled xanax 0.25 mg q hs.    6. Constipation: suggest senokot.     6. Advance directives ; patient is not interested in completing AD, she defers medical decisions to her  if she is not able to make medical decisions. 7 Code status : patient confirms DNR, DDNR explained, will follow up tomorrow, currently patient wants to rest.    7. Psychosocial support: patient is feeling overwhelmed because of rapid decline in QOL , new diagnosis of cancer in last one month, she has good support system from  and  Friends. she wants to take some time to process, is not open to talking about her feelings to any body . Emotional support offered. 8. Nausea : controlled, 0 use of zofran in last 48 hrs. we will continue to support patient and family through difficult course of illness. Initial consult note routed to primary continuity provider  Communicated plan of care with: Palliative IDT and primary attending Dr Madison Pisano. GOALS OF CARE / TREATMENT PREFERENCES:   [====Goals of Care====]  GOALS OF CARE:  Patient / health care proxy stated goals: treatment of acute medical issues. TREATMENT PREFERENCES:   Code Status: DNR    Advance Care Planning:  Advance Care Planning 8/31/2017   Patient's Healthcare Decision Maker is: Legal Next of Tracey Jacobson   Primary Decision Maker Name Jair Laguna    Primary Decision Maker Phone Number [phone]   Primary Decision Maker Relationship to Patient Spouse   Confirm Advance Directive None   Patient Would Like to Complete Advance Directive No       Other:    The palliative care team has discussed with patient / health care proxy about goals of care / treatment preferences for patient.  [====Goals of Care====]         HISTORY:     History obtained from: chart, patient and staff. CHIEF COMPLAINT: weakness and fatigue    HPI/SUBJECTIVE:    The patient is:   [] Verbal and participatory  Per patient she has been feeling weak dry heaving and poorly eating for past few days, she was functioning independently, cutting grass up until one month ago.      she reports progressive weight loss of 15 to 20 lbs in last 6 months. She reports she worries a lot, and at base line unable to sleep at night because she worries about her Dad who is 80years old, worries about her daughter who was in a care accident but is functioning fine. she takes Xanax on and off to sleep. She feels very tired currently cannot rest during day because of interruption due to routine in hospital care, doctors visit , PT etc.    Reports last bowel movements few days ago. Clinical Pain Assessment (nonverbal scale for severity on nonverbal patients):   [++++ Clinical Pain Assessment++++]  [++++Pain Severity++++]: Pain: 0currently 0/10  [++++Pain Character++++]: dull   [++++Pain Duration++++]: few days  [++++Pain Effect++++]: moving can increase pain . [++++Pain Factors++++]: laying on right side helps. [++++Pain Frequency++++]: couple of times per day. [++++Pain Location++++]: left flank, because of laying on left side.   [++++ Clinical Pain Assessment++++]  Duration: for how long has pt been experiencing pain (e.g., 2 days, 1 month, years)  Frequency: how often pain is an issue (e.g., several times per day, once every few days, constant)     FUNCTIONAL ASSESSMENT:     Palliative Performance Scale (PPS):  PPS: 50       PSYCHOSOCIAL/SPIRITUAL SCREENING:     Advance Care Planning:  Advance Care Planning 8/31/2017   Patient's Healthcare Decision Maker is: Legal Next scarlet Webb 67   Primary Decision Maker Name Polly Hollis    Primary Decision Maker Phone Number [phone]   Primary Decision Maker Relationship to Patient Spouse   Confirm Advance Directive None   Patient Would Like to Complete Advance Directive No        Any spiritual / Anabaptism concerns:  [] Yes /  [x] No    Caregiver Burnout:  [] Yes /  [] No /  [x] No Caregiver Present      Anticipatory grief assessment:   [x] Normal  / [] Maladaptive       ESAS Anxiety: Anxiety: 3    ESAS Depression: Depression: 0        REVIEW OF SYSTEMS:     Positive and pertinent negative findings in ROS are noted above in HPI. The following systems were [x] reviewed / [] unable to be reviewed as noted in HPI  Other findings are noted below. Systems: constitutional, ears/nose/mouth/throat, respiratory, gastrointestinal, genitourinary, musculoskeletal, integumentary, neurologic, psychiatric, endocrine. Positive findings noted below. Modified ESAS Completed by: provider   Fatigue: 6 Drowsiness: 0   Depression: 0 Pain: 0   Anxiety: 3 Nausea: 0   Anorexia: 6 Dyspnea: 0     Constipation: Yes     Stool Occurrence(s): 1        PHYSICAL EXAM:     From RN flowsheet:  Wt Readings from Last 3 Encounters:   09/04/17 123 lb 14.4 oz (56.2 kg)   08/25/17 113 lb (51.3 kg)   08/23/17 114 lb 12.8 oz (52.1 kg)     Blood pressure 105/52, pulse 77, temperature 97.5 °F (36.4 °C), resp. rate 18, height 5' (1.524 m), weight 123 lb 14.4 oz (56.2 kg), SpO2 95 %, not currently breastfeeding. Pain Scale 1: Numeric (0 - 10)  Pain Intensity 1: 0  Pain Onset 1: gradual  Pain Location 1: Flank  Pain Orientation 1: Left  Pain Description 1: Aching, Constant  Pain Intervention(s) 1: Medication (see MAR)  Last bowel movement, if known:     Constitutional: chronically sick , siting in chair. Eyes: pupils equal, anicteric  ENMT: no nasal discharge, moist mucous membranes  Cardiovascular: regular rhythm.   Respiratory: breathing not labored, symmetric  Gastrointestinal: soft non-tender, +bowel sounds  Musculoskeletal: no deformity, no tenderness to palpation  Skin: warm, dry  Neurologic: following commands, moving all extremities  Psychiatric: full affect, no hallucinations  Other:       HISTORY:     Principal Problem:    NSTEMI (non-ST elevated myocardial infarction) ([facility] [address] (8/31/2017)      Past Medical History:   Diagnosis Date    Anxiety 7/31/2017    2/3/17:Under a tremendous amount of stress, will continue Alprazolam prn, if using regularly consider changing to an SSRI or counseling    Cancer ([facility] [address] 08/2017    renal cell carcinoma    Cervical myelopathy ([facility] 7/31/2017    2/3/17:Cervical fusion     Controlled diabetes mellitus ([facility] 7/31/2017    Fatigue 7/31/2017    Hypercholesterolemia 7/31/2017    Hyperglycemia 7/31/2017    Hypertension 7/31/2017    2/3/17:Poorly controlled, lifestyle changes, repeat /90 will follow    On statin therapy 7/31/2017    Osteoporosis 7/31/2017    Post-menopausal 7/31/2017    Spinal stenosis of lumbar region with radiculopathy 7/31/2017    2/3/17:Symptoms c/w this diagnosis discussed with pt., if progresses then need MRI L spine and follow up    Vaginitis due to Candida 7/31/2017    2/3/17:Associated with antibiotic use, requests prescription med      Past Surgical History:   Procedure Laterality Date    HX ORTHOPAEDIC      2009 spinal cord surgery     HX ORTHOPAEDIC Left     knee procedure    HX OTHER SURGICAL  08/16/2017    incisional biopsy right neck mass      Family History   Problem Relation Age of Onset    Heart Disease Mother       History reviewed, no pertinent family history.   Social History   Substance Use Topics    Smoking status: Never Smoker    Smokeless tobacco: Never Used    Alcohol use No     No Known Allergies   Current Facility-Administered Medications   Medication Dose Route Frequency    nystatin (MYCOSTATIN) 100,000 unit/mL oral suspension 500,000 Units  500,000 Units Oral QID    cefTRIAXone (ROCEPHIN) 2 g in 0.9% sodium chloride (MBP/ADV) 50 mL  2 g IntraVENous Q24H    HYDROmorphone (PF) (DILAUDID) injection 1 mg  1 mg IntraVENous Q4H PRN    insulin glargine (LANTUS) injection 12 Units  12 Units SubCUTAneous QHS    ondansetron (ZOFRAN) injection 4 mg  4 mg IntraVENous Q4H PRN    ALPRAZolam (XANAX) tablet 0.25 mg  0.25 mg Oral BID PRN    carvedilol (COREG) tablet 6.25 mg  6.25 mg Oral BID WITH MEALS    0.9% sodium chloride infusion  50 mL/hr IntraVENous CONTINUOUS    aspirin chewable tablet 81 mg  81 mg Oral DAILY    heparin (porcine) injection 5,000 Units  5,000 Units SubCUTAneous Q12H    insulin lispro (HUMALOG) injection   SubCUTAneous AC&HS    therapeutic multivitamin (THERAGRAN) tablet 1 Tab  1 Tab Oral DAILY    sodium chloride (NS) flush 5-10 mL  5-10 mL IntraVENous Q8H    sodium chloride (NS) flush 5-10 mL  5-10 mL IntraVENous PRN    sodium chloride (NS) flush 5-10 mL  5-10 mL IntraVENous PRN    sodium chloride (NS) flush 5-10 mL  5-10 mL IntraVENous Q8H    sodium chloride (NS) flush 5-10 mL  5-10 mL IntraVENous PRN    nitroglycerin (NITROSTAT) tablet 0.4 mg  0.4 mg SubLINGual Q5MIN PRN    atorvastatin (LIPITOR) tablet 40 mg  40 mg Oral QPM    glucose chewable tablet 16 g  4 Tab Oral PRN    dextrose (D50W) injection syrg 12.5-25 g  12.5-25 g IntraVENous PRN    glucagon (GLUCAGEN) injection 1 mg  1 mg IntraMUSCular PRN          LAB AND IMAGING FINDINGS:     Lab Results   Component Value Date/Time    WBC 12.0 09/04/2017 03:39 AM    HGB 7.8 09/04/2017 03:39 AM    PLATELET 262 01/80/8599 03:39 AM     Lab Results   Component Value Date/Time    Sodium 136 09/04/2017 03:39 AM    Potassium 3.7 09/04/2017 03:39 AM    Chloride 104 09/04/2017 03:39 AM    CO2 21 09/04/2017 03:39 AM    BUN 35 09/04/2017 03:39 AM    Creatinine 1.63 09/04/2017 03:39 AM    Calcium 9.3 09/04/2017 03:39 AM    Magnesium 1.6 09/04/2017 03:39 AM    Phosphorus 2.7 09/04/2017 03:39 AM      Lab Results   Component Value Date/Time    AST (SGOT) 8 09/04/2017 03:39 AM    Alk.  phosphatase 106 09/04/2017 03:39 AM    Protein, total 5.4 09/04/2017 03:39 AM    Albumin 2.0 09/04/2017 03:39 AM    Globulin 3.4 09/04/2017 03:39 AM     No results found for: INR, PTMR, PTP, PT1, PT2, APTT   No results found for: IRON, FE, TIBC, IBCT, PSAT, FERR   No results found for: PH, PCO2, PO2  No components found for: [place]   Lab Results   Component Value Date/Time    CK 49 08/31/2017 06:47 PM                Total time: 72 mins  Counseling / coordination time, spent as noted above: 50 mins  > 50% counseling / coordination?: yes.    Prolonged service was provided for  []30 min   []75 min in face to face time in the presence of the patient, spent as noted above. Time Start:   Time End:   Note: this can only be billed with 82957 (initial) or 84344 (follow up). If multiple start / stop times, list each separately.

## 2017-09-04 NOTE — PROGRESS NOTES
Problem: Mobility Impaired (Adult and Pediatric)  Goal: *Acute Goals and Plan of Care (Insert Text)  Physical Therapy Goals  Initiated 9/4/2017  1. Patient will move from supine to sit and sit to supine in bed with minimal assistance/contact guard assist within 7 day(s). 2. Patient will transfer from bed to chair and chair to bed with supervision/set-up using the least restrictive device within 7 day(s). 3. Patient will perform sit to stand with supervision/set-up within 7 day(s). 4. Patient will ambulate with supervision/set-up for 100 feet with the least restrictive device within 7 day(s). 5. Patient will tolerate sitting up in the bedside chair x 2-3 hours per day within 7 days. 6. Patient will increase Tinetti balance score by 3-4 points to decrease risk of falling within 7 days. PHYSICAL THERAPY EVALUATION  Patient: Gato Grewal (51 y.o. female)  Date: 9/4/2017  Primary Diagnosis: NSTEMI (non-ST elevated myocardial infarction) [facility])        Precautions:          ASSESSMENT :  Chart reviewed. Patient cleared to be seen by nursing staff. Patient is very resistant to therapy secondary to fatigue and \"not sleeping well\". Patient educated that she has not been out of bed since admission and the health risks associated with long term bedrest. Patient agreeable to \"trying\". Patient demonstrating rolling side to side using UEs and bed rails. With a significant amount of extra time patient able to sit EOB with mod assist x 2. Patient calling out in pain. Reporting that pain came from a \"tender spot\" on the left side/ribs. Not tender to palpation at this time. No dizziness reported. Good sitting balance with feet flat on the floor. Patient needing education for hand placement and walker use. Very forward flexed upon initial standing needing manual cues to stand fully erect. Initiated forward gait training x 2 steps with RW. Good WB bilat without buckling.  Patient fatiguing quickly, stating that she is unable to go farther and asking to sit. Patient able to safely stand step to bedside chair. /50 in sitting. Patient encouraged to sit up for at least half an hour. Pt encouraged to make small gains daily. Plan to ambulate past the door tomorrow. Concerned about decline in functioning. Patient may need SNF rehab pending progress with acute care PT. If patient is able to demonstrate ambulating to and from the bathroom with a RW at a SBA level, patient may be able to return home with family and friends with HHPT. This is family's preference. Nursing notified that patient requires additional time for OOB mobility. Recommend with nursing patient to complete as able in order to maintain strength, endurance and independence: OOB to chair 3x/day as appropriate with assist x 1 and RW. Thank you for your assistance. Patient will benefit from skilled intervention to address the above impairments. Patients rehabilitation potential is considered to be Good  Factors which may influence rehabilitation potential include:   [ ]         None noted  [ ]         Mental ability/status  [X]         Medical condition  [X]         Home/family situation and support systems  [ ]         Safety awareness  [X]         Pain tolerance/management  [ ]         Other:        PLAN :  Recommendations and Planned Interventions:  [X]           Bed Mobility Training             [ ]    Neuromuscular Re-Education  [X]           Transfer Training                   [ ]    Orthotic/Prosthetic Training  [X]           Gait Training                         [ ]    Modalities  [X]           Therapeutic Exercises           [ ]    Edema Management/Control  [X]           Therapeutic Activities            [ ]    Patient and Family Training/Education  [ ]           Other (comment):     Frequency/Duration: Patient will be followed by physical therapy  5 times a week to address goals.   Discharge Recommendations: Home Health with increased family support and RW vs [facility] Carias  Further Equipment Recommendations for Discharge: RW if home, none if rehab       SUBJECTIVE:   Patient stated I wouldn't do your job for $200 an hour.       OBJECTIVE DATA SUMMARY:   HISTORY:    Past Medical History:   Diagnosis Date    Anxiety 7/31/2017     2/3/17:Under a tremendous amount of stress, will continue Alprazolam prn, if using regularly consider changing to an SSRI or counseling    Cancer ([facility] [address] 08/2017     renal cell carcinoma    Cervical myelopathy ([facility] [address] 7/31/2017     2/3/17:Cervical fusion     Controlled diabetes mellitus ([facility] [address] 7/31/2017    Fatigue 7/31/2017    Hypercholesterolemia 7/31/2017    Hyperglycemia 7/31/2017    Hypertension 7/31/2017     2/3/17:Poorly controlled, lifestyle changes, repeat /90 will follow    On statin therapy 7/31/2017    Osteoporosis 7/31/2017    Post-menopausal 7/31/2017    Spinal stenosis of lumbar region with radiculopathy 7/31/2017     2/3/17:Symptoms c/w this diagnosis discussed with pt., if progresses then need MRI L spine and follow up    Vaginitis due to Candida 7/31/2017     2/3/17:Associated with antibiotic use, requests prescription med     Past Surgical History:   Procedure Laterality Date    HX ORTHOPAEDIC         2009 spinal cord surgery     HX ORTHOPAEDIC Left       knee procedure    HX OTHER SURGICAL   08/16/2017     incisional biopsy right neck mass     Prior Level of Function/Home Situation: Patient lives with her  in a single level house. Patient found out that she has metastatic CA 1 month ago and recently a heart attack. Prior to a month ago, patient was ambulating independently, mowing the lawn and going out to eat. Since her cancer diagnosis, patient has been predominantly house bound. She has been able to get out of bed and get to her bathroom without device. Her friend (who is present today and uses a cane) was helping her when she had to [facility] farther than one room\".  No falls reported. Personal factors and/or comorbidities impacting plan of care:      Home Situation  Home Environment: Private residence  # Steps to Enter: 0  One/Two Story Residence: One story  Living Alone: No  Support Systems: Spouse/Significant Other/Partner, Friends \ neighbors  Patient Expects to be Discharged to[de-identified] Private residence  Current DME Used/Available at Home: None     EXAMINATION/PRESENTATION/DECISION MAKING:   Critical Behavior:  Neurologic State: Drowsy  Orientation Level: Oriented X4  Cognition: Appropriate decision making, Appropriate for age attention/concentration, Appropriate safety awareness, Follows commands     Hearing: Auditory  Auditory Impairment: None     Strength:    Strength: Generally decreased, functional     Tone & Sensation:   Tone: Normal  Sensation: Intact      Coordination:  Coordination: Generally decreased, functional  Functional Mobility:  Bed Mobility:  Supine to Sit: Moderate assistance;Assist x2     Transfers:  Sit to Stand: Moderate assistance;Assist x2; Additional time  Stand to Sit: Moderate assistance;Assist x1;Additional time  Bed to Chair: Assist x2;Adaptive equipment; Additional time     Balance:   Sitting: Impaired  Sitting - Static: Good (unsupported)  Sitting - Dynamic: Fair (occasional)  Standing: Impaired  Standing - Static: Fair;Constant support  Standing - Dynamic : Fair        Functional Measure:  Tinetti test:      Sitting Balance: 1  Arises: 0  Attempts to Rise: 0  Immediate Standing Balance: 1  Standing Balance: 1  Nudged: 0  Eyes Closed: 0  Turn 360 Degrees - Continuous/Discontinuous: 0  Turn 360 Degrees - Steady/Unsteady: 0  Sitting Down: 1  Balance Score: 4  Indication of Gait: 0  R Step Length/Height: 0  L Step Length/Height: 0  R Foot Clearance: 1  L Foot Clearance: 1  Step Symmetry: 1  Step Continuity: 0  Path: 1  Trunk: 0  Walking Time: 1  Gait Score: 5  Total Score: 9         Tinetti Test and G-code impairment scale:  Percentage of Impairment CH 0%    CI     1-19% CJ     20-39% CK     40-59% CL     60-79% CM     80-99% CN      100%   Tinetti  Score 0-28 28 23-27 17-22 12-16 6-11 1-5 0          Tinetti Tool Score Risk of Falls  <19 = High Fall Risk  19-24 = Moderate Fall Risk  25-28 = Low Fall Risk  Tinetti ME. Performance-Oriented Assessment of Mobility Problems in Elderly Patients. [facility]; [id number]. (Scoring Description: PT Bulletin Feb. 10, 1993)     Older adults: Johny Heart et al, 2009; n = 1000 [facility] elderly evaluated with ABC, SUKHI, ADL, and IADL)  · Mean SUKHI score for males aged 69-68 years = 26.21(3.40)  · Mean SUKHI score for females age 69-68 years = 25.16(4.30)  · Mean SUKHI score for males over 80 years = 23.29(6.02)  · Mean SUKHI score for females over 80 years = 17.20(8.32)         G codes: In compliance with CMSs Claims Based Outcome Reporting, the following G-code set was chosen for this patient based on their primary functional limitation being treated: The outcome measure chosen to determine the severity of the functional limitation was the Tinetti with a score of 9/28 which was correlated with the impairment scale. · Mobility - Walking and Moving Around:               - CURRENT STATUS:    CL - 60%-79% impaired, limited or restricted               - GOAL STATUS:           CK - 40%-59% impaired, limited or restricted               - D/C STATUS:                       ---------------To be determined---------------      Pain:  Pain Scale 1: Numeric (0 - 10)  Pain Intensity 1: 0  Pain Location 1: Flank  Pain Orientation 1: Left  Pain Description 1: Aching;Constant  Pain Intervention(s) 1: Medication (see MAR)  Activity Tolerance:   Decreased. HR and O2 stable. /50 sitting up in the chair  Please refer to the flowsheet for vital signs taken during this treatment.   After treatment:   [X]         Patient left in no apparent distress sitting up in chair  [ ]         Patient left in no apparent distress in bed  [X] Call bell left within reach  [X]         Nursing notified  [X]         Caregiver/ friend  present  [ ]         Bed alarm activated      COMMUNICATION/EDUCATION:   The patients plan of care was discussed with: Registered Nurse.  [X]         Fall prevention education was provided and the patient/caregiver indicated understanding. [X]         Patient/family have participated as able in goal setting and plan of care. [X]         Patient/family agree to work toward stated goals and plan of care. [ ]         Patient understands intent and goals of therapy, but is neutral about his/her participation. [ ]         Patient is unable to participate in goal setting and plan of care.      Thank you for this referral.  Venita Tran Pt, DPT   Time Calculation: 51 mins

## 2017-09-04 NOTE — PROGRESS NOTES
2330: Bedside and Verbal shift change report given to Erica Contreras RN (oncoming nurse) by Uvaldo Correa RN (offgoing nurse). Report included the following information SBAR, Kardex, Intake/Output, MAR, Recent Results and Cardiac Rhythm NSR.   0730: Bedside and Verbal shift change report given to Van Gore RN (oncoming nurse) by Erica Contreras RN (offgoing nurse). Report included the following information SBAR, Kardex, Intake/Output, MAR, Recent Results and Cardiac Rhythm NSR. Problem: Pressure Injury - Risk of  Goal: *Prevention of pressure ulcer  Outcome: Progressing Towards Goal    09/03/17 0710 09/04/17 0020   Wound Prevention and Protection Methods   Orientation of Wound Prevention --  Posterior   Location of Wound Prevention --  Sacrum/Coccyx   Dressing Present  --  No   Dressing Status Intact --    Read Only, Retired: Wound Treatment (non-mechanical)   Wound Offloading (Prevention Methods) --  Bed, pressure redistribution/air;Bed, pressure reduction mattress;Pillows;Repositioning;Turning         Problem: Falls - Risk of  Goal: *Absence of Falls  Document Deedee Fall Risk and appropriate interventions in the flowsheet.    Outcome: Progressing Towards Goal  Fall Risk Interventions:  Mobility Interventions: Communicate number of staff needed for ambulation/transfer, OT consult for ADLs, Patient to call before getting OOB, PT Consult for mobility concerns, PT Consult for assist device competence           Medication Interventions: Patient to call before getting OOB, Teach patient to arise slowly     Elimination Interventions: Call light in reach, Patient to call for help with toileting needs

## 2017-09-04 NOTE — PROGRESS NOTES
0730: Bedside shift change report given to Russell Ya RN (oncoming nurse) by Carole Hernandez RN (offgoing nurse). Report included the following information SBAR, Kardex, Procedure Summary, Intake/Output, MAR, Accordion, Recent Results, Med Rec Status and Cardiac Rhythm NSR w/ PACs. 0830: Patient refused breakfast as she only wanted to eat jello or ice cream.   1130: Patient turned on right side. 1930: Bedside shift change report given to Carole Hernandez RN (oncoming nurse) by Russell Ya RN (offgoing nurse). Report included the following information SBAR, Kardex, Intake/Output, MAR, Accordion, Recent Results, Med Rec Status and Cardiac Rhythm NSR. Problem: Diabetes Self-Management  Goal: *Incorporating physical activity into lifestyle  Outcome: Not Progressing Towards Goal  Pt has been on bedrest for a few days. PT consult placed. Problem: Pressure Injury - Risk of  Goal: *Prevention of pressure ulcer  Outcome: Progressing Towards Goal    09/03/17 0710 09/04/17 0740   Wound Prevention and Protection Methods   Orientation of Wound Prevention --  Posterior   Location of Wound Prevention --  Sacrum/Coccyx   Dressing Present  --  No   Dressing Status Intact --    Read Only, Retired: Wound Treatment (non-mechanical)   Wound Offloading (Prevention Methods) --  Bed, pressure redistribution/air;Elevate heels;Pillows;Repositioning;Turning         Problem: AMI: Day 5  Goal: Nutrition/Diet  Outcome: Not Progressing Towards Goal  Pt is not eating a lot of her meals. Only wants to eat jello, ice cream, or yogurt. Goal: Psychosocial  Outcome: Not Progressing Towards Goal  Psych consult placed. Problem: Falls - Risk of  Goal: *Absence of Falls  Document Deedee Fall Risk and appropriate interventions in the flowsheet.    Outcome: Progressing Towards Goal  Fall Risk Interventions:  Mobility Interventions: Communicate number of staff needed for ambulation/transfer, OT consult for ADLs, Patient to call before getting OOB, PT Consult for mobility concerns, PT Consult for assist device competence, Strengthening exercises (ROM-active/passive), Utilize walker, cane, or other assitive device           Medication Interventions: Patient to call before getting OOB, Teach patient to arise slowly     Elimination Interventions: Call light in reach, Patient to call for help with toileting needs

## 2017-09-04 NOTE — PROGRESS NOTES
NAME: Efrem Holland        :  1950        MRN:  [id number]        Assessment :    Plan:  --BONG- s/p early ATN    Sepsis- Serratia bacteremia/urosepsis    Hyperkalemia - better    Hyponatremia     Acidosis-mild; lactate nl    Proteinuria    NSTEMI    Metastatic Renal cell cancer- with mets to pleura, mediastinum, LN, L adrenal, kidneys    DM-2 with proteinuria -Renal indices showing improvement-> Cr down to 1.6mg/dl. Suspect ATN recovery    Supplement Oral KCl  IV Mag sulfate 2gm x1 dose    Gentle IVF. Encourage solid intake    Trend Hgb-> PRN PRBCS    Prognosis appears to be quite poor. Discussed with  and RN       Subjective:     Chief Complaint:  Resting. No events overnight. Intake poor    Objective:     VITALS:   Last 24hrs VS reviewed since prior progress note. Most recent are:  Visit Vitals    /43 (BP 1 Location: Right arm, BP Patient Position: At rest;Lying left side)    Pulse 78    Temp 97.6 °F (36.4 °C)    Resp 18    Ht 5' (1.524 m)    Wt 56.2 kg (123 lb 14.4 oz)    SpO2 93%    Breastfeeding No    BMI 24.2 kg/m2       Intake/Output Summary (Last 24 hours) at 17 0940  Last data filed at 17 0740   Gross per 24 hour   Intake           831.66 ml   Output             1425 ml   Net          -593.34 ml      Telemetry Reviewed:     PHYSICAL EXAM:  General: NAD  CTAB/L  abd soft  No edema       ________________________________________________________________________  Deep Nhung Salvador MD     Procedures: see electronic medical records for all procedures/Xrays and details which  were not copied into this note but were reviewed prior to creation of Plan.       LABS:  Recent Labs      17   03317   010   WBC  12.0*  11.3*   HGB  7.8*  7.2*   HCT  24.2*  22.0*   PLT  278  288     Recent Labs      17   0339  17   0100  17   0333   NA  136  132*  132*   K  3.7  3.2*  3.4* CL  104  99  97   CO2  21  21  21   BUN  35*  50*  57*   CREA  1.63*  2.37*  3.06*   GLU  126*  125*  175*   CA  9.3  9.2  9.2   MG  1.6   --   2.0   PHOS  2.7   --   3.2     Recent Labs      09/04/17   0339  09/03/17   0100   SGOT  8*  8*   AP  106  90   TP  5.4*  5.7*   ALB  2.0*  2.3*   GLOB  3.4  3.4     No results for input(s): INR, PTP, APTT in the last 72 hours. No lab exists for component: INREXT, INREXT   No results for input(s): FE, TIBC, PSAT, FERR in the last 72 hours. No results found for: FOL, RBCF   No results for input(s): PH, PCO2, PO2 in the last 72 hours. No results for input(s): CPK, CKMB in the last 72 hours.     No lab exists for component: TROPONINI  No components found for: [place]  Lab Results   Component Value Date/Time    Color YELLOW/STRAW 08/31/2017 05:19 PM    Appearance TURBID 08/31/2017 05:19 PM    Specific gravity 1.017 08/31/2017 05:19 PM    pH (UA) 5.5 08/31/2017 05:19 PM    Protein 300 08/31/2017 05:19 PM    Glucose 100 08/31/2017 05:19 PM    Ketone TRACE 08/31/2017 05:19 PM    Bilirubin NEGATIVE  08/31/2017 05:19 PM    Urobilinogen 0.2 08/31/2017 05:19 PM    Nitrites NEGATIVE  08/31/2017 05:19 PM    Leukocyte Esterase LARGE 08/31/2017 05:19 PM    Epithelial cells FEW 08/31/2017 05:19 PM    Bacteria 3+ 08/31/2017 05:19 PM    WBC >100 08/31/2017 05:19 PM    RBC 0-5 08/31/2017 05:19 PM       MEDICATIONS:  Current Facility-Administered Medications   Medication Dose Route Frequency    nystatin (MYCOSTATIN) 100,000 unit/mL oral suspension 500,000 Units  500,000 Units Oral QID    cefTRIAXone (ROCEPHIN) 2 g in 0.9% sodium chloride (MBP/ADV) 50 mL  2 g IntraVENous Q24H    HYDROmorphone (PF) (DILAUDID) injection 1 mg  1 mg IntraVENous Q4H PRN    insulin glargine (LANTUS) injection 12 Units  12 Units SubCUTAneous QHS    ondansetron (ZOFRAN) injection 4 mg  4 mg IntraVENous Q4H PRN    ALPRAZolam (XANAX) tablet 0.25 mg  0.25 mg Oral BID PRN    carvedilol (COREG) tablet 6.25 mg 6.25 mg Oral BID WITH MEALS    0.9% sodium chloride infusion  50 mL/hr IntraVENous CONTINUOUS    aspirin chewable tablet 81 mg  81 mg Oral DAILY    heparin (porcine) injection 5,000 Units  5,000 Units SubCUTAneous Q12H    insulin lispro (HUMALOG) injection   SubCUTAneous AC&HS    therapeutic multivitamin (THERAGRAN) tablet 1 Tab  1 Tab Oral DAILY    sodium chloride (NS) flush 5-10 mL  5-10 mL IntraVENous Q8H    sodium chloride (NS) flush 5-10 mL  5-10 mL IntraVENous PRN    sodium chloride (NS) flush 5-10 mL  5-10 mL IntraVENous PRN    sodium chloride (NS) flush 5-10 mL  5-10 mL IntraVENous Q8H    sodium chloride (NS) flush 5-10 mL  5-10 mL IntraVENous PRN    nitroglycerin (NITROSTAT) tablet 0.4 mg  0.4 mg SubLINGual Q5MIN PRN    atorvastatin (LIPITOR) tablet 40 mg  40 mg Oral QPM    glucose chewable tablet 16 g  4 Tab Oral PRN    dextrose (D50W) injection syrg 12.5-25 g  12.5-25 g IntraVENous PRN    glucagon (GLUCAGEN) injection 1 mg  1 mg IntraMUSCular PRN

## 2017-09-04 NOTE — CONSULTS
Psychiatry  Consult    Subjective:     Date of Evaluation:  9/4/2017    Reason for Referral:  Anny Banegas was referred to the examiners from Medical floor for depression  .     History of Presenting Problem: 69years old lady with no history of anxiety or depression who recently diagnosed with cancer  Stated she is not feeling right and she is frustrated as she is embarrassed by people taking care of her , she used to take care of every body else  Now she is not able to do it , she feels anxious and worries and she has been feeling sad and depressed and not eating well , she is not sleeping well and she has difficulty with accepting that she is sick , she has not been feeling that way before and used to be very active , she has anxiety and depressed mood      Patient Active Problem List    Diagnosis Date Noted    NSTEMI (non-ST elevated myocardial infarction) ([facility] [address] 08/31/2017    Metastatic adenocarcinoma ([facility] [address] 08/29/2017    Clear cell renal cell carcinoma ([facility] [address] 08/28/2017    Controlled diabetes mellitus ([address] 07/31/2017    On statin therapy 07/31/2017    Fatigue 07/31/2017    Hypercholesterolemia 07/31/2017    Hypertension 07/31/2017    Osteoporosis 07/31/2017    Spinal stenosis of lumbar region with radiculopathy 07/31/2017    Cervical myelopathy ([facility] [address] 07/31/2017    Anxiety 07/31/2017    Vaginitis due to Candida 07/31/2017    Hyperglycemia 07/31/2017    Post-menopausal 07/31/2017     Past Medical History:   Diagnosis Date    Anxiety 7/31/2017    2/3/17:Under a tremendous amount of stress, will continue Alprazolam prn, if using regularly consider changing to an SSRI or counseling    Cancer ([address] 08/2017    renal cell carcinoma    Cervical myelopathy ([address] 7/31/2017    2/3/17:Cervical fusion     Controlled diabetes mellitus ([address] 7/31/2017    Fatigue 7/31/2017    Hypercholesterolemia 7/31/2017    Hyperglycemia 7/31/2017    Hypertension 7/31/2017    2/3/17:Poorly controlled, lifestyle changes, repeat /90 will follow    On statin therapy 7/31/2017    Osteoporosis 7/31/2017    Post-menopausal 7/31/2017    Spinal stenosis of lumbar region with radiculopathy 7/31/2017    2/3/17:Symptoms c/w this diagnosis discussed with pt., if progresses then need MRI L spine and follow up    Vaginitis due to Candida 7/31/2017    2/3/17:Associated with antibiotic use, requests prescription med      Family History   Problem Relation Age of Onset    Heart Disease Mother       Social History   Substance Use Topics    Smoking status: Never Smoker    Smokeless tobacco: Never Used    Alcohol use No     Past Surgical History:   Procedure Laterality Date    HX ORTHOPAEDIC      2009 spinal cord surgery     HX ORTHOPAEDIC Left     knee procedure    HX OTHER SURGICAL  08/16/2017    incisional biopsy right neck mass      Prior to Admission medications    Medication Sig Start Date End Date Taking? Authorizing Provider   calcium-vitamin D (OYSTER SHELL) 500 mg(1,250mg) -200 unit per tablet Take 1 Tab by mouth two (2) times daily (with meals). Yes Historical Provider   therapeutic multivitamin (THERAGRAN) tablet Take 1 Tab by mouth daily. Yes Historical Provider   vit a,c & e-lutein-minerals (OCUVITE) tablet Take 1 Tab by mouth daily. Yes Historical Provider   ALPRAZolam (XANAX) 0.25 mg tablet Take 0.25 mg by mouth three (3) times daily as needed for Anxiety. Yes Historical Provider   KLOR-CON M20 20 mEq tablet Take 20 mEq by mouth daily. 6/16/17   Historical Provider   ondansetron (ZOFRAN ODT) 4 mg disintegrating tablet Take 1 Tab by mouth every eight (8) hours as needed for Nausea. 8/23/17   Hema Malin MD   furosemide (LASIX) 80 mg tablet Take 80 mg by mouth daily. Historical Provider   atorvastatin (LIPITOR) 20 mg tablet Take 20 mg by mouth daily. Historical Provider   glipiZIDE-metFORMIN (METAGLIP) 5-500 mg per tablet Take 2 Tabs by mouth Before breakfast and dinner.     Historical Provider No Known Allergies       Objective:     Patient Vitals for the past 8 hrs:   BP Temp Pulse Resp SpO2   09/04/17 1119 103/44 97.5 °F (36.4 °C) 69 18 93 %   09/04/17 0740 131/43 97.6 °F (36.4 °C) 78 18 93 %       Mental Status exam: She is feeling sad and depressed and has anxiety , has no self harm behavior and she has no suicidal thoughts and has no paranoid ideations and intact memory and has fair insight  And judgment           Impression: Adjustment disorder with depressed mood      Principal Problem:    NSTEMI (non-ST elevated myocardial infarction) ([facility] [address] (8/31/2017)          Plan: please continue Xanax 0.25 mg PRN bid that can be increased to TID , Remeron 7.5 mg po bed time to help for depression and insomnia       Recommendations for Treatment/Conditions:  Outpatient follow up recommended after release    Referral To:    need supportive psychotherapy     Competency Statement: At the current time, the patient is competent to make informed consent regarding their current medical care and discharge planning and/or financial decisions.

## 2017-09-04 NOTE — PROGRESS NOTES
Hospitalist Progress Note  Maximus Ramos MD  Office: [phone]  Cell: [phone]      Date of Service:  2017  NAME:  Rosaland Favre  :  1950  MRN:  [id number]      Admission Summary:    The patient is a 68-year-old female with past medical history of diabetes, history of hyperlipidemia, anxiety, hypertension, currently diet-controlled, osteoporosis, spinal stenosis, who recently diagnosed with renal cell carcinoma. The patient had some swelling in her cervical lymph nodes and biopsy indicated clear cell carcinoma. The patient is followed up with [address] [place] [address] Oncology with Dr Giovanni Aburto, and has not started chemotherapy as of yet. The patient also was noted to have metastases of the disease to the left to the pleura and mediastinum. Today, the patient presents because she reports that she has been having generalized weakness, no appetite for 2 days, some dry heaving, but no vomiting, and just has been feeling \"very lethargic. \" The patient was found to be hypotensive, pale and lightheaded in the triage, with systolic blood pressure of 80. The patient was placed in the ER, was started on fluid resuscitation. Her was elevated troponin and was found to be in acute renal failure and was found to be septic and was requested to be admitted under the hospitalist service.     Interval history / Subjective:   Generalized weakness, left flank pain and shortness of breath      Assessment & Plan:     Sepsis likely due to UTI POA   -on iv merrem and IVF  - zosyn discontinued on   -hypotensive improved with normal saline bolus and albumin on   -blood and urine cx on  grow  Serratia Marcescens sensitive to all antibiotics  -repeat blood cx on  no growth  -leukocytosis improving  -ID following    Subacute STEMI  -no chest pain  -on lipitor and  aspirin 81 mg   -troponin initial was 6.61, then 5.12 and 4.47 -repeated ekg on 8/31 sinus tachy vent rate 113 st elevation in lead I, aVL, inverted t wave  -echo result LV EF LV EF 40-45% hypokinesis of mid-apical anterior, mid-anteroseptal, mid-apical inferior, and apical wall, left atrium mildly dilated  -cardiologist on bord    BONG possible due to pre renal and sepsis   -creatinine improving  -monitor renal function  -nephrologist on board    Hyperkalemia  -received kayexalate, insulin and dextrose  -resolved,    Hypokalemia  -replaced and resolved    Mild Hyponatremia   -resolved  -TSH was normal a week ago    Mild anion gap Metabolic acidosis due to BONG  -resolved    DM-II  -finger stick glucose 215-205 since admission, continue lantus 12 units, sliding scale and monitor finger stick glucose   -home glipizide and metformin is held    Metastatic clear cell renal carcinoma  -CT abdomen pelvis on 8/22 left renal bulky malignancy with left renal vein and pararenal extension, bilateral small renal cortical tumors/metastasis, left adrenal metastasis  -CT on 8/1 there is marked adenopathy/mass in the base of the right neck in the  periclavicular region which extends into the superior mediastinum. There is also  right hilar adenopathy/mass. Multiple lung nodules as described above some of which are pleural-based. Left adrenal nodule. Left lobe liver lesion.   -oncologist on board  -consult palliative care team    Left renal vein tumor thrombosis  -no anticoagulation needed  -appreciated hematologist input    Anemia related to renal cell carcinoma  -H/H low but stable  -no evidence of active bleeding    Hx of HTN  -Hypotension improved, BP normal now    Hx of hyperlipidemia  -continue lipitor    Generalized weakness related to underlying illness  -supportive care    Diarrhea  -may be due to contrast, resolved now, stool for c diff cancelled    Oral thrush  -add nystatin suspension    DNR, at risk for decompensation, consult to palliative care team    Code status: DNR  DVT prophylaxis: heparin    Care Plan discussed with: Patient/Family, Nurse and   Disposition: TBD    at bedside, questions answered      Hospital Problems  Date Reviewed: 8/31/2017          Codes Class Noted POA    * (Principal)NSTEMI (non-ST elevated myocardial infarction) ([facility] [address] ICD-10-CM: I21.4  ICD-9-CM: 410.70  8/31/2017 Unknown                Vital Signs:    Last 24hrs VS reviewed since prior progress note. Most recent are:  Visit Vitals    /44 (BP 1 Location: Right arm, BP Patient Position: Lying left side)    Pulse 69    Temp 97.5 °F (36.4 °C)    Resp 18    Ht 5' (1.524 m)    Wt 56.2 kg (123 lb 14.4 oz)    SpO2 93%    Breastfeeding No    BMI 24.2 kg/m2         Intake/Output Summary (Last 24 hours) at 09/04/17 1320  Last data filed at 09/04/17 1119   Gross per 24 hour   Intake          1064.16 ml   Output             1200 ml   Net          -135.84 ml        Physical Examination:             Constitutional:  No acute distress, cooperative, looks anxious   ENT:  Oral mucous moist, oropharynx benign. Neck supple,    Resp:  Decrease bronchial breath sound bilaterally. No wheezing/rhonchi/rales. No accessory muscle use   CV:  Regular rhythm, normal rate, no murmurs, gallops, rubs    GI:  Soft, non distended, non tender. normoactive bowel sounds, no hepatosplenomegaly     Musculoskeletal:  No edema    Neurologic:  Moves all extremities. AAOx3, CN II-XII reviewed     Psych: looks anxious nor agitated.        Data Review:    Review and/or order of clinical lab test      Labs:     Recent Labs      09/04/17 0339 09/03/17 0100   WBC  12.0*  11.3*   HGB  7.8*  7.2*   HCT  24.2*  22.0*   PLT  278  288     Recent Labs      09/04/17 0339 09/03/17 0100 09/02/17 0333   NA  136  132*  132*   K  3.7  3.2*  3.4*   CL  104  99  97   CO2  21  21  21   BUN  35*  50*  57*   CREA  1.63*  2.37*  3.06*   GLU  126*  125*  175*   CA  9.3  9.2  9.2   MG  1.6   --   2.0   PHOS  2.7   --   3.2 Recent Labs      09/04/17   0339  09/03/17   0100   SGOT  8*  8*   ALT  7*  8*   AP  106  90   TBILI  0.2  0.2   TP  5.4*  5.7*   ALB  2.0*  2.3*   GLOB  3.4  3.4     No results for input(s): INR, PTP, APTT in the last 72 hours. No lab exists for component: INREXT, INREXT   No results for input(s): FE, TIBC, PSAT, FERR in the last 72 hours. No results found for: FOL, RBCF   No results for input(s): PH, PCO2, PO2 in the last 72 hours. No results for input(s): CPK, CKNDX, TROIQ in the last 72 hours.     No lab exists for component: CPKMB  Lab Results   Component Value Date/Time    Cholesterol, total 119 09/02/2017 03:33 AM    HDL Cholesterol 17 09/02/2017 03:33 AM    LDL, calculated 32 09/02/2017 03:33 AM    Triglyceride 350 09/02/2017 03:33 AM    CHOL/HDL Ratio 7.0 09/02/2017 03:33 AM     Lab Results   Component Value Date/Time    Glucose (POC) 110 09/04/2017 11:23 AM    Glucose (POC) 111 09/04/2017 06:45 AM    Glucose (POC) 163 09/03/2017 10:03 PM    Glucose (POC) 187 09/03/2017 04:31 PM    Glucose (POC) 143 09/03/2017 11:21 AM     Lab Results   Component Value Date/Time    Color YELLOW/STRAW 08/31/2017 05:19 PM    Appearance TURBID 08/31/2017 05:19 PM    Specific gravity 1.017 08/31/2017 05:19 PM    pH (UA) 5.5 08/31/2017 05:19 PM    Protein 300 08/31/2017 05:19 PM    Glucose 100 08/31/2017 05:19 PM    Ketone TRACE 08/31/2017 05:19 PM    Bilirubin NEGATIVE  08/31/2017 05:19 PM    Urobilinogen 0.2 08/31/2017 05:19 PM    Nitrites NEGATIVE  08/31/2017 05:19 PM    Leukocyte Esterase LARGE 08/31/2017 05:19 PM    Epithelial cells FEW 08/31/2017 05:19 PM    Bacteria 3+ 08/31/2017 05:19 PM    WBC >100 08/31/2017 05:19 PM    RBC 0-5 08/31/2017 05:19 PM         Medications Reviewed:     Current Facility-Administered Medications   Medication Dose Route Frequency    nystatin (MYCOSTATIN) 100,000 unit/mL oral suspension 500,000 Units  500,000 Units Oral QID    cefTRIAXone (ROCEPHIN) 2 g in 0.9% sodium chloride (MBP/ADV) 50 mL  2 g IntraVENous Q24H    HYDROmorphone (PF) (DILAUDID) injection 1 mg  1 mg IntraVENous Q4H PRN    insulin glargine (LANTUS) injection 12 Units  12 Units SubCUTAneous QHS    ondansetron (ZOFRAN) injection 4 mg  4 mg IntraVENous Q4H PRN    ALPRAZolam (XANAX) tablet 0.25 mg  0.25 mg Oral BID PRN    carvedilol (COREG) tablet 6.25 mg  6.25 mg Oral BID WITH MEALS    0.9% sodium chloride infusion  50 mL/hr IntraVENous CONTINUOUS    aspirin chewable tablet 81 mg  81 mg Oral DAILY    heparin (porcine) injection 5,000 Units  5,000 Units SubCUTAneous Q12H    insulin lispro (HUMALOG) injection   SubCUTAneous AC&HS    therapeutic multivitamin (THERAGRAN) tablet 1 Tab  1 Tab Oral DAILY    sodium chloride (NS) flush 5-10 mL  5-10 mL IntraVENous Q8H    sodium chloride (NS) flush 5-10 mL  5-10 mL IntraVENous PRN    sodium chloride (NS) flush 5-10 mL  5-10 mL IntraVENous PRN    sodium chloride (NS) flush 5-10 mL  5-10 mL IntraVENous Q8H    sodium chloride (NS) flush 5-10 mL  5-10 mL IntraVENous PRN    nitroglycerin (NITROSTAT) tablet 0.4 mg  0.4 mg SubLINGual Q5MIN PRN    atorvastatin (LIPITOR) tablet 40 mg  40 mg Oral QPM    glucose chewable tablet 16 g  4 Tab Oral PRN    dextrose (D50W) injection syrg 12.5-25 g  12.5-25 g IntraVENous PRN    glucagon (GLUCAGEN) injection 1 mg  1 mg IntraMUSCular PRN     ______________________________________________________________________  EXPECTED LENGTH OF STAY: 4d 21h  ACTUAL LENGTH OF STAY:          4                 Etta Ness MD

## 2017-09-05 NOTE — PROGRESS NOTES
Hospitalist Progress Note  Dahiana Hightower MD  Office: [phone]      Date of Service:  2017  NAME:  Dahiana Hightower  :  1950  MRN:  [id number]      Admission Summary:   The patient is a 75-year-old female with past medical history of diabetes, history of hyperlipidemia, anxiety, hypertension, currently diet-controlled, osteoporosis, spinal stenosis, who recently diagnosed with renal cell carcinoma. The patient had some swelling in her cervical lymph nodes and biopsy indicated clear cell carcinoma. The patient is followed up with [address] [place] [address] Oncology with Dr Jeffrey Mesa, and has not started chemotherapy as of yet. The patient also was noted to have metastases of the disease to the left to the pleura and mediastinum. Today, the patient presents because she reports that she has been having generalized weakness, no appetite for 2 days, some dry heaving, but no vomiting, and just has been feeling \"very lethargic. \" The patient was found to be hypotensive, pale and lightheaded in the triage, with systolic blood pressure of 80. The patient was placed in the ER, was started on fluid resuscitation. Her was elevated troponin and was found to be in acute renal failure and was found to be septic and was requested to be admitted under the hospitalist service. Interval history / Subjective:   Ms. Raghav Ricks is feeling very fatigued. No more flank pain. Not SOB at rest. Feels like she just needs to sleep. Assessment & Plan:     Sepsis likely due to serratia bacteremia, UTI (POA)   - Bcx  with serratia, repeat  no growth  - Ucx  with serratia  - US renal  with marked heterogeneity of the left kidney compatible with the known neoplasm. Thrombosis of the left renal vein  - Continue IVF  - Continue ceftriaxone started 9/3.  Zosyn -, Meropenum -9/3  - ID consulted    Subacute STEMI (POA) - no chest pain  - Troponin initial was 6.61, then 5.12 and 4.47  - ECG 8/31 with some anterolateral ST elevation   - Echo 8/31 with LVEF 40-45% hypokinesis of mid-apical anterior, mid-anteroseptal, mid-apical inferior, and apical wall, left atrium mildly dilated  - Continue aspirin, lipitor and carvedilol for medical management   - Cardiologist consulted    BONG (POA) - possible due to pre renal and sepsis, improving  - Renal US as above  - Continue IVF  - Nephrology consulted    Urinary retention - likely due to UTI, weakness  - Voiding trial, replace walsh for >300cc retained urine    Hyperkalemia (POA) - received kayexalate, insulin and dextrose  - Monitor    Mild Hyponatremia - resolved  -TSH was normal a week ago    Mild anion gap Metabolic acidosis due to BONG - resolved    DM-II (chronic) - A1c 7.5, BG a little high yesterday  - home glipizide and metformin is held  - Continue lantus, SSI as needed    Metastatic clear cell renal carcinoma (chronic)  - CT abdomen pelvis on 8/22 left renal bulky malignancy with left renal vein and pararenal extension, bilateral small renal cortical tumors/metastasis, left adrenal metastasis  - CT on 8/1 there is marked adenopathy/mass in the base of the right neck in the  periclavicular region which extends into the superior mediastinum. There is also  right hilar adenopathy/mass. Multiple lung nodules as described above some of which are pleural-based. Left adrenal nodule.  Left lobe liver lesion.  - Oncologist, palliative care consulted    Left renal vein tumor thrombosis  - no anticoagulation needed  - appreciated hematologist input    Anemia related to renal cell carcinoma - no evidence of active bleeding  - H/H low but stable    Hx of HTN - Hypotension improved, BP normal now    Hx of hyperlipidemia - continue lipitor    Generalized weakness related to underlying illness - supportive care    Diarrhea - may be due to contrast, resolved now, stool for c diff cancelled    Oral thrush - added nystatin suspension    DNR except ACLS meds, at risk for decompensation, consult to palliative care team    Code status: DNR  DVT prophylaxis: heparin    Care Plan discussed with: Patient/Family, Nurse and   Disposition: TBD    at bedside, questions answered      Hospital Problems  Date Reviewed: 8/31/2017          Codes Class Noted POA    * (Principal)NSTEMI (non-ST elevated myocardial infarction) ([facility] [address] ICD-10-CM: I21.4  ICD-9-CM: 410.70  8/31/2017 Unknown                Vital Signs:    Last 24hrs VS reviewed since prior progress note. Most recent are:  Visit Vitals    /45 (BP 1 Location: Left arm, BP Patient Position: At rest)    Pulse 71    Temp 98 °F (36.7 °C)    Resp 16    Ht 5' (1.524 m)    Wt 56.7 kg (125 lb)    SpO2 91%    Breastfeeding No    BMI 24.41 kg/m2         Intake/Output Summary (Last 24 hours) at 09/05/17 1042  Last data filed at 09/05/17 0842   Gross per 24 hour   Intake          2551.66 ml   Output             1000 ml   Net          1551.66 ml        Physical Examination:             Constitutional:  No acute distress, cooperative   ENT:  Oral mucous moist, oropharynx benign. Neck supple,    Resp:  Decrease bronchial breath sound bilaterally. Scattered rhonchi. No accessory muscle use   CV:  Regular rhythm, normal rate, no murmurs, gallops, rubs    GI:  Soft, non distended, non tender. normoactive bowel sounds, no hepatosplenomegaly     Musculoskeletal:  No edema    Neurologic:  Moves all extremities. AAOx3, CN II-XII reviewed     Psych: fatigued but not anxious or agitated. Data Review:     Telemetry x   ECG    Xray x   CT scan    MRI    Echocardiogram    Ultrasound              I have reviewed the flow sheet and recent notes  New labs and data personally reviewed.         Labs:     Recent Labs      09/05/17   0401 09/04/17   0339   WBC  11.3*  12.0*   HGB  8.1*  7.8*   HCT  25.8*  24.2*   PLT  234  278     Recent Labs      09/05/17   0401  09/04/17   0339  09/03/17   0100   NA  136  136  132*   K 4. 2  3.7  3.2*   CL  105  104  99   CO2  23  21  21   BUN  31*  35*  50*   CREA  1.34*  1.63*  2.37*   GLU  161*  126*  125*   CA  9.6  9.3  9.2   MG  2.2  1.6   --    PHOS  2.6  2.7   --      Recent Labs      09/05/17   0401  09/04/17   0339  09/03/17   0100   SGOT  19  8*  8*   ALT  14  7*  8*   AP  136*  106  90   TBILI  0.2  0.2  0.2   TP  5.6*  5.4*  5.7*   ALB  1.9*  2.0*  2.3*   GLOB  3.7  3.4  3.4     No results for input(s): INR, PTP, APTT in the last 72 hours. No lab exists for component: INREXT, INREXT   No results for input(s): FE, TIBC, PSAT, FERR in the last 72 hours. No results found for: FOL, RBCF   No results for input(s): PH, PCO2, PO2 in the last 72 hours. No results for input(s): CPK, CKNDX, TROIQ in the last 72 hours.     No lab exists for component: CPKMB  Lab Results   Component Value Date/Time    Cholesterol, total 119 09/02/2017 03:33 AM    HDL Cholesterol 17 09/02/2017 03:33 AM    LDL, calculated 32 09/02/2017 03:33 AM    Triglyceride 350 09/02/2017 03:33 AM    CHOL/HDL Ratio 7.0 09/02/2017 03:33 AM     Lab Results   Component Value Date/Time    Glucose (POC) 139 09/05/2017 06:25 AM    Glucose (POC) 155 09/04/2017 09:20 PM    Glucose (POC) 224 09/04/2017 04:21 PM    Glucose (POC) 110 09/04/2017 11:23 AM    Glucose (POC) 111 09/04/2017 06:45 AM     Lab Results   Component Value Date/Time    Color YELLOW/STRAW 08/31/2017 05:19 PM    Appearance TURBID 08/31/2017 05:19 PM    Specific gravity 1.017 08/31/2017 05:19 PM    pH (UA) 5.5 08/31/2017 05:19 PM    Protein 300 08/31/2017 05:19 PM    Glucose 100 08/31/2017 05:19 PM    Ketone TRACE 08/31/2017 05:19 PM    Bilirubin NEGATIVE  08/31/2017 05:19 PM    Urobilinogen 0.2 08/31/2017 05:19 PM    Nitrites NEGATIVE  08/31/2017 05:19 PM    Leukocyte Esterase LARGE 08/31/2017 05:19 PM    Epithelial cells FEW 08/31/2017 05:19 PM    Bacteria 3+ 08/31/2017 05:19 PM    WBC >100 08/31/2017 05:19 PM    RBC 0-5 08/31/2017 05:19 PM         Medications Reviewed:     Current Facility-Administered Medications   Medication Dose Route Frequency    ALPRAZolam (XANAX) tablet 0.25 mg  0.25 mg Oral QHS    senna-docusate (PERICOLACE) 8.6-50 mg per tablet 2 Tab  2 Tab Oral DAILY    nystatin (MYCOSTATIN) 100,000 unit/mL oral suspension 500,000 Units  500,000 Units Oral QID    cefTRIAXone (ROCEPHIN) 2 g in 0.9% sodium chloride (MBP/ADV) 50 mL  2 g IntraVENous Q24H    HYDROmorphone (PF) (DILAUDID) injection 1 mg  1 mg IntraVENous Q4H PRN    insulin glargine (LANTUS) injection 12 Units  12 Units SubCUTAneous QHS    ondansetron (ZOFRAN) injection 4 mg  4 mg IntraVENous Q4H PRN    ALPRAZolam (XANAX) tablet 0.25 mg  0.25 mg Oral BID PRN    carvedilol (COREG) tablet 6.25 mg  6.25 mg Oral BID WITH MEALS    0.9% sodium chloride infusion  50 mL/hr IntraVENous CONTINUOUS    aspirin chewable tablet 81 mg  81 mg Oral DAILY    heparin (porcine) injection 5,000 Units  5,000 Units SubCUTAneous Q12H    insulin lispro (HUMALOG) injection   SubCUTAneous AC&HS    therapeutic multivitamin (THERAGRAN) tablet 1 Tab  1 Tab Oral DAILY    sodium chloride (NS) flush 5-10 mL  5-10 mL IntraVENous Q8H    sodium chloride (NS) flush 5-10 mL  5-10 mL IntraVENous PRN    sodium chloride (NS) flush 5-10 mL  5-10 mL IntraVENous PRN    sodium chloride (NS) flush 5-10 mL  5-10 mL IntraVENous Q8H    sodium chloride (NS) flush 5-10 mL  5-10 mL IntraVENous PRN    nitroglycerin (NITROSTAT) tablet 0.4 mg  0.4 mg SubLINGual Q5MIN PRN    atorvastatin (LIPITOR) tablet 40 mg  40 mg Oral QPM    glucose chewable tablet 16 g  4 Tab Oral PRN    dextrose (D50W) injection syrg 12.5-25 g  12.5-25 g IntraVENous PRN    glucagon (GLUCAGEN) injection 1 mg  1 mg IntraMUSCular PRN     ______________________________________________________________________  EXPECTED LENGTH OF STAY: 4d 21h  ACTUAL LENGTH OF STAY:          5                 Drea Carney MD

## 2017-09-05 NOTE — PROGRESS NOTES
ID Progress Note  2017    Subjective:     Afebrile. Upset that she got transferred. Objective:     Vitals:   Visit Vitals    /70 (BP 1 Location: Left arm, BP Patient Position: At rest)    Pulse 72    Temp 97.2 °F (36.2 °C)    Resp 16    Ht 5' (1.524 m)    Wt 56.7 kg (125 lb)    SpO2 91%    Breastfeeding No    BMI 24.41 kg/m2        Tmax:  Temp (24hrs), Av.7 °F (36.5 °C), Min:97.2 °F (36.2 °C), Max:98.1 °F (36.7 °C)      Exam:    Not in distress  Lungs: clear to auscultation, no rales, wheezes or rhonchi   Heart: s1, s2, no murmurs, rubs or clicks   Abdomen: soft, nontender, no guarding or rebound      Labs:   Lab Results   Component Value Date/Time    WBC 11.3 2017 04:01 AM    Hemoglobin (POC) 11.2 2017 11:42 AM    HGB 8.1 2017 04:01 AM    Hematocrit (POC) 33 2017 11:42 AM    HCT 25.8 2017 04:01 AM    PLATELET 121  04:01 AM    MCV 78.2 2017 04:01 AM     Lab Results   Component Value Date/Time    Sodium 136 2017 04:01 AM    Potassium 4.2 2017 04:01 AM    Chloride 105 2017 04:01 AM    CO2 23 2017 04:01 AM    Anion gap 8 2017 04:01 AM    Glucose 161 2017 04:01 AM    BUN 31 2017 04:01 AM    Creatinine 1.34 2017 04:01 AM    BUN/Creatinine ratio 23 2017 04:01 AM    GFR est AA 48 2017 04:01 AM    GFR est non-AA 39 2017 04:01 AM    Calcium 9.6 2017 04:01 AM    Bilirubin, total 0.2 2017 04:01 AM    AST (SGOT) 19 2017 04:01 AM    Alk. phosphatase 136 2017 04:01 AM    Protein, total 5.6 2017 04:01 AM    Albumin 1.9 2017 04:01 AM    Globulin 3.7 2017 04:01 AM    A-G Ratio 0.5 2017 04:01 AM    ALT (SGPT) 14 2017 04:01 AM         Assessment:          1. Serratia bacteremia     2. UTI from serratia      3. Metastatic renal cell carcinoma      4. Renal failure      5. MI      6. Osteoporosis      7. DM                 Recommendations:     1. Continue eftriaxone. The isolate is levaquin sensitive. She can be placed on this on discharge until the 16th of this month.          Cielo Awan MD

## 2017-09-05 NOTE — PROGRESS NOTES
Problem: Mobility Impaired (Adult and Pediatric)  Goal: *Acute Goals and Plan of Care (Insert Text)  Physical Therapy Goals  Initiated 9/4/2017  1. Patient will move from supine to sit and sit to supine in bed with minimal assistance/contact guard assist within 7 day(s). 2. Patient will transfer from bed to chair and chair to bed with supervision/set-up using the least restrictive device within 7 day(s). 3. Patient will perform sit to stand with supervision/set-up within 7 day(s). 4. Patient will ambulate with supervision/set-up for 100 feet with the least restrictive device within 7 day(s). 5. Patient will tolerate sitting up in the bedside chair x 2-3 hours per day within 7 days. 6. Patient will increase Tinetti balance score by 3-4 points to decrease risk of falling within 7 days. PHYSICAL THERAPY TREATMENT  Patient: Tacho Reid (38 y.o. female)  Date: 9/5/2017  Diagnosis: NSTEMI (non-ST elevated myocardial infarction) [facility]) NSTEMI (non-ST elevated myocardial infarction) [facility])       Precautions:        ASSESSMENT:  Pt in bed with eyes closed, agreed to participate in therapy with motivation required. Pt reports she is feeling very fatigued and expresses depression from having to be assisted to bathe/clean up after toileting. Pt rolled side to side with Min A to don brief as she was worried she would have to urinate while working with therapy. Mod A x 2 to come to sitting with increased discomfort. Overall, Min A x 2 for transfers and ambulating within room. Pt declined further ambulation d/t fatigue and feeling lightheaded. Vitals stable throughout. Gait training forward, backwards and alteral completed to walk to door and back to chair. Pt left sitting up in chair with tray table set for lunch. Discussed plan to sit up for 30 minutes at a time as nursing reports is was very difficult getting the pt back to bed yesterday after sitting up too long.  D/t pt's decline in functional status would recommend rehab before discharge home. Goal set to make it to hallway with RW tomorrow. Progression toward goals:  [ ]    Improving appropriately and progressing toward goals  [X]    Improving slowly and progressing toward goals  [ ]    Not making progress toward goals and plan of care will be adjusted       PLAN:  Patient continues to benefit from skilled intervention to address the above impairments. Continue treatment per established plan of care. Discharge Recommendations:  Rehab  Further Equipment Recommendations for Discharge:  TBD       SUBJECTIVE:   Patient stated I don;t want them to have to clean me up, they should have left the catheter in.      OBJECTIVE DATA SUMMARY:   Critical Behavior:  Neurologic State: Drowsy  Orientation Level: Disoriented to time, Oriented to person, Oriented to place, Oriented to situation  Cognition: Follows commands, Impaired decision making     Functional Mobility Training:  Bed Mobility:     Supine to Sit: Moderate assistance;Assist x2              Transfers:  Sit to Stand: Minimum assistance;Assist x2  Stand to Sit: Minimum assistance;Assist x2        Bed to Chair: Minimum assistance;Assist x2                    Balance:  Sitting: Intact  Sitting - Static: Good (unsupported)  Sitting - Dynamic: Good (unsupported)  Standing: Impaired  Standing - Static: Fair;Constant support  Standing - Dynamic : Fair  Ambulation/Gait Training:  Distance (ft): 10 Feet (ft)  Assistive Device: Gait belt;Walker, rolling  Ambulation - Level of Assistance: Minimal assistance;Assist x2        Gait Abnormalities: Decreased step clearance; Antalgic; Path deviations;Trunk sway increased        Base of Support: Narrowed     Speed/Honey: Slow;Pace decreased (<100 feet/min)  Step Length: Right shortened;Left shortened           Activity Tolerance:   Good  Please refer to the flowsheet for vital signs taken during this treatment.   After treatment:   [X]    Patient left in no apparent distress sitting up in chair  [ ]    Patient left in no apparent distress in bed  [X]    Call bell left within reach  [X]    Nursing notified  [X]    Caregiver present  [ ]    Bed alarm activated      COMMUNICATION/COLLABORATION:   The patients plan of care was discussed with: Registered Nurse     Sabas Stoll, PT   Time Calculation: 24 mins

## 2017-09-05 NOTE — PROGRESS NOTES
Pt unhappy in a double room. She has explained, she needs absolute quiet, a cool room, and her nurses upstairs, \"told me I would be in  Private room\". There are no private rooms on [facility]. Pt Advocacy has been notified.

## 2017-09-05 NOTE — PROGRESS NOTES
0730: Bedside shift change report given to Saira Talley RN (oncoming nurse) by Carole Hernandez RN (offgoing nurse). Report included the following information SBAR, Kardex, Intake/Output, MAR, Accordion, Recent Results, Med Rec Status and Cardiac Rhythm NSR.   1100: Ayoub removed by Gianluca Stoll RN.   1150: Patient sitting in chair for lunch. 1250: Pt returned to bed. Laying on left side. 1400: TRANSFER - OUT REPORT:    Verbal report given to Radha Sparks RN(name) on Marleni Lobe  being transferred to (unit) for routine progression of care       Report consisted of patients Situation, Background, Assessment and   Recommendations(SBAR). Information from the following report(s) SBAR, Kardex, ED Summary, Intake/Output, MAR, Accordion, Recent Results, Med Rec Status and Cardiac Rhythm NSR was reviewed with the receiving nurse. Lines:   Peripheral IV 08/31/17 Left Wrist (Active)   Site Assessment Clean, dry, & intact 9/5/2017  8:42 AM   Phlebitis Assessment 0 9/5/2017  8:42 AM   Infiltration Assessment 0 9/5/2017  8:42 AM   Dressing Status Clean, dry, & intact 9/5/2017  8:42 AM   Dressing Type Tape;Transparent 9/5/2017  8:42 AM   Hub Color/Line Status Purple; Infusing;Capped 9/5/2017  8:42 AM   Action Taken Open ports on tubing capped 9/5/2017  8:42 AM   Alcohol Cap Used Yes 9/5/2017  8:42 AM        Opportunity for questions and clarification was provided. Patient transported with:   Monitor  Registered Nurse            Problem: Diabetes Self-Management  Goal: *Incorporating nutritional management into lifestyle  Describe effect of type, amount and timing of food on blood glucose; list 3 methods for planning meals. Outcome: Not Progressing Towards Goal  Pt is eating a very small amount of her meals. Only wants cold foods. Goal: *Incorporating physical activity into lifestyle  Outcome: Not Progressing Towards Goal  Pt does not want to get out of bed.      Problem: Pressure Injury - Risk of  Goal: *Prevention of pressure ulcer  Outcome: Progressing Towards Goal    09/05/17 0842   Wound Prevention and Protection Methods   Orientation of Wound Prevention Posterior   Location of Wound Prevention Sacrum/Coccyx   Dressing Present  Yes   Dressing Status Intact   Read Only, Retired: Wound Treatment (non-mechanical)   Wound Offloading (Prevention Methods) Bed, pressure redistribution/air;Elevate heels;Pillows;Repositioning;Turning         Problem: AMI: Day 5  Goal: Medications  Outcome: Progressing Towards Goal  Taking medications as prescribed. Problem: Falls - Risk of  Goal: *Absence of Falls  Document Deedee Fall Risk and appropriate interventions in the flowsheet.    Outcome: Progressing Towards Goal  Fall Risk Interventions:  Mobility Interventions: Assess mobility with egress test, Communicate number of staff needed for ambulation/transfer, Mechanical lift, OT consult for ADLs, Patient to call before getting OOB, PT Consult for mobility concerns, PT Consult for assist device competence, Strengthening exercises (ROM-active/passive), Utilize gait belt for transfers/ambulation     Mentation Interventions: Adequate sleep, hydration, pain control, Door open when patient unattended, Increase mobility, More frequent rounding, Room close to nurse's station, Reorient patient     Medication Interventions: Patient to call before getting OOB, Teach patient to arise slowly     Elimination Interventions: Call light in reach, Patient to call for help with toileting needs

## 2017-09-05 NOTE — PROGRESS NOTES
1930: Bedside and Verbal shift change report given to Hilda Adams RN (oncoming nurse) by Mohini Bradley RN (offgoing nurse). Report included the following information SBAR, Kardex, Intake/Output, MAR, Recent Results and Cardiac Rhythm NSR.   0730: Bedside and Verbal shift change report given to Mohini Bradley RN (oncoming nurse) by Hilda Adams RN (offgoing nurse). Report included the following information SBAR, Kardex, Intake/Output, MAR, Recent Results and Cardiac Rhythm NSR. Problem: Pressure Injury - Risk of  Goal: *Prevention of pressure ulcer  Outcome: Progressing Towards Goal    09/04/17 1945   Wound Prevention and Protection Methods   Orientation of Wound Prevention Posterior   Location of Wound Prevention Sacrum/Coccyx; Hip   Dressing Present  Yes   Dressing Status Intact   Read Only, Retired: Wound Treatment (non-mechanical)   Wound Offloading (Prevention Methods) Bed, pressure redistribution/air;Bed, pressure reduction mattress;Pillows;Repositioning;Turning         Problem: Falls - Risk of  Goal: *Absence of Falls  Document Deedee Fall Risk and appropriate interventions in the flowsheet.    Outcome: Progressing Towards Goal  Fall Risk Interventions:  Mobility Interventions: Communicate number of staff needed for ambulation/transfer, OT consult for ADLs, Patient to call before getting OOB, PT Consult for mobility concerns, PT Consult for assist device competence           Medication Interventions: Evaluate medications/consider consulting pharmacy, Patient to call before getting OOB, Teach patient to arise slowly     Elimination Interventions: Call light in reach, Patient to call for help with toileting needs

## 2017-09-05 NOTE — PROGRESS NOTES
Cardiology Progress Note            Admit Date: 8/31/2017  Admit Diagnosis: NSTEMI (non-ST elevated myocardial infarction) [facility])  Date: 9/5/2017     Time: 3:11 PM    HPI: Pt with PMH of DM, HTN, HLD, osteoporosis, DM, depression, anxiety and recently diagnosed metastatic renal cell cancer. Admitted 8/31 with generalized weakness, no appetite. Found to have BONG, sepsis due UTI and bacteremia, and subacute STEMI. Cardiology following for subacute STEMI. Assessment and Plan     1. Subacute ST elevation MI: anterolateral wall. Admit EKG 8/31/17 Q wave development with ST elevation anterolateral leads. Troponin trending down 4.47 8/31 from peak of 6.61 on admit. .   -TTE: EF 40-45%. HK at mid apical ant, mid A, mid apical Inferior, apex with dynamic obstruction at rest reatled to AM, peak at 40 Mod MR, aortic sclerosis without stenosis.   -No chest pain, SOB   -ASA 81 mg daily   -Lipitor 40 mg daily   -Coreg 6.25 mg BID   -Medical management at this time given her situation with myla for mortality and morbitidy. No cath planned. 2. GNR UTI/Bacteremia (serratia marscesan UTI and bacteremia): WBC decreasing, now 11.3   -Management per ID    3. BONG: suspected ATN per nephrology. Creatinine 1.34 from 1.63 yesterday. GFR 39   -Nephrology on consult    4. Hx of HLD:  Continue lipitor     5. Metastatic renal cancer: oncology following. 6. Anemia:  hgb 8.1 today from 7.8 yesterday.-     7. 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 8. Advanced directives:  DNR. Tolerating coreg. Continue medical management of NSTEMI. Will sign off. Cardiology Attending:Patient seen and examined. I agree with NP assessment and plans. No murmur. Adan Burr MD 9/5/2017 4:32 PM         Starla Horner NP 9/5/2017 1:00 PM         Subjective:   Lamont Dior denies chest pain, SOB.       Objective: Physical Exam:                Visit Vitals    /50 (BP 1 Location: Left arm, BP Patient Position: At rest;Head of bed elevated (Comment degrees))    Pulse 65    Temp 98.1 °F (36.7 °C)    Resp 16    Ht 5' (1.524 m)    Wt 56.7 kg (125 lb)    SpO2 95%    Breastfeeding No    BMI 24.41 kg/m2          General Appearance:   Well developed, pale,  oriented x 3,NAD. Ears/Nose/Mouth/Throat:    Hearing grossly normal.         Neck:  Supple. Chest:    Lungs clear to auscultation bilaterally. Cardiovascular:    Regular rate and rhythm, S1, S2 normal, no murmur. Abdomen:    Soft, non-distended bowel sounds are active. Extremities:  No edema bilaterally. Skin:  Warm and dry.      Telemetry: NSR           Data Review:    Labs:    Recent Results (from the past 24 hour(s))   GLUCOSE, POC    Collection Time: 09/04/17  4:21 PM   Result Value Ref Range    Glucose (POC) 224 (H) 65 - 100 mg/dL    Performed by Bassem Lopez (S0N)    GLUCOSE, POC    Collection Time: 09/04/17  9:20 PM   Result Value Ref Range    Glucose (POC) 155 (H) 65 - 100 mg/dL    Performed by Alka WEST    CBC W/O DIFF    Collection Time: 09/05/17  4:01 AM   Result Value Ref Range    WBC 11.3 (H) 3.6 - 11.0 K/uL    RBC 3.30 (L) 3.80 - 5.20 M/uL    HGB 8.1 (L) 11.5 - 16.0 g/dL    HCT 25.8 (L) 35.0 - 47.0 %    MCV 78.2 (L) 80.0 - 99.0 FL    MCH 24.5 (L) 26.0 - 34.0 PG    MCHC 31.4 30.0 - 36.5 g/dL    RDW 14.8 (H) 11.5 - 14.5 %    PLATELET 904 000 - 533 K/uL   MAGNESIUM    Collection Time: 09/05/17  4:01 AM   Result Value Ref Range    Magnesium 2.2 1.6 - 2.4 mg/dL   PHOSPHORUS    Collection Time: 09/05/17  4:01 AM   Result Value Ref Range    Phosphorus 2.6 2.6 - 4.7 MG/DL   METABOLIC PANEL, COMPREHENSIVE    Collection Time: 09/05/17  4:01 AM   Result Value Ref Range    Sodium 136 136 - 145 mmol/L    Potassium 4.2 3.5 - 5.1 mmol/L    Chloride 105 97 - 108 mmol/L    CO2 23 21 - 32 mmol/L    Anion gap 8 5 - 15 mmol/L    Glucose 161 (H) 65 - 100 mg/dL    BUN 31 (H) 6 - 20 MG/DL    Creatinine 1.34 (H) 0.55 - 1.02 MG/DL    BUN/Creatinine ratio 23 (H) 12 - 20      GFR est AA 48 (L) >60 ml/min/1.73m2    GFR est non-AA 39 (L) >60 ml/min/1.73m2    Calcium 9.6 8.5 - 10.1 MG/DL    Bilirubin, total 0.2 0.2 - 1.0 MG/DL    ALT (SGPT) 14 12 - 78 U/L    AST (SGOT) 19 15 - 37 U/L    Alk.  phosphatase 136 (H) 45 - 117 U/L    Protein, total 5.6 (L) 6.4 - 8.2 g/dL    Albumin 1.9 (L) 3.5 - 5.0 g/dL    Globulin 3.7 2.0 - 4.0 g/dL    A-G Ratio 0.5 (L) 1.1 - 2.2     GLUCOSE, POC    Collection Time: 09/05/17  6:25 AM   Result Value Ref Range    Glucose (POC) 139 (H) 65 - 100 mg/dL    Performed by [address] [place] [address], POC    Collection Time: 09/05/17 11:29 AM   Result Value Ref Range    Glucose (POC) 127 (H) 65 - 100 mg/dL    Performed by [facility] Lindy Kelly)           Radiology:        Current Facility-Administered Medications   Medication Dose Route Frequency    ALPRAZolam (XANAX) tablet 0.25 mg  0.25 mg Oral QHS    senna-docusate (PERICOLACE) 8.6-50 mg per tablet 2 Tab  2 Tab Oral DAILY    nystatin (MYCOSTATIN) 100,000 unit/mL oral suspension 500,000 Units  500,000 Units Oral QID    cefTRIAXone (ROCEPHIN) 2 g in 0.9% sodium chloride (MBP/ADV) 50 mL  2 g IntraVENous Q24H    HYDROmorphone (PF) (DILAUDID) injection 1 mg  1 mg IntraVENous Q4H PRN    insulin glargine (LANTUS) injection 12 Units  12 Units SubCUTAneous QHS    ondansetron (ZOFRAN) injection 4 mg  4 mg IntraVENous Q4H PRN    ALPRAZolam (XANAX) tablet 0.25 mg  0.25 mg Oral BID PRN    carvedilol (COREG) tablet 6.25 mg  6.25 mg Oral BID WITH MEALS    0.9% sodium chloride infusion  50 mL/hr IntraVENous CONTINUOUS    aspirin chewable tablet 81 mg  81 mg Oral DAILY    heparin (porcine) injection 5,000 Units  5,000 Units SubCUTAneous Q12H    insulin lispro (HUMALOG) injection   SubCUTAneous AC&HS    therapeutic multivitamin (THERAGRAN) tablet 1 Tab  1 Tab Oral DAILY    sodium chloride (NS) flush 5-10 mL  5-10 mL IntraVENous Q8H    sodium chloride (NS) flush 5-10 mL  5-10 mL IntraVENous PRN    sodium chloride (NS) flush 5-10 mL  5-10 mL IntraVENous PRN    sodium chloride (NS) flush 5-10 mL  5-10 mL IntraVENous Q8H    sodium chloride (NS) flush 5-10 mL  5-10 mL IntraVENous PRN    nitroglycerin (NITROSTAT) tablet 0.4 mg  0.4 mg SubLINGual Q5MIN PRN    atorvastatin (LIPITOR) tablet 40 mg  40 mg Oral QPM    glucose chewable tablet 16 g  4 Tab Oral PRN    dextrose (D50W) injection syrg 12.5-25 g  12.5-25 g IntraVENous PRN    glucagon (GLUCAGEN) injection 1 mg  1 mg IntraMUSCular PRN          [facility].  JARROD Horner     [facility] [address] [place] [address],[address]   Lidia Vera   [phone]

## 2017-09-05 NOTE — PROGRESS NOTES
NAME: Mauricio Maya        :  1950        MRN:  [id number]        Assessment :    Plan:  --BONG- s/p early ATN    Sepsis- Serratia bacteremia/urosepsis    Hyperkalemia - better    Hyponatremia     Acidosis-mild; lactate nl    Proteinuria    NSTEMI    Metastatic Renal cell cancer- with mets to pleura, mediastinum, LN, L adrenal, kidneys    DM-2 with proteinuria -Renal indices showing improvement-> Cr down to 1.34mg/dl. Non-oliguric. Suspect ATN recovery    Gentle IVF. Encourage solid intake    IV Ceftriaxone per ID    Discussed with /patient and RN       Subjective:     Chief Complaint:  Agitated about being transferred. Objective:     VITALS:   Last 24hrs VS reviewed since prior progress note. Most recent are:  Visit Vitals    /70 (BP 1 Location: Left arm, BP Patient Position: At rest)    Pulse 72    Temp 97.2 °F (36.2 °C)    Resp 16    Ht 5' (1.524 m)    Wt 56.7 kg (125 lb)    SpO2 91%    Breastfeeding No    BMI 24.41 kg/m2       Intake/Output Summary (Last 24 hours) at 17 1820  Last data filed at 17 1236   Gross per 24 hour   Intake          1961.66 ml   Output              925 ml   Net          1036.66 ml      Telemetry Reviewed:     PHYSICAL EXAM:  General: NAD  CTAB/L  abd soft  No edema       ________________________________________________________________________  Deep Omar Ponce MD     Procedures: see electronic medical records for all procedures/Xrays and details which  were not copied into this note but were reviewed prior to creation of Plan.       LABS:  Recent Labs      17   033   WBC  11.3*  12.0*   HGB  8.1*  7.8*   HCT  25.8*  24.2*   PLT  234  278     Recent Labs      17   04017   0339  17   0100   NA  136  136  132*   K  4.2  3.7  3.2*   CL  105  104  99   CO2  23  21  21   BUN  31*  35*  50*   CREA  1.34*  1.63*  2.37*   GLU  161*  126* 125*   CA  9.6  9.3  9.2   MG  2.2  1.6   --    PHOS  2.6  2.7   --      Recent Labs      09/05/17   0401  09/04/17   0339  09/03/17   0100   SGOT  19  8*  8*   AP  136*  106  90   TP  5.6*  5.4*  5.7*   ALB  1.9*  2.0*  2.3*   GLOB  3.7  3.4  3.4     No results for input(s): INR, PTP, APTT in the last 72 hours. No lab exists for component: INREXT, INREXT   No results for input(s): FE, TIBC, PSAT, FERR in the last 72 hours. No results found for: FOL, RBCF   No results for input(s): PH, PCO2, PO2 in the last 72 hours. No results for input(s): CPK, CKMB in the last 72 hours.     No lab exists for component: TROPONINI  No components found for: [place]  Lab Results   Component Value Date/Time    Color YELLOW/STRAW 08/31/2017 05:19 PM    Appearance TURBID 08/31/2017 05:19 PM    Specific gravity 1.017 08/31/2017 05:19 PM    pH (UA) 5.5 08/31/2017 05:19 PM    Protein 300 08/31/2017 05:19 PM    Glucose 100 08/31/2017 05:19 PM    Ketone TRACE 08/31/2017 05:19 PM    Bilirubin NEGATIVE  08/31/2017 05:19 PM    Urobilinogen 0.2 08/31/2017 05:19 PM    Nitrites NEGATIVE  08/31/2017 05:19 PM    Leukocyte Esterase LARGE 08/31/2017 05:19 PM    Epithelial cells FEW 08/31/2017 05:19 PM    Bacteria 3+ 08/31/2017 05:19 PM    WBC >100 08/31/2017 05:19 PM    RBC 0-5 08/31/2017 05:19 PM       MEDICATIONS:  Current Facility-Administered Medications   Medication Dose Route Frequency    ALPRAZolam (XANAX) tablet 0.25 mg  0.25 mg Oral QHS    senna-docusate (PERICOLACE) 8.6-50 mg per tablet 2 Tab  2 Tab Oral DAILY    nystatin (MYCOSTATIN) 100,000 unit/mL oral suspension 500,000 Units  500,000 Units Oral QID    cefTRIAXone (ROCEPHIN) 2 g in 0.9% sodium chloride (MBP/ADV) 50 mL  2 g IntraVENous Q24H    HYDROmorphone (PF) (DILAUDID) injection 1 mg  1 mg IntraVENous Q4H PRN    insulin glargine (LANTUS) injection 12 Units  12 Units SubCUTAneous QHS    ondansetron (ZOFRAN) injection 4 mg  4 mg IntraVENous Q4H PRN    ALPRAZolam [place]) tablet 0.25 mg  0.25 mg Oral BID PRN    carvedilol (COREG) tablet 6.25 mg  6.25 mg Oral BID WITH MEALS    0.9% sodium chloride infusion  50 mL/hr IntraVENous CONTINUOUS    aspirin chewable tablet 81 mg  81 mg Oral DAILY    heparin (porcine) injection 5,000 Units  5,000 Units SubCUTAneous Q12H    insulin lispro (HUMALOG) injection   SubCUTAneous AC&HS    therapeutic multivitamin (THERAGRAN) tablet 1 Tab  1 Tab Oral DAILY    sodium chloride (NS) flush 5-10 mL  5-10 mL IntraVENous Q8H    sodium chloride (NS) flush 5-10 mL  5-10 mL IntraVENous PRN    sodium chloride (NS) flush 5-10 mL  5-10 mL IntraVENous PRN    sodium chloride (NS) flush 5-10 mL  5-10 mL IntraVENous Q8H    sodium chloride (NS) flush 5-10 mL  5-10 mL IntraVENous PRN    nitroglycerin (NITROSTAT) tablet 0.4 mg  0.4 mg SubLINGual Q5MIN PRN    atorvastatin (LIPITOR) tablet 40 mg  40 mg Oral QPM    glucose chewable tablet 16 g  4 Tab Oral PRN    dextrose (D50W) injection syrg 12.5-25 g  12.5-25 g IntraVENous PRN    glucagon (GLUCAGEN) injection 1 mg  1 mg IntraMUSCular PRN

## 2017-09-06 NOTE — PROGRESS NOTES
Primary Nurse Sukumar Skelton, RN and  Duane Brown RN performed a dual skin assessment on this patient Impairment noted- see wound doc flow sheet  Antonio score is {ANTONIO SCALE: 15   Right clavicle-scabbed, healing area, red, open to air-pt stated where she had surgery.   Right  Shoulder posterior-red abrasion-bandaid  Mepilex to sacrum-reddened, but intact

## 2017-09-06 NOTE — PROGRESS NOTES
Care Management Interventions  PCP Verified by CM: Yes  Discharge Durable Medical Equipment: No  Physical Therapy Consult: Yes  Occupational Therapy Consult: No  Speech Therapy Consult: No  Current Support Network: Lives with Spouse (independent with ADLs)  Confirm Follow Up Transport: Family  Plan discussed with Pt/Family/Caregiver: Yes  Freedom of Choice Offered: Yes    CM reviewed chart and met with pt to introduce self and discuss discharge planning. Pt lives with her  in a 1-level private residence. Pt was independent with ADLs and IADLs prior to admission, and was not using any assistive devices. Discussed home with home health vs rehab. Pt was not impressed with the idea of rehab, but admitted that she could not even get up and walk to the bathroom at this time. CM discussed [address] as an option, pt wants to talk to her  about it. CM tried calling  and left a voicemail. OT Eval is needed for Rehab Eval, OT eval ordered. CM will continue to follow.   COLLIN RodriguezW, ACM

## 2017-09-06 NOTE — PROGRESS NOTES
Hospitalist Progress Note  Patty Bower MD  Office: [phone]      Date of Service:  2017  NAME:  Patty Bower  :  1950  MRN:  [id number]      Admission Summary:   The patient is a 75-year-old female with past medical history of diabetes, history of hyperlipidemia, anxiety, hypertension, currently diet-controlled, osteoporosis, spinal stenosis, who recently diagnosed with renal cell carcinoma. The patient had some swelling in her cervical lymph nodes and biopsy indicated clear cell carcinoma. The patient is followed up with [address] [place] [address] Oncology with Dr Matt Avila, and has not started chemotherapy as of yet. The patient also was noted to have metastases of the disease to the left to the pleura and mediastinum. Today, the patient presents because she reports that she has been having generalized weakness, no appetite for 2 days, some dry heaving, but no vomiting, and just has been feeling \"very lethargic. \" The patient was found to be hypotensive, pale and lightheaded in the triage, with systolic blood pressure of 80. The patient was placed in the ER, was started on fluid resuscitation. Her was elevated troponin and was found to be in acute renal failure and was found to be septic and was requested to be admitted under the hospitalist service. Interval history / Subjective:      Ms. Lan Harrison is feeling very depressed. She is fatigued and just wants to sleep. She is used to being an active person and it is very difficult to be cared for. No chest pain, SOB, palpitations. Assessment & Plan:     Sepsis due to serratia bacteremia, UTI (POA)   - Bcx  with serratia, repeat  no growth  - Ucx  with serratia  - US renal  with marked heterogeneity of the left kidney compatible with the known neoplasm. Thrombosis of the left renal vein  - Continue IVF  - Continue ceftriaxone started 9/3.  Zosyn -, Meropenum -9/3  - ID consulted    Subacute STEMI (POA) - no chest pain  - Troponin initial was 6.61, then 5.12 and 4.47  - ECG 8/31 with some anterolateral ST elevation   - Echo 8/31 with LVEF 40-45% hypokinesis of mid-apical anterior, mid-anteroseptal, mid-apical inferior, and apical wall, left atrium mildly dilated  - Continue aspirin, lipitor and carvedilol for medical management   - Cardiologist consulted    BONG (POA) - possible due to pre renal and sepsis, improving  - Renal US as above  - Continue IVF  - Nephrology consulted    Urinary retention - likely due to UTI, weakness  - Failed voiding trial 9/5, walsh replaced  - Will need urology follow up for voiding trial    Hyperkalemia (POA) - received kayexalate, insulin and dextrose  - Monitor    Mild Hyponatremia - resolved  -TSH was normal a week ago    Hypercalcemia - increasing, no bony lesions seen on CT scan? I think this may be contributing to some of her depression and fatigue  - Check PTH  - Continue IV fluids but given newly reduced EF I will add lasix as well to prevent fluid overload and continue to reduce the calcium  - Add calcitonin  - Monitor calcium and albumin    Mild anion gap Metabolic acidosis due to BONG - resolved    DM-II (chronic) - A1c 7.5, BG a little high yesterday  - home glipizide and metformin is held  - Continue lantus, SSI as needed    Metastatic clear cell renal carcinoma (chronic)  - CT abdomen pelvis on 8/22 left renal bulky malignancy with left renal vein and pararenal extension, bilateral small renal cortical tumors/metastasis, left adrenal metastasis  - CT on 8/1 there is marked adenopathy/mass in the base of the right neck in the  periclavicular region which extends into the superior mediastinum. There is also  right hilar adenopathy/mass. Multiple lung nodules as described above some of which are pleural-based. Left adrenal nodule.  Left lobe liver lesion.  - Oncologist, palliative care consulted    Left renal vein tumor thrombosis  - no anticoagulation needed  - Pain control  - appreciated hematologist input    Anemia related to renal cell carcinoma - no evidence of active bleeding  - H/H low but stable    Hx of HTN - Hypotension improved, BP normal now    Hx of hyperlipidemia - continue lipitor    Generalized weakness related to underlying illness - combination of sepsis, newly reduced EF, metastatic cancer and hypercalcemia    Diarrhea - may be due to contrast, resolved now, stool for c diff cancelled    Oral thrush - added nystatin suspension    DNR except ACLS meds, at risk for decompensation, consulted palliative care team    Code status: DNR  DVT prophylaxis: heparin  Care Plan discussed with: Patient/Family, Nurse and   Disposition: TBD      Hospital Problems  Date Reviewed: 8/31/2017          Codes Class Noted POA    * (Principal)NSTEMI (non-ST elevated myocardial infarction) ([facility] [address] ICD-10-CM: I21.4  ICD-9-CM: 410.70  8/31/2017 Unknown                Vital Signs:    Last 24hrs VS reviewed since prior progress note. Most recent are:  Visit Vitals    /61 (BP 1 Location: Right arm, BP Patient Position: At rest)    Pulse 64    Temp 97.9 °F (36.6 °C)    Resp 16    Ht 5' (1.524 m)    Wt 59.5 kg (131 lb 1.6 oz)    SpO2 95%    Breastfeeding No    BMI 25.6 kg/m2         Intake/Output Summary (Last 24 hours) at 09/06/17 1236  Last data filed at 09/06/17 0649   Gross per 24 hour   Intake              120 ml   Output              650 ml   Net             -530 ml        Physical Examination:             Constitutional:  No acute distress, cooperative   ENT:  Oral mucous moist, oropharynx benign. Neck supple,    Resp:  Decrease bronchial breath sound bilaterally. Scattered rhonchi. No accessory muscle use   CV:  Regular rhythm, normal rate, no murmurs, gallops, rubs    GI:  Soft, non distended, non tender. normoactive bowel sounds, no hepatosplenomegaly     Musculoskeletal:  No edema    Neurologic:  Moves all extremities.   AAOx3, CN II-XII reviewed Psych: fatigued, tearful       Data Review:     Telemetry x   ECG    Xray    CT scan    MRI    Echocardiogram    Ultrasound              I have reviewed the flow sheet and recent notes  New labs and data personally reviewed. Labs:     Recent Labs      09/06/17 0626 09/05/17 0401   WBC  12.1*  11.3*   HGB  9.4*  8.1*   HCT  29.9*  25.8*   PLT  348  234     Recent Labs      09/06/17 0626 09/05/17 0401 09/04/17 0339   NA  138  136  136   K  4.4  4.2  3.7   CL  108  105  104   CO2  21  23  21   BUN  27*  31*  35*   CREA  1.07*  1.34*  1.63*   GLU  135*  161*  126*   CA  10.2*  9.6  9.3   MG  1.9  2.2  1.6   PHOS  2.8  2.6  2.7     Recent Labs      09/06/17 0626 09/05/17 0401 09/04/17 0339   SGOT   --   19  8*   ALT   --   14  7*   AP   --   136*  106   TBILI   --   0.2  0.2   TP   --   5.6*  5.4*   ALB  1.9*  1.9*  2.0*   GLOB   --   3.7  3.4     No results for input(s): INR, PTP, APTT in the last 72 hours. No lab exists for component: INREXT, INREXT   No results for input(s): FE, TIBC, PSAT, FERR in the last 72 hours. No results found for: FOL, RBCF   No results for input(s): PH, PCO2, PO2 in the last 72 hours. No results for input(s): CPK, CKNDX, TROIQ in the last 72 hours.     No lab exists for component: CPKMB  Lab Results   Component Value Date/Time    Cholesterol, total 119 09/02/2017 03:33 AM    HDL Cholesterol 17 09/02/2017 03:33 AM    LDL, calculated 32 09/02/2017 03:33 AM    Triglyceride 350 09/02/2017 03:33 AM    CHOL/HDL Ratio 7.0 09/02/2017 03:33 AM     Lab Results   Component Value Date/Time    Glucose (POC) 117 09/06/2017 11:28 AM    Glucose (POC) 142 09/06/2017 06:29 AM    Glucose (POC) 159 09/05/2017 10:10 PM    Glucose (POC) 197 09/05/2017 04:10 PM    Glucose (POC) 127 09/05/2017 11:29 AM     Lab Results   Component Value Date/Time    Color YELLOW/STRAW 08/31/2017 05:19 PM    Appearance TURBID 08/31/2017 05:19 PM    Specific gravity 1.017 08/31/2017 05:19 PM    pH (UA) 5.5 08/31/2017 05:19 PM    Protein 300 08/31/2017 05:19 PM    Glucose 100 08/31/2017 05:19 PM    Ketone TRACE 08/31/2017 05:19 PM    Bilirubin NEGATIVE  08/31/2017 05:19 PM    Urobilinogen 0.2 08/31/2017 05:19 PM    Nitrites NEGATIVE  08/31/2017 05:19 PM    Leukocyte Esterase LARGE 08/31/2017 05:19 PM    Epithelial cells FEW 08/31/2017 05:19 PM    Bacteria 3+ 08/31/2017 05:19 PM    WBC >100 08/31/2017 05:19 PM    RBC 0-5 08/31/2017 05:19 PM         Medications Reviewed:     Current Facility-Administered Medications   Medication Dose Route Frequency    ALPRAZolam (XANAX) tablet 0.25 mg  0.25 mg Oral QHS    senna-docusate (PERICOLACE) 8.6-50 mg per tablet 2 Tab  2 Tab Oral DAILY    nystatin (MYCOSTATIN) 100,000 unit/mL oral suspension 500,000 Units  500,000 Units Oral QID    cefTRIAXone (ROCEPHIN) 2 g in 0.9% sodium chloride (MBP/ADV) 50 mL  2 g IntraVENous Q24H    HYDROmorphone (PF) (DILAUDID) injection 1 mg  1 mg IntraVENous Q4H PRN    insulin glargine (LANTUS) injection 12 Units  12 Units SubCUTAneous QHS    ondansetron (ZOFRAN) injection 4 mg  4 mg IntraVENous Q4H PRN    ALPRAZolam (XANAX) tablet 0.25 mg  0.25 mg Oral BID PRN    carvedilol (COREG) tablet 6.25 mg  6.25 mg Oral BID WITH MEALS    0.9% sodium chloride infusion  100 mL/hr IntraVENous CONTINUOUS    aspirin chewable tablet 81 mg  81 mg Oral DAILY    heparin (porcine) injection 5,000 Units  5,000 Units SubCUTAneous Q12H    insulin lispro (HUMALOG) injection   SubCUTAneous AC&HS    therapeutic multivitamin (THERAGRAN) tablet 1 Tab  1 Tab Oral DAILY    sodium chloride (NS) flush 5-10 mL  5-10 mL IntraVENous Q8H    sodium chloride (NS) flush 5-10 mL  5-10 mL IntraVENous PRN    sodium chloride (NS) flush 5-10 mL  5-10 mL IntraVENous PRN    sodium chloride (NS) flush 5-10 mL  5-10 mL IntraVENous Q8H    sodium chloride (NS) flush 5-10 mL  5-10 mL IntraVENous PRN    nitroglycerin (NITROSTAT) tablet 0.4 mg  0.4 mg SubLINGual Q5MIN PRN    atorvastatin (LIPITOR) tablet 40 mg  40 mg Oral QPM    glucose chewable tablet 16 g  4 Tab Oral PRN    dextrose (D50W) injection syrg 12.5-25 g  12.5-25 g IntraVENous PRN    glucagon (GLUCAGEN) injection 1 mg  1 mg IntraMUSCular PRN     ______________________________________________________________________  EXPECTED LENGTH OF STAY: 4d 21h  ACTUAL LENGTH OF STAY:          6                 Mauricio Maya MD

## 2017-09-06 NOTE — PROGRESS NOTES
Problem: Mobility Impaired (Adult and Pediatric)  Goal: *Acute Goals and Plan of Care (Insert Text)  Physical Therapy Goals  Initiated 9/4/2017  1. Patient will move from supine to sit and sit to supine in bed with minimal assistance/contact guard assist within 7 day(s). 2. Patient will transfer from bed to chair and chair to bed with supervision/set-up using the least restrictive device within 7 day(s). 3. Patient will perform sit to stand with supervision/set-up within 7 day(s). 4. Patient will ambulate with supervision/set-up for 100 feet with the least restrictive device within 7 day(s). 5. Patient will tolerate sitting up in the bedside chair x 2-3 hours per day within 7 days. 6. Patient will increase Tinetti balance score by 3-4 points to decrease risk of falling within 7 days. PHYSICAL THERAPY TREATMENT  Patient: Herber Do (63 y.o. female)  Date: 9/6/2017  Diagnosis: NSTEMI (non-ST elevated myocardial infarction) [facility]) NSTEMI (non-ST elevated myocardial infarction) [facility])  Precautions: Fall (Partial Code; stage IV cancer with depression)      ASSESSMENT:  Patient received supine in bed, stating she was \"depressed\" but agreeable to therapy with extensive education on need for mobility in order to progress towards d/c home. Patient with circular conversation about d/c and repeated herself up to 3 times in a row with same statement. Also stated multiple times that she just felt \"confused\" about \"everything. \" She required max verbal cues for sequencing and encouragement but with this she was able to roll, sit EOB, then stand from EOB at RW. She seems to have both \"given up\" but also presents with some slight neuro deficits (poor attention to task, poor RW management, confusion about situation, poor sequencing). Discussed this with RN. With max encouragement, verbal cues for stepping up into RW, and to attend to steering RW in environment. Returned to chair and set up for lunch.  Patient not yet safe for d/c home and remains with significantly impaired endurance, on top of other impairments listed above. Education and encouragement provided that in order to enjoy life at home in setting of cancer, participation in mobility and therapy is important to regain safety and independence with functional mobility. Continue to recommend d/c to SNF rehab. Progression toward goals:  [ ]    Improving appropriately and progressing toward goals  [X]    Improving slowly and progressing toward goals  [ ]    Not making progress toward goals and plan of care will be adjusted       PLAN:  Patient continues to benefit from skilled intervention to address the above impairments. Continue treatment per established plan of care. Discharge Recommendations:  Skilled Nursing Facility  Further Equipment Recommendations for Discharge:  TBD       SUBJECTIVE:   Patient stated I just thought I was going to go home with my  and my girlfriend and get everything at home straight maybe over the weekend.  then stated this same sentence 2 more times in a row and followed with \"I'm just confused. \"      OBJECTIVE DATA SUMMARY:   Critical Behavior:  Neurologic State: Alert, Confused (transient confusion)  Orientation Level: Oriented to person, Oriented to place, Disoriented to situation, Disoriented to time  Cognition: Follows commands  Functional Mobility Training:  Bed Mobility:  Supine to Sit: Contact guard assistance; Additional time (max verbal cues)  Transfers:  Sit to Stand: Contact guard assistance; Additional time (max verbal cues )  Stand to Sit: Contact guard assistance  Bed to Chair: Contact guard assistance;Minimum assistance; Additional time (min A to manage RW + max verbal cues)  Balance:  Sitting: Intact  Standing: Impaired; With support  Standing - Static: Fair  Standing - Dynamic : Fair  Ambulation/Gait Training:  Distance (ft): 20 Feet (ft)  Assistive Device: Walker, rolling;Gait belt  Ambulation - Level of Assistance: Minimal assistance (near constant verbal cues and assist with RW management)  Gait Abnormalities: Decreased step clearance; Path deviations; Shuffling gait (poor RW management)  Base of Support: Narrowed  Speed/Honey: Slow;Pace decreased (<100 feet/min)  Step Length: Right shortened;Left shortened  Pain:  Pain Scale 1: Numeric (0 - 10)  Pain Intensity 1: 7  Pain Location 1: Generalized  Pain Description 1: Aching  Pain Intervention(s) 1: Medication (see MAR)  Activity Tolerance:   Fair- fatigued, required standing rest break x3 with gait x20 ft  Please refer to the flowsheet for vital signs taken during this treatment.   After treatment:   [X]    Patient left in no apparent distress sitting up in chair  [ ]    Patient left in no apparent distress in bed  [X]    Call bell left within reach  [X]    Nursing notified  [ ]    Caregiver present  [ ]    Bed alarm activated      COMMUNICATION/COLLABORATION:   The patients plan of care was discussed with: Registered Nurse     Rachel De La Rosa PT, DPT   Time Calculation: 35 mins

## 2017-09-06 NOTE — PROGRESS NOTES
Bedside shift change report given to Jose Hurley RN (oncoming nurse) by Andrés Genao RN (offgoing nurse). Report included the following information SBAR and Kardex.

## 2017-09-06 NOTE — CARDIO/PULMONARY
[facility]; This is a non visit note for Jessica Tidwell. She had a STEMI  which was noted by [facility] and attempted to provide educational material and education on 9/2/2017 but education was deferred by staff due to Jessica Tidwell not being receptive. MI education folder given to staff on that date. Education again deferred on 9/4/2017 and 9/5/2017 . At this time will not educate in regard to her MI due to medical management in light of metastatic renal cancer. Karla West RN

## 2017-09-06 NOTE — ROUTINE PROCESS
Bedside and Verbal shift change report given to Mike OSBORNelias Louis (oncoming nurse) by MICHELLE Horton (offgoing nurse). Report included the following information SBAR, Kardex and MAR.

## 2017-09-06 NOTE — PROGRESS NOTES
NAME: Rosaland Favre        :  1950        MRN:  [id number]        Assessment :    Plan:  --BONG- s/p early ATN    Sepsis- Serratia bacteremia/urosepsis    Hyperkalemia - better    Hyponatremia     Hypercalcemia    Acidosis-mild; lactate nl    Proteinuria    NSTEMI    Metastatic Renal cell cancer- with mets to pleura, mediastinum, LN, L adrenal, kidneys    DM-2 with proteinuria -Renal indices showing improvement-> Cr down to 1.07mg/dl. Non-oliguric. Suspect ATN recovery    Gentle IVF. Encourage solid intake    Hypercalcemia -> corrected Ca 11.8. IVF/calcitonin/lasix initiated by Dr. Laverne Prado    IV Ceftriaxone per ID    Discussed with /patient and RN       Subjective:     Chief Complaint:  Remains quite \"grumpy\" per . Objective:     VITALS:   Last 24hrs VS reviewed since prior progress note. Most recent are:  Visit Vitals    /70 (BP 1 Location: Right arm, BP Patient Position: At rest)    Pulse 80    Temp 97.5 °F (36.4 °C)    Resp 16    Ht 5' (1.524 m)    Wt 59.5 kg (131 lb 1.6 oz)    SpO2 98%    Breastfeeding No    BMI 25.6 kg/m2       Intake/Output Summary (Last 24 hours) at 17 1750  Last data filed at 17 1637   Gross per 24 hour   Intake             1280 ml   Output              650 ml   Net              630 ml      Telemetry Reviewed:     PHYSICAL EXAM:  General: NAD  CTAB/L  abd soft  No edema       ________________________________________________________________________  Deep Renae Burgess MD     Procedures: see electronic medical records for all procedures/Xrays and details which  were not copied into this note but were reviewed prior to creation of Plan.       LABS:  Recent Labs      17   0617   0401   WBC  12.1*  11.3*   HGB  9.4*  8.1*   HCT  29.9*  25.8*   PLT  348  234     Recent Labs      17   1419  17   0626  17   0401  17   0339   NA   -- 138  136  136   K   --   4.4  4.2  3.7   CL   --   108  105  104   CO2   --   21  23  21   BUN   --   27*  31*  35*   CREA   --   1.07*  1.34*  1.63*   GLU   --   135*  161*  126*   CA  9.7  10.2*  9.6  9.3   MG   --   1.9  2.2  1.6   PHOS   --   2.8  2.6  2.7     Recent Labs      09/06/17   0626  09/05/17   0401  09/04/17   0339   SGOT   --   19  8*   AP   --   136*  106   TP   --   5.6*  5.4*   ALB  1.9*  1.9*  2.0*   GLOB   --   3.7  3.4     No results for input(s): INR, PTP, APTT in the last 72 hours. No lab exists for component: INREXT, INREXT   No results for input(s): FE, TIBC, PSAT, FERR in the last 72 hours. No results found for: FOL, RBCF   No results for input(s): PH, PCO2, PO2 in the last 72 hours. No results for input(s): CPK, CKMB in the last 72 hours.     No lab exists for component: TROPONINI  No components found for: [place]  Lab Results   Component Value Date/Time    Color YELLOW/STRAW 08/31/2017 05:19 PM    Appearance TURBID 08/31/2017 05:19 PM    Specific gravity 1.017 08/31/2017 05:19 PM    pH (UA) 5.5 08/31/2017 05:19 PM    Protein 300 08/31/2017 05:19 PM    Glucose 100 08/31/2017 05:19 PM    Ketone TRACE 08/31/2017 05:19 PM    Bilirubin NEGATIVE  08/31/2017 05:19 PM    Urobilinogen 0.2 08/31/2017 05:19 PM    Nitrites NEGATIVE  08/31/2017 05:19 PM    Leukocyte Esterase LARGE 08/31/2017 05:19 PM    Epithelial cells FEW 08/31/2017 05:19 PM    Bacteria 3+ 08/31/2017 05:19 PM    WBC >100 08/31/2017 05:19 PM    RBC 0-5 08/31/2017 05:19 PM       MEDICATIONS:  Current Facility-Administered Medications   Medication Dose Route Frequency    furosemide (LASIX) injection 20 mg  20 mg IntraVENous ACB&D    calciTONIN (MIACALCIN) injection 100 Int'l Units  100 Int'l Units SubCUTAneous Q12H    ALPRAZolam (XANAX) tablet 0.25 mg  0.25 mg Oral QHS    senna-docusate (PERICOLACE) 8.6-50 mg per tablet 2 Tab  2 Tab Oral DAILY    nystatin (MYCOSTATIN) 100,000 unit/mL oral suspension 500,000 Units  500,000 Units Oral QID    cefTRIAXone (ROCEPHIN) 2 g in 0.9% sodium chloride (MBP/ADV) 50 mL  2 g IntraVENous Q24H    HYDROmorphone (PF) (DILAUDID) injection 1 mg  1 mg IntraVENous Q4H PRN    insulin glargine (LANTUS) injection 12 Units  12 Units SubCUTAneous QHS    ondansetron (ZOFRAN) injection 4 mg  4 mg IntraVENous Q4H PRN    ALPRAZolam (XANAX) tablet 0.25 mg  0.25 mg Oral BID PRN    carvedilol (COREG) tablet 6.25 mg  6.25 mg Oral BID WITH MEALS    0.9% sodium chloride infusion  100 mL/hr IntraVENous CONTINUOUS    aspirin chewable tablet 81 mg  81 mg Oral DAILY    heparin (porcine) injection 5,000 Units  5,000 Units SubCUTAneous Q12H    insulin lispro (HUMALOG) injection   SubCUTAneous AC&HS    therapeutic multivitamin (THERAGRAN) tablet 1 Tab  1 Tab Oral DAILY    sodium chloride (NS) flush 5-10 mL  5-10 mL IntraVENous Q8H    sodium chloride (NS) flush 5-10 mL  5-10 mL IntraVENous PRN    sodium chloride (NS) flush 5-10 mL  5-10 mL IntraVENous PRN    sodium chloride (NS) flush 5-10 mL  5-10 mL IntraVENous Q8H    sodium chloride (NS) flush 5-10 mL  5-10 mL IntraVENous PRN    nitroglycerin (NITROSTAT) tablet 0.4 mg  0.4 mg SubLINGual Q5MIN PRN    atorvastatin (LIPITOR) tablet 40 mg  40 mg Oral QPM    glucose chewable tablet 16 g  4 Tab Oral PRN    dextrose (D50W) injection syrg 12.5-25 g  12.5-25 g IntraVENous PRN    glucagon (GLUCAGEN) injection 1 mg  1 mg IntraMUSCular PRN

## 2017-09-06 NOTE — PROGRESS NOTES
Bedside and Verbal shift change report given to Tarsha (oncoming nurse) by Kasey Womack (offgoing nurse). Report included the following information SBAR, Kardex, Intake/Output and MAR.

## 2017-09-06 NOTE — PROGRESS NOTES
ID Progress Note  2017    Subjective:     Afebrile. Feels weak. Objective:     Vitals:   Visit Vitals    /61 (BP 1 Location: Right arm, BP Patient Position: At rest)    Pulse 64    Temp 97.9 °F (36.6 °C)    Resp 16    Ht 5' (1.524 m)    Wt 59.5 kg (131 lb 1.6 oz)    SpO2 95%    Breastfeeding No    BMI 25.6 kg/m2        Tmax:  Temp (24hrs), Av.8 °F (36.6 °C), Min:97.2 °F (36.2 °C), Max:98.2 °F (36.8 °C)      Exam:    Not in distress  Lungs: clear to auscultation, no rales, wheezes or rhonchi   Heart: s1, s2, no murmurs, rubs or clicks   Abdomen: soft, nontender, no guarding or rebound      Labs:   Lab Results   Component Value Date/Time    WBC 12.1 2017 06:26 AM    Hemoglobin (POC) 11.2 2017 11:42 AM    HGB 9.4 2017 06:26 AM    Hematocrit (POC) 33 2017 11:42 AM    HCT 29.9 2017 06:26 AM    PLATELET 622  06:26 AM    MCV 78.9 2017 06:26 AM     Lab Results   Component Value Date/Time    Sodium 138 2017 06:26 AM    Potassium 4.4 2017 06:26 AM    Chloride 108 2017 06:26 AM    CO2 21 2017 06:26 AM    Anion gap 9 2017 06:26 AM    Glucose 135 2017 06:26 AM    BUN 27 2017 06:26 AM    Creatinine 1.07 2017 06:26 AM    BUN/Creatinine ratio 25 2017 06:26 AM    GFR est AA >60 2017 06:26 AM    GFR est non-AA 51 2017 06:26 AM    Calcium 10.2 2017 06:26 AM    Bilirubin, total 0.2 2017 04:01 AM    AST (SGOT) 19 2017 04:01 AM    Alk. phosphatase 136 2017 04:01 AM    Protein, total 5.6 2017 04:01 AM    Albumin 1.9 2017 06:26 AM    Globulin 3.7 2017 04:01 AM    A-G Ratio 0.5 2017 04:01 AM    ALT (SGPT) 14 2017 04:01 AM         Assessment:          1. Serratia bacteremia     2. UTI from serratia      3. Metastatic renal cell carcinoma      4. Renal failure      5. MI      6. Osteoporosis      7. DM                 Recommendations:     1.  Continue ceftriaxone. The isolate is levaquin sensitive. She can be placed on this on discharge until the 16th of this month.          Mary Montesinos MD

## 2017-09-06 NOTE — PROGRESS NOTES
Problem: Falls - Risk of  Goal: *Absence of Falls  Document Deedee Fall Risk and appropriate interventions in the flowsheet.    Outcome: Progressing Towards Goal  Fall Risk Interventions:  Mobility Interventions: Communicate number of staff needed for ambulation/transfer     Mentation Interventions: Adequate sleep, hydration, pain control     Medication Interventions: Evaluate medications/consider consulting pharmacy, Patient to call before getting OOB     Elimination Interventions: Call light in reach, Toileting schedule/hourly rounds

## 2017-09-07 NOTE — CONSULTS
Palliative Medicine Consult  Farhan: [phone] ([phone])    Patient Name: Duke Linares  YOB: 1950    Date of Initial Consult: 9/4/17  Reason for Consult: Overwhelming symptoms. Requesting Provider: Ursula Shetty MD  Primary Care Physician: Joshua Powers MD      SUMMARY:   Duke Linares is a 69 y.o. Female with recent diagnosis of metastatic renal cell carcinoma with lung , lymph node and adrenal metastasis , who was to satrt palliative chemotherapy with Dr Deysi Lynch, past medical history is notable for, diabetes diet controlled, htn, anxiety depressionwith a p who was admitted on 8/31/2017 from  Home with a diagnosis of weakness, lethargy, associated with loss of appetite for and dry heaving, found to have Gram negative sepsis from urine source and NSTEMI. Work up shows thrombosis of left Renal vein, does not need anticoagulation per oncology. Current medical issues leading to Palliative Medicine involvement include: stage 4 renal cell carcinoma with sepsis, overwhelming symptoms. PALLIATIVE DIAGNOSES:   1. Generalized weakness  2. Fatigue  3. Anorexia  4. Weight loss  5. Constipation   6. Insomnia    Plan:    1..Pain : on dilaudid 1 mg I/v q 4 hrs prn, used 3 mg of I/vn dilaudid= 12 mg of oral dilaudid daily, for past two days,  Place her on on 2 mg of oral dilaudid q 4 hrs prn upon discharge. 2. Insomnia:  patient is on benzodiazepine in out patient setting sleeping better with scheduled xanax at bed time. 3.. Constipation: bowels are moving on senokot. 4.. Code status : currently partial code with ASCLS medication only    no intubation , no chest compression, no shock. may consider DDNR , left a blank DDNR, patient is currently resting. .    5. Nausea : controlled, 0 use of zofran in last 48 hrs. we will continue to support patient and family through difficult course of illness.       Initial consult note routed to primary continuity provider  Communicated plan of care with: Palliative IDT . GOALS OF CARE / TREATMENT PREFERENCES:   [====Goals of Care====]  GOALS OF CARE:  Patient / health care proxy stated goals: treatment of acute medical issues. TREATMENT PREFERENCES:   Code Status: Partial Code    Advance Care Planning:  Advance Care Planning 8/31/2017   Patient's Healthcare Decision Maker is: Legal Next of Kin   Primary Decision Maker Name Joanie Fuller    Primary Decision Maker Phone Number [phone]   Primary Decision Maker Relationship to Patient Spouse   Confirm Advance Directive None   Patient Would Like to Complete Advance Directive No       Other:    The palliative care team has discussed with patient / health care proxy about goals of care / treatment preferences for patient.  [====Goals of Care====]         HISTORY:     History obtained from: chart, patient and staff. CHIEF COMPLAINT: weakness and fatigue    HPI/SUBJECTIVE:    The patient is:   [] Verbal and participatory  Per patient she has been feeling weak dry heaving and poorly eating for past few days, she was functioning independently, cutting grass up until one month ago. she reports progressive weight loss of 15 to 20 lbs in last 6 months. She reports she worries a lot, and at base line unable to sleep at night because she worries about her Dad who is 80years old, worries about her daughter who was in a care accident but is functioning fine. she takes Xanax on and off to sleep. She feels very tired currently cannot rest during day because of interruption due to routine in hospital care, doctors visit , PT etc.  Reports last bowel movements few days ago. 9/7/17:  Feels tired, denies pain.    sister at bed side reports    Clinical Pain Assessment (nonverbal scale for severity on nonverbal patients):   [++++ Clinical Pain Assessment++++]  [++++Pain Severity++++]: 0/10  [++++Pain Character++++]:   [++++Pain Duration++++]:   [++++Pain Effect++++]:   [++++Pain Factors++++]:   [++++Pain Frequency++++]:   [++++Pain Location++++]:   [++++ Clinical Pain Assessment++++]  Duration: for how long has pt been experiencing pain (e.g., 2 days, 1 month, years)  Frequency: how often pain is an issue (e.g., several times per day, once every few days, constant)     FUNCTIONAL ASSESSMENT:     Palliative Performance Scale (PPS):  PPS: 50       PSYCHOSOCIAL/SPIRITUAL SCREENING:     Advance Care Planning:  Advance Care Planning 8/31/2017   Patient's Healthcare Decision Maker is: Legal Next of Tracey Jacobson   Primary Decision Maker Name Mirna Matamoros    Primary Decision Maker Phone Number [phone]   Primary Decision Maker Relationship to Patient Spouse   Confirm Advance Directive None   Patient Would Like to Complete Advance Directive No        Any spiritual / Methodist concerns:  [] Yes /  [x] No    Caregiver Burnout:  [] Yes /  [] No /  [x] No Caregiver Present      Anticipatory grief assessment:   [x] Normal  / [] Maladaptive       ESAS Anxiety: Anxiety: 3    ESAS Depression: Depression: 0        REVIEW OF SYSTEMS:     Positive and pertinent negative findings in ROS are noted above in HPI. The following systems were [x] reviewed / [] unable to be reviewed as noted in HPI  Other findings are noted below. Systems: constitutional, ears/nose/mouth/throat, respiratory, gastrointestinal, genitourinary, musculoskeletal, integumentary, neurologic, psychiatric, endocrine. Positive findings noted below. Modified ESAS Completed by: provider   Fatigue: 6 Drowsiness: 0   Depression: 0 Pain: 0   Anxiety: 3 Nausea: 0   Anorexia: 6 Dyspnea: 0     Constipation: Yes     Stool Occurrence(s): 1        PHYSICAL EXAM:     From RN flowsheet:  Wt Readings from Last 3 Encounters:   09/07/17 128 lb 14.4 oz (58.5 kg)   08/25/17 113 lb (51.3 kg)   08/23/17 114 lb 12.8 oz (52.1 kg)     Blood pressure 113/61, pulse 66, temperature 97.9 °F (36.6 °C), resp.  rate 16, height 5' (1.524 m), weight 128 lb 14.4 oz (58.5 kg), SpO2 92 %, not currently breastfeeding. Pain Scale 1: Numeric (0 - 10)  Pain Intensity 1: 0  Pain Onset 1: movement  Pain Location 1: Generalized  Pain Orientation 1: Left  Pain Description 1: Aching  Pain Intervention(s) 1: Medication (see MAR)  Last bowel movement, if known:     Constitutional: chronically sick, resting . Eyes: pupils equal, anicteric  Cardiovascular: regular rhythm. Respiratory: breathing not labored, symmetric  Gastrointestinal: soft non-tender, +bowel sounds  Musculoskeletal: no deformity, no tenderness to palpation  Skin: warm, dry  Neurologic: following commands, moving all extremities  Psychiatric: flat affect.   Other:       HISTORY:     Principal Problem:    NSTEMI (non-ST elevated myocardial infarction) ([address] (8/31/2017)      Past Medical History:   Diagnosis Date    Anxiety 7/31/2017    2/3/17:Under a tremendous amount of stress, will continue Alprazolam prn, if using regularly consider changing to an SSRI or counseling    Cancer ([address] 08/2017    renal cell carcinoma    Cervical myelopathy ([address] 7/31/2017    2/3/17:Cervical fusion     Controlled diabetes mellitus ([address] 7/31/2017    Fatigue 7/31/2017    Hypercholesterolemia 7/31/2017    Hyperglycemia 7/31/2017    Hypertension 7/31/2017    2/3/17:Poorly controlled, lifestyle changes, repeat /90 will follow    On statin therapy 7/31/2017    Osteoporosis 7/31/2017    Post-menopausal 7/31/2017    Spinal stenosis of lumbar region with radiculopathy 7/31/2017    2/3/17:Symptoms c/w this diagnosis discussed with pt., if progresses then need MRI L spine and follow up    Vaginitis due to Candida 7/31/2017    2/3/17:Associated with antibiotic use, requests prescription med      Past Surgical History:   Procedure Laterality Date    HX ORTHOPAEDIC      2009 spinal cord surgery     HX ORTHOPAEDIC Left     knee procedure    HX OTHER SURGICAL  08/16/2017    incisional biopsy right neck mass      Family History   Problem Relation Age of Onset    Heart Disease Mother       History reviewed, no pertinent family history.   Social History   Substance Use Topics    Smoking status: Never Smoker    Smokeless tobacco: Never Used    Alcohol use No     No Known Allergies   Current Facility-Administered Medications   Medication Dose Route Frequency    furosemide (LASIX) injection 20 mg  20 mg IntraVENous ACB&D    calciTONIN (MIACALCIN) injection 100 Int'l Units  100 Int'l Units SubCUTAneous Q12H    ALPRAZolam (XANAX) tablet 0.25 mg  0.25 mg Oral QHS    senna-docusate (PERICOLACE) 8.6-50 mg per tablet 2 Tab  2 Tab Oral DAILY    nystatin (MYCOSTATIN) 100,000 unit/mL oral suspension 500,000 Units  500,000 Units Oral QID    cefTRIAXone (ROCEPHIN) 2 g in 0.9% sodium chloride (MBP/ADV) 50 mL  2 g IntraVENous Q24H    HYDROmorphone (PF) (DILAUDID) injection 1 mg  1 mg IntraVENous Q4H PRN    insulin glargine (LANTUS) injection 12 Units  12 Units SubCUTAneous QHS    ondansetron (ZOFRAN) injection 4 mg  4 mg IntraVENous Q4H PRN    ALPRAZolam (XANAX) tablet 0.25 mg  0.25 mg Oral BID PRN    carvedilol (COREG) tablet 6.25 mg  6.25 mg Oral BID WITH MEALS    0.9% sodium chloride infusion  100 mL/hr IntraVENous CONTINUOUS    aspirin chewable tablet 81 mg  81 mg Oral DAILY    heparin (porcine) injection 5,000 Units  5,000 Units SubCUTAneous Q12H    insulin lispro (HUMALOG) injection   SubCUTAneous AC&HS    therapeutic multivitamin (THERAGRAN) tablet 1 Tab  1 Tab Oral DAILY    sodium chloride (NS) flush 5-10 mL  5-10 mL IntraVENous Q8H    sodium chloride (NS) flush 5-10 mL  5-10 mL IntraVENous PRN    sodium chloride (NS) flush 5-10 mL  5-10 mL IntraVENous PRN    sodium chloride (NS) flush 5-10 mL  5-10 mL IntraVENous Q8H    sodium chloride (NS) flush 5-10 mL  5-10 mL IntraVENous PRN    nitroglycerin (NITROSTAT) tablet 0.4 mg  0.4 mg SubLINGual Q5MIN PRN    atorvastatin (LIPITOR) tablet 40 mg  40 mg Oral QPM    glucose chewable tablet 16 g  4 Tab Oral PRN    dextrose (D50W) injection syrg 12.5-25 g  12.5-25 g IntraVENous PRN    glucagon (GLUCAGEN) injection 1 mg  1 mg IntraMUSCular PRN          LAB AND IMAGING FINDINGS:     Lab Results   Component Value Date/Time    WBC 12.1 09/06/2017 06:26 AM    HGB 9.4 09/06/2017 06:26 AM    PLATELET 413 14/04/7284 06:26 AM     Lab Results   Component Value Date/Time    Sodium 140 09/07/2017 05:59 AM    Potassium 3.8 09/07/2017 05:59 AM    Chloride 108 09/07/2017 05:59 AM    CO2 24 09/07/2017 05:59 AM    BUN 18 09/07/2017 05:59 AM    Creatinine 0.95 09/07/2017 05:59 AM    Calcium 8.8 09/07/2017 05:59 AM    Magnesium 1.9 09/06/2017 06:26 AM    Phosphorus 2.4 09/07/2017 05:59 AM      Lab Results   Component Value Date/Time    AST (SGOT) 19 09/05/2017 04:01 AM    Alk. phosphatase 136 09/05/2017 04:01 AM    Protein, total 5.6 09/05/2017 04:01 AM    Albumin 1.7 09/07/2017 05:59 AM    Globulin 3.7 09/05/2017 04:01 AM     No results found for: INR, PTMR, PTP, PT1, PT2, APTT   No results found for: IRON, FE, TIBC, IBCT, PSAT, FERR   No results found for: PH, PCO2, PO2  No components found for: [place]   Lab Results   Component Value Date/Time    CK 49 08/31/2017 06:47 PM                Total time: 25 mins  Counseling / coordination time, spent as noted above: 15 mins  > 50% counseling / coordination?: yes. Prolonged service was provided for  []30 min   []75 min in face to face time in the presence of the patient, spent as noted above. Time Start:   Time End:   Note: this can only be billed with 06637 (initial) or 67508 (follow up). If multiple start / stop times, list each separately.

## 2017-09-07 NOTE — PROGRESS NOTES
CM received call back from pt's  who was in agreement with inpatient rehab. Offered freedom of choice and pt's  prefers [address]. CM sent referral through [facility] to [address] [place] [address]. CM will continue to follow.   Marizol Li, BSW, ACM

## 2017-09-07 NOTE — PROGRESS NOTES
Note recorded on 9.7.17: Spiritual Care Partner Volunteer visited patient in [address] [facility] on 9.6. 17. Documented by:  Rev. Kellen Blake M.Div, [facility]

## 2017-09-07 NOTE — PROGRESS NOTES
Problem: Falls - Risk of  Goal: *Absence of Falls  Document Deedee Fall Risk and appropriate interventions in the flowsheet.    Outcome: Progressing Towards Goal  Fall Risk Interventions:  Mobility Interventions: Patient to call before getting OOB     Mentation Interventions: Door open when patient unattended     Medication Interventions: Patient to call before getting OOB     Elimination Interventions: Call light in reach

## 2017-09-07 NOTE — PROGRESS NOTES
NUTRITION COMPLETE ASSESSMENT    RECOMMENDATIONS:   1. Encourage intake of PO at meals and with supplements   -if pt does not like Boost Pudding after trial please discontinue order    2. Consider addition of megace/marinol if no improvement in PO over next few days since start of remeron    3. Daily weights     Interventions/Plan:   Food/Nutrient Delivery:   (continue current diet) Commercial supplement (d/c Ensure Clear, trial Boost Pudding)          Assessment:   Reason for Assessment: [x]Reassessment    Diet: Consistent carb (soft solids, 2gm Na)  Supplements: Magic Cup BID, Ensure Clear BID  Nutritionally Significant Medications: [x] Reviewed & Includes: albumin, xanax, calcitonin, ceftriaxone, lasix, lantus (12 units), SSI, mag sulfate, nystatin (oral), zosyn, pericolace, MVI, NS @ 100ml/hr; PRN: zofran  Meal Intake:   Patient Vitals for the past 100 hrs:   % Diet Eaten   09/06/17 1400 10 %   09/06/17 1004 10 %   09/05/17 1830 5 %   09/05/17 1236 50 %     Subjective:  Pt sleeping at time of visit, family deferring to alternative time. Will attempt to revisit when more appropriate. Objective:  Pt admitted for NSTEMI. PMHx: renal CA w/ lung nodules, anxiety, DM, hypercholesterolemia, HTN. Sepsis with bacteremia noted. ID following for abx. BONG improved although calcium remains elevated, calcitonin rx. Hypophosphatemia today. Recent cancer diagnosis with mets earlier this month noted - has not started chemo, prognosis noted as poor per chart review. Palliative Care following. Seen by psych with depressed mood noted. Remeron added (9/4) for depression and insomnia with xanax PRN. No plans for chemo at the moment with ID issues per oncology notes. BG better controlled but appetite remains poor. Hope that addition of remeron may also help with appetite. However if no improvement in appetite may want to consider additional appetite stimulant.  Pt did not like Ensure Clear but liked Magic Cup per Palliative note. Will also trial Boost Pudding. Edentulous with dentures at home  Last 3 Recorded Weights in this Encounter    09/05/17 0354 09/06/17 0410 09/07/17 0655   Weight: 56.7 kg (125 lb) 59.5 kg (131 lb 1.6 oz) 58.5 kg (128 lb 14.4 oz)     Severe wt loss of 6% x 1 month noted. # in July. Patient meets criteria for Severe Acute Protein Calorie Malnutrition as evidenced by:   ASPEN Malnutrition Criteria  Acute Illness, Chronic Illness, or Social/Enviornmental: Acute illness  Energy Intake: <50% est energy req for > 5 days  Weight Loss: Greater than 5% x 1 mo  ASPEN Malnutrition Score - Acute Illness: 12  Acute Illness - Malnutrition Diagnosis: Severe malnutrition. Plan to follow for PO intake, supplement acceptance, and plan of care. Estimated Nutrition Needs:   Kcals/day: 1575 Kcals/day (1575-1825kcal)  Protein: 63 g (63-74g (1.2-1.4g/kg))  Fluid: 1575 ml (1ml/kcal)  Based On: [place] (x 1.4 + 250kcal)  Weight Used: Actual wt (52.6kg)    Pt expected to meet estimated nutrient needs:  []   Yes     [x]  No [] Unable to predict at this time  Nutrition Diagnosis:   1. Unintended weight loss related to inadequate protein/energy intake; increased energy expenditure as evidenced by 6% wt loss x 1 month with poor appeite; renal CA w/ mets; less than 25% meals consumed    2.  Altered nutrition-related lab values (improved) related to DM as evidenced by BG better controlled with insulin increase  Goals:     Consumption of at least 50% of meals and 2-3 supplements/day in 5-7 days; wt maintance     Monitoring & Evaluation:    - Total energy intake, Liquid meal replacement   - Weight/weight change     Previous Nutrition Goals Met:   No  Previous Recommendations:    Yes    Education & Discharge Needs:   [x] None Identified   [] Identified and addressed    [x] Participated in care plan, discharge planning, and/or interdisciplinary rounds        Cultural, Jehovah's witness and ethnic food preferences identified: None    Skin Integrity: [x]Intact  []Other  Edema: []None [x]Other: 2+ LUE, 1+ LUE  Last BM: 9/7 - loose  Food Allergies: [x]None []Other  Diet Restrictions: Food Dislikes:  (Ensure Clear)  Cultural/[place] Preference(s): None     Anthropometrics:    Weight Loss Metrics 9/7/2017 8/31/2017 8/25/2017 8/23/2017 8/23/2017 8/16/2017 8/10/2017   Today's Wt 128 lb 14.4 oz - 113 lb 114 lb 12.8 oz 115 lb 111 lb 8.8 oz 117 lb 12.8 oz   BMI - 25.17 kg/m2 22.07 kg/m2 22.42 kg/m2 22.46 kg/m2 21.79 kg/m2 23.01 kg/m2      Weight Source: Bed  Height: 5' (152.4 cm),    Body mass index is 25.17 kg/(m^2).   IBW : 45.4 kg (100 lb), % IBW (Calculated): 115.96 %  Usual Body Weight: 55.3 kg (122 lb),      Labs:    Lab Results   Component Value Date/Time    Sodium 140 09/07/2017 05:59 AM    Potassium 3.8 09/07/2017 05:59 AM    Chloride 108 09/07/2017 05:59 AM    CO2 24 09/07/2017 05:59 AM    Glucose 150 09/07/2017 05:59 AM    BUN 18 09/07/2017 05:59 AM    Creatinine 0.95 09/07/2017 05:59 AM    Calcium 8.8 09/07/2017 05:59 AM    Magnesium 1.9 09/06/2017 06:26 AM    Phosphorus 2.4 09/07/2017 05:59 AM    Albumin 1.7 09/07/2017 05:59 AM     Lab Results   Component Value Date/Time    Hemoglobin A1c 7.5 08/31/2017 03:19 PM     Alexandra Longo RD

## 2017-09-07 NOTE — PROGRESS NOTES
Problem: Self Care Deficits Care Plan (Adult)  Goal: *Acute Goals and Plan of Care (Insert Text)  Occupational Therapy Goals  Initiated 9/7/2017  1. Patient will perform self-feeding with modified independence within 7 day(s). 2. Patient will perform grooming with modified independence within 7 day(s). 3. Patient will perform upper body dressing and lower body dressing with minimal assistance/contact guard assist within 7 day(s). 4. Patient will perform toilet transfers with minimal assistance/contact guard assist within 7 day(s). 5. Patient will perform all aspects of toileting with minimal assistance/contact guard assist within 7 day(s). 6. Patient will participate in upper extremity therapeutic exercise/activities with supervision/set-up for 10 minutes within 7 day(s). 7. Patient will utilize energy conservation techniques during functional activities with verbal, visual and tactile cues within 7 day(s). OCCUPATIONAL THERAPY EVALUATION  Patient: Rosaland Favre (88 y.o. female)  Date: 9/7/2017  Primary Diagnosis: NSTEMI (non-ST elevated myocardial infarction) [facility])        Precautions:   Fall (Partial Code; New/recent Stage IV cancer with depression)      ASSESSMENT :  Based on the objective data described below, the patient presents with general debility with new dx Stage IV cancer to ER 8-31. Subsequently noted to have NSTEMI, with primary symptom of hypotension and dizziness. Patient received in chair reporting 8/10 pain in L flank with very poor tolerance for activity, overall D /intolerant of self care tasks with current pain level; nsg notified and patient medicated during OT. Tolerated transfer chair to bed with light mod A for sit to stand and she was initially not able to step towards bed; with increased time, tactile cues to facilitate LE movement and gentle encouragement, patient eventually able to take 3 steps to bed with mod A. Min A bed mobility with difficulty getting comfortable.  MoCA may be helpful to clarify baseline cognition as she was unable to find callbell, which was in her lap. Vision impaired due to DM. (May have just been pain/balankets/lines related.) Family present and supportive; expect patient will need 24/7 care at discharge and home would be OK with [facility] if they can provide 24/7 hands on assist PRN. Patient will benefit from skilled intervention to address the above impairments. Patients rehabilitation potential is considered to be Guarded  Factors which may influence rehabilitation potential include:   [ ]             None noted  [ ]             Mental ability/status  [X]             Medical condition  [ ]             Home/family situation and support systems  [ ]             Safety awareness  [X]             Pain tolerance/management  [ ]             Other:        PLAN :  Recommendations and Planned Interventions:  [X]               Self Care Training                  [X]        Therapeutic Activities  [X]               Functional Mobility Training    [X]        Cognitive Retraining PRN  [X]               Therapeutic Exercises           [X]        Endurance Activities  [X]               Balance Training                   [ ]        Neuromuscular Re-Education  [X]               Visual/Perceptual Training     [X]   Home Safety Training  [X]               Patient Education                 [X]        Family Training/Education  [ ]               Other (comment):     Frequency/Duration: Patient will be followed by occupational therapy 4 times a week to address goals. Discharge Recommendations: Home Health 24/7 care vs Skilled Nursing Facility  Further Equipment Recommendations for Discharge: has none       SUBJECTIVE:   Patient stated I've never been sick before; I could do anything I wanted.       OBJECTIVE DATA SUMMARY:   HISTORY:   Past Medical History:   Diagnosis Date    Anxiety 7/31/2017     2/3/17:Under a tremendous amount of stress, will continue Alprazolam prn, if using regularly consider changing to an SSRI or counseling    Cancer ([facility] 08/2017     renal cell carcinoma    Cervical myelopathy ([facility] 7/31/2017     2/3/17:Cervical fusion     [facility] 7/31/2017    Fatigue 7/31/2017    Hypercholesterolemia 7/31/2017    Hyperglycemia 7/31/2017    Hypertension 7/31/2017     2/3/17:Poorly controlled, lifestyle changes, repeat /90 will follow    On statin therapy 7/31/2017    Osteoporosis 7/31/2017    Post-menopausal 7/31/2017    Spinal stenosis of lumbar region with radiculopathy 7/31/2017     2/3/17:Symptoms c/w this diagnosis discussed with pt., if progresses then need MRI L spine and follow up    Vaginitis due to Candida 7/31/2017     2/3/17:Associated with antibiotic use, requests prescription med     Past Surgical History:   Procedure Laterality Date    HX ORTHOPAEDIC         2009 spinal cord surgery     HX ORTHOPAEDIC Left       knee procedure    HX OTHER SURGICAL   08/16/2017     incisional biopsy right neck mass        Prior Level of Function/Home Situation: I PTA with self care and functional mobility; stressfully caregiver to 81 yo father; sister present who appears in good health- could she assist?  Expanded or extensive additional review of patient history:      Home Situation  Home Environment: Private residence  # Steps to Enter: 0  Rails to Enter: No  One/Two Story Residence: One story  Living Alone: No  Support Systems: Spouse/Significant Other/Partner, Child(zakiya), Baptism / chilo community, Friends \ neighbors, Family member(s)  Patient Expects to be Discharged to[de-identified] Private residence  Current DME Used/Available at Home: None  Tub or Shower Type: Shower  [X]  Right hand dominant             [ ]  Left hand dominant     EXAMINATION OF PERFORMANCE DEFICITS:  Cognitive/Behavioral Status:  Neurologic State: Alert  Orientation Level: Oriented X4  Cognition: Follows commands (expect decision making impaired due to current stress)  Perception:  (impaired due to impaired vision)  Perseveration: No perseveration noted  Safety/Judgement: Awareness of environment; Fall prevention;Home safety                 Hearing: Auditory  Auditory Impairment: None     Vision/Perceptual:                           Acuity: Able to read employee name badge without difficulty; Impaired near vision; Impaired far vision (Impaired due to DM; was getting shots 2.5 yrs; CA has stoppe)    Corrective Lenses: Reading glasses (\"they dont help since the shots stopped)     Range of Motion:  B UE; L UE AROM slightly limited by flank pain and R UE limited proximally by recent surgical intervention/line with tegraderm present: \"I'm not suppose to touch it! \"  AROM: Generally decreased, functional  PROM: Generally decreased, functional                       Strength:     Strength: Generally decreased, functional (UEs; impaired B LEs)- B LEs supported with OTR's LEs during transfer due to frequent buckeling                 Coordination:  Coordination: Generally decreased, functional  Fine Motor Skills-Upper: Left Impaired;Right Impaired (limited by pain/fatigue)    Gross Motor Skills-Upper: Left Impaired;Right Impaired (limited by general pain/fatigue overall)     Tone & Sensation:     Tone: Normal  Sensation:  (NT)                       Balance:  Sitting: Intact  Sitting - Static: Good (unsupported)  Sitting - Dynamic: Fair (occasional) (limited by L flank pain 8/10)  Standing: Impaired; With support  Standing - Static: Fair  Standing - Dynamic : Fair     Functional Mobility and Transfers for ADLs:  Bed Mobility:  Rolling: Minimum assistance  Supine to Sit: Contact guard assistance (occasional VCs)  Sit to Supine: Minimum assistance  Scooting: Minimum assistance     Transfers:  Sit to Stand: Contact guard assistance  Stand to Sit: Contact guard assistance  Bed to Chair: Contact guard assistance  Toilet Transfer : Moderate assistance; Adaptive equipment; Additional time (recommend BS use)     ADL Assessment:  Feeding: Moderate assistance (eating broth with difficulty tolerating sitting up; solids e)     Oral Facial Hygiene/Grooming: Moderate assistance; Additional time (limited activity tolerance due to 8/10 L flank pain)     Bathing: Maximum assistance; Additional time     Upper Body Dressing: Maximum assistance; Additional time     Lower Body Dressing: Maximum assistance; Total assistance; Additional time     Toileting: Moderate assistance;Minimum assistance; Additional time (inferred; walsh in place)                 ADL Intervention and task modifications:     Recommend meals in chair, potentially can benefit from pre-medication before getting into chair for meals; initiated training for pain management, fall prevention, energy conservation and stress management with Stage IV cancer as well as explanation of extreme fatigue with CA not unusual as she is quite distressed by her fatigue level. Cognitive Retraining  Safety/Judgement: Awareness of environment; Fall prevention;Home safety        Functional Measure:  Barthel Index:      Bathin  Bladder: 0  Bowels: 5  Groomin  Dressin  Feedin  Mobility: 0  Stairs: 0  Toilet Use: 5  Transfer (Bed to Chair and Back): 5  Total: 20         Barthel and G-code impairment scale:  Percentage of impairment CH  0% CI  1-19% CJ  20-39% CK  40-59% CL  60-79% CM  80-99% CN  100%   Barthel Score 0-100 100 99-80 79-60 59-40 20-39 1-19    0   Barthel Score 0-20 20 17-19 13-16 9-12 5-8 1-4 0      The Barthel ADL Index: Guidelines  1. The index should be used as a record of what a patient does, not as a record of what a patient could do. 2. The main aim is to establish degree of independence from any help, physical or verbal, however minor and for whatever reason. 3. The need for supervision renders the patient not independent.   4. A patient's performance should be established using the best available evidence. Asking the patient, friends/relatives and nurses are the usual sources, but direct observation and common sense are also important. However direct testing is not needed. 5. Usually the patient's performance over the preceding 24-48 hours is important, but occasionally longer periods will be relevant. 6. Middle categories imply that the patient supplies over 50 per cent of the effort. 7. Use of aids to be independent is allowed. Kitty Thornton., Barthel, D.W. (0901). Functional evaluation: the Barthel Index. [address] [facility] (14)2. Zuleima Ching elsy DAMIEN Quezada, Srinivasan Sinclair., Keiry James., [place], [address] (1999). Measuring the change indisability after inpatient rehabilitation; comparison of the responsiveness of the Barthel Index and Functional [place] Measure. Journal of Neurology, Neurosurgery, and Psychiatry, 66(4), 564-491. Daniel Waller, NLicoJ.A, GIULIA Oconnor, & Elsie Draper M.A. (2004.) Assessment of post-stroke quality of life in cost-effectiveness studies: The usefulness of the Barthel Index and the EuroQoL-5D. Quality of Life Research, 13, 411-65         G codes: In compliance with CMSs Claims Based Outcome Reporting, the following G-code set was chosen for this patient based on their primary functional limitation being treated: The outcome measure chosen to determine the severity of the functional limitation was the Barthel Index  with a score of 20/100 which was correlated with the impairment scale.       · Self Care:               - CURRENT STATUS:    CL - 60%-79% impaired, limited or restricted               - GOAL STATUS:           CJ - 20%-39% impaired, limited or restricted               - D/C STATUS:                       ---------------To be determined---------------      Occupational Therapy Evaluation Charge Determination   History Examination Decision-Making   LOW Complexity : Brief history review  HIGH Complexity : 5 or more performance deficits relating to physical, cognitive , or psychosocial skils that result in activity limitations and / or participation restrictions HIGH Complexity : Patient presents with comorbidities that affect occupational performance. Signifigant modification of tasks or assistance (eg, physical or verbal) with assessment (s) is necessary to enable patient to complete evaluation       Based on the above components, the patient evaluation is determined to be of the following complexity level: LOW   Pain:  Pain Scale 1: Numeric (0 - 10)  Pain Intensity 1: 0              Activity Tolerance:   Poor today  Please refer to the flowsheet for vital signs taken during this treatment. After treatment:   [ ] Patient left in no apparent distress sitting up in chair  [X] Patient left in no apparent new distress in bed  [X] Call bell left within reach  [X] Nursing notified  [X] Caregiver present  [ ] Bed alarm activated      COMMUNICATION/EDUCATION:   The patients plan of care was discussed with: Physical Therapist and Registered Nurse.  [X] Home safety education was provided and the patient/caregiver indicated understanding. [X] Patient/family have participated as able in goal setting and plan of care. [X] Patient/family agree to work toward stated goals and plan of care. [ ] Patient understands intent and goals of therapy, but is neutral about his/her participation. [ ] Patient is unable to participate in goal setting and plan of care. This patients plan of care is appropriate for delegation to [facility].      Thank you for this referral.  Davion Campo OTR/L  Time Calculation: 17 mins

## 2017-09-07 NOTE — PROGRESS NOTES
NAME: Ange Tello        :  1950        MRN:  [id number]        Assessment :    Plan:  --BONG- s/p early ATN    Sepsis- Serratia bacteremia/urosepsis    Hyperkalemia - better    Hyponatremia     Hypercalcemia    Acidosis-mild; lactate nl    Proteinuria    NSTEMI    Metastatic Renal cell cancer- with mets to pleura, mediastinum, LN, L adrenal, kidneys    DM-2 with proteinuria -Renal indices showing improvement-> Cr down <1mg/dl. Non-oliguric. Suspect ATN recovery    Encourage solid intake    Hypercalcemia -> corrected Ca 11.8. IVF/calcitonin/lasix initiated by Dr. Cresencio Zheng    IV Ceftriaxone per ID    Discussed with patient and RN    Will sign off. Please call us back if needed       Subjective:     Chief Complaint:  Remains quite miserable-> multiple complaints. Jose Saunas ie phone ringing too much, uncomfortable chair. .back pain etc    Objective:     VITALS:   Last 24hrs VS reviewed since prior progress note. Most recent are:  Visit Vitals    /61 (BP 1 Location: Right arm)    Pulse 66    Temp 97.9 °F (36.6 °C)    Resp 16    Ht 5' (1.524 m)    Wt 58.5 kg (128 lb 14.4 oz)    SpO2 92%    Breastfeeding No    BMI 25.17 kg/m2       Intake/Output Summary (Last 24 hours) at 17 1158  Last data filed at 17 1830   Gross per 24 hour   Intake             1040 ml   Output             1550 ml   Net             -510 ml      Telemetry Reviewed:     PHYSICAL EXAM:  General: NAD  CTAB/L  abd soft  No significant edema       ________________________________________________________________________  Deep Himanshu Burroughs MD     Procedures: see electronic medical records for all procedures/Xrays and details which  were not copied into this note but were reviewed prior to creation of Plan.       LABS:  Recent Labs      17   0626  17   0401   WBC  12.1*  11.3*   HGB  9.4*  8.1*   HCT  29.9*  25.8*   PLT  348  234     Recent Labs 09/07/17   0559  09/06/17   1419  09/06/17   0626  09/05/17   0401   NA  140   --   138  136   K  3.8   --   4.4  4.2   CL  108   --   108  105   CO2  24   --   21  23   BUN  18   --   27*  31*   CREA  0.95   --   1.07*  1.34*   GLU  150*   --   135*  161*   CA  8.8  9.7  10.2*  9.6   MG   --    --   1.9  2.2   PHOS  2.4*   --   2.8  2.6     Recent Labs      09/07/17   0559  09/06/17   0626  09/05/17   0401   SGOT   --    --   19   AP   --    --   136*   TP   --    --   5.6*   ALB  1.7*  1.9*  1.9*   GLOB   --    --   3.7     No results for input(s): INR, PTP, APTT in the last 72 hours. No lab exists for component: INREXT, INREXT   No results for input(s): FE, TIBC, PSAT, FERR in the last 72 hours. No results found for: FOL, RBCF   No results for input(s): PH, PCO2, PO2 in the last 72 hours. No results for input(s): CPK, CKMB in the last 72 hours.     No lab exists for component: TROPONINI  No components found for: [place]  Lab Results   Component Value Date/Time    Color YELLOW/STRAW 08/31/2017 05:19 PM    Appearance TURBID 08/31/2017 05:19 PM    Specific gravity 1.017 08/31/2017 05:19 PM    pH (UA) 5.5 08/31/2017 05:19 PM    Protein 300 08/31/2017 05:19 PM    Glucose 100 08/31/2017 05:19 PM    Ketone TRACE 08/31/2017 05:19 PM    Bilirubin NEGATIVE  08/31/2017 05:19 PM    Urobilinogen 0.2 08/31/2017 05:19 PM    Nitrites NEGATIVE  08/31/2017 05:19 PM    Leukocyte Esterase LARGE 08/31/2017 05:19 PM    Epithelial cells FEW 08/31/2017 05:19 PM    Bacteria 3+ 08/31/2017 05:19 PM    WBC >100 08/31/2017 05:19 PM    RBC 0-5 08/31/2017 05:19 PM       MEDICATIONS:  Current Facility-Administered Medications   Medication Dose Route Frequency    furosemide (LASIX) injection 20 mg  20 mg IntraVENous ACB&D    calciTONIN (MIACALCIN) injection 100 Int'l Units  100 Int'l Units SubCUTAneous Q12H    ALPRAZolam (XANAX) tablet 0.25 mg  0.25 mg Oral QHS    senna-docusate (PERICOLACE) 8.6-50 mg per tablet 2 Tab  2 Tab Oral DAILY    nystatin (MYCOSTATIN) 100,000 unit/mL oral suspension 500,000 Units  500,000 Units Oral QID    cefTRIAXone (ROCEPHIN) 2 g in 0.9% sodium chloride (MBP/ADV) 50 mL  2 g IntraVENous Q24H    HYDROmorphone (PF) (DILAUDID) injection 1 mg  1 mg IntraVENous Q4H PRN    insulin glargine (LANTUS) injection 12 Units  12 Units SubCUTAneous QHS    ondansetron (ZOFRAN) injection 4 mg  4 mg IntraVENous Q4H PRN    ALPRAZolam (XANAX) tablet 0.25 mg  0.25 mg Oral BID PRN    carvedilol (COREG) tablet 6.25 mg  6.25 mg Oral BID WITH MEALS    0.9% sodium chloride infusion  100 mL/hr IntraVENous CONTINUOUS    aspirin chewable tablet 81 mg  81 mg Oral DAILY    heparin (porcine) injection 5,000 Units  5,000 Units SubCUTAneous Q12H    insulin lispro (HUMALOG) injection   SubCUTAneous AC&HS    therapeutic multivitamin (THERAGRAN) tablet 1 Tab  1 Tab Oral DAILY    sodium chloride (NS) flush 5-10 mL  5-10 mL IntraVENous Q8H    sodium chloride (NS) flush 5-10 mL  5-10 mL IntraVENous PRN    sodium chloride (NS) flush 5-10 mL  5-10 mL IntraVENous PRN    sodium chloride (NS) flush 5-10 mL  5-10 mL IntraVENous Q8H    sodium chloride (NS) flush 5-10 mL  5-10 mL IntraVENous PRN    nitroglycerin (NITROSTAT) tablet 0.4 mg  0.4 mg SubLINGual Q5MIN PRN    atorvastatin (LIPITOR) tablet 40 mg  40 mg Oral QPM    glucose chewable tablet 16 g  4 Tab Oral PRN    dextrose (D50W) injection syrg 12.5-25 g  12.5-25 g IntraVENous PRN    glucagon (GLUCAGEN) injection 1 mg  1 mg IntraMUSCular PRN

## 2017-09-07 NOTE — PROGRESS NOTES
Hospitalist Progress Note  Vaibhav Lee MD  Office: [phone]      Date of Service:  2017  NAME:  Vaibhav Lee  :  1950  MRN:  [id number]      Admission Summary:   The patient is a 52-year-old female with past medical history of diabetes, history of hyperlipidemia, anxiety, hypertension, currently diet-controlled, osteoporosis, spinal stenosis, who recently diagnosed with renal cell carcinoma. The patient had some swelling in her cervical lymph nodes and biopsy indicated clear cell carcinoma. The patient is followed up with [address] [place] [address] Oncology with Dr Rachna Kahn, and has not started chemotherapy as of yet. The patient also was noted to have metastases of the disease to the left to the pleura and mediastinum. Today, the patient presents because she reports that she has been having generalized weakness, no appetite for 2 days, some dry heaving, but no vomiting, and just has been feeling \"very lethargic. \" The patient was found to be hypotensive, pale and lightheaded in the triage, with systolic blood pressure of 80. The patient was placed in the ER, was started on fluid resuscitation. Her was elevated troponin and was found to be in acute renal failure and was found to be septic and was requested to be admitted under the hospitalist service. Interval history / Subjective:      Ms. Naun Decker continues to be depressed and fatigued. She still has some flank pain. No nausea. Assessment & Plan:     Sepsis due to serratia bacteremia, UTI (POA) - improved  - Bcx  with serratia, repeat  negative  - Ucx  with serratia  - US renal  with marked heterogeneity of the left kidney compatible with the known neoplasm. Thrombosis of the left renal vein  - Continue IVF  - Continue ceftriaxone started 9/3. Zosyn -, Meropenum -9/3.  Needs abx until   - ID consulted    Subacute STEMI (POA) - no chest pain, SOB  - Troponin initial was 6.61, then 5.12 and 4.47  - ECG 8/31 with some anterolateral ST elevation   - Echo 8/31 with LVEF 40-45% hypokinesis of mid-apical anterior, mid-anteroseptal, mid-apical inferior, and apical wall, left atrium mildly dilated  - Continue aspirin, lipitor and carvedilol for medical management   - Cardiologist consulted    BONG (POA) - possible due to pre renal and sepsis, resolved  - Renal US as above  - Nephrology consulted    Urinary retention - likely due to UTI, weakness  - Failed voiding trial 9/5, walsh replaced  - Will need urology follow up for voiding trial    Hyperkalemia (POA) - received kayexalate, insulin and dextrose. Resolved  - Monitor    Mild Hyponatremia - resolved  -TSH was normal a week ago    Hypercalcemia - seems to be hypercalcemia of malignancy, improving but still elevated when corrected for albumin  - PTH supressed  - Continue IV fluids, lasix given newly reduced EF  - Continue calcitonin  - Monitor calcium and albumin    Mild anion gap Metabolic acidosis due to BONG - resolved    DM-II (chronic) - A1c 7.5, BG a little high yesterday  - restart glipizide and continue to hold metformin  - Continue lantus, SSI as needed    Metastatic clear cell renal carcinoma (chronic)  - CT abdomen pelvis on 8/22 left renal bulky malignancy with left renal vein and pararenal extension, bilateral small renal cortical tumors/metastasis, left adrenal metastasis  - CT on 8/1 there is marked adenopathy/mass in the base of the right neck in the  periclavicular region which extends into the superior mediastinum. There is also  right hilar adenopathy/mass. Multiple lung nodules as described above some of which are pleural-based. Left adrenal nodule.  Left lobe liver lesion.  - Oncologist, palliative care consulted    Left renal vein tumor thrombosis  - no anticoagulation needed  - Pain control  - appreciated hematologist input    Anemia related to renal cell carcinoma - no evidence of active bleeding  - H/H low but stable    Hx of HTN - Hypotension improved, BP normal now    Hx of hyperlipidemia - continue lipitor    Generalized weakness related to underlying illness - combination of sepsis, newly reduced EF, metastatic cancer and hypercalcemia    Diarrhea - may be due to contrast, resolved now, stool for c diff cancelled    Oral thrush - added nystatin suspension    DNR except ACLS meds, at risk for decompensation, consulted palliative care team    Code status: DNR  DVT prophylaxis: heparin  Care Plan discussed with: Patient/Family, Nurse and   Disposition: To [address] Problems  Date Reviewed: 8/31/2017          Codes Class Noted POA    * (Principal)NSTEMI (non-ST elevated myocardial infarction) ([facility] ICD-10-CM: I21.4  ICD-9-CM: 410.70  8/31/2017 Unknown                Vital Signs:    Last 24hrs VS reviewed since prior progress note. Most recent are:  Visit Vitals    /61 (BP 1 Location: Right arm)    Pulse 66    Temp 97.9 °F (36.6 °C)    Resp 16    Ht 5' (1.524 m)    Wt 58.5 kg (128 lb 14.4 oz)    SpO2 92%    Breastfeeding No    BMI 25.17 kg/m2         Intake/Output Summary (Last 24 hours) at 09/07/17 1517  Last data filed at 09/06/17 1830   Gross per 24 hour   Intake              800 ml   Output              850 ml   Net              -50 ml        Physical Examination:             Constitutional:  No acute distress, cooperative   ENT:  Oral mucous moist, oropharynx benign. Neck supple,    Resp:  Decrease bronchial breath sound bilaterally. Scattered rhonchi. No accessory muscle use   CV:  Regular rhythm, normal rate, no murmurs, gallops, rubs    GI:  Soft, non distended, non tender. normoactive bowel sounds, no hepatosplenomegaly     Musculoskeletal:  No edema    Neurologic:  Moves all extremities.   AAOx3, CN II-XII reviewed     Psych: fatigued, seems defeated       Data Review:     Telemetry x   ECG    Xray    CT scan    MRI    Echocardiogram    Ultrasound              I have reviewed the flow sheet and recent notes  New labs and data personally reviewed. Labs:     Recent Labs      09/06/17   0626 09/05/17   0401   WBC  12.1*  11.3*   HGB  9.4*  8.1*   HCT  29.9*  25.8*   PLT  348  234     Recent Labs      09/07/17   0559  09/06/17   1419  09/06/17   0626  09/05/17   0401   NA  140   --   138  136   K  3.8   --   4.4  4.2   CL  108   --   108  105   CO2  24   --   21  23   BUN  18   --   27*  31*   CREA  0.95   --   1.07*  1.34*   GLU  150*   --   135*  161*   CA  8.8  9.7  10.2*  9.6   MG   --    --   1.9  2.2   PHOS  2.4*   --   2.8  2.6     Recent Labs      09/07/17 0559  09/06/17   0626  09/05/17   0401   SGOT   --    --   19   ALT   --    --   14   AP   --    --   136*   TBILI   --    --   0.2   TP   --    --   5.6*   ALB  1.7*  1.9*  1.9*   GLOB   --    --   3.7     No results for input(s): INR, PTP, APTT in the last 72 hours. No lab exists for component: INREXT, INREXT   No results for input(s): FE, TIBC, PSAT, FERR in the last 72 hours. No results found for: FOL, RBCF   No results for input(s): PH, PCO2, PO2 in the last 72 hours. No results for input(s): CPK, CKNDX, TROIQ in the last 72 hours.     No lab exists for component: CPKMB  Lab Results   Component Value Date/Time    Cholesterol, total 119 09/02/2017 03:33 AM    HDL Cholesterol 17 09/02/2017 03:33 AM    LDL, calculated 32 09/02/2017 03:33 AM    Triglyceride 350 09/02/2017 03:33 AM    CHOL/HDL Ratio 7.0 09/02/2017 03:33 AM     Lab Results   Component Value Date/Time    Glucose (POC) 155 09/07/2017 11:09 AM    Glucose (POC) 138 09/07/2017 06:56 AM    Glucose (POC) 217 09/06/2017 09:36 PM    Glucose (POC) 182 09/06/2017 04:29 PM    Glucose (POC) 117 09/06/2017 11:28 AM     Lab Results   Component Value Date/Time    Color YELLOW/STRAW 08/31/2017 05:19 PM    Appearance TURBID 08/31/2017 05:19 PM    Specific gravity 1.017 08/31/2017 05:19 PM    pH (UA) 5.5 08/31/2017 05:19 PM    Protein 300 08/31/2017 05:19 PM    Glucose 100 08/31/2017 05:19 PM    Ketone TRACE 08/31/2017 05:19 PM    Bilirubin NEGATIVE  08/31/2017 05:19 PM    Urobilinogen 0.2 08/31/2017 05:19 PM    Nitrites NEGATIVE  08/31/2017 05:19 PM    Leukocyte Esterase LARGE 08/31/2017 05:19 PM    Epithelial cells FEW 08/31/2017 05:19 PM    Bacteria 3+ 08/31/2017 05:19 PM    WBC >100 08/31/2017 05:19 PM    RBC 0-5 08/31/2017 05:19 PM         Medications Reviewed:     Current Facility-Administered Medications   Medication Dose Route Frequency    furosemide (LASIX) injection 20 mg  20 mg IntraVENous ACB&D    calciTONIN (MIACALCIN) injection 100 Int'l Units  100 Int'l Units SubCUTAneous Q12H    ALPRAZolam (XANAX) tablet 0.25 mg  0.25 mg Oral QHS    senna-docusate (PERICOLACE) 8.6-50 mg per tablet 2 Tab  2 Tab Oral DAILY    nystatin (MYCOSTATIN) 100,000 unit/mL oral suspension 500,000 Units  500,000 Units Oral QID    cefTRIAXone (ROCEPHIN) 2 g in 0.9% sodium chloride (MBP/ADV) 50 mL  2 g IntraVENous Q24H    HYDROmorphone (PF) (DILAUDID) injection 1 mg  1 mg IntraVENous Q4H PRN    insulin glargine (LANTUS) injection 12 Units  12 Units SubCUTAneous QHS    ondansetron (ZOFRAN) injection 4 mg  4 mg IntraVENous Q4H PRN    ALPRAZolam (XANAX) tablet 0.25 mg  0.25 mg Oral BID PRN    carvedilol (COREG) tablet 6.25 mg  6.25 mg Oral BID WITH MEALS    0.9% sodium chloride infusion  100 mL/hr IntraVENous CONTINUOUS    aspirin chewable tablet 81 mg  81 mg Oral DAILY    heparin (porcine) injection 5,000 Units  5,000 Units SubCUTAneous Q12H    insulin lispro (HUMALOG) injection   SubCUTAneous AC&HS    therapeutic multivitamin (THERAGRAN) tablet 1 Tab  1 Tab Oral DAILY    sodium chloride (NS) flush 5-10 mL  5-10 mL IntraVENous Q8H    sodium chloride (NS) flush 5-10 mL  5-10 mL IntraVENous PRN    sodium chloride (NS) flush 5-10 mL  5-10 mL IntraVENous PRN    sodium chloride (NS) flush 5-10 mL  5-10 mL IntraVENous Q8H    sodium chloride (NS) flush 5-10 mL  5-10 mL IntraVENous PRN  nitroglycerin (NITROSTAT) tablet 0.4 mg  0.4 mg SubLINGual Q5MIN PRN    atorvastatin (LIPITOR) tablet 40 mg  40 mg Oral QPM    glucose chewable tablet 16 g  4 Tab Oral PRN    dextrose (D50W) injection syrg 12.5-25 g  12.5-25 g IntraVENous PRN    glucagon (GLUCAGEN) injection 1 mg  1 mg IntraMUSCular PRN     ______________________________________________________________________  EXPECTED LENGTH OF STAY: 4d 21h  ACTUAL LENGTH OF STAY:          7                 Vaibhav Lee MD

## 2017-09-07 NOTE — PROGRESS NOTES
Bedside shift change report given to Irena Gunter RN (oncoming nurse) by Dedrick Patiño RN (offgoing nurse). Report included the following information SBAR, Kardex and MAR.

## 2017-09-07 NOTE — PROGRESS NOTES
Problem: Mobility Impaired (Adult and Pediatric)  Goal: *Acute Goals and Plan of Care (Insert Text)  Physical Therapy Goals  Initiated 9/4/2017  1. Patient will move from supine to sit and sit to supine in bed with minimal assistance/contact guard assist within 7 day(s). 2. Patient will transfer from bed to chair and chair to bed with supervision/set-up using the least restrictive device within 7 day(s). 3. Patient will perform sit to stand with supervision/set-up within 7 day(s). 4. Patient will ambulate with supervision/set-up for 100 feet with the least restrictive device within 7 day(s). 5. Patient will tolerate sitting up in the bedside chair x 2-3 hours per day within 7 days. 6. Patient will increase Tinetti balance score by 3-4 points to decrease risk of falling within 7 days. PHYSICAL THERAPY TREATMENT  Patient: Leopold Darting (31 y.o. female)  Date: 9/7/2017  Diagnosis: NSTEMI (non-ST elevated myocardial infarction) [facility]) NSTEMI (non-ST elevated myocardial infarction) [facility])       Precautions: Fall (Partial Code; New/recent Stage IV cancer with depression)      ASSESSMENT:  Patient received sidelying in bed with RN present, administering medications. Patient immediately began to state she couldn't get up and wanted everyone to let her just stay in the bed. PT and RN encouraged her, explaining the detrimental effects of immobility and bedrest and reminding her of her good prognosis to achieve goal to return home at her prior level of function with participation in therapy. Patient agreeable to transferring to a recliner a few feet away (patient requested recliner vs. Straight back chair used previously). She required regular cues and constant encouragement to perform bed mobility but was able to respond to the cues typically without repeating them as in prior sessions.   Once in standing, patient initially c/o weakness in her legs and dizziness but stated it was \"no worse than when I'm lying down\". PT encouraged her to take a few steps around the bed before going straight to the recliner and she was receptive, managing the RW with only a few cues periodically. Patient continuously stated throughout the session, however, that there was no point to any of this because she \"would be dead soon\". Nursing notified and aware of patient's outlook. Continues to benefit from skilled PT with progress heavily dependent on patient's motivation level. Recommend Rehab or SNF post-discharge. Progression toward goals:  [ ]    Improving appropriately and progressing toward goals  [X]    Improving slowly and progressing toward goals  [ ]    Not making progress toward goals and plan of care will be adjusted       PLAN:  Patient continues to benefit from skilled intervention to address the above impairments. Continue treatment per established plan of care. Discharge Recommendations:  Rehab  Further Equipment Recommendations for Discharge:  TBD       SUBJECTIVE:   Patient stated I don't know why everyone won't just leave me alone.       OBJECTIVE DATA SUMMARY:   Critical Behavior:  Neurologic State: (P) Alert  Orientation Level: (P) Oriented X4  Cognition: (P) Follows commands (expect decision making impaired due to current stress)  Safety/Judgement: (P) Awareness of environment, Fall prevention, Home safety  Functional Mobility Training:  Bed Mobility:     Supine to Sit: Contact guard assistance (occasional VCs)              Transfers:  Sit to Stand: Contact guard assistance  Stand to Sit: Contact guard assistance        Bed to Chair: Contact guard assistance                    Balance:  Sitting: Intact  Standing: Impaired; With support  Standing - Static: Fair  Standing - Dynamic : Fair  Ambulation/Gait Training:  Distance (ft): 10 Feet (ft)  Assistive Device: Walker, rolling;Gait belt  Ambulation - Level of Assistance: Minimal assistance        Gait Abnormalities: Decreased step clearance        Base of Support: Narrowed     Speed/Honey: Slow;Pace decreased (<100 feet/min)  Step Length: Left shortened;Right shortened                               Stairs:  NT                       Neuro Re-Education:  Deferred  Therapeutic Exercises:   Patient declined today  Pain:  Pain Scale 1: Numeric (0 - 10)  Pain Intensity 1: 0              Activity Tolerance:   Severely decreased, ambulation 10-20 feet, limited tolerance to sitting in recliner EOB     Please refer to the flowsheet for vital signs taken during this treatment.   After treatment:   [X]    Patient left in no apparent distress sitting up in chair  [ ]    Patient left in no apparent distress in bed  [X]    Call bell left within reach  [X]    Nursing notified  [ ]    Caregiver present  [ ]    Bed alarm activated      COMMUNICATION/COLLABORATION:   The patients plan of care was discussed with: Registered Nurse     Contreras Schofield PT   Time Calculation: 30 mins

## 2017-09-07 NOTE — PROGRESS NOTES
Spiritual Care Assessment/Progress Notes    Dahiana Hightower [id number]  [id number]    1950  69 y.o.  female    Patient Telephone Number: [phone] (home)   Evangelical Affiliation: Non Anabaptist   Language: English   Extended Emergency Contact Information  Primary Emergency Contact: Dwaine Oquendo  Address: [address] [place] [address], [address]  [address] Phone: [phone]  Relation: Spouse   Patient Active Problem List    Diagnosis Date Noted    NSTEMI (non-ST elevated myocardial infarction) ([facility] 08/31/2017    [facility] 08/29/2017    Clear cell renal cell carcinoma ([facility] 08/28/2017    Controlled diabetes mellitus ([facility] 07/31/2017    On statin therapy 07/31/2017    Fatigue 07/31/2017    Hypercholesterolemia 07/31/2017    Hypertension 07/31/2017    Osteoporosis 07/31/2017    Spinal stenosis of lumbar region with radiculopathy 07/31/2017    Cervical myelopathy ([facility] 07/31/2017    Anxiety 07/31/2017    Vaginitis due to Candida 07/31/2017    Hyperglycemia 07/31/2017    Post-menopausal 07/31/2017        Date: 9/7/2017       Level of Evangelical/Spiritual Activity:  []         Involved in chilo tradition/spiritual practice    []         Not involved in chilo tradition/spiritual practice  []         Spiritually oriented    []         Claims no spiritual orientation    []         seeking spiritual identity  []         Feels alienated from Orthodoxy practice/tradition  []         Feels angry about Orthodoxy practice/tradition  []         Spirituality/Orthodoxy tradition is a resource for coping at this time.   [x]         Not able to assess    Services Provided Today:  []         crisis intervention    []         reading Scriptures  []         spiritual assessment    []         prayer  []         empathic listening/emotional support  []         rites and rituals (cite in comments)  []         life review     []         Orthodoxy support  [] theological development   []         advocacy  []         ethical dialog     []         blessing  []         bereavement support    [x]         support to friend  []         anticipatory grief support   []         help with AMD  []         spiritual guidance    []         meditation      Spiritual Care Needs  []         Emotional Support  []         Spiritual/Christianity Care  []         Loss/Adjustment  []         Advocacy/Referral                /Ethics  []         No needs expressed at               this time  []         Other: (note in               comments)  [address]  []         Follow up visits with               pt/family  []         Provide materials  []         Schedule sacraments  []         Contact Community               Clergy  [x]         Follow up as needed  []         Other: (note in               comments)     Comments: Attempted visit with Ms. Oquendo. Staff was present and working with pt. Spoke briefly with pt's friend at doorway. Will attempt to return another time to offer support to pt.     Rachael Washington, Palliative

## 2017-09-07 NOTE — PROGRESS NOTES
CM reviewed chart and received a phone call from [facility] confirming that they were accepting pt, and should have a bed available for pt tomorrow. Met with pt and her sister at the bedside, they are in agreement with discharge plan. CM also spoke with pt's , who is in agreement with discharge plan.   [facility] prefers pt come to them around 2pm, CM will set up transport for around [address] [facility], [facility], [facility]

## 2017-09-08 NOTE — PROGRESS NOTES
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 Alexi Curran is a 69 y.o. female with newly diagnosed L RCC with lung, LN and adrenal metastasis who sees Dr. Kalpesh Diaz for palliative management and was slated to start votrient is currently admitted with GN sepsis. She was noted by her family to have progressive weakness in the last week with no appetite and inability to walk, dress or do much around the house. She was admitted to [facility] on 8/31/17 with these complains and found to have hypotension, acute renal failure and leucocytosis. She was subsequently found to have serratia UTI and GNR on blood cultures. Has been started on Meropenem. She has known L renal mass with thrombosis of the left renal vein extending to the level of the midline. She is withdrawn today, has no pain, no SOB, denies a HA, no hematuria or other bleeding, still tachycardic and hypotensive but not having fevers. Is quite fatigued, has no appetite, denies, diarrhea or vomiting, no rashes, no CP and is making urine    INTERVAL HISTORY: She was scheduled today for discharge to [address] [place] [address]. She has just returned from brain MRI. Lethargic but arousable. Received pain medication and anxiolytic prior to MRI. Unable to obtain ROS as she wishes to rest.  is supportive at the bedside.     Past Medical History:   Diagnosis Date    Anxiety 7/31/2017    2/3/17:Under a tremendous amount of stress, will continue Alprazolam prn, if using regularly consider changing to an SSRI or counseling    Cancer ([address] 08/2017    renal cell carcinoma    Cervical myelopathy ([address] 7/31/2017    2/3/17:Cervical fusion     Controlled diabetes mellitus ([address] 7/31/2017    Fatigue 7/31/2017    Hypercholesterolemia 7/31/2017    Hyperglycemia 7/31/2017    Hypertension 7/31/2017    2/3/17:Poorly controlled, lifestyle changes, repeat /90 will follow    On statin therapy 7/31/2017    Osteoporosis 7/31/2017    Post-menopausal 7/31/2017    Spinal stenosis of lumbar region with radiculopathy 7/31/2017    2/3/17:Symptoms c/w this diagnosis discussed with pt., if progresses then need MRI L spine and follow up    Vaginitis due to Candida 7/31/2017    2/3/17:Associated with antibiotic use, requests prescription med       Past Surgical History:   Procedure Laterality Date    HX ORTHOPAEDIC      2009 spinal cord surgery     HX ORTHOPAEDIC Left     knee procedure    HX OTHER SURGICAL  08/16/2017    incisional biopsy right neck mass       No Known Allergies    Current Facility-Administered Medications   Medication Dose Route Frequency Provider Last Rate Last Dose    0.9% sodium chloride with KCl 40 mEq/L infusion   IntraVENous CONTINUOUS Thong Vanegas  mL/hr at 09/08/17 1108      oxyCODONE IR (ROXICODONE) tablet 5 mg  5 mg Oral Q4H PRN Destiny Valenzuela MD        simethicone (MYLICON) tablet 80 mg  80 mg Oral QID PRN Destiny Valenzuela MD        megestrol (MEGACE) 400 mg/10 mL (10 mL) oral suspension 200 mg  200 mg Oral DAILY Thong Vanegas MD   200 mg at 09/08/17 1139    furosemide (LASIX) injection 20 mg  20 mg IntraVENous ACB&D Thong Vanegas MD   20 mg at 09/08/17 0750    calciTONIN (MIACALCIN) injection 100 Int'l Units  100 Int'l Units SubCUTAneous Q12H Thong Vanegas MD   100 Int'l Units at 09/08/17 1115    ALPRAZolam (XANAX) tablet 0.25 mg  0.25 mg Oral QHS Detsiny Valenzuela MD   0.25 mg at 09/07/17 2217    senna-docusate (PERICOLACE) 8.6-50 mg per tablet 2 Tab  2 Tab Oral DAILY Destiny Valenzuela MD   2 Tab at 09/08/17 1109    nystatin (MYCOSTATIN) 100,000 unit/mL oral suspension 500,000 Units  500,000 Units Oral QID Geneva Luna MD   Stopped at 09/08/17 1300    cefTRIAXone (ROCEPHIN) 2 g in 0.9% sodium chloride (MBP/ADV) 50 mL  2 g IntraVENous Q24H [address]  mL/hr at 09/08/17 1507 2 g at 09/08/17 1507    insulin glargine (LANTUS) injection 12 Units  12 Units SubCUTAneous QHS Azalia Curtis MD   12 Units at 09/07/17 2218    ondansetron (ZOFRAN) injection 4 mg  4 mg IntraVENous Q4H PRN Azalia Curtis MD        ALPRAZolam Paulla Page) tablet 0.25 mg  0.25 mg Oral BID PRN Azalia Curtis MD   0.25 mg at 09/08/17 1151    carvedilol (COREG) tablet 6.25 mg  6.25 mg Oral BID WITH MEALS Moose Smith MD   6.25 mg at 09/08/17 0750    aspirin chewable tablet 81 mg  81 mg Oral DAILY Azalia Curtis MD   81 mg at 09/08/17 1109    heparin (porcine) injection 5,000 Units  5,000 Units SubCUTAneous Q12H Azalia Curtis MD   5,000 Units at 09/08/17 1111    insulin lispro (HUMALOG) injection   SubCUTAneous AC&HS Azalia Curtis MD   Stopped at 09/07/17 2200    therapeutic multivitamin (THERAGRAN) tablet 1 Tab  1 Tab Oral DAILY Vickey Shelton MD   1 Tab at 09/08/17 1109    sodium chloride (NS) flush 5-10 mL  5-10 mL IntraVENous Q8H Vickey Shelton MD   10 mL at 09/08/17 1510    sodium chloride (NS) flush 5-10 mL  5-10 mL IntraVENous PRN Vickey Shelton MD        sodium chloride (NS) flush 5-10 mL  5-10 mL IntraVENous PRN Vickey Shelton MD        sodium chloride (NS) flush 5-10 mL  5-10 mL IntraVENous Q8H Vickey Shelton MD   10 mL at 09/08/17 1510    sodium chloride (NS) flush 5-10 mL  5-10 mL IntraVENous PRN Vickey Shelton MD        nitroglycerin (NITROSTAT) tablet 0.4 mg  0.4 mg SubLINGual Q5MIN PRN Vickey Shelton MD        atorvastatin (LIPITOR) tablet 40 mg  40 mg Oral QPM Vickey Shelton MD   40 mg at 09/07/17 1710    glucose chewable tablet 16 g  4 Tab Oral PRN Vickey Shelton MD        dextrose (D50W) injection syrg 12.5-25 g  12.5-25 g IntraVENous PRN Vickey Shelton MD   25 g at 09/08/17 0722    glucagon (GLUCAGEN) injection 1 mg  1 mg IntraMUSCular PRN Vickey Shelton MD           Social History     Social History    Marital status:      Spouse name: N/A    Number of children: N/A    Years of education: N/A     Social History Main Topics    Smoking status: Never Smoker    Smokeless tobacco: Never Used    Alcohol use No    Drug use: No    Sexual activity: Not Asked     Other Topics Concern    None     Social History Narrative       Family History   Problem Relation Age of Onset    Heart Disease Mother        ROS  A 12 point review of systems was obtained and is negative except as listed in HPI. ECOG PS is 3    Physical Examination:   Visit Vitals    /59 (BP 1 Location: Left arm, BP Patient Position: At rest)    Pulse 75    Temp 97.8 °F (36.6 °C)    Resp 16    Ht 5' (1.524 m)    Wt 130 lb (59 kg)    SpO2 94%    Breastfeeding No    BMI 23.78 kg/m2     General appearance - lethargic, pale, no distress,  present  Mental status - unable to assess  Mouth - mucous membranes dry  Neck - supple, no significant adenopathy  Lymphatics - no palpable lymphadenopathy, no hepatosplenomegaly  Chest - clear to auscultation, no wheezes, rales or rhonchi, symmetric air entry  Heart - regular rate, regular rhythm  Neurological - normal speech, no focal findings or movement disorder noted  Skin - no rashes    LABS  Lab Results   Component Value Date/Time    WBC 14.1 09/08/2017 11:53 AM    HGB 7.3 09/08/2017 11:53 AM    HCT 23.3 09/08/2017 11:53 AM    PLATELET 872 81/99/9217 11:53 AM    MCV 78.2 09/08/2017 11:53 AM    ABS.  NEUTROPHILS 12.4 09/08/2017 11:53 AM     Lab Results   Component Value Date/Time    Sodium 142 09/08/2017 05:17 AM    Potassium 3.0 09/08/2017 05:17 AM    Chloride 109 09/08/2017 05:17 AM    CO2 26 09/08/2017 05:17 AM    Glucose 51 09/08/2017 05:17 AM    BUN 14 09/08/2017 05:17 AM    Creatinine 0.77 09/08/2017 05:17 AM    GFR est AA >60 09/08/2017 05:17 AM    GFR est non-AA >60 09/08/2017 05:17 AM    Calcium 8.5 09/08/2017 05:17 AM    BUN (POC) 13 08/16/2017 11:42 AM    Calcium, ionized (POC) 1.49 08/16/2017 11:42 AM     Lab Results   Component Value Date/Time    AST (SGOT) 19 09/05/2017 04:01 AM    Alk. phosphatase 136 09/05/2017 04:01 AM    Protein, total 5.6 09/05/2017 04:01 AM    Albumin 1.7 09/08/2017 05:17 AM    Globulin 3.7 09/05/2017 04:01 AM    A-G Ratio 0.5 09/05/2017 04:01 AM       MRI ABDOMEN 8/25/17  IMPRESSION  IMPRESSION:   1. Dominant, bulky mass arising from the lower pole left kidney is  redemonstrated. Multiple other solid masses are redemonstrated in both kidneys. Metastatic lesions are also noted in the left perirenal fat. Left renal vein  thrombosis does not appear significantly changed. 2. Left adrenal metastasis and thoracic metastases redemonstrated. 3. Bulky mass arising from the lower pole left kidney extends through Gerota's  fascia and is nearly contiguous with portions of the descending colon. Associated peritoneal thickening and enhancement is noted in the left paracolic  gutter. 4. No metastatic lesions in the liver    cxr 8/31/17  IMPRESSION  IMPRESSION: Right superior mediastinal and right hilar lymphadenopathy. Pulmonary nodules, as described above.     MRI Brain 9/8/17  Addendum:      Patient returned for repeat postcontrast multiplanar T1 imaging with an  additional 5 mL of gadolinium. There is a ring-enhancing anterior left frontal  lobe lesion which measures 1.1 x 1.0 cm. There is an additional subtle 4 mm  enhancing focus left centrum semiovale. 4 mm lesion lateral left frontal lobe on  series 2R image 7. Imaging is somewhat degraded by motion artifact. Findings  again most consistent with intracranial metastases. ASSESSMENT  Ms. Alexi Curran is a 69 y.o. female with  1. Metastatic poor risk RCC  2. Large L renal mass with likely tumor thrombus in the renal vein  3. GNR sepsis  4. UTI  5. Depression  6. ARF- resolved  7. New Brain Metastasis    DISCUSSION    All treatments for RCC on hold. These are palliative in nature. Palliative Medicine on board for symptom management. She is currently DNR.     Plan was for discharge today to [address] [place] [address]. MRI Brain obtained revealed ring-enhancing anterior left frontal lobe lesion 1.1 x 1.0 cm. Additional small 4mm lesions noted. Discussed this with  at the bedside. Patient was lethargic from pain medication and anxiolytic needed prior to MRI. Given small lesions and no current symptoms from intracranial disease discussed GK with Dr. Esvin Garcia vs discussions with his primary Oncologist about immunotherapy which may control intracranial disease as well     is very concerned that his wife demonstrates no will power to go through palliative TKI , rehab or other treatments to control the cancer,  Though this is not what he wishes for her, he wonders whether she should be on Hospice. I discussed that the Overall prognosis is poor, reasonable disease ontrol and possibly 1-2 years of survival may be feasible if disease responds to above mentioned options. She appears clinically depressed. Discussed starting Remeron. I will also start her on small dose of decadron until she meets with Dr. Rehana Perez to further discuss goals of care. PLAN  - ID involved for management of urosepsis on admission. Dr. Jody Sweet has transitioned to ceftriaxone which she will need until 9/16/17.  - Goals of care discussed on admission - DNR orders placed. Readdressed today on husbands request. He hopes she could receive palliative RCC specific treatments   -  MRI brain reviewed personally- Start Decadron 1 mg BID- will reach out to her oncologist to discuss [place] vs other modalities  - Depression: Start Remeron  - No anticoagulation needed for tumor thrombosis    Appreciate the opportunity to be involved in Ms. Oquendo's care. Please call with any questions. Seen in conjunction with Jesse Sun NP.   Discussed with Dr. Rehana Perez    Signed by: Guzman Hernandez MD                     September 8, 2017

## 2017-09-08 NOTE — PROGRESS NOTES
Care Management Interventions  PCP Verified by CM: Yes  Mode of Transport at Discharge: S ([facility])  Hospital Transport Time of Discharge: 1400  Discharge Durable Medical Equipment: No  Physical Therapy Consult: Yes  Occupational Therapy Consult: Yes  Speech Therapy Consult: No  Current Support Network: Lives with Spouse (independent with ADLs)  Confirm Follow Up Transport: Family  Plan discussed with Pt/Family/Caregiver: Yes  Freedom of Choice Offered: Yes  Discharge Location  Discharge Placement: Rehab hospital/unit acute ([facility])    CM reviewed chart and received discharge orders. CM spoke with [facility] who confirmed that they have a bed available and are katie to accept pt today after 2pm.  Pt and family are in agreement with discharge plan. CM set up ambulance transport for 2pm.  Envelope containing MARs, Kardex, AVS, and Emtalas will be sent with pt. Nurse will call report to [phone]. LINNEA Arenas, MARGARET    Discharge canceled due to findings on MRI. CM notified both [facility] and [facility]. CM will continue to follow.   LINNEA Arenas, MARGARET

## 2017-09-08 NOTE — PROGRESS NOTES
Bedside shift change report given to Clay Catalan RN (oncoming nurse) by Shelley Raphael RN (offgoing nurse). Report included the following information SBAR, Kardex and MAR.

## 2017-09-08 NOTE — CONSULTS
Palliative Medicine Consult  Farhan: [phone] ([phone])    Patient Name: Feliz Yadav  YOB: 1950    Date of Initial Consult: 9/4/17  Reason for Consult: Overwhelming symptoms. Requesting Provider: Yaz Tee MD  Primary Care Physician: Shea Johnson MD      SUMMARY:   Feliz Yadav is a 69 y.o. Female with recent diagnosis of metastatic renal cell carcinoma with lung , lymph node and adrenal metastasis , who was to satrt palliative chemotherapy with Dr Madhuri Washington, past medical history is notable for, diabetes diet controlled, htn, anxiety depressionwith a p who was admitted on 8/31/2017 from  Home with a diagnosis of weakness, lethargy, associated with loss of appetite for and dry heaving, found to have Gram negative sepsis from urine source and NSTEMI. Work up shows thrombosis of left Renal vein, does not need anticoagulation per oncology. Current medical issues leading to Palliative Medicine involvement include: stage 4 renal cell carcinoma with sepsis, overwhelming symptoms. PALLIATIVE DIAGNOSES:   1. Generalized weakness  2. Depression  3. Anorexia  4. Pain  5. Weight loss  6. Anxiety    Plan:    1. Depression :   Negatively impacting her quality of life suggest placing on SSRI. 2.Pain :  dilaudid does not seem to help with pain control, suggest oxycodone 5 mg q 4 hrs prn, d/c norco.        -Place on Simethacone for gas contributing some to pain. 3. Insomnia:  On xanax q hs.    4 Anxiety: on xanax bid prn.    5. Constipation: bowels are moving on senokot. 6.. Code status : patient is clear to change her code status from partial code to DNR and DNI, pink sheet place don chart , DDNR in place . 7. Nausea : controlled, 0 use of zofran in last 72 hrs. 8. Anorexia : on megace. we will continue to support patient and family through difficult course of illness.       Initial consult note routed to primary continuity provider  Communicated plan of care with: Palliative IDT and primary medical team Dr Maris Hamlin. GOALS OF CARE / TREATMENT PREFERENCES:   [====Goals of Care====]  GOALS OF CARE:  Patient / health care proxy stated goals: treatment of acute medical issues. TREATMENT PREFERENCES:   Code Status: DNR    Advance Care Planning:  Advance Care Planning 8/31/2017   Patient's Healthcare Decision Maker is: Legal Next of Tracey Jacobson   Primary Decision Maker Name Mel Sibley    Primary Decision Maker Phone Number [phone]   Primary Decision Maker Relationship to Patient Spouse   Confirm Advance Directive None   Patient Would Like to Complete Advance Directive No       Other:    The palliative care team has discussed with patient / health care proxy about goals of care / treatment preferences for patient.  [====Goals of Care====]         HISTORY:     History obtained from: chart, patient and staff. CHIEF COMPLAINT: weakness and fatigue    HPI/SUBJECTIVE:    The patient is:   [] Verbal and participatory  Per patient she has been feeling weak dry heaving and poorly eating for past few days, she was functioning independently, cutting grass up until one month ago. she reports progressive weight loss of 15 to 20 lbs in last 6 months. She reports she worries a lot, and at base line unable to sleep at night because she worries about her Dad who is 80years old, worries about her daughter who was in a care accident but is functioning fine. she takes Xanax on and off to sleep. She feels very tired currently cannot rest during day because of interruption due to routine in hospital care, doctors visit , PT etc.  Reports last bowel movements few days ago. 9/7/17:  Feels tired, denies pain.    sister at bed side reports  9/8/17: patient feels depressed, because of \" two illness heart and cancer \", energy is very low, does not want to get out of bed ,  Asking \" god for comfort \" Does not want to be bothered, notes she only rest at night, does not sleep. Pain in left flank, on and off, currently feels \" gas \"    Appetite is very poor. Bowels are moving . Clinical Pain Assessment (nonverbal scale for severity on nonverbal patients):   [++++ Clinical Pain Assessment++++]  [++++Pain Severity++++]: 8/10  [++++Pain Character++++]: \"gas \"  [++++Pain Duration++++]: weeks  [++++Pain Effect++++]:   [++++Pain Factors++++]: dilaudid does not seem to relive pain  [++++Pain Frequency++++]: comes and go. [++++Pain Location++++]: left flank  [++++ Clinical Pain Assessment++++]  Duration: for how long has pt been experiencing pain (e.g., 2 days, 1 month, years)  Frequency: how often pain is an issue (e.g., several times per day, once every few days, constant)     FUNCTIONAL ASSESSMENT:     Palliative Performance Scale (PPS):  PPS: 50       PSYCHOSOCIAL/SPIRITUAL SCREENING:     Advance Care Planning:  Advance Care Planning 8/31/2017   Patient's Healthcare Decision Maker is: Legal Next of Tracey Jacobson   Primary Decision Maker Name Fuller Kanner    Primary Decision Maker Phone Number [phone]   Primary Decision Maker Relationship to Patient Spouse   Confirm Advance Directive None   Patient Would Like to Complete Advance Directive No        Any spiritual / Christianity concerns:  [] Yes /  [x] No    Caregiver Burnout:  [] Yes /  [] No /  [x] No Caregiver Present      Anticipatory grief assessment:   [x] Normal  / [] Maladaptive       ESAS Anxiety: Anxiety: 2    ESAS Depression: Depression: 8        REVIEW OF SYSTEMS:     Positive and pertinent negative findings in ROS are noted above in HPI. The following systems were [x] reviewed / [] unable to be reviewed as noted in HPI  Other findings are noted below. Systems: constitutional, ears/nose/mouth/throat, respiratory, gastrointestinal, genitourinary, musculoskeletal, integumentary, neurologic, psychiatric, endocrine. Positive findings noted below.   Modified ESAS Completed by: provider   Fatigue: 7 Drowsiness: 4 Depression: 8 Pain: 8   Anxiety: 2 Nausea: 0   Anorexia: 6 Dyspnea: 0     Constipation: No     Stool Occurrence(s): 1        PHYSICAL EXAM:     From RN flowsheet:  Wt Readings from Last 3 Encounters:   09/08/17 130 lb (59 kg)   09/07/17 128 lb 4.9 oz (58.2 kg)   08/25/17 113 lb (51.3 kg)     Blood pressure 139/68, pulse 82, temperature 97.5 °F (36.4 °C), resp. rate 16, height 5' (1.524 m), weight 130 lb (59 kg), SpO2 93 %, not currently breastfeeding. Pain Scale 1: Numeric (0 - 10)  Pain Intensity 1: 0  Pain Onset 1: movement  Pain Location 1: Generalized  Pain Orientation 1: Anterior  Pain Description 1: Aching  Pain Intervention(s) 1: Medication (see MAR)  Last bowel movement, if known:     Constitutional: chronically sick, alert , oriented x 3. Eyes: pupils equal, anicteric  Cardiovascular: regular rhythm. Respiratory: breathing not labored, symmetric  Gastrointestinal: soft non-tender, +bowel sounds  Musculoskeletal: no deformity, no tenderness to palpation  Skin: warm, dry  Neurologic: following commands, moving all extremities  Psychiatric: flat affect.   Other:       HISTORY:     Principal Problem:    NSTEMI (non-ST elevated myocardial infarction) ([address] (8/31/2017)      Past Medical History:   Diagnosis Date    Anxiety 7/31/2017    2/3/17:Under a tremendous amount of stress, will continue Alprazolam prn, if using regularly consider changing to an SSRI or counseling    Cancer ([address] 08/2017    renal cell carcinoma    Cervical myelopathy ([address] 7/31/2017    2/3/17:Cervical fusion     Controlled diabetes mellitus ([address] 7/31/2017    Fatigue 7/31/2017    Hypercholesterolemia 7/31/2017    Hyperglycemia 7/31/2017    Hypertension 7/31/2017    2/3/17:Poorly controlled, lifestyle changes, repeat /90 will follow    On statin therapy 7/31/2017    Osteoporosis 7/31/2017    Post-menopausal 7/31/2017    Spinal stenosis of lumbar region with radiculopathy 7/31/2017    2/3/17:Symptoms c/w this diagnosis discussed with pt., if progresses then need MRI L spine and follow up    Vaginitis due to Candida 7/31/2017    2/3/17:Associated with antibiotic use, requests prescription med      Past Surgical History:   Procedure Laterality Date    HX ORTHOPAEDIC      2009 spinal cord surgery     HX ORTHOPAEDIC Left     knee procedure    HX OTHER SURGICAL  08/16/2017    incisional biopsy right neck mass      Family History   Problem Relation Age of Onset    Heart Disease Mother       History reviewed, no pertinent family history.   Social History   Substance Use Topics    Smoking status: Never Smoker    Smokeless tobacco: Never Used    Alcohol use No     No Known Allergies   Current Facility-Administered Medications   Medication Dose Route Frequency    0.9% sodium chloride with KCl 40 mEq/L infusion   IntraVENous CONTINUOUS    HYDROcodone-acetaminophen (NORCO) 5-325 mg per tablet 1 Tab  1 Tab Oral Q4H PRN    megestrol (MEGACE) 400 mg/10 mL (10 mL) oral suspension 200 mg  200 mg Oral DAILY    furosemide (LASIX) injection 20 mg  20 mg IntraVENous ACB&D    calciTONIN (MIACALCIN) injection 100 Int'l Units  100 Int'l Units SubCUTAneous Q12H    ALPRAZolam (XANAX) tablet 0.25 mg  0.25 mg Oral QHS    senna-docusate (PERICOLACE) 8.6-50 mg per tablet 2 Tab  2 Tab Oral DAILY    nystatin (MYCOSTATIN) 100,000 unit/mL oral suspension 500,000 Units  500,000 Units Oral QID    cefTRIAXone (ROCEPHIN) 2 g in 0.9% sodium chloride (MBP/ADV) 50 mL  2 g IntraVENous Q24H    HYDROmorphone (PF) (DILAUDID) injection 1 mg  1 mg IntraVENous Q4H PRN    insulin glargine (LANTUS) injection 12 Units  12 Units SubCUTAneous QHS    ondansetron (ZOFRAN) injection 4 mg  4 mg IntraVENous Q4H PRN    ALPRAZolam (XANAX) tablet 0.25 mg  0.25 mg Oral BID PRN    carvedilol (COREG) tablet 6.25 mg  6.25 mg Oral BID WITH MEALS    aspirin chewable tablet 81 mg  81 mg Oral DAILY    heparin (porcine) injection 5,000 Units  5,000 Units SubCUTAneous Q12H    insulin lispro (HUMALOG) injection   SubCUTAneous AC&HS    therapeutic multivitamin (THERAGRAN) tablet 1 Tab  1 Tab Oral DAILY    sodium chloride (NS) flush 5-10 mL  5-10 mL IntraVENous Q8H    sodium chloride (NS) flush 5-10 mL  5-10 mL IntraVENous PRN    sodium chloride (NS) flush 5-10 mL  5-10 mL IntraVENous PRN    sodium chloride (NS) flush 5-10 mL  5-10 mL IntraVENous Q8H    sodium chloride (NS) flush 5-10 mL  5-10 mL IntraVENous PRN    nitroglycerin (NITROSTAT) tablet 0.4 mg  0.4 mg SubLINGual Q5MIN PRN    atorvastatin (LIPITOR) tablet 40 mg  40 mg Oral QPM    glucose chewable tablet 16 g  4 Tab Oral PRN    dextrose (D50W) injection syrg 12.5-25 g  12.5-25 g IntraVENous PRN    glucagon (GLUCAGEN) injection 1 mg  1 mg IntraMUSCular PRN          LAB AND IMAGING FINDINGS:     Lab Results   Component Value Date/Time    WBC 12.1 09/06/2017 06:26 AM    HGB 9.4 09/06/2017 06:26 AM    PLATELET 726 31/03/7222 06:26 AM     Lab Results   Component Value Date/Time    Sodium 142 09/08/2017 05:17 AM    Potassium 3.0 09/08/2017 05:17 AM    Chloride 109 09/08/2017 05:17 AM    CO2 26 09/08/2017 05:17 AM    BUN 14 09/08/2017 05:17 AM    Creatinine 0.77 09/08/2017 05:17 AM    Calcium 8.5 09/08/2017 05:17 AM    Magnesium 1.9 09/06/2017 06:26 AM    Phosphorus 2.4 09/08/2017 05:17 AM      Lab Results   Component Value Date/Time    AST (SGOT) 19 09/05/2017 04:01 AM    Alk. phosphatase 136 09/05/2017 04:01 AM    Protein, total 5.6 09/05/2017 04:01 AM    Albumin 1.7 09/08/2017 05:17 AM    Globulin 3.7 09/05/2017 04:01 AM     No results found for: INR, PTMR, PTP, PT1, PT2, APTT   No results found for: IRON, FE, TIBC, IBCT, PSAT, FERR   No results found for: PH, PCO2, PO2  No components found for: [place]   Lab Results   Component Value Date/Time    CK 49 08/31/2017 06:47 PM                Total time: 35 mins  Counseling / coordination time, spent as noted above: 25 mins  > 50% counseling / coordination?: yes.     Prolonged service was provided for  []30 min   []75 min in face to face time in the presence of the patient, spent as noted above. Time Start:   Time End:   Note: this can only be billed with 17801 (initial) or 02433 (follow up). If multiple start / stop times, list each separately.

## 2017-09-08 NOTE — PROGRESS NOTES
Brief Oncology Note:    Attempt to see patient. She is CHARLEY currently. Will return later today.     Peewee Flores NP

## 2017-09-08 NOTE — PROGRESS NOTES
Spiritual Care Assessment/Progress Notes    Duke Linares [id number]  [id number]    1950  69 y.o.  female    Patient Telephone Number: [phone] (home)   Buddhism Affiliation: Non Church   Language: English   Extended Emergency Contact Information  Primary Emergency Contact: Roberta Bowers  Address: [address], [address] Phone: [phone]  Work Phone: [phone]  Mobile Phone: [phone]  Relation: Spouse   Patient Active Problem List    Diagnosis Date Noted    NSTEMI (non-ST elevated myocardial infarction) ([facility] 08/31/2017    Metastatic adenocarcinoma ([facility] 08/29/2017    Clear cell renal cell carcinoma ([facility] 08/28/2017    Controlled diabetes mellitus ([facility] 07/31/2017    On statin therapy 07/31/2017    Fatigue 07/31/2017    Hypercholesterolemia 07/31/2017    Hypertension 07/31/2017    Osteoporosis 07/31/2017    Spinal stenosis of lumbar region with radiculopathy 07/31/2017    Cervical myelopathy ([facility] 07/31/2017    Anxiety 07/31/2017    Vaginitis due to Candida 07/31/2017    Hyperglycemia 07/31/2017    Post-menopausal 07/31/2017        Date: 9/8/2017       Level of Buddhism/Spiritual Activity:  []         Involved in chilo tradition/spiritual practice    []         Not involved in chilo tradition/spiritual practice  [x]         Spiritually oriented    []         Claims no spiritual orientation    []         seeking spiritual identity  []         Feels alienated from Lutheran practice/tradition  []         Feels angry about Lutheran practice/tradition  []         Spirituality/Lutheran tradition a resource for coping at this time.   []         Not able to assess due to medical condition    Services Provided Today:  []         crisis intervention    []         reading Scriptures  [x]         spiritual assessment    []         prayer  [x]         empathic listening/emotional support  []         rites and rituals (cite in comments)  []         life review     []         Episcopalian support  []         theological development   []         advocacy  []         ethical dialog     []         blessing  []         bereavement support    []         support to family  []         anticipatory grief support   []         help with AMD  []         spiritual guidance    []         meditation      Spiritual Care Needs  [x]         Emotional Support  []         Spiritual/Restoration Care  []         Loss/Adjustment  []         Advocacy/Referral                /Ethics  []         No needs expressed at               this time  []         Other: (note in               comments)  [address]  []         Follow up visits with               pt/family  []         Provide materials  []         Schedule sacraments  []         Contact Community               Clergy  [x]         Follow up as needed  []         Other: (note in               comments)     Comments: Supportive visit with Ms. Oquendo. Pt was expressing pain - nursing staff was present and providing care. Offered ministry of presence and emotional support as pt shared her suffering. Pt expressed her desire to end the visit as she said she wanted to rest and could not talk right now. Offered expressions of comfort and assurance. Patient and staff indicate a plan for discharge today. Following visit, consulted with pt's nurse. Please page [phone] for  support as needed prior to discharge.     Rachael Washington, Palliative

## 2017-09-08 NOTE — DIABETES MGMT
DTC Progress Note    Recommendations/ Comments: Chart reviewed due to hypoglycemia. Pt with a blood sugar of 58 mg/dl at 0707 and of 56 mg/dl at 0719 this morning. Noted patient with poor po intake. Noted Glipizide d/c. If hypoglycemia persists, please consider decreasing Lantus dose to to 8 units. DTC to continue to follow. Chart reviewed on Dah[facility] Hightower. Patient is a 69 y.o. female with known DM on Metaglip 5-500 mg (two) tabs BID at home. Recent dx renal cell CA     A1c:   Lab Results   Component Value Date/Time    Hemoglobin A1c 7.5 08/31/2017 03:19 PM       Recent Glucose Results: Lab Results   Component Value Date/Time    GLU 51 (L) 09/08/2017 05:17 AM    GLUCPOC 152 (H) 09/08/2017 07:55 AM    GLUCPOC 196 (H) 09/08/2017 07:34 AM    GLUCPOC 56 (L) 09/08/2017 07:19 AM        Lab Results   Component Value Date/Time    Creatinine 0.77 09/08/2017 05:17 AM     Estimated Creatinine Clearance: 55.4 mL/min (based on Cr of 0.77). Active Orders   Diet    DIET DIABETIC WITH OPTIONS Consistent Carb 1800kcal; Soft Solids; 2 GM NA (House Low NA)        PO intake: Patient Vitals for the past 72 hrs:   % Diet Eaten   09/06/17 1400 10 %   09/06/17 1004 10 %   09/05/17 1830 5 %   09/05/17 1236 50 %       Current hospital DM medication: Lantus 12 units hs and correction scale Humalog, normal sensitivity. Will continue to follow as needed.     Thank you  Alex Pineda, LISA, CDE

## 2017-09-08 NOTE — PROGRESS NOTES
HYPOGLYCEMIC EPISODE DOCUMENTATION    Patient with hypoglycemic episode(s) at 0707(time) on 09/8/17(date). BG value(s) pre-treatment 62    Was patient symptomatic? [x] yes, [] no  Patient was treated with the following rescue medications/treatments: [x] D50                [] Glucose tablets                [] Glucagon                [x] 4oz juice                [] 6oz reg soda                [] 8oz low fat milk  BG value post-treatment: 196  Once BG treated and value greater than 80mg/dl, pt was provided with the following:  [] snack  [x] meal       Dr. Ramiro Arenas notified. Will continue to monitor.

## 2017-09-08 NOTE — DISCHARGE SUMMARY
Discharge Summary     Patient:  Tacho Reid       MRN: [id number]       YOB: 1950       Age: 69 y.o. Date of admission:  8/31/2017    Date of discharge:  9/10/2017    Primary care provider: Dr. Kim Andrade MD    Admitting provider:  Bay Caraballo MD    Discharging provider:  Camille Menchaca [address] [phone]. If unavailable, call [phone] and ask the  to page the triage hospitalist.    Consultations  IP CONSULT TO HOSPITALIST  IP CONSULT TO CARDIOLOGY  IP CONSULT TO NEPHROLOGY  IP CONSULT TO INFECTIOUS DISEASES  IP CONSULT TO HEMATOLOGY  IP CONSULT TO PALLIATIVE CARE - PROVIDER  IP CONSULT TO PSYCHIATRY    Procedures  * No surgery found *    Discharge destination: Rehab. The patient is stable for discharge. Admission diagnosis  NSTEMI (non-ST elevated myocardial infarction) [facility])    Current Discharge Medication List      START taking these medications    Details   mirtazapine (REMERON) 15 mg tablet Take 1 Tab by mouth nightly. Qty: 30 Tab, Refills: 0      dexamethasone (DECADRON) 1 mg tablet Take 1 Tab by mouth two (2) times daily (with meals). Qty: 60 Tab, Refills: 0      carvedilol (COREG) 6.25 mg tablet Take 1 Tab by mouth two (2) times daily (with meals). Qty: 60 Tab, Refills: 2      nystatin (MYCOSTATIN) 100,000 unit/mL suspension Take 5 mL by mouth four (4) times daily for 5 days. swish and spit  Qty: 100 mL, Refills: 0      megestrol (MEGACE) 400 mg/10 mL (10 mL) suspension Take 5 mL by mouth daily. Qty: 100 mL, Refills: 0      insulin glargine (LANTUS) 100 unit/mL injection 12 Units by SubCUTAneous route nightly.   Qty: 1 Vial, Refills: 0      insulin lispro (HUMALOG) 100 unit/mL injection Inject subcutaneously three times daily before meals only For Blood Sugar (mg/dl) of :             140-199=2 units            200-249=3 units  250-299=5 units  300-349=7 units  350 or greater = Call MD  Qty: 1 Vial, Refills: 0      senna-docusate (PERICOLACE) 8.6-50 mg per tablet Take 2 Tabs by mouth daily. Qty: 60 Tab, Refills: 0      aspirin 81 mg chewable tablet Take 1 Tab by mouth daily. Qty: 30 Tab, Refills: 2      nitroglycerin (NITROSTAT) 0.4 mg SL tablet 1 Tab by SubLINGual route every five (5) minutes as needed for Chest Pain. Qty: 10 Tab, Refills: 0      HYDROcodone-acetaminophen (NORCO) 5-325 mg per tablet Take 1-2 Tabs by mouth every four (4) hours as needed for Pain. Max Daily Amount: 12 Tabs. Qty: 15 Tab, Refills: 0      levoFLOXacin (LEVAQUIN) 500 mg tablet Take 1 Tab by mouth daily for 8 days. Qty: 8 Tab, Refills: 0      Saccharomyces boulardii (FLORASTOR) 250 mg capsule Take 1 Cap by mouth two (2) times a day for 30 days. OK to substitute other probiotic  Qty: 60 Cap, Refills: 0         CONTINUE these medications which have CHANGED    Details   ALPRAZolam (XANAX) 0.25 mg tablet Take by mouth nightly for sleep and then three times daily as needed for anxiety  Qty: 10 Tab, Refills: 0      atorvastatin (LIPITOR) 40 mg tablet Take 1 Tab by mouth daily. Qty: 30 Tab, Refills: 2         CONTINUE these medications which have NOT CHANGED    Details   therapeutic multivitamin (THERAGRAN) tablet Take 1 Tab by mouth daily. vit a,c & e-lutein-minerals (OCUVITE) tablet Take 1 Tab by mouth daily. KLOR-CON M20 20 mEq tablet Take 20 mEq by mouth daily. ondansetron (ZOFRAN ODT) 4 mg disintegrating tablet Take 1 Tab by mouth every eight (8) hours as needed for Nausea. Qty: 40 Tab, Refills: 1    Associated Diagnoses: Renal cell carcinoma, left (HCC)      furosemide (LASIX) 80 mg tablet Take 80 mg by mouth daily.          STOP taking these medications       calcium-vitamin D (OYSTER SHELL) 500 mg(1,250mg) -200 unit per tablet Comments:   Reason for Stopping:         prochlorperazine (COMPAZINE) 10 mg tablet Comments:   Reason for Stopping: glipiZIDE-metFORMIN (METAGLIP) 5-500 mg per tablet Comments:   Reason for Stopping: Follow-up Information     Follow up With Details Comments Contact Info    Montserrat Alegria MD Schedule an appointment as soon as possible for a visit in 1 week For follow up of metastatic cancer [facility]  [address] [place]      Reny Owen MD Schedule an appointment as soon as possible for a visit in 2 weeks For follow up of metastatic cancer and pallaitive care [address]  [address]      [address] On 9/10/2017 11am transport by [facility] [address] [facility] [address]   [address] [place] [address]          Future Appointments  Date Time Provider Norma Castañeda   9/11/2017 9:00 AM [facility] NM INJ RM 1 [address] REG   9/11/2017 12:00 PM [facility] NM RM 1 [address] REG   9/13/2017 9:45 AM Thomas Urena NP ONC NICOLE SCHED   11/27/2017 10:20 AM RONY Trujillo MD [address] [place] [address],[facility], [id number]     Final discharge diagnoses and brief hospital course  Please also refer to the admission H&P for details on the presenting problem. The patient is a 68-year-old female with past medical history of diabetes, history of hyperlipidemia, anxiety, hypertension, currently diet-controlled, osteoporosis, spinal stenosis, who recently diagnosed with renal cell carcinoma. The patient had some swelling in her cervical lymph nodes and biopsy indicated clear cell carcinoma. The patient is followed up with [address] [place] [address] Oncology with Dr Mariela Taylor, and has not started chemotherapy as of yet. The patient also was noted to have metastases of the disease to the left to the pleura and mediastinum. Today, the patient presents because she reports that she has been having generalized weakness, no appetite for 2 days, some dry heaving, but no vomiting, and just has been feeling \"very lethargic. \" The patient was found to be hypotensive, pale and lightheaded in the triage, with systolic blood pressure of 80. The patient was placed in the ER, was started on fluid resuscitation. Her was elevated troponin and was found to be in acute renal failure and was found to be septic and was requested to be admitted under the hospitalist service. Sepsis due to serratia bacteremia, UTI (POA) - improved  - Bcx 8/31 with serratia, repeat 9/2 negative  - Ucx 8/31 with serratia  - US renal 8/31 with marked heterogeneity of the left kidney compatible with the known neoplasm. Thrombosis of the left renal vein  - Continue IVF  - Continue ceftriaxone started 9/3. Zosyn 8/31-9/1, Meropenum 9/1-9/3. Discharge on levofloxacin, needs abx until 9/16  - ID consulted     Subacute STEMI (POA) - no chest pain, SOB  - Troponin initial was 6.61, then 5.12 and 4.47  - ECG 8/31 with some anterolateral ST elevation   - Echo 8/31 with LVEF 40-45% hypokinesis of mid-apical anterior, mid-anteroseptal, mid-apical inferior, and apical wall, left atrium mildly dilated  - Continue aspirin, lipitor and carvedilol for medical management   - Cardiologist consulted     BONG (POA) - possible due to pre renal and sepsis, resolved  - Renal US as above  - Nephrology consulted     Urinary retention - likely due to UTI, weakness  - Failed voiding trial 9/5, walsh replaced  - I would wait 1 week and then attempt another voiding trial. If she fails this, she will need referral to urology     Hyperkalemia (POA) - received kayexalate, insulin and dextrose.  Was hypokalemic, now normal K  - Continue home potassium     Mild Hyponatremia - resolved  -TSH was normal a week ago     Hypercalcemia - seems to be hypercalcemia of malignancy, improved  - PTH supressed  - Treated with IV fluids, lasix, calcitonin  - Monitor calcium and albumin     Mild anion gap Metabolic acidosis due to BONG - resolved     DM-II (chronic) - A1c 7.5, BG low this morning  - Discontinue glipizide and metformin  - Continue lantus, SSI as needed     Metastatic clear cell renal carcinoma (chronic)  - CT abdomen pelvis on 8/22 left renal bulky malignancy with left renal vein and pararenal extension, bilateral small renal cortical tumors/metastasis, left adrenal metastasis  - CT on 8/1 there is marked adenopathy/mass in the base of the right neck in the periclavicular region which extends into the superior mediastinum. There is also right hilar adenopathy/mass. Multiple lung nodules as described above some of which are pleural-based. Left adrenal nodule. Left lobe liver lesion  - MRI brain 9/7 was degraded by motion artifact but there were areas of nonspecific but slightly suspicious T2 signal hyperintensity in the bilateral frontal lobes, and thus patient should return if possible for additional attempted postcontrast sequences  - Oncologist, palliative care consulted - follow up with Dr. Rafael Mei, Dr. Handy Velazquez     Brain metastases (POA)  - MRI brain 9/8 shows a ring-enhancing anterior left frontal lobe lesion which measures 1.1 x 1.0 cm. There is an additional subtle 4 mm enhancing focus left centrum semiovale.  4 mm lesion lateral left frontal lobe   - Start decadron  - Oncology aware - follow up with Dr. Rafael Mei and consider radiation oncology for [facility] versus whole-brain irradiation    Left renal vein tumor thrombosis  - no anticoagulation needed  - Pain control  - appreciated hematologist input     Anemia related to renal cell carcinoma - no evidence of active bleeding  - H/H low but stable     Hx of HTN - Hypotension improved, BP normal now     Hx of hyperlipidemia - continue lipitor     Generalized weakness related to underlying illness - combination of sepsis, newly reduced EF, metastatic cancer and hypercalcemia     Diarrhea - may be due to contrast, resolved now, stool for c diff cancelled     Oral thrush - added nystatin suspension    FOLLOW-UP CARE RECOMMENDATIONS:     It is very important that you keep follow-up appointment(s). Bring discharge papers, medication list (and/or medication bottles) to follow-up appointments for review by outpatient provider(s). FOLLOW-UP TESTS RECOMMENDED:   · Basic metabolic panel and albumin on Monday, 9/11/17, to monitor calcium    ONGOING TREATMENT PLAN: Rehab, finish antibiotics, voiding trial for walsh, monitor fluid status, follow up with Oncology and Palliative Care     Specific symptoms to watch for: chest pain, shortness of breath, fever, chills, nausea, vomiting, diarrhea, change in mentation, falling, weakness, bleeding. DIET:  Diabetic Diet    ACTIVITY:  Activity as tolerated    Physical examination at discharge  Visit Vitals    /77    Pulse 75    Temp 98.7 °F (37.1 °C)    Resp 16    Ht 5' (1.524 m)    Wt 58.3 kg (128 lb 8 oz)    SpO2 92%    Breastfeeding No    BMI 23.5 kg/m2       Constitutional:  No acute distress, cooperative   ENT:  Oral mucous moist, oropharynx benign. Neck supple,    Resp:  Decrease bronchial breath sound bilaterally. Scattered rhonchi. No accessory muscle use   CV:  Regular rhythm, normal rate, no murmurs, gallops, rubs    GI:  Soft, non distended, non tender. normoactive bowel sounds, no hepatosplenomegaly     Musculoskeletal:  No edema    Neurologic:  Moves all extremities. AAOx3, CN II-XII reviewed                           Psych: fatigued, seems defeated    Pertinent imaging studies:    CT chest 8/1/17  FINDINGS:  LUNGS: Elevation of the right hemidiaphragm noted  Pleural-based 3.6 mm nodule noted along the anterior order of the right middle  lobe (4-21). Adjacent to this there is a pleural-based nodule with enhancing rim  measuring 11.1 mm. Pleural-based nodule with an enhancing rim is also noted  along the diaphragm anteriorly measuring 13.9 mm (601-45). In the left lower lobe and 8 mm lesion is noted (4-31). A number of small  nodules noted in the lingula centrally (4-20).  Discrete nodule noted in the  lingula measuring 11 mm (4-17). Peripheral semipleural-based nodule in the  lingula noted on the same image measuring 4.4 mm. Superiorly in the lingula  there is a 12.3 mm nodule (4-11). Tiny more medial nodule noted approximately  the same level measuring 3.5 mm  No other evidence of acute finding noted in the lungs. PLEURA: No pleural effusion or pneumothorax   TRACHEA/BRONCHI: Patent  MEDIASTINUM/MC:  There is adenopathy in the base of the right neck which is  incompletely visualized. This is under the clavicle but most likely extends  somewhat cephalad. There are number of nodes which extend from this region into  the superior mediastinum and right paratracheal region. This complex is  approximately 3.7 x 5.65 3.8 cm. There is right hilar adenopathy which extends  from the central hilus inferiorly to the diaphragm. There are number of  lobulated heterogeneous low density nodes in this region. The largest complex on  axial imaging is approximately 2.5 x 3.2 cm. There is no axillary adenopathy. THYROID: Unremarkable   AORTA:  The aorta enhances normally without evidence of aneurysm or dissection. UPPER ABDOMEN:  There is a 9 mm lucency in hepatic segment 4A which is fairly  well-defined but cannot be completely characterized. Tiny lucency which cannot  be characterized noted in segment 8 peripherally. Adrenal glands demonstrate a  heterogeneous 16 mm nodule in the apex of the left adrenal gland. BONES: No lytic or sclerotic lesion   IMPRESSION:   1. There is marked adenopathy/mass in the base of the right neck in the  periclavicular region which extends into the superior mediastinum. There is also  right hilar adenopathy/mass. 2. Multiple lung nodules as described above some of which are pleural-based. 3. Left adrenal nodule. Left lobe liver lesion. .    CT abdomen/pelvis 8/22/17  FINDINGS:  There is a bulky exophytic mass protruding from the lower pole of the left  kidney extending into the pararenal space, with tumor and/or clot extending into  and expanding the left renal vein, sparing the IVC, with left perirenal  varicosities. There are small left perirenal hyperenhancing masses. There are multiple small enhancing bilateral renal cortical masses. There is a  left adrenal nodule, likely metastasis. Right adrenal is unremarkable. Liver contains several subcentimeter nonenhancing fluid attenuation round foci  compatible with cysts. There is cholelithiasis without inflammation. Bile and  pancreatic ducts are not enlarged. Pancreatic duct is unremarkable. Spleen is  unremarkable.   Aorta is calcified without aneurysm. There are colonic diverticula. Bladder is  midline. There is no pelvic mass or significant adenopathy. There is no ascites. IMPRESSION:  1. Left renal bulky malignancy with left renal vein and pararenal extension. 2. Bilateral small renal cortical hyperenhancing tumors/metastases. 3. Left adrenal metastasis. MRI abdomen 8/25/17  FINDINGS:   MRCP: No pancreatic or biliary ductal dilation. No choledocholithiasis. LIVER: No steatosis. No solid mass. 10 mm simple cyst noted in the left lobe in  segment 4A. A tiny 3 mm nonenhancing cystic lesion is also noted in the right  lobe segment 8. GALLBLADDER: Layering stones are noted in the gallbladder, which is otherwise  normal in appearance. PANCREAS: Normal.  SPLEEN: Normal.  ADRENALS: 16 x 21 mm metastatic nodule redemonstrated in the left adrenal gland. No abnormality in the right adrenal gland. KIDNEYS: Multifocal renal cell carcinoma is redemonstrated in the left kidney. This includes a dominant, irregular, large bulky mass arising from the lower  pole of the left kidney measuring approximately 8.5 x 6.5 x 8.9 cm. The mass  extends through the perirenal fat and Gerota's fascia with only a thin, hazy fat  plane seen between the inferior posterior margin the mass and the abdominal wall  musculature. . In addition, there are at least 4 smaller solid masses noted in  the upper pole the left kidney that measure from less than a centimeter up to  1.6 cm. There are also metastatic lesions seen in the perirenal fat adjacent to  the lower pole of the left kidney. Again seen is thrombosis of the left renal  vein extending to the level of the midline. No extension into the IVC is seen. Again seen are at least 5 solid masses in the right kidney as well measuring  from less than a centimeter to up to 2.1 cm. No right-sided renal vein  thrombosis. No hydronephrosis. DISTAL ESOPHAGUS, stomach, and visualized bowel: No acute findings, including no  obstruction. However, portions of the bulky mass arising from the lower pole of  the left kidney extend into the left paracolic gutter, nearly touching the  descending colon. There is associated peritoneal thickening and enhancement. Direct extension of tumor is not excluded. PERITONEUM: Trace ascites noted in the left paracolic gutter and peritoneal  thickening described above. VISUALIZED LUNG BASES: Bulky necrotic appearing lymphadenopathy redemonstrated  in the right hilar region, not well evaluated on the current exam. Pleural-based  metastatic lesions are also redemonstrated in the right lung. BONES: No suspicious lesions. IMPRESSION:   1. Dominant, bulky mass arising from the lower pole left kidney is  redemonstrated. Multiple other solid masses are redemonstrated in both kidneys. Metastatic lesions are also noted in the left perirenal fat. Left renal vein  thrombosis does not appear significantly changed. 2. Left adrenal metastasis and thoracic metastases redemonstrated. 3. Bulky mass arising from the lower pole left kidney extends through Gerota's  fascia and is nearly contiguous with portions of the descending colon. Associated peritoneal thickening and enhancement is noted in the left paracolic  gutter.    4. No metastatic lesions in the liver    CXR 8/31/17  Cardiomediastinal silhouette is stable; right superior mediastinal and right  hilar lymphadenopathy is again noted. There are several left-sided lung nodules  are seen on prior CT. Patient's known right lung nodules are not well visualized  there is mild elevation of the right hemidiaphragm. There is no pneumothorax. No  pleural fluid is visualized. IMPRESSION: Right superior mediastinal and right hilar lymphadenopathy. Pulmonary nodules, as described above. US renal 8/31/17  Realtime sonographic imaging of the retroperitoneum was performed. The right  kidney measures 11.5 cm and the left kidney measures 12.5 cm. Marked  heterogeneity of the left kidney is noted compatible with the known neoplasm. Thrombosis of the left renal vein is demonstrated. The right kidney is normal in  appearance. The bladder is unremarkable as is the visualized portion of the  abdominal aorta, common iliac artery bifurcation, and IVC. IMPRESSION: Marked heterogeneity of the left kidney compatible with the known  neoplasm. Thrombosis of the left renal vein. MRI brain 9/7/17  FINDINGS:    Ventricles:  Midline, no hydrocephalus. Intracranial Hemorrhage:  None. Brain Parenchyma/Brainstem:  Focal area of T2 hyperintense signal in the  cortical and subcortical region of the anterior left frontal lobe. There are few  other areas of T2 hyperintensity in the bilateral frontal lobe subcortical white  matter. There is mild periventricular T2 signal hyperintensity which also  involves the gumaro focus. No acute infarction. Basal Cisterns:  Normal.   Flow Voids:  Normal.  Post Contrast:  IV contrast was administered, although there is no obvious  contrast enhancement on the T1 images. Additional Comments: There is a small focus of extra-axial T1 hyperintensity in  the anterior left frontal region near the site of parenchymal signal  abnormality. Motion artifact. IMPRESSION:  Study degraded by motion artifact, and no evidence of intravenous contrast on  images as above.  There are however areas of nonspecific but slightly suspicious  T2 signal hyperintensity in the bilateral frontal lobes, and thus patient should  return if possible for additional attempted postcontrast sequences. Addendum:   Patient returned for repeat postcontrast multiplanar T1 imaging with an  additional 5 mL of gadolinium. There is a ring-enhancing anterior left frontal  lobe lesion which measures 1.1 x 1.0 cm. There is an additional subtle 4 mm  enhancing focus left centrum semiovale. 4 mm lesion lateral left frontal lobe on  series 2R image 7. Imaging is somewhat degraded by motion artifact. Findings  again most consistent with intracranial metastases. Recent Labs      09/09/17   1403  09/08/17   1153   WBC   --   14.1*   HGB  7.7*  7.3*   HCT  24.3*  23.3*   PLT   --   322     Recent Labs      09/09/17   1403  09/08/17   0517   NA   --   142   K  3.8  3.0*   CL   --   109*   CO2   --   26   BUN   --   14   CREA   --   0.77   GLU   --   51*   CA   --   8.5   PHOS   --   2.4*     Recent Labs      09/08/17   0517   ALB  1.7*     No results for input(s): INR, PTP, APTT in the last 72 hours. No lab exists for component: INREXT, INREXT   No results for input(s): FE, TIBC, PSAT, FERR in the last 72 hours. No results for input(s): PH, PCO2, PO2 in the last 72 hours. No results for input(s): CPK, CKMB in the last 72 hours.     No lab exists for component: TROPONINI  No components found for: [place]    Chronic Diagnoses:    Problem List as of 9/10/2017  Date Reviewed: 8/31/2017          Codes Class Noted - Resolved    * (Principal)NSTEMI (non-ST elevated myocardial infarction) ([facility] ICD-10-CM: I21.4  ICD-9-CM: 410.70  8/31/2017 - Present        Metastatic adenocarcinoma ([facility] ICD-10-CM: C79.9  ICD-9-CM: 199.1  8/29/2017 - Present        Clear cell renal cell carcinoma ([facility] ICD-10-CM: C64.9  ICD-9-CM: 189.0  8/28/2017 - Present        Controlled diabetes mellitus ([facility] ICD-10-CM: E11.9  ICD-9-CM: 250.00  7/31/2017 - Present        On statin therapy ICD-10-CM: Z79.899  ICD-9-CM: V58.69  7/31/2017 - Present        Fatigue ICD-10-CM: R53.83  ICD-9-CM: 780.79  7/31/2017 - Present        Hypercholesterolemia ICD-10-CM: E78.00  ICD-9-CM: 272.0  7/31/2017 - Present        Hypertension ICD-10-CM: I10  ICD-9-CM: 401.9  7/31/2017 - Present    Overview Signed 7/31/2017  1:32 PM by Alber Guzmán     2/3/17:Poorly controlled, lifestyle changes, repeat /90 will follow             Osteoporosis ICD-10-CM: M81.0  ICD-9-CM: 733.00  7/31/2017 - Present        Spinal stenosis of lumbar region with radiculopathy ICD-10-CM: M48.06, M54.16  ICD-9-CM: 724.02, 724.4  7/31/2017 - Present    Overview Signed 7/31/2017  1:34 PM by Alber Guzmán     2/3/17:Symptoms c/w this diagnosis discussed with pt., if progresses then need MRI L spine and follow up             Cervical myelopathy ([facility] ICD-10-CM: G95.9  ICD-9-CM: 721.1  7/31/2017 - Present    Overview Signed 7/31/2017  1:34 PM by Alber Guzmán     2/3/17:Cervical fusion               Anxiety ICD-10-CM: F41.9  ICD-9-CM: 300.00  7/31/2017 - Present    Overview Signed 7/31/2017  1:36 PM by Alber Guzmán     2/3/17:Under a tremendous amount of stress, will continue Alprazolam prn, if using regularly consider changing to an SSRI or counseling             Vaginitis due to Candida ICD-10-CM: B37.3  ICD-9-CM: 112.1  7/31/2017 - Present    Overview Signed 7/31/2017  1:43 PM by Alber Guzmán     2/3/17:Associated with antibiotic use, requests prescription med             Hyperglycemia ICD-10-CM: R73.9  ICD-9-CM: 790.29  7/31/2017 - Present        Post-menopausal ICD-10-CM: Z78.0  ICD-9-CM: V49.81  7/31/2017 - Present              Time spent on discharge related activities today greater than 30 minutes.       Signed:  Patty Bower MD                 Hospitalist, Internal Medicine      Cc: Greyson Moncada MD

## 2017-09-08 NOTE — PROGRESS NOTES
Physical Therapy  9.8.17    Chart reviewed. Noted patient's MRI + for brain mets in frontal lobe. Attempted to see patient,  present, however patient supine in bed, stating \"I'm not getting up! \" Patient requested ginger ale and ice, provided by PT. Patient continues with confusion and stated \"Are they going to kick me out tomorrow? \" When PT attempted to console patient , she seems to have difficulty comprehending reasoning that the doctors will explain the outcomes and assist with making medical plan. Patient then states \"I'm just so confused. \"  also attempting to console patient. Presentation very consistent with brain mets. Patient would benefit from Palliative Medicine consult for potential transfer to hospice pending outcomes from meeting with oncology and patient/family wishes. Patient continually states when working with therapy to just Latonyaže (her) die\" or comments of this nature and at this time, rehab does not seem to be the most appropriate for patient. Will f/u Monday as indicated. Thank you.     Sidra Coombs, PT, DPT

## 2017-09-08 NOTE — PROGRESS NOTES
ID Progress Note  2017    Subjective:     Afebrile. Says she hurts all over. Feels weak. Objective:     Vitals:   Visit Vitals    /68 (BP 1 Location: Left arm, BP Patient Position: At rest)    Pulse 82    Temp 97.5 °F (36.4 °C)    Resp 16    Ht 5' (1.524 m)    Wt 59 kg (130 lb)    SpO2 93%    Breastfeeding No    BMI 23.78 kg/m2        Tmax:  Temp (24hrs), Av.2 °F (36.8 °C), Min:97.5 °F (36.4 °C), Max:98.5 °F (36.9 °C)      Exam:    Not in distress  Lungs: clear to auscultation, no rales, wheezes or rhonchi   Heart: s1, s2, no murmurs, rubs or clicks   Abdomen: soft, nontender, no guarding or rebound      Labs:   Lab Results   Component Value Date/Time    WBC 12.1 2017 06:26 AM    Hemoglobin (POC) 11.2 2017 11:42 AM    HGB 9.4 2017 06:26 AM    Hematocrit (POC) 33 2017 11:42 AM    HCT 29.9 2017 06:26 AM    PLATELET 666  06:26 AM    MCV 78.9 2017 06:26 AM     Lab Results   Component Value Date/Time    Sodium 142 2017 05:17 AM    Potassium 3.0 2017 05:17 AM    Chloride 109 2017 05:17 AM    CO2 26 2017 05:17 AM    Anion gap 7 2017 05:17 AM    Glucose 51 2017 05:17 AM    BUN 14 2017 05:17 AM    Creatinine 0.77 2017 05:17 AM    BUN/Creatinine ratio 18 2017 05:17 AM    GFR est AA >60 2017 05:17 AM    GFR est non-AA >60 2017 05:17 AM    Calcium 8.5 2017 05:17 AM    Bilirubin, total 0.2 2017 04:01 AM    AST (SGOT) 19 2017 04:01 AM    Alk. phosphatase 136 2017 04:01 AM    Protein, total 5.6 2017 04:01 AM    Albumin 1.7 2017 05:17 AM    Globulin 3.7 2017 04:01 AM    A-G Ratio 0.5 2017 04:01 AM    ALT (SGPT) 14 2017 04:01 AM         Assessment:          1. Serratia bacteremia     2. UTI from serratia      3. Metastatic renal cell carcinoma      4. Renal failure      5. MI      6. Osteoporosis      7.  DM                 Recommendations: 1. Continue ceftriaxone. The isolate is levaquin sensitive. She can be placed on this on discharge until the 16th of this month. 2. She tells me that she doesn't want treatment anymore. Team available over the weekend if needed.        Suzy Valencia MD

## 2017-09-09 NOTE — DISCHARGE INSTRUCTIONS
Discharging provider: Leopold Darting, MD    Primary care provider: Paulina Nguyễn MD    [address]:    The patient is a 78-year-old female with past medical history of diabetes, history of hyperlipidemia, anxiety, hypertension, currently diet-controlled, osteoporosis, spinal stenosis, who recently diagnosed with renal cell carcinoma. The patient had some swelling in her cervical lymph nodes and biopsy indicated clear cell carcinoma. The patient is followed up with [address] [place] [address] Oncology with Dr Amy Erickson, and has not started chemotherapy as of yet. The patient also was noted to have metastases of the disease to the left to the pleura and mediastinum. Today, the patient presents because she reports that she has been having generalized weakness, no appetite for 2 days, some dry heaving, but no vomiting, and just has been feeling \"very lethargic. \" The patient was found to be hypotensive, pale and lightheaded in the triage, with systolic blood pressure of 80. The patient was placed in the ER, was started on fluid resuscitation. Her was elevated troponin and was found to be in acute renal failure and was found to be septic and was requested to be admitted under the hospitalist service. Sepsis due to serratia bacteremia, UTI (POA) - improved  - Bcx 8/31 with serratia, repeat 9/2 negative  - Ucx 8/31 with serratia  - US renal 8/31 with marked heterogeneity of the left kidney compatible with the known neoplasm. Thrombosis of the left renal vein  - Continue IVF  - Continue ceftriaxone started 9/3. Zosyn 8/31-9/1, Meropenum 9/1-9/3.  Discharge on levofloxacin, needs abx until 9/16  - ID consulted     Subacute STEMI (POA) - no chest pain, SOB  - Troponin initial was 6.61, then 5.12 and 4.47  - ECG 8/31 with some anterolateral ST elevation   - Echo 8/31 with LVEF 40-45% hypokinesis of mid-apical anterior, mid-anteroseptal, mid-apical inferior, and apical wall, left atrium mildly dilated  - Continue aspirin, lipitor and carvedilol for medical management   - Cardiologist consulted     BONG (POA) - possible due to pre renal and sepsis, resolved  - Renal US as above  - Nephrology consulted     Urinary retention - likely due to UTI, weakness  - Failed voiding trial 9/5, walsh replaced  - Maintain walsh, wait 1 week and then attempt another voiding trial. If she fails this, she will need referral to urology     Hyperkalemia (POA) - received kayexalate, insulin and dextrose. Now hypokalemic  - Restarted home potassium     Mild Hyponatremia - resolved  -TSH was normal a week ago     Hypercalcemia - seems to be hypercalcemia of malignancy, improved  - PTH supressed  - Treated with IV fluids, lasix, calcitonin  - Monitor calcium and albumin     Mild anion gap Metabolic acidosis due to BONG - resolved     DM-II (chronic) - A1c 7.5, BG stable  - Discontinue glipizide and metformin  - Continue lantus, SSI as needed     Metastatic clear cell renal carcinoma (chronic)  - CT abdomen pelvis on 8/22 left renal bulky malignancy with left renal vein and pararenal extension, bilateral small renal cortical tumors/metastasis, left adrenal metastasis  - CT on 8/1 there is marked adenopathy/mass in the base of the right neck in the periclavicular region which extends into the superior mediastinum. There is also right hilar adenopathy/mass. Multiple lung nodules as described above some of which are pleural-based. Left adrenal nodule. Left lobe liver lesion.  - Oncologist, palliative care consulted - follow up with Dr. Davis Solomon, Dr. Gabriella Garcia     Brain metastases (POA)  - MRI brain 9/8 shows a ring-enhancing anterior left frontal lobe lesion which measures 1.1 x 1.0 cm. There is an additional subtle 4 mm enhancing focus left centrum semiovale.  4 mm lesion lateral left frontal lobe   - Start decadron  - Oncology aware - follow up with Dr. Davis Solomon and consider radiation oncology for [facility] versus whole-brain irradiation    Left renal vein tumor thrombosis  - no anticoagulation needed  - Pain control  - appreciated hematologist input     Anemia related to renal cell carcinoma - no evidence of active bleeding  - H/H low but stable     Hx of HTN - Hypotension improved, BP normal now     Hx of hyperlipidemia - continue lipitor     Generalized weakness related to underlying illness - combination of sepsis, newly reduced EF, metastatic cancer and hypercalcemia     Diarrhea - may be due to contrast, resolved now, stool for c diff cancelled     Oral thrush - added nystatin suspension    FOLLOW-UP CARE RECOMMENDATIONS:    APPOINTMENTS:  Follow-up Information     Follow up With Details Comments Contact Info    Priti Carrillo MD Schedule an appointment as soon as possible for a visit in 1 week For follow up of metastatic cancer [address]  [address] [place] [address]      Ami Andersen MD Schedule an appointment as soon as possible for a visit in 2 weeks For follow up of metastatic cancer and pallaitive care [address] [facility]  [address]  [phone]           Future Appointments  Date Time Provider Norma Castañeda   9/11/2017 9:00 AM [facility] NM INJ RM 1 [address] REG   9/11/2017 12:00 PM [facility] NM RM 1 [address] REG   9/13/2017 9:45 AM Juan Callaway NP ONCMR NICOLE SCHED   11/27/2017 10:20 AM RONY Holt MD [address] [place] [address],[facility] [id number]     It is very important that you keep follow-up appointment(s). Bring discharge papers, medication list (and/or medication bottles) to follow-up appointments for review by outpatient provider(s).     FOLLOW-UP TESTS RECOMMENDED:   · Basic metabolic panel and albumin on Monday, 9/11/17, to monitor calcium    ONGOING TREATMENT PLAN: Rehab, finish antibiotics, voiding trial for walsh, monitor fluid status, follow up with Oncology and Palliative Care     Specific symptoms to watch for: chest pain, shortness of breath, fever, chills, nausea, vomiting, diarrhea, change in mentation, falling, weakness, bleeding. DIET:  Diabetic Diet    ACTIVITY:  Activity as tolerated    GOALS OF CARE:  x  Eventual return to home/independent/assisted living     Long term SNF      Hospice     No rehospitalization     Patient condition at discharge:   Functional status    Poor    x  Deconditioned      Independent   Cognition    Lucid   x  Forgetful (some sensescence)     Dementia   Catheters/lines (plus indication)  x  Ayoub     PICC      PEG         Code status    Full code    x  DNR    . . . . . . . . . . . . . . . . . . . . . . . . . . . . . . . . . . . . . . . . . . . . . . . . . . . . . . . . . . . . . . . . . . . . . . . Angelo Toro      CHRONIC MEDICAL CONDITIONS:  Problem List as of 9/8/2017  Date Reviewed: 8/31/2017          Codes Class Noted - Resolved    * (Principal)NSTEMI (non-ST elevated myocardial infarction) ([email or web address] ICD-10-CM: I21.4  ICD-9-CM: 410.70  8/31/2017 - Present        Metastatic adenocarcinoma ([email or web address] 75.) ICD-10-CM: C79.9  ICD-9-CM: 199.1  8/29/2017 - Present        Clear cell renal cell carcinoma ([email or web address] 75.) ICD-10-CM: C64.9  ICD-9-CM: 189.0  8/28/2017 - Present        Controlled diabetes mellitus ([email or web address] 75.) ICD-10-CM: E11.9  ICD-9-CM: 250.00  7/31/2017 - Present        On statin therapy ICD-10-CM: Z79.899  ICD-9-CM: V58.69  7/31/2017 - Present        Fatigue ICD-10-CM: R53.83  ICD-9-CM: 780.79  7/31/2017 - Present        Hypercholesterolemia ICD-10-CM: E78.00  ICD-9-CM: 272.0  7/31/2017 - Present        Hypertension ICD-10-CM: I10  ICD-9-CM: 401.9  7/31/2017 - Present    Overview Signed 7/31/2017  1:32 PM by Isa Brewer     2/3/17:Poorly controlled, lifestyle changes, repeat /90 will follow             Osteoporosis ICD-10-CM: M81.0  ICD-9-CM: 733.00  7/31/2017 - Present        Spinal stenosis of lumbar region with radiculopathy ICD-10-CM: M48.06, M54.16  ICD-9-CM: 724.02, 724.4  7/31/2017 - Present    Overview Signed 7/31/2017  1:34 PM by Isa Brewer 2/3/17:Symptoms c/w this diagnosis discussed with pt., if progresses then need MRI L spine and follow up             Cervical myelopathy ([facility] ICD-10-CM: G95.9  ICD-9-CM: 721.1  7/31/2017 - Present    Overview Signed 7/31/2017  1:34 PM by Kasey Gonzalez     2/3/17:Cervical fusion               Anxiety ICD-10-CM: F41.9  ICD-9-CM: 300.00  7/31/2017 - Present    Overview Signed 7/31/2017  1:36 PM by Wolf Meléndez     2/3/17:Under a tremendous amount of stress, will continue Alprazolam prn, if using regularly consider changing to an SSRI or counseling             Vaginitis due to Candida ICD-10-CM: B37.3  ICD-9-CM: 112.1  7/31/2017 - Present    Overview Signed 7/31/2017  1:43 PM by Wolf Meléndez     2/3/17:Associated with antibiotic use, requests prescription med             Hyperglycemia ICD-10-CM: R73.9  ICD-9-CM: 790.29  7/31/2017 - Present        Post-menopausal ICD-10-CM: Z78.0  ICD-9-CM: V49.81  7/31/2017 - Present

## 2017-09-09 NOTE — PROGRESS NOTES
Patient to be discharged tomorrow at 11:00 am to [address] [place] [address]. Dr. Martin Farley aware, family aware.

## 2017-09-09 NOTE — PROGRESS NOTES
Nurse calls again for pain medication. I had asked that nursing staff to recheck VS including O2sat prior to additional narcotic dosing to prevent hypoxia/ hypotension/ or oversedation. Patient reportedly is now awake and alert with EP=476/70 HR= 88, O2sat= 100% on 2 liters O2 NC. I will now order Dilaudid 1 mg IV x 1 dose for breakthrough pain.

## 2017-09-09 NOTE — PROGRESS NOTES
Offered support to pt in [facility] [place] [facility]. Pt was resting with pt's  at bedside. Pt's  shared appreciation of [address] support but expressed no needs during this visit. Affirmed ongoing availability of support. Estrellita Boland MDiv.  Staff   Please call [phone]) to page  if needed

## 2017-09-09 NOTE — PROGRESS NOTES
Hospitalist Progress Note  Barbie Tobin MD  Office: [phone]      Date of Service:  2017  NAME:  Barbie Tobin  :  1950  MRN:  [id number]      Admission Summary:   The patient is a 78-year-old female with past medical history of diabetes, history of hyperlipidemia, anxiety, hypertension, currently diet-controlled, osteoporosis, spinal stenosis, who recently diagnosed with renal cell carcinoma. The patient had some swelling in her cervical lymph nodes and biopsy indicated clear cell carcinoma. The patient is followed up with [address] [place] [address] Oncology with Dr Nati Paz, and has not started chemotherapy as of yet. The patient also was noted to have metastases of the disease to the left to the pleura and mediastinum. Today, the patient presents because she reports that she has been having generalized weakness, no appetite for 2 days, some dry heaving, but no vomiting, and just has been feeling \"very lethargic. \" The patient was found to be hypotensive, pale and lightheaded in the triage, with systolic blood pressure of 80. The patient was placed in the ER, was started on fluid resuscitation. Her was elevated troponin and was found to be in acute renal failure and was found to be septic and was requested to be admitted under the hospitalist service. Interval history / Subjective:      Ms. Casimiro Victor is about the same. Depressed and considering hospice versus rehab. Still pain all over, even her flank. Assessment & Plan:     Sepsis due to serratia bacteremia, UTI (POA) - resolved  - Bcx  with serratia, repeat  negative  - Ucx  with serratia  - US renal  with marked heterogeneity of the left kidney compatible with the known neoplasm. Thrombosis of the left renal vein  - Continue IVF  - Continue ceftriaxone started 9/3. Zosyn -, Meropenum -9/3.  Needs abx until   - ID consulted    Subacute STEMI (POA) - no chest pain, SOB  - Troponin initial was 6.61, then 5.12 and 4.47  - ECG 8/31 with some anterolateral ST elevation   - Echo 8/31 with LVEF 40-45% hypokinesis of mid-apical anterior, mid-anteroseptal, mid-apical inferior, and apical wall, left atrium mildly dilated  - Continue aspirin, lipitor and carvedilol for medical management   - Cardiologist consulted    BONG (POA) - possible due to pre renal and sepsis, resolved  - Renal US as above  - Nephrology consulted    Urinary retention - likely due to UTI, weakness  - Failed voiding trial 9/5, walsh replaced  - Will need follow up for voiding trial and urology referral if she fails again    Hyperkalemia (POA) - received kayexalate, insulin and dextrose. Resolved  - Monitor    Mild Hyponatremia - resolved  -TSH was normal a week ago    Hypercalcemia - seems to be hypercalcemia of malignancy, improving  - PTH supressed  - Continue IV fluids, lasix given newly reduced EF  - Continue calcitonin  - Monitor calcium and albumin    Mild anion gap Metabolic acidosis due to BONG - resolved    DM-II (chronic) - A1c 7.5, BG a little high yesterday  - restart glipizide and continue to hold metformin  - Continue lantus, SSI as needed    Metastatic clear cell renal carcinoma (chronic)  - CT abdomen pelvis on 8/22 left renal bulky malignancy with left renal vein and pararenal extension, bilateral small renal cortical tumors/metastasis, left adrenal metastasis  - CT on 8/1 there is marked adenopathy/mass in the base of the right neck in the  periclavicular region which extends into the superior mediastinum. There is also  right hilar adenopathy/mass. Multiple lung nodules as described above some of which are pleural-based. Left adrenal nodule. Left lobe liver lesion.  - Oncologist, palliative care consulted    Likely brain mets (POA) - MRI shows possible metastatic disease but contrast was not timed right. Repeat MRI this afternoon.  Discussed with oncology    Left renal vein tumor thrombosis  - no anticoagulation needed  - Pain control  - appreciated hematologist input    Anemia related to renal cell carcinoma - no evidence of active bleeding  - H/H low but stable    Hx of HTN - Hypotension improved, BP normal now    Hx of hyperlipidemia - continue lipitor    Generalized weakness related to underlying illness - combination of sepsis, newly reduced EF, metastatic cancer and hypercalcemia    Diarrhea - may be due to contrast, resolved now, stool for c diff cancelled, resolved    Oral thrush - added nystatin suspension    DNR    Code status: DNR  DVT prophylaxis: heparin  Care Plan discussed with: Patient/Family, Nurse and   Disposition: To [facility] tomorrow     Hospital Problems  Date Reviewed: 8/31/2017          Codes Class Noted POA    * (Principal)NSTEMI (non-ST elevated myocardial infarction) ([facility] ICD-10-CM: I21.4  ICD-9-CM: 410.70  8/31/2017 Unknown                Vital Signs:    Last 24hrs VS reviewed since prior progress note. Most recent are:  Visit Vitals    /57 (BP 1 Location: Left arm, BP Patient Position: At rest)    Pulse 72    Temp 97.9 °F (36.6 °C)    Resp 16    Ht 5' (1.524 m)    Wt 58.3 kg (128 lb 8 oz)    SpO2 97%    Breastfeeding No    BMI 23.5 kg/m2         Intake/Output Summary (Last 24 hours) at 09/09/17 1221  Last data filed at 09/09/17 0600   Gross per 24 hour   Intake              100 ml   Output             1600 ml   Net            -1500 ml        Physical Examination:             Constitutional:  No acute distress, cooperative   ENT:  Oral mucous moist, oropharynx benign. Neck supple,    Resp:  Decrease bronchial breath sound bilaterally. Scattered rhonchi. No accessory muscle use   CV:  Regular rhythm, normal rate, no murmurs, gallops, rubs    GI:  Soft, non distended, non tender. normoactive bowel sounds, no hepatosplenomegaly     Musculoskeletal:  No edema    Neurologic:  Moves all extremities.   AAOx3, CN II-XII reviewed     Psych: fatigued, seems defeated Data Review:     Telemetry x   ECG    Xray    CT scan    MRI    Echocardiogram    Ultrasound              I have reviewed the flow sheet and recent notes  New labs and data personally reviewed. Labs:     Recent Labs      09/08/17   1153   WBC  14.1*   HGB  7.3*   HCT  23.3*   PLT  322     Recent Labs      09/08/17   0517  09/07/17   0559  09/06/17   1419   NA  142  140   --    K  3.0*  3.8   --    CL  109*  108   --    CO2  26  24   --    BUN  14  18   --    CREA  0.77  0.95   --    GLU  51*  150*   --    CA  8.5  8.8  9.7   PHOS  2.4*  2.4*   --      Recent Labs      09/08/17   0517 09/07/17   0559   ALB  1.7*  1.7*     No results for input(s): INR, PTP, APTT in the last 72 hours. No lab exists for component: INREXT, INREXT   No results for input(s): FE, TIBC, PSAT, FERR in the last 72 hours. No results found for: FOL, RBCF   No results for input(s): PH, PCO2, PO2 in the last 72 hours. No results for input(s): CPK, CKNDX, TROIQ in the last 72 hours.     No lab exists for component: CPKMB  Lab Results   Component Value Date/Time    Cholesterol, total 119 09/02/2017 03:33 AM    HDL Cholesterol 17 09/02/2017 03:33 AM    LDL, calculated 32 09/02/2017 03:33 AM    Triglyceride 350 09/02/2017 03:33 AM    CHOL/HDL Ratio 7.0 09/02/2017 03:33 AM     Lab Results   Component Value Date/Time    Glucose (POC) 178 09/09/2017 11:01 AM    Glucose (POC) 194 09/09/2017 06:48 AM    Glucose (POC) 205 09/08/2017 09:51 PM    Glucose (POC) 182 09/08/2017 04:15 PM    Glucose (POC) 178 09/08/2017 11:40 AM     Lab Results   Component Value Date/Time    Color YELLOW/STRAW 08/31/2017 05:19 PM    Appearance TURBID 08/31/2017 05:19 PM    Specific gravity 1.017 08/31/2017 05:19 PM    pH (UA) 5.5 08/31/2017 05:19 PM    Protein 300 08/31/2017 05:19 PM    Glucose 100 08/31/2017 05:19 PM    Ketone TRACE 08/31/2017 05:19 PM    Bilirubin NEGATIVE  08/31/2017 05:19 PM    Urobilinogen 0.2 08/31/2017 05:19 PM    Nitrites NEGATIVE 08/31/2017 05:19 PM    Leukocyte Esterase LARGE 08/31/2017 05:19 PM    Epithelial cells FEW 08/31/2017 05:19 PM    Bacteria 3+ 08/31/2017 05:19 PM    WBC >100 08/31/2017 05:19 PM    RBC 0-5 08/31/2017 05:19 PM         Medications Reviewed:     Current Facility-Administered Medications   Medication Dose Route Frequency    HYDROmorphone (PF) (DILAUDID) injection 1 mg  1 mg IntraVENous ONCE    oxyCODONE IR (ROXICODONE) tablet 5 mg  5 mg Oral Q4H PRN    simethicone (MYLICON) tablet 80 mg  80 mg Oral QID PRN    dexamethasone (DECADRON) tablet 1 mg  1 mg Oral Q12H    mirtazapine (REMERON) tablet 15 mg  15 mg Oral QHS    megestrol (MEGACE) 400 mg/10 mL (10 mL) oral suspension 200 mg  200 mg Oral DAILY    calciTONIN (MIACALCIN) injection 100 Int'l Units  100 Int'l Units SubCUTAneous Q12H    ALPRAZolam (XANAX) tablet 0.25 mg  0.25 mg Oral QHS    senna-docusate (PERICOLACE) 8.6-50 mg per tablet 2 Tab  2 Tab Oral DAILY    nystatin (MYCOSTATIN) 100,000 unit/mL oral suspension 500,000 Units  500,000 Units Oral QID    cefTRIAXone (ROCEPHIN) 2 g in 0.9% sodium chloride (MBP/ADV) 50 mL  2 g IntraVENous Q24H    insulin glargine (LANTUS) injection 12 Units  12 Units SubCUTAneous QHS    ondansetron (ZOFRAN) injection 4 mg  4 mg IntraVENous Q4H PRN    ALPRAZolam (XANAX) tablet 0.25 mg  0.25 mg Oral BID PRN    carvedilol (COREG) tablet 6.25 mg  6.25 mg Oral BID WITH MEALS    aspirin chewable tablet 81 mg  81 mg Oral DAILY    heparin (porcine) injection 5,000 Units  5,000 Units SubCUTAneous Q12H    insulin lispro (HUMALOG) injection   SubCUTAneous AC&HS    therapeutic multivitamin (THERAGRAN) tablet 1 Tab  1 Tab Oral DAILY    sodium chloride (NS) flush 5-10 mL  5-10 mL IntraVENous Q8H    sodium chloride (NS) flush 5-10 mL  5-10 mL IntraVENous PRN    sodium chloride (NS) flush 5-10 mL  5-10 mL IntraVENous PRN    sodium chloride (NS) flush 5-10 mL  5-10 mL IntraVENous Q8H    sodium chloride (NS) flush 5-10 mL  5-10 mL IntraVENous PRN    nitroglycerin (NITROSTAT) tablet 0.4 mg  0.4 mg SubLINGual Q5MIN PRN    atorvastatin (LIPITOR) tablet 40 mg  40 mg Oral QPM    glucose chewable tablet 16 g  4 Tab Oral PRN    dextrose (D50W) injection syrg 12.5-25 g  12.5-25 g IntraVENous PRN    glucagon (GLUCAGEN) injection 1 mg  1 mg IntraMUSCular PRN     ______________________________________________________________________  EXPECTED LENGTH OF STAY: 4d 21h  ACTUAL LENGTH OF STAY:          9                 Yvrose Swartz MD

## 2017-09-09 NOTE — PROGRESS NOTES
Pt accepted by [facility] for Sunday by Dr. Rosemarie Mcgee. Requested [facility] transport for 11am on [place] 9/10/2017. PCS already on chart. Discharge envelope needs to travel with pt to include AVS, kardex, MAR and OSCAR, DDNEFTALY copy.       Kyra Romero MSW

## 2017-09-09 NOTE — PROGRESS NOTES
Problem: Falls - Risk of  Goal: *Absence of Falls  Document Deedee Fall Risk and appropriate interventions in the flowsheet.    Outcome: Progressing Towards Goal  Fall Risk Interventions:  Mobility Interventions: Patient to call before getting OOB, Utilize walker, cane, or other assitive device, Utilize gait belt for transfers/ambulation     Mentation Interventions: Bed/chair exit alarm, Adequate sleep, hydration, pain control, Gait belt with transfers/ambulation, More frequent rounding     Medication Interventions: Evaluate medications/consider consulting pharmacy     Elimination Interventions: Patient to call for help with toileting needs

## 2017-09-09 NOTE — PROGRESS NOTES
Received call from bedside nurse that pt ready for discharge to [facility]. Apparently discharge was cancelled yesterday and per attending pt is ready today. CM called Zhang Warren who will check with their MD and return call. JACQUELINE Duckworth    1:30pm  Received call from [address] [place] [address] liason and their physician needs to see potassium and hemoglobin results for today. Called nurse who will communicate with [facility] [place] MD.  Outcome pending.     JACQUELINE Duckworth

## 2017-09-10 NOTE — PROGRESS NOTES
Problem: Falls - Risk of  Goal: *Absence of Falls  Document Deedee Fall Risk and appropriate interventions in the flowsheet.    Fall Risk Interventions:  Mobility Interventions: Patient to call before getting OOB, Utilize gait belt for transfers/ambulation     Mentation Interventions: Adequate sleep, hydration, pain control     Medication Interventions: Evaluate medications/consider consulting pharmacy, Patient to call before getting OOB, Teach patient to arise slowly     Elimination Interventions: Bed/chair exit alarm, Call light in reach, Toilet paper/wipes in reach

## 2017-09-10 NOTE — PROGRESS NOTES
Problem: Falls - Risk of  Goal: *Absence of Falls  Document Deedee Fall Risk and appropriate interventions in the flowsheet.    Outcome: Progressing Towards Goal  Fall Risk Interventions:  Mobility Interventions: Patient to call before getting OOB, Utilize gait belt for transfers/ambulation     Mentation Interventions: Adequate sleep, hydration, pain control     Medication Interventions: Evaluate medications/consider consulting pharmacy, Patient to call before getting OOB, Teach patient to arise slowly     Elimination Interventions: Bed/chair exit alarm, Call light in reach, Toilet paper/wipes in reach

## 2017-09-10 NOTE — PROGRESS NOTES
Hospitalist Progress Note  Stacy Stroud MD  Office: [phone]      Date of Service:  2017  NAME:  Stacy Stroud  :  1950  MRN:  [id number]      Admission Summary:   The patient is a 38-year-old female with past medical history of diabetes, history of hyperlipidemia, anxiety, hypertension, currently diet-controlled, osteoporosis, spinal stenosis, who recently diagnosed with renal cell carcinoma. The patient had some swelling in her cervical lymph nodes and biopsy indicated clear cell carcinoma. The patient is followed up with [address] [place] [address] Oncology with Dr Miriam Hedrick, and has not started chemotherapy as of yet. The patient also was noted to have metastases of the disease to the left to the pleura and mediastinum. Today, the patient presents because she reports that she has been having generalized weakness, no appetite for 2 days, some dry heaving, but no vomiting, and just has been feeling \"very lethargic. \" The patient was found to be hypotensive, pale and lightheaded in the triage, with systolic blood pressure of 80. The patient was placed in the ER, was started on fluid resuscitation. Her was elevated troponin and was found to be in acute renal failure and was found to be septic and was requested to be admitted under the hospitalist service. Interval history / Subjective:      Ms. Makayla De Santiago continues to be depressed and fatigued. She complains of pain all over. Assessment & Plan:     Sepsis due to serratia bacteremia, UTI (POA) - resolved  - Bcx  with serratia, repeat  negative  - Ucx  with serratia  - US renal  with marked heterogeneity of the left kidney compatible with the known neoplasm. Thrombosis of the left renal vein  - Continue IVF  - Continue ceftriaxone started 9/3. Zosyn -, Meropenum -9/3.  Needs abx until   - ID consulted    Subacute STEMI (POA) - no chest pain, SOB  - Troponin initial was 6.61, then 5.12 and 4.47  - ECG 8/31 with some anterolateral ST elevation   - Echo 8/31 with LVEF 40-45% hypokinesis of mid-apical anterior, mid-anteroseptal, mid-apical inferior, and apical wall, left atrium mildly dilated  - Continue aspirin, lipitor and carvedilol for medical management   - Cardiologist consulted    BONG (POA) - possible due to pre renal and sepsis, resolved  - Renal US as above  - Nephrology consulted    Urinary retention - likely due to UTI, weakness  - Failed voiding trial 9/5, walsh replaced  - Will need follow up for voiding trial and urology referral if she fails again    Hyperkalemia (POA) - received kayexalate, insulin and dextrose. Resolved  - Monitor    Mild Hyponatremia - resolved  -TSH was normal a week ago    Hypercalcemia - seems to be hypercalcemia of malignancy, improving  - PTH supressed  - Continue IV fluids, lasix given newly reduced EF  - Continue calcitonin  - Monitor calcium and albumin    Mild anion gap Metabolic acidosis due to BONG - resolved    DM-II (chronic) - A1c 7.5, BG a little high yesterday  - restart glipizide and continue to hold metformin  - Continue lantus, SSI as needed    Metastatic clear cell renal carcinoma (chronic)  - CT abdomen pelvis on 8/22 left renal bulky malignancy with left renal vein and pararenal extension, bilateral small renal cortical tumors/metastasis, left adrenal metastasis  - CT on 8/1 there is marked adenopathy/mass in the base of the right neck in the  periclavicular region which extends into the superior mediastinum. There is also  right hilar adenopathy/mass. Multiple lung nodules as described above some of which are pleural-based. Left adrenal nodule. Left lobe liver lesion.  - Oncologist, palliative care consulted    Likely brain mets (POA) - MRI shows possible metastatic disease but contrast was not timed right. Repeat MRI this afternoon.  Discussed with oncology    Left renal vein tumor thrombosis  - no anticoagulation needed  - Pain control  - appreciated hematologist input    Anemia related to renal cell carcinoma - no evidence of active bleeding  - H/H low but stable    Hx of HTN - Hypotension improved, BP normal now    Hx of hyperlipidemia - continue lipitor    Generalized weakness related to underlying illness - combination of sepsis, newly reduced EF, metastatic cancer and hypercalcemia    Diarrhea - may be due to contrast, resolved now, stool for c diff cancelled, resolved    Oral thrush - added nystatin suspension    DNR    Patient stable for discharge, awaiting rehab bed    Code status: DNR  DVT prophylaxis: heparin  Care Plan discussed with: Patient/Family, Nurse and   Disposition: To [address] Problems  Date Reviewed: 8/31/2017          Codes Class Noted POA    * (Principal)NSTEMI (non-ST elevated myocardial infarction) ([facility] ICD-10-CM: I21.4  ICD-9-CM: 410.70  8/31/2017 Unknown                Vital Signs:    Last 24hrs VS reviewed since prior progress note. Most recent are:  Visit Vitals    /77    Pulse 75    Temp 98.7 °F (37.1 °C)    Resp 16    Ht 5' (1.524 m)    Wt 58.3 kg (128 lb 8 oz)    SpO2 92%    Breastfeeding No    BMI 23.5 kg/m2       No intake or output data in the 24 hours ending 09/10/17 0806     Physical Examination:             Constitutional:  No acute distress, cooperative   ENT:  Oral mucous moist, oropharynx benign. Neck supple,    Resp:  Decrease bronchial breath sound bilaterally. Scattered rhonchi. No accessory muscle use   CV:  Regular rhythm, normal rate, no murmurs, gallops, rubs    GI:  Soft, non distended, non tender. normoactive bowel sounds, no hepatosplenomegaly     Musculoskeletal:  No edema    Neurologic:  Moves all extremities.   AAOx3, CN II-XII reviewed     Psych: fatigued, seems defeated       Data Review:     Telemetry x   ECG    Xray    CT scan    MRI    Echocardiogram    Ultrasound              I have reviewed the flow sheet and recent notes  New labs and data personally reviewed. Labs:     Recent Labs      09/09/17   1403  09/08/17   1153   WBC   --   14.1*   HGB  7.7*  7.3*   HCT  24.3*  23.3*   PLT   --   322     Recent Labs      09/09/17   1403  09/08/17   0517   NA   --   142   K  3.8  3.0*   CL   --   109*   CO2   --   26   BUN   --   14   CREA   --   0.77   GLU   --   51*   CA   --   8.5   PHOS   --   2.4*     Recent Labs      09/08/17   0517   ALB  1.7*     No results for input(s): INR, PTP, APTT in the last 72 hours. No lab exists for component: INREXT, INREXT   No results for input(s): FE, TIBC, PSAT, FERR in the last 72 hours. No results found for: FOL, RBCF   No results for input(s): PH, PCO2, PO2 in the last 72 hours. No results for input(s): CPK, CKNDX, TROIQ in the last 72 hours.     No lab exists for component: CPKMB  Lab Results   Component Value Date/Time    Cholesterol, total 119 09/02/2017 03:33 AM    HDL Cholesterol 17 09/02/2017 03:33 AM    LDL, calculated 32 09/02/2017 03:33 AM    Triglyceride 350 09/02/2017 03:33 AM    CHOL/HDL Ratio 7.0 09/02/2017 03:33 AM     Lab Results   Component Value Date/Time    Glucose (POC) 89 09/10/2017 06:55 AM    Glucose (POC) 167 09/09/2017 09:19 PM    Glucose (POC) 177 09/09/2017 08:55 PM    Glucose (POC) 188 09/09/2017 04:19 PM    Glucose (POC) 178 09/09/2017 11:01 AM     Lab Results   Component Value Date/Time    Color YELLOW/STRAW 08/31/2017 05:19 PM    Appearance TURBID 08/31/2017 05:19 PM    Specific gravity 1.017 08/31/2017 05:19 PM    pH (UA) 5.5 08/31/2017 05:19 PM    Protein 300 08/31/2017 05:19 PM    Glucose 100 08/31/2017 05:19 PM    Ketone TRACE 08/31/2017 05:19 PM    Bilirubin NEGATIVE  08/31/2017 05:19 PM    Urobilinogen 0.2 08/31/2017 05:19 PM    Nitrites NEGATIVE  08/31/2017 05:19 PM    Leukocyte Esterase LARGE 08/31/2017 05:19 PM    Epithelial cells FEW 08/31/2017 05:19 PM    Bacteria 3+ 08/31/2017 05:19 PM    WBC >100 08/31/2017 05:19 PM    RBC 0-5 08/31/2017 05:19 PM Medications Reviewed:     Current Facility-Administered Medications   Medication Dose Route Frequency    oxyCODONE IR (ROXICODONE) tablet 5 mg  5 mg Oral Q4H PRN    simethicone (MYLICON) tablet 80 mg  80 mg Oral QID PRN    dexamethasone (DECADRON) tablet 1 mg  1 mg Oral Q12H    mirtazapine (REMERON) tablet 15 mg  15 mg Oral QHS    megestrol (MEGACE) 400 mg/10 mL (10 mL) oral suspension 200 mg  200 mg Oral DAILY    calciTONIN (MIACALCIN) injection 100 Int'l Units  100 Int'l Units SubCUTAneous Q12H    ALPRAZolam (XANAX) tablet 0.25 mg  0.25 mg Oral QHS    senna-docusate (PERICOLACE) 8.6-50 mg per tablet 2 Tab  2 Tab Oral DAILY    nystatin (MYCOSTATIN) 100,000 unit/mL oral suspension 500,000 Units  500,000 Units Oral QID    cefTRIAXone (ROCEPHIN) 2 g in 0.9% sodium chloride (MBP/ADV) 50 mL  2 g IntraVENous Q24H    insulin glargine (LANTUS) injection 12 Units  12 Units SubCUTAneous QHS    ondansetron (ZOFRAN) injection 4 mg  4 mg IntraVENous Q4H PRN    ALPRAZolam (XANAX) tablet 0.25 mg  0.25 mg Oral BID PRN    carvedilol (COREG) tablet 6.25 mg  6.25 mg Oral BID WITH MEALS    aspirin chewable tablet 81 mg  81 mg Oral DAILY    heparin (porcine) injection 5,000 Units  5,000 Units SubCUTAneous Q12H    insulin lispro (HUMALOG) injection   SubCUTAneous AC&HS    therapeutic multivitamin (THERAGRAN) tablet 1 Tab  1 Tab Oral DAILY    sodium chloride (NS) flush 5-10 mL  5-10 mL IntraVENous Q8H    sodium chloride (NS) flush 5-10 mL  5-10 mL IntraVENous PRN    sodium chloride (NS) flush 5-10 mL  5-10 mL IntraVENous PRN    sodium chloride (NS) flush 5-10 mL  5-10 mL IntraVENous Q8H    sodium chloride (NS) flush 5-10 mL  5-10 mL IntraVENous PRN    nitroglycerin (NITROSTAT) tablet 0.4 mg  0.4 mg SubLINGual Q5MIN PRN    atorvastatin (LIPITOR) tablet 40 mg  40 mg Oral QPM    glucose chewable tablet 16 g  4 Tab Oral PRN    dextrose (D50W) injection syrg 12.5-25 g  12.5-25 g IntraVENous PRN    glucagon (GLUCAGEN) injection 1 mg  1 mg IntraMUSCular PRN     ______________________________________________________________________  EXPECTED LENGTH OF STAY: 4d 21h  ACTUAL LENGTH OF STAY:          10                 Barbie Tobin MD

## 2017-09-10 NOTE — PROGRESS NOTES
Patient being discharged to [address] [place] [address], IV removed, OSCAR signed, prescriptions sent with patient, no questions at this time.

## 2017-09-10 NOTE — PROGRESS NOTES
Problem: Falls - Risk of  Goal: *Absence of Falls  Document Deedee Fall Risk and appropriate interventions in the flowsheet.    Outcome: Progressing Towards Goal  Fall Risk Interventions:  Mobility Interventions: Patient to call before getting OOB     Mentation Interventions: Bed/chair exit alarm     Medication Interventions: Evaluate medications/consider consulting pharmacy     Elimination Interventions: Patient to call for help with toileting needs

## 2017-09-25 NOTE — PROGRESS NOTES
Lacy Ramos is a 69 y.o. female here today for kidney cancer, primary, with metastasis from kidney to other site,on  left f/u. Patient accompanied by spouse, daughter and friend to today's OV. Patient VS stable. Patient denies pain. Patient discharged from [facility] on Saturday per spouse.

## 2017-09-25 NOTE — PROGRESS NOTES
Oncology Consultation Note         Patient: Patty Bower MRN: [id number]  SSN: [id number]    YOB: 1950  Age: 69 y.o. Sex: female      Subjective:     Patty Bower is a 69 y.o. female who I am seeing in follow up for a new diagnosis of clear cell renal cell carcinoma. She noticed a lump coming up in the right side of the chest and shoulder. A CT followed by biopsy of the LN reveals the diagnosis of kidney cancer. CT of the abdomen shows a large exophytic mass in the left kidney, pleural based lung nodules and mediastinal adenopathy. She denies weight loss, pain, hematuria, bone pains etc. She is very anxious about the presumed diagnosis of cancer. She is accompanied by her . She has a grown up daughter who works at [employer]. She is suffering with uncontrolled DM and diabetic eye complication. Ms. Lan Harrison was admitted to [facility] with sepsis on 8/31/2017. During this hospital stay she was also found to have a subacute STEMI and BONG. A MRI of the brain shows she has multiple small lesions, but she is currently asymptomatic. She was sent to a SNF and is was discharged home over the weekend. She is very weak and unable to stand and is complaining of decreased appetite. She denies a headache or excessive bone pains.        Review of Systems:    Constitutional: weakness, fatigue   Eyes: negative  Ears, Nose, Mouth, Throat, and Face: negative  Respiratory: negative  Cardiovascular: negative  Gastrointestinal: decreased appetite  Genitourinary:negative  Integument/Breast: negative  Hematologic/Lymphatic: negative  Musculoskeletal:negative  Neurological: negative    Past Medical History:   Diagnosis Date    Anxiety 7/31/2017    2/3/17:Under a tremendous amount of stress, will continue Alprazolam prn, if using regularly consider changing to an SSRI or counseling    Cancer ([facility] 08/2017    renal cell carcinoma    Cervical myelopathy ([facility] 7/31/2017    2/3/17:Cervical fusion  Controlled diabetes mellitus ([facility] 7/31/2017    Fatigue 7/31/2017    Hypercholesterolemia 7/31/2017    Hyperglycemia 7/31/2017    Hypertension 7/31/2017    2/3/17:Poorly controlled, lifestyle changes, repeat /90 will follow    On statin therapy 7/31/2017    Osteoporosis 7/31/2017    Post-menopausal 7/31/2017    Spinal stenosis of lumbar region with radiculopathy 7/31/2017    2/3/17:Symptoms c/w this diagnosis discussed with pt., if progresses then need MRI L spine and follow up    Vaginitis due to Candida 7/31/2017    2/3/17:Associated with antibiotic use, requests prescription med     Past Surgical History:   Procedure Laterality Date    HX ORTHOPAEDIC      2009 spinal cord surgery     HX ORTHOPAEDIC Left     knee procedure    HX OTHER SURGICAL  08/16/2017    incisional biopsy right neck mass      Family History   Problem Relation Age of Onset    Heart Disease Mother      Social History   Substance Use Topics    Smoking status: Never Smoker    Smokeless tobacco: Never Used    Alcohol use No      Prior to Admission medications    Medication Sig Start Date End Date Taking? Authorizing Provider   metFORMIN (GLUCOPHAGE) 500 mg tablet 500 mg two (2) times daily (with meals). 9/23/17  Yes Historical Provider   oxybutynin (DITROPAN) 5 mg tablet 5 mg nightly. Indications: Overactive Bladder 9/23/17  Yes Historical Provider   pantoprazole (PROTONIX) 40 mg tablet  9/23/17  Yes Historical Provider   sertraline (ZOLOFT) 50 mg tablet  9/23/17  Yes Historical Provider   ferrous sulfate 325 mg (65 mg iron) tablet Take  by mouth Before breakfast and dinner. Yes Historical Provider   mirtazapine (REMERON) 15 mg tablet Take 1 Tab by mouth nightly. 9/9/17  Yes Maryhelen Lanes, MD   carvedilol (COREG) 6.25 mg tablet Take 1 Tab by mouth two (2) times daily (with meals).  9/8/17  Yes Maryhelen Lanes, MD   ALPRAZolam [facility] 0.25 mg tablet Take by mouth nightly for sleep and then three times daily as needed for anxiety 9/8/17  Yes Roldan Calvillo MD   atorvastatin (LIPITOR) 40 mg tablet Take 1 Tab by mouth daily. 9/8/17  Yes Roldan Calvillo MD   megestrol (MEGACE) 400 mg/10 mL (10 mL) suspension Take 5 mL by mouth daily. 9/8/17  Yes Roldan Calvillo MD   HYDROcodone-acetaminophen (NORCO) 5-325 mg per tablet Take 1-2 Tabs by mouth every four (4) hours as needed for Pain. Max Daily Amount: 12 Tabs. 9/8/17  Yes Roldan Calvillo MD   Saccharomyces boulardii (FLORASTOR) 250 mg capsule Take 1 Cap by mouth two (2) times a day for 30 days. OK to substitute other probiotic 9/8/17 10/8/17 Yes Roldan Calvillo MD   therapeutic multivitamin [facility]) tablet Take 1 Tab by mouth daily. Yes Historical Provider   vit a,c & e-lutein-minerals (OCUVITE) tablet Take 1 Tab by mouth daily. Yes Historical Provider   ondansetron (ZOFRAN ODT) 4 mg disintegrating tablet Take 1 Tab by mouth every eight (8) hours as needed for Nausea. 8/23/17  Yes Kb Forte MD   dexamethasone (DECADRON) 0.5 mg tablet  9/23/17   Historical Provider   dexamethasone (DECADRON) 1 mg tablet Take 1 Tab by mouth two (2) times daily (with meals). 9/9/17   Roldan Calvillo MD   insulin glargine (LANTUS) 100 unit/mL injection 12 Units by SubCUTAneous route nightly. 9/8/17   Roldan Calvillo MD   insulin lispro (HUMALOG) 100 unit/mL injection Inject subcutaneously three times daily before meals only For Blood Sugar (mg/dl) of :             140-199=2 units            200-249=3 units  250-299=5 units  300-349=7 units  350 or greater = Call MD 9/8/17   Roldan Calvillo MD   senna-docusate (PERICOLACE) 8.6-50 mg per tablet Take 2 Tabs by mouth daily. 9/8/17   Roldan Calvillo MD   aspirin 81 mg chewable tablet Take 1 Tab by mouth daily.  9/8/17   Roldan Calvillo MD   nitroglycerin (NITROSTAT) 0.4 mg SL tablet 1 Tab by SubLINGual route every five (5) minutes as needed for Chest Pain. 9/8/17   Maryhelen Lanes, MD KLOR-CON M20 20 mEq tablet Take 20 mEq by mouth daily. 6/16/17   Historical Provider   furosemide (LASIX) 80 mg tablet Take 80 mg by mouth daily. Historical Provider              No Known Allergies        Objective:     Vitals:    09/25/17 1530   BP: 116/68   Pulse: 75   Resp: 16   Temp: 98.9 °F (37.2 °C)   TempSrc: Oral   SpO2: 97%   Height: 5' (1.524 m)            Physical Exam:  GENERAL: alert, cooperative, no distress, appears stated age  EYE: negative  LYMPHATIC: Cervical, supraclavicular, and axillary nodes normal.   THROAT & NECK: normal and no erythema or exudates noted. LUNG: clear to auscultation bilaterally  HEART: regular rate and rhythm  ABDOMEN: soft, non-tender  EXTREMITIES:  no edema  SKIN: Normal.  NEUROLOGIC: negative      CT Results (most recent):    Results from Hospital Encounter encounter on 08/22/17   CT ABD PELV W WO CONT   Narrative EXAM:  CT ABD PELV W WO CONT    INDICATION:   Right neck mass biopsy showing carcinoma, metastatic with clear  cell features, 8/16/2017. Extensive intrathoracic tumor as well. COMPARISON: None. CONTRAST:  100 mL of Isovue-370. TECHNIQUE:    CT abdomen was performed without IV contrast followed by CT abdomen and pelvis  with IV contrast. Enhanced images of the abdomen include arterial phase and  portal venous phase. CT dose reduction was achieved through use of a  standardized protocol tailored for this examination and automatic exposure  control for dose modulation. FINDINGS:  There is a bulky exophytic mass protruding from the lower pole of the left  kidney extending into the pararenal space, with tumor and/or clot extending into  and expanding the left renal vein, sparing the IVC, with left perirenal  varicosities. There are small left perirenal hyperenhancing masses. There are multiple small enhancing bilateral renal cortical masses.  There is a  left adrenal nodule, likely metastasis. Right adrenal is unremarkable. Liver contains several subcentimeter nonenhancing fluid attenuation round foci  compatible with cysts. There is cholelithiasis without inflammation. Bile and  pancreatic ducts are not enlarged. Pancreatic duct is unremarkable. Spleen is  unremarkable. Aorta is calcified without aneurysm. There are colonic diverticula. Bladder is  midline. There is no pelvic mass or significant adenopathy. There is no ascites. Impression IMPRESSION:  1. Left renal bulky malignancy with left renal vein and pararenal extension. 2. Bilateral small renal cortical hyperenhancing tumors/metastases. 3. Left adrenal metastasis. Assessment:     1. Metastatic clear cell kidney cancer    Lung nodules, mediastinal adenopathy  Left kidney primary  Left adrenal mass    ECOG PS 2  Intent of treatment - palliative    Due to poor performance status, she is not longer a candidate for cytoreductive nephrectomy. She was supposed to start Pazopanib over 4 weeks ago but then she got admitted to the hospital with sepsis. She was then transferred to [address] [place] [address] after d/c. She recently got out of the hospital.   Plan to start Pazopanib at reduced dose 400 mg daily. I have reduced her starting dose due to poor performance status. 2. Brain metastasis     Will refer to Rad-onc      3. Protein calorie malnutrition    > Dietary consult  > Protein supplementation      4. DM - managed by Dr. Feroz Henderson:       > Start Pazopanib 2 pills daily 400 mg (The medication has been approved. She will need to contact [facility])  > Continue PT and home health  > Contact office one she receives medication  > will need monitoring labs once starts medication      I saw the patient in conjunction with LUIS A Diego. Signed by: Clementine Cheadle, MD                     September 25, 2017        CC. Isabella Lira.  Sharon Burroughs MD

## 2017-10-02 NOTE — TELEPHONE ENCOUNTER
Home Health called and stated that they believe patient has thrush and if you could call something in

## 2017-10-04 NOTE — PROGRESS NOTES
Patient's caregiver called to report that Ms. César Rodriguez is not sleeping well and has hallucinations when taking Norco. She is extremely weak and has developed a bed sore. Discussed having wound care visit to assess. Also in need of hospital bed.  will call to arrange. Instructed patient to stop Norco and a prescription for Oxycodone IR 5 mg written.     Abdoulaye Marrufo NP

## 2017-10-06 NOTE — PROGRESS NOTES
[address]  Social Work Navigator Encounter     Patient Name:  Boubacar Beckett    Medical History:     Advance Directives:    Narrative: SW followed up with pt/pt caregiver/ Samuel Moeller,  -[phone]. Eduin Ferrara Nurse needs order for topical ointment/nystatin - NP Yeni Fabian states she will call in. Stella also wants to order gel overlay for hospital bed that has been ordered. After discussion Brittani Ramos will have their orders created and will not need us to do so. Barriers to Care:     Plan:   1.  SW feels comfortable wound care will be taken care of by [facility] as nurse just discovered at the visit. 2.  Hospital Bed that pt/pt family requested has been ordered.

## 2017-10-09 NOTE — PROGRESS NOTES
Pt is a 69 yr old Pt here for a routine follow up for Renal Cell Ca, Pt is on Votrient, no side effects from this. Pt does report having a pressure ulcer on her sacrum, and a yeast infection on her groin area. Pt reports pain is improved, sleeping \"ok\". She seems to sleep better with pain meds at night. She has a caretaker with her today.

## 2017-10-09 NOTE — MR AVS SNAPSHOT
Visit Information Date & Time Provider Department Dept. Phone Encounter #  
 10/9/2017 10:15 AM Chandana Reddy, NP [address] [facility] at [facility] [phone] [id number] Your Appointments 11/27/2017 10:20 AM  
Follow Up with MD Lukas Bhagat 26 ([facility]) Appt Note: [address] [phone] [address] [address] Upcoming Health Maintenance Date Due Hepatitis C Screening 1950 FOOT EXAM Q1 1/3/1958 MICROALBUMIN Q1 1/3/1958 EYE EXAM RETINAL OR DILATED Q1 1/3/1958 DTaP/Tdap/Td series (1 - Tdap) 1/3/1969 FOBT Q 1 YEAR AGE 50-75 1/3/1998 ZOSTER VACCINE AGE 60> 11/3/2007 GLAUCOMA SCREENING Q2Y 1/3/2013 MEDICARE YEARLY EXAM 1/3/2013 INFLUENZA AGE 9 TO ADULT 8/1/2017 Pneumococcal 65+ High/Highest Risk (2 of 2 - PPSV23) 11/20/2017 HEMOGLOBIN A1C Q6M 2/28/2018 LIPID PANEL Q1 9/2/2018 BREAST CANCER SCRN MAMMOGRAM 3/17/2019 Allergies as of 10/9/2017  Review Complete On: 10/9/2017 By: John Moreno RN No Known Allergies Current Immunizations  Never Reviewed No immunizations on file. Not reviewed this visit You Were Diagnosed With   
  
 Codes Comments Clear cell adenocarcinoma of kidney, unspecified laterality ([facility]    -  Primary ICD-10-CM: C64.9 ICD-9-CM: 189. 0 Vitals BP Pulse Temp Resp Height(growth percentile) Weight(growth percentile) 121/79 69 98.5 °F (36.9 °C) 20 5' (1.524 m) 97 lb (44 kg) SpO2 BMI OB Status Smoking Status 97% 18.94 kg/m2 Postmenopausal Never Smoker BMI and BSA Data Body Mass Index Body Surface Area 18.94 kg/m 2 1.36 m 2 Preferred Pharmacy [facility] Name Phone [email or web address] [phone] Your Updated Medication List  
 This list is accurate as of: 10/9/17 11:39 AM.  Always use your most recent med list.  
  
  
  
  
 atorvastatin 40 mg tablet Commonly known as:  LIPITOR Take 1 Tab by mouth daily. carvedilol 6.25 mg tablet Commonly known as:  Carol Ute Take 1 Tab by mouth two (2) times daily (with meals). glipiZIDE 5 mg tablet Commonly known as:  Helen Satya Take 0.5 Tabs by mouth daily. megestrol 400 mg/10 mL (10 mL) suspension Commonly known as:  MEGACE Take 5 mL by mouth daily. mirtazapine 30 mg tablet Commonly known as:  Wilnette Jarvis Take 1 Tab by mouth nightly. nystatin powder Commonly known as:  MYCOSTATIN Apply  to affected area four (4) times daily. ondansetron 4 mg disintegrating tablet Commonly known as:  ZOFRAN ODT Take 1 Tab by mouth every eight (8) hours as needed for Nausea. oxyCODONE IR 5 mg immediate release tablet Commonly known as:  Stana Christina Take 1 Tab by mouth every six (6) hours as needed for Pain. Max Daily Amount: 20 mg.  
  
 pantoprazole 40 mg tablet Commonly known as:  PROTONIX  
  
 sertraline 50 mg tablet Commonly known as:  ZOLOFT  
  
 vit a,c & e-lutein-minerals tablet Commonly known as:  Yessica Rose Take 1 Tab by mouth daily. Prescriptions Sent to Pharmacy Refills  
 mirtazapine (REMERON) 30 mg tablet 3 Sig: Take 1 Tab by mouth nightly. Class: Normal  
 Pharmacy: [address] Ph #: [phone] Route: Oral  
 glipiZIDE (GLUCOTROL) 5 mg tablet 1 Sig: Take 0.5 Tabs by mouth daily. Class: Normal  
 Pharmacy: [address] Ph #: [phone] Route: Oral  
 nystatin (MYCOSTATIN) powder 2 Sig: Apply  to affected area four (4) times daily. Class: Normal  
 Pharmacy: [address] Ph #: [phone]  Route: Topical  
 We Performed the Following MAGNESIUM [id number] CPT(R)] METABOLIC PANEL, COMPREHENSIVE [55803 CPT(R)] Randall Gracia [[id number] Custom] TSH REFLEX TO T4 [[id number] Custom] To-Do List   
 10/09/2017 ECG:  EKG, 12 LEAD, INITIAL Introducing [facility]! Janet Willis introduces [employer] patient portal. Now you can access parts of your medical record, email your doctor's office, and request medication refills online. 1. In your internet browser, go to [email or web address] 2. Click on the First Time User? Click Here link in the Sign In box. You will see the New Member Sign Up page. 3. Enter your [employer] Access Code exactly as it appears below. You will not need to use this code after youve completed the sign-up process. If you do not sign up before the expiration date, you must request a new code. · [employer] Access Code: [email or web address] Expires: 10/29/2017  2:47 PM 
 
4. Enter the last four digits of your Social Security Number (xxxx) and Date of Birth (mm/dd/yyyy) as indicated and click Submit. You will be taken to the next sign-up page. 5. Create a [employer] ID. This will be your [employer] login ID and cannot be changed, so think of one that is secure and easy to remember. 6. Create a [employer] password. You can change your password at any time. 7. Enter your Password Reset Question and Answer. This can be used at a later time if you forget your password. 8. Enter your e-mail address. You will receive e-mail notification when new information is available in [address] 9. Click Sign Up. You can now view and download portions of your medical record. 10. Click the Download Summary menu link to download a portable copy of your medical information. If you have questions, please visit the Frequently Asked Questions section of the [employer] website. Remember, [employer] is NOT to be used for urgent needs. For medical emergencies, dial 911. Now available from your iPhone and Android! Please provide this summary of care documentation to your next provider. Your primary care clinician is listed as RONY Salomon. If you have any questions after today's visit, please call [phone].

## 2017-10-09 NOTE — PROGRESS NOTES
Follow-up Note         Patient: Michael Medina MRN: [id number]  SSN: [id number]    YOB: 1950  Age: 69 y.o. Sex: female        Assessment:     1. Metastatic clear cell kidney cancer    Treatment:     1. On Pazopanib at reduced dose 400 mg daily - started 10/1/2017    Subjective:     Michael Medina is a 69 y.o. female who I am seeing in follow up for a diagnosis of clear cell renal cell carcinoma. She noticed a lump coming up in the right side of the chest and shoulder. A CT followed by biopsy of the LN reveals the diagnosis of kidney cancer. CT of the abdomen shows a large exophytic mass in the left kidney, pleural based lung nodules and mediastinal adenopathy. She denies weight loss, pain, hematuria, bone pains etc. She is very anxious about the presumed diagnosis of cancer. She is accompanied by her . She has a grown up daughter who works at [employer]. She is suffering with uncontrolled DM and diabetic eye complication. Ms. Corby Crooks was admitted to [facility] with sepsis on 8/31/2017. During this hospital stay she was also found to have a subacute STEMI and BONG. A MRI of the brain shows she has multiple small lesions, but she is currently asymptomatic. She is here today with her  and caregiver. She started Pazopanib one week ago and is tolerating it well. She is having difficulty sleeping and is still weak but making improvements in her mobility. She also notes a pressure sore on her bottom and a yeast infection in her groin area.        Review of Systems:    Constitutional: weakness, fatigue, insomnia   Eyes: negative  Ears, Nose, Mouth, Throat, and Face: negative  Respiratory: negative  Cardiovascular: negative  Gastrointestinal: decreased appetite  Genitourinary:negative  Integument/Breast: fungal infection groin area, pressure sore sacrum  Hematologic/Lymphatic: negative  Musculoskeletal:negative  Neurological: negative      Past Medical History:   Diagnosis Date    Anxiety 7/31/2017    2/3/17:Under a tremendous amount of stress, will continue Alprazolam prn, if using regularly consider changing to an SSRI or counseling    Cancer ([facility] 08/2017    renal cell carcinoma    Cervical myelopathy ([facility] 7/31/2017    2/3/17:Cervical fusion     Controlled diabetes mellitus ([facility] 7/31/2017    Fatigue 7/31/2017    Hypercholesterolemia 7/31/2017    Hyperglycemia 7/31/2017    Hypertension 7/31/2017    2/3/17:Poorly controlled, lifestyle changes, repeat /90 will follow    On statin therapy 7/31/2017    Osteoporosis 7/31/2017    Post-menopausal 7/31/2017    Spinal stenosis of lumbar region with radiculopathy 7/31/2017    2/3/17:Symptoms c/w this diagnosis discussed with pt., if progresses then need MRI L spine and follow up    Vaginitis due to Candida 7/31/2017    2/3/17:Associated with antibiotic use, requests prescription med     Past Surgical History:   Procedure Laterality Date    HX ORTHOPAEDIC      2009 spinal cord surgery     HX ORTHOPAEDIC Left     knee procedure    HX OTHER SURGICAL  08/16/2017    incisional biopsy right neck mass      Family History   Problem Relation Age of Onset    Heart Disease Mother      Social History   Substance Use Topics    Smoking status: Never Smoker    Smokeless tobacco: Never Used    Alcohol use No      Prior to Admission medications    Medication Sig Start Date End Date Taking? Authorizing Provider   nystatin (MYCOSTATIN) topical cream Apply  to affected area two (2) times a day. 10/6/17  Yes Raegan Rush NP   oxyCODONE IR (ROXICODONE) 5 mg immediate release tablet Take 1 Tab by mouth every six (6) hours as needed for Pain. Max Daily Amount: 20 mg. 10/4/17  Yes Tuyet Myers NP   nystatin (MYCOSTATIN) 100,000 unit/mL suspension Take 5 mL by mouth four (4) times daily for 10 days.  swish and spit 10/3/17 10/13/17 Yes RONY Brandon MD   dexamethasone (DECADRON) 0.5 mg tablet  9/23/17  Yes Historical Provider metFORMIN (GLUCOPHAGE) 500 mg tablet 500 mg two (2) times daily (with meals). 9/23/17  Yes Historical Provider   oxybutynin (DITROPAN) 5 mg tablet 5 mg nightly. Indications: Overactive Bladder 9/23/17  Yes Historical Provider   pantoprazole (PROTONIX) 40 mg tablet  9/23/17  Yes Historical Provider   sertraline (ZOLOFT) 50 mg tablet  9/23/17  Yes Historical Provider   ferrous sulfate 325 mg (65 mg iron) tablet Take  by mouth Before breakfast and dinner. Yes Historical Provider   mirtazapine (REMERON) 15 mg tablet Take 1 Tab by mouth nightly. 9/9/17  Yes Lilian Schwartz MD   dexamethasone (DECADRON) 1 mg tablet Take 1 Tab by mouth two (2) times daily (with meals). 9/9/17  Yes Lilian Schwartz MD   carvedilol (COREG) 6.25 mg tablet Take 1 Tab by mouth two (2) times daily (with meals). 9/8/17  Yes Lilian Schwartz MD   ALPRAZolam Violette Sanes) 0.25 mg tablet Take by mouth nightly for sleep and then three times daily as needed for anxiety 9/8/17  Yes Lilian Schwartz MD   atorvastatin (LIPITOR) 40 mg tablet Take 1 Tab by mouth daily. 9/8/17  Yes Lilian Schwartz MD   megestrol (MEGACE) 400 mg/10 mL (10 mL) suspension Take 5 mL by mouth daily. 9/8/17  Yes Lilian Schwartz MD   insulin glargine (LANTUS) 100 unit/mL injection 12 Units by SubCUTAneous route nightly. 9/8/17  Yes Lilian Schwartz MD   insulin lispro (HUMALOG) 100 unit/mL injection Inject subcutaneously three times daily before meals only For Blood Sugar (mg/dl) of :             140-199=2 units            200-249=3 units  250-299=5 units  300-349=7 units  350 or greater = Call MD 9/8/17  Yes Lilian Schwartz MD   senna-docusate (PERICOLACE) 8.6-50 mg per tablet Take 2 Tabs by mouth daily. 9/8/17  Yes Lilian Schwartz MD   aspirin 81 mg chewable tablet Take 1 Tab by mouth daily.  9/8/17  Yes Lilian Schwartz MD   nitroglycerin (NITROSTAT) 0.4 mg SL tablet 1 Tab by SubLINGual route every five (5) minutes as needed for Chest Pain. 9/8/17  Yes Marla Martell MD   therapeutic multivitamin [facility]) tablet Take 1 Tab by mouth daily. Yes Historical Provider   vit a,c & e-lutein-minerals (OCUVITE) tablet Take 1 Tab by mouth daily. Yes Historical Provider   KLOR-CON M20 20 mEq tablet Take 20 mEq by mouth daily. 6/16/17  Yes Historical Provider   ondansetron (ZOFRAN ODT) 4 mg disintegrating tablet Take 1 Tab by mouth every eight (8) hours as needed for Nausea. 8/23/17  Yes Abiodun Varghese MD   furosemide (LASIX) 80 mg tablet Take 80 mg by mouth daily. Yes Historical Provider   Saccharomyces boulardii (FLORASTOR) 250 mg capsule Take 1 Cap by mouth two (2) times a day for 30 days. OK to substitute other probiotic 9/8/17 10/8/17  Leslie Boswell MD          No Known Allergies        Objective:     Vitals:    10/09/17 1045   BP: 121/79   Pulse: 69   Resp: 20   Temp: 98.5 °F (36.9 °C)   SpO2: 97%   Weight: 97 lb (44 kg)   Height: 5' (1.524 m)          Physical Exam:  GENERAL: alert, cooperative, no distress, appears stated age  EYE: negative  LYMPHATIC: Cervical, supraclavicular, and axillary nodes normal.   THROAT & NECK: normal and no erythema or exudates noted. LUNG: clear to auscultation bilaterally  HEART: regular rate and rhythm  ABDOMEN: soft, non-tender  EXTREMITIES:  no edema  SKIN: fungal infection groin area  NEUROLOGIC: negative      CT Results (most recent):    Results from Hospital Encounter encounter on 08/22/17   CT ABD PELV W WO CONT   Narrative EXAM:  CT ABD PELV W WO CONT    INDICATION:   Right neck mass biopsy showing carcinoma, metastatic with clear  cell features, 8/16/2017. Extensive intrathoracic tumor as well. COMPARISON: None. CONTRAST:  100 mL of Isovue-370.     TECHNIQUE:    CT abdomen was performed without IV contrast followed by CT abdomen and pelvis  with IV contrast. Enhanced images of the abdomen include arterial phase and  portal venous phase. CT dose reduction was achieved through use of a  standardized protocol tailored for this examination and automatic exposure  control for dose modulation. FINDINGS:  There is a bulky exophytic mass protruding from the lower pole of the left  kidney extending into the pararenal space, with tumor and/or clot extending into  and expanding the left renal vein, sparing the IVC, with left perirenal  varicosities. There are small left perirenal hyperenhancing masses. There are multiple small enhancing bilateral renal cortical masses. There is a  left adrenal nodule, likely metastasis. Right adrenal is unremarkable. Liver contains several subcentimeter nonenhancing fluid attenuation round foci  compatible with cysts. There is cholelithiasis without inflammation. Bile and  pancreatic ducts are not enlarged. Pancreatic duct is unremarkable. Spleen is  unremarkable. Aorta is calcified without aneurysm. There are colonic diverticula. Bladder is  midline. There is no pelvic mass or significant adenopathy. There is no ascites. Impression IMPRESSION:  1. Left renal bulky malignancy with left renal vein and pararenal extension. 2. Bilateral small renal cortical hyperenhancing tumors/metastases. 3. Left adrenal metastasis. Assessment:     1. Metastatic clear cell kidney cancer    Lung nodules, mediastinal adenopathy  Left kidney primary  Left adrenal mass    ECOG PS 2  Intent of treatment - palliative    Due to poor performance status, she is not longer a candidate for cytoreductive nephrectomy. Treatment was delayed d/t hospitalization and rehab. On Pazopanib at reduced dose 400 mg daily - started 10/1/2017  I have reduced her starting dose due to poor performance status. Tolerating treatment   A detailed system by system evaluation of side effect was performed to assess chemotherapy related toxicity. Blood counts are acceptable.  Results reviewed with the patient. Symptom management form reviewed with patient. 2. Brain metastasis     Will refer to Rad-onc      3. Protein calorie malnutrition    > Dietary consult  > Protein supplementation      4. DM     > Stop Metformin r/t renal function  > Continue Glipizide 2.5 mg daily - patient did not want to use insulin      5. Insomnia    > Increase Mirtazapine to 30 mg nightly      6. Fungal skin infection    > Nystatin powder to affected area      Plan:       > Continue Pazopanib 400 mg daily  > Increase Mirtazapine to 30 mg nightly  > Stop Metformin, continue Glipizide 2.5 mg daily  > Nystatin powder for local application  > Continue Oxycodone IR as needed  > EKG today  > Labs: CMP, Mg, TSH, Urine protein  > Follow-up in 3 weeks      I saw the patient in conjunction with Lizandro Amezcua NP. Signed by: Gaston Dorsey MD                     October 9, 2017        CC. Geeta Coleman.  Darvin Sosa MD

## 2017-10-19 NOTE — TELEPHONE ENCOUNTER
Harsh Cotton called stating he would like to speak with NP Gail Hilton to discuss patient's current medication list and which medications pt is going to stop taking.  Please advise [phone]  Or [phone]

## 2017-10-30 NOTE — PROGRESS NOTES
Follow-up Note         Patient: Nighat Carlos MRN: [id number]  SSN: [id number]    YOB: 1950  Age: 69 y.o. Sex: female        Assessment:     1. Metastatic clear cell kidney cancer    Treatment:     1. On Pazopanib at reduced dose 400 mg daily - started 10/1/2017    Subjective:     Nighat Carlos is a 69 y.o. female who I am seeing in follow up for a diagnosis of clear cell renal cell carcinoma. She noticed a lump coming up in the right side of the chest and shoulder. A CT followed by biopsy of the LN reveals the diagnosis of kidney cancer. CT of the abdomen shows a large exophytic mass in the left kidney, pleural based lung nodules and mediastinal adenopathy. She denies weight loss, pain, hematuria, bone pains etc. She is very anxious about the presumed diagnosis of cancer. She is accompanied by her . She has a grown up daughter who works at [employer]. She is suffering with uncontrolled DM and diabetic eye complication. Ms. Mary Ramos was admitted to [facility] with sepsis on 8/31/2017. During this hospital stay she was also found to have a subacute STEMI and BONG. A MRI of the brain shows she has multiple small lesions, but she is currently asymptomatic. She is here today with her  and caregiver. She started Pazopanib one month ago and is improving with her overall symptoms. She is regaining some of her strength and her appetite has increased.         Review of Systems:    Constitutional: weakness, fatigue, insomnia   Eyes: negative  Ears, Nose, Mouth, Throat, and Face: negative  Respiratory: negative  Cardiovascular: negative  Gastrointestinal: improving appetite  Genitourinary:negative  Integument/Breast: negative  Hematologic/Lymphatic: negative  Musculoskeletal:negative  Neurological: negative      Past Medical History:   Diagnosis Date    Anxiety 7/31/2017    2/3/17:Under a tremendous amount of stress, will continue Alprazolam prn, if using regularly consider changing to an SSRI or counseling    Cancer ([facility] 08/2017    renal cell carcinoma    Cervical myelopathy ([facility] 7/31/2017    2/3/17:Cervical fusion     [facility] 7/31/2017    Fatigue 7/31/2017    Hypercholesterolemia 7/31/2017    Hyperglycemia 7/31/2017    Hypertension 7/31/2017    2/3/17:Poorly controlled, lifestyle changes, repeat /90 will follow    On statin therapy 7/31/2017    Osteoporosis 7/31/2017    Post-menopausal 7/31/2017    Spinal stenosis of lumbar region with radiculopathy 7/31/2017    2/3/17:Symptoms c/w this diagnosis discussed with pt., if progresses then need MRI L spine and follow up    Vaginitis due to Candida 7/31/2017    2/3/17:Associated with antibiotic use, requests prescription med     Past Surgical History:   Procedure Laterality Date    HX ORTHOPAEDIC      2009 spinal cord surgery     HX ORTHOPAEDIC Left     knee procedure    HX OTHER SURGICAL  08/16/2017    incisional biopsy right neck mass      Family History   Problem Relation Age of Onset    Heart Disease Mother      Social History   Substance Use Topics    Smoking status: Never Smoker    Smokeless tobacco: Never Used    Alcohol use No      Prior to Admission medications    Medication Sig Start Date End Date Taking? Authorizing Provider   mirtazapine (REMERON) 30 mg tablet Take 1 Tab by mouth nightly. 10/9/17  Yes Rohan Bautista MD   glipiZIDE (GLUCOTROL) 5 mg tablet Take 0.5 Tabs by mouth daily. 10/9/17  Yes Rohan Bautista MD   nystatin (MYCOSTATIN) powder Apply  to affected area four (4) times daily. 10/9/17  Yes Rohan Bautista MD   oxyCODONE IR (ROXICODONE) 5 mg immediate release tablet Take 1 Tab by mouth every six (6) hours as needed for Pain.  Max Daily Amount: 20 mg. 10/4/17  Yes Aniket Myers, JARROD   pantoprazole (PROTONIX) 40 mg tablet  9/23/17  Yes Historical Provider   sertraline (ZOLOFT) 50 mg tablet  9/23/17  Yes Historical Provider   carvedilol (COREG) 6.25 mg tablet Take 1 Tab by mouth two (2) times daily (with meals). 9/8/17  Yes Andie Hussein MD   atorvastatin (LIPITOR) 40 mg tablet Take 1 Tab by mouth daily. 9/8/17  Yes Andie Hussein MD   megestrol (MEGACE) 400 mg/10 mL (10 mL) suspension Take 5 mL by mouth daily. 9/8/17  Yes Andie Hussein MD   vit a,c & e-lutein-minerals (OCUVITE) tablet Take 1 Tab by mouth daily. Yes Historical Provider   ondansetron (ZOFRAN ODT) 4 mg disintegrating tablet Take 1 Tab by mouth every eight (8) hours as needed for Nausea. 8/23/17  Yes Charlie Rojo MD          No Known Allergies        Objective:     Vitals:    10/30/17 1028   BP: 103/60   Pulse: 83   Resp: 18   Temp: 99.1 °F (37.3 °C)   SpO2: 96%   Weight: 101 lb 6.4 oz (46 kg)   Height: 5' 1\" (1.549 m)          Physical Exam:  GENERAL: alert, cooperative, no distress, appears stated age  EYE: negative  LYMPHATIC: Cervical, supraclavicular, and axillary nodes normal.   THROAT & NECK: normal and no erythema or exudates noted. LUNG: clear to auscultation bilaterally  HEART: regular rate and rhythm  ABDOMEN: soft, non-tender  EXTREMITIES:  no edema  SKIN: fungal infection groin area  NEUROLOGIC: negative      CT Results (most recent):    Results from Hospital Encounter encounter on 08/22/17   CT ABD PELV W WO CONT   Narrative EXAM:  CT ABD PELV W WO CONT    INDICATION:   Right neck mass biopsy showing carcinoma, metastatic with clear  cell features, 8/16/2017. Extensive intrathoracic tumor as well. COMPARISON: None. CONTRAST:  100 mL of Isovue-370. TECHNIQUE:    CT abdomen was performed without IV contrast followed by CT abdomen and pelvis  with IV contrast. Enhanced images of the abdomen include arterial phase and  portal venous phase. CT dose reduction was achieved through use of a  standardized protocol tailored for this examination and automatic exposure  control for dose modulation.       FINDINGS:  There is a bulky exophytic mass protruding from the lower pole of the left  kidney extending into the pararenal space, with tumor and/or clot extending into  and expanding the left renal vein, sparing the IVC, with left perirenal  varicosities. There are small left perirenal hyperenhancing masses. There are multiple small enhancing bilateral renal cortical masses. There is a  left adrenal nodule, likely metastasis. Right adrenal is unremarkable. Liver contains several subcentimeter nonenhancing fluid attenuation round foci  compatible with cysts. There is cholelithiasis without inflammation. Bile and  pancreatic ducts are not enlarged. Pancreatic duct is unremarkable. Spleen is  unremarkable. Aorta is calcified without aneurysm. There are colonic diverticula. Bladder is  midline. There is no pelvic mass or significant adenopathy. There is no ascites. Impression IMPRESSION:  1. Left renal bulky malignancy with left renal vein and pararenal extension. 2. Bilateral small renal cortical hyperenhancing tumors/metastases. 3. Left adrenal metastasis. Assessment:     1. Metastatic clear cell kidney cancer    Lung nodules, mediastinal adenopathy  Left kidney primary  Left adrenal mass    ECOG PS 2  Intent of treatment - palliative    Due to poor performance status, she is not longer a candidate for cytoreductive nephrectomy. Treatment was delayed d/t hospitalization and rehab. On Pazopanib at reduced dose 400 mg daily - started 10/1/2017  I have reduced her starting dose due to poor performance status. Tolerating treatment   A detailed system by system evaluation of side effect was performed to assess chemotherapy related toxicity. Blood counts are acceptable. Results reviewed with the patient. Symptom management form reviewed with patient. 2. Brain metastasis     Will refer to Rad-onc      3.  Protein calorie malnutrition - improving    > Protein supplementation      4. DM     > Stop Metformin r/t renal function  > Continue Glipizide 2.5 mg daily  > She may be best served by pre-meal regular insulin      5. Insomnia    > Increase Mirtazapine to 30 mg nightly      6. Fungal skin infection - improved    > Nystatin powder to affected area      Plan:       > Continue Pazopanib 400 mg daily  > continue Mirtazapine to 30 mg nightly  > continue Glipizide 2.5 mg daily  > follow up with Dr. Checo Daly regarding hyperglycemia  > Repeat CT chest/ abd/ pelvis  > Continue Oxycodone IR as needed  > Labs: CMP, Mg, and CBC  > Follow-up in 4 weeks      I saw the patient in conjunction with Lebron Cordero NP. Signed by: Dru Collado MD                     October 30, 2017        CC. Luis Antonio Figueroa.  Checo Daly MD

## 2017-10-30 NOTE — MR AVS SNAPSHOT
Visit Information Date & Time Provider Department Dept. Phone Encounter #  
 10/30/2017 10:15 AM Mykel Lomeli, JARROD [address] Oncology at [facility] [phone] [id number] Your Appointments 11/27/2017 10:20 AM  
Follow Up with MD Lukas Alvarado 26 [facility] Appt Note: [address] [address] [address] [address] Upcoming Health Maintenance Date Due Hepatitis C Screening 1950 FOOT EXAM Q1 1/3/1958 MICROALBUMIN Q1 1/3/1958 EYE EXAM RETINAL OR DILATED Q1 1/3/1958 DTaP/Tdap/Td series (1 - Tdap) 1/3/1969 FOBT Q 1 YEAR AGE 50-75 1/3/1998 ZOSTER VACCINE AGE 60> 11/3/2007 GLAUCOMA SCREENING Q2Y 1/3/2013 MEDICARE YEARLY EXAM 1/3/2013 INFLUENZA AGE 9 TO ADULT 8/1/2017 Pneumococcal 65+ High/Highest Risk (2 of 2 - PPSV23) 11/20/2017 HEMOGLOBIN A1C Q6M 2/28/2018 LIPID PANEL Q1 9/2/2018 BREAST CANCER SCRN MAMMOGRAM 3/17/2019 Allergies as of 10/30/2017  Review Complete On: 10/30/2017 By: Armani Katz RN No Known Allergies Current Immunizations  Never Reviewed No immunizations on file. Not reviewed this visit Vitals BP Pulse Temp Resp Height(growth percentile) Weight(growth percentile) 103/60 83 99.1 °F (37.3 °C) 18 5' 1\" (1.549 m) 101 lb 6.4 oz (46 kg) SpO2 BMI OB Status Smoking Status 96% 19.16 kg/m2 Postmenopausal Never Smoker BMI and BSA Data Body Mass Index Body Surface Area  
 19.16 kg/m 2 1.41 m 2 Preferred Pharmacy Pharmacy Name Phone [address] [phone] Your Updated Medication List  
  
   
This list is accurate as of: 10/30/17 10:48 AM.  Always use your most recent med list.  
  
  
  
  
 atorvastatin 40 mg tablet Commonly known as:  LIPITOR Take 1 Tab by mouth daily. carvedilol 6.25 mg tablet Commonly known as:  Nestor Bourdon Take 1 Tab by mouth two (2) times daily (with meals). glipiZIDE 5 mg tablet Commonly known as:  Debbie Coreas Take 0.5 Tabs by mouth daily. megestrol 400 mg/10 mL (10 mL) suspension Commonly known as:  MEGACE Take 5 mL by mouth daily. mirtazapine 30 mg tablet Commonly known as:  Mills Dills Take 1 Tab by mouth nightly. nystatin powder Commonly known as:  MYCOSTATIN Apply  to affected area four (4) times daily. ondansetron 4 mg disintegrating tablet Commonly known as:  ZOFRAN ODT Take 1 Tab by mouth every eight (8) hours as needed for Nausea. oxyCODONE IR 5 mg immediate release tablet Commonly known as:  Corrinne Mitten Take 1 Tab by mouth every six (6) hours as needed for Pain. Max Daily Amount: 20 mg.  
  
 pantoprazole 40 mg tablet Commonly known as:  PROTONIX  
  
 sertraline 50 mg tablet Commonly known as:  ZOLOFT  
  
 vit a,c & e-lutein-minerals tablet Commonly known as:  Onalee Katayama Take 1 Tab by mouth daily. Introducing [facility] & HEALTH SERVICES! [facility] introduces [employer] patient portal. Now you can access parts of your medical record, email your doctor's office, and request medication refills online. 1. In your internet browser, go to [email or web address] 2. Click on the First Time User? Click Here link in the Sign In box. You will see the New Member Sign Up page. 3. Enter your [employer] Access Code exactly as it appears below. You will not need to use this code after youve completed the sign-up process. If you do not sign up before the expiration date, you must request a new code. · [employer] Access Code: [email or web address] Expires: 1/28/2018 10:48 AM 
 
4. Enter the last four digits of your Social Security Number (xxxx) and Date of Birth (mm/dd/yyyy) as indicated and click Submit.  You will be taken to the next sign-up page. 5. Create a [employer] ID. This will be your [employer] login ID and cannot be changed, so think of one that is secure and easy to remember. 6. Create a [employer] password. You can change your password at any time. 7. Enter your Password Reset Question and Answer. This can be used at a later time if you forget your password. 8. Enter your e-mail address. You will receive e-mail notification when new information is available in [address] 9. Click Sign Up. You can now view and download portions of your medical record. 10. Click the Download Summary menu link to download a portable copy of your medical information. If you have questions, please visit the Frequently Asked Questions section of the [employer] website. Remember, [employer] is NOT to be used for urgent needs. For medical emergencies, dial 911. Now available from your iPhone and Android! Please provide this summary of care documentation to your next provider. Your primary care clinician is listed as RONY Lepe. If you have any questions after today's visit, please call [phone].

## 2017-10-30 NOTE — PROGRESS NOTES
Pt is a 69 yr old Pt here for a routine follow up, she is currently taking Votrient for Clear Cell Renal Cancer. Pt reports feeling better, weight is improved, eating well and moving around with the help of PT and a walker. Pt also reports blood sugars improved.

## 2017-11-07 NOTE — PROGRESS NOTES
AST/ALT is high. Continue weekly CMP. Blood glucose is uncontrolled. She needs to get in touch with Dr. Shadi Carrillo for management. Also needs to drink more fluid.

## 2017-11-15 NOTE — MR AVS SNAPSHOT
Visit Information Date & Time Provider Department Dept. Phone Encounter #  
 11/15/2017 10:20 AM RONY Irvin MD [address] [facility] [phone] [id number] Follow-up Instructions Return in about 3 months (around 2/15/2018) for follow up. Your Appointments 11/29/2017  9:45 AM  
Any with Gala Durbin, JARROD [address] Oncology at [facility]) Appt Note: onc 1 mth f/u ct results [address] [facility] [address] [phone]  
[address] [facility] [address] Salvador Crowley Upcoming Health Maintenance Date Due Hepatitis C Screening 1950 FOOT EXAM Q1 1/3/1958 MICROALBUMIN Q1 1/3/1958 EYE EXAM RETINAL OR DILATED Q1 1/3/1958 DTaP/Tdap/Td series (1 - Tdap) 1/3/1969 FOBT Q 1 YEAR AGE 50-75 1/3/1998 ZOSTER VACCINE AGE 60> 11/3/2007 GLAUCOMA SCREENING Q2Y 1/3/2013 MEDICARE YEARLY EXAM 1/3/2013 Influenza Age 5 to Adult 8/1/2017 Pneumococcal 65+ High/Highest Risk (2 of 2 - PPSV23) 11/20/2017 HEMOGLOBIN A1C Q6M 2/28/2018 LIPID PANEL Q1 9/2/2018 BREAST CANCER SCRN MAMMOGRAM 3/17/2019 Allergies as of 11/15/2017  Review Complete On: 11/15/2017 By: Eh Frank MD  
 No Known Allergies Current Immunizations  Never Reviewed No immunizations on file. Not reviewed this visit You Were Diagnosed With   
  
 Codes Comments Type 2 diabetes mellitus without complication, without long-term current use of insulin (HCC)    -  Primary ICD-10-CM: E11.9 ICD-9-CM: 250.00 Vitals BP Pulse Height(growth percentile) Weight(growth percentile) BMI OB Status 122/79 (BP 1 Location: Left arm, BP Patient Position: Sitting) 84 5' 1\" (1.549 m) 103 lb (46.7 kg) 19.46 kg/m2 Postmenopausal  
 Smoking Status Never Smoker Vitals History BMI and BSA Data Body Mass Index Body Surface Area  
 19.46 kg/m 2 1.42 m 2 Preferred Pharmacy Pharmacy Name Phone [facility] [address] [phone] Your Updated Medication List  
  
   
This list is accurate as of: 11/15/17 11:40 AM.  Always use your most recent med list.  
  
  
  
  
 atorvastatin 40 mg tablet Commonly known as:  LIPITOR Take 1 Tab by mouth daily. carvedilol 6.25 mg tablet Commonly known as:  Porfirio Been Take 1 Tab by mouth two (2) times daily (with meals). gentamicin 0.3 % ophthalmic solution Commonly known as:  GARAMYCIN Administer 2 Drops to right eye four (4) times daily for 7 days. glipiZIDE 5 mg tablet Commonly known as:  Lupe Dayhoff Take 0.5 Tabs by mouth two (2) times a day. magnesium oxide 250 mg tablet Take 250 mg by mouth nightly. megestrol 400 mg/10 mL (10 mL) suspension Commonly known as:  MEGACE Take 5 mL by mouth daily. mirtazapine 30 mg tablet Commonly known as:  Azul Luis Antonio Take 1 Tab by mouth nightly. nystatin powder Commonly known as:  MYCOSTATIN Apply  to affected area four (4) times daily. ondansetron 4 mg disintegrating tablet Commonly known as:  ZOFRAN ODT Take 1 Tab by mouth every eight (8) hours as needed for Nausea. oxyCODONE IR 5 mg immediate release tablet Commonly known as:  Herve Camel Take 1 Tab by mouth every six (6) hours as needed for Pain. Max Daily Amount: 20 mg.  
  
 pantoprazole 40 mg tablet Commonly known as:  PROTONIX  
  
 sertraline 50 mg tablet Commonly known as:  ZOLOFT Take 50 mg by mouth daily. vit a,c & e-lutein-minerals tablet Commonly known as:  Vernard Sake Take 1 Tab by mouth daily. VOTRIENT 200 mg tablet Generic drug:  PAZOPanib Take 200 mg by mouth daily. 2 tablets daily Prescriptions Printed  Refills  
 glipiZIDE (GLUCOTROL) 5 mg tablet 1  
 Sig: Take 0.5 Tabs by mouth two (2) times a day. Class: Print Route: Oral  
  
Prescriptions Sent to Pharmacy Refills  
 gentamicin (GARAMYCIN) 0.3 % ophthalmic solution 0 Sig: Administer 2 Drops to right eye four (4) times daily for 7 days. Class: Normal  
 Pharmacy: [address] #: [phone] Route: Right Eye Follow-up Instructions Return in about 3 months (around 2/15/2018) for follow up. To-Do List   
 11/28/2017 9:30 AM  
  Appointment with [facility] CT 2 at [facility] CT ([phone]) CONTRAST STUDY: 1. The patient should not eat solid food four hours before the appointment but should be encouraged to drink clear liquids. 2.  If you have to drink oral contrast, please pick it up any weekday prior to your appointment, if you cannot please check in 2 hrs before appt time. 3.  The patient will require IV access for contrast administration. 4.  The patient should not take Ibuprofen (Advil, Motrin, etc.) and Naproxen Sodium (Aleve, etc.)  on the day of the exam. Stopping non-steroidal anti-inflammatory agents (NSAIDs) like Ibuprofen decreases the risk of kidney damage from the x-ray contrast (dye). 5.  Bring any non [facility] facility films/images pertaining to the area of interest with you on the day of appointment. 6.  Bring current lab work if available (within last 90 days CMP) ***If scheduled at [facility], iSTAT is not available, labs will need to be done before appointment*** 7. Check in at registration at least 30 minutes before appt time unless you were instructed to do otherwise. Introducing \[facility]\"" & HEALTH SERVICES! [employer] introduces [employer] patient portal. Now you can access parts of your medical record, email your doctor's office, and request medication refills online. 1. In your internet browser, go to [email or web address] 2. Click on the First Time User? Click Here link in the Sign In box. You will see the New Member Sign Up page. 3. Enter your [employer] Access Code exactly as it appears below. You will not need to use this code after youve completed the sign-up process. If you do not sign up before the expiration date, you must request a new code. · [employer] Access Code: [email or web address] Expires: 1/28/2018  9:48 AM 
 
4. Enter the last four digits of your Social Security Number (xxxx) and Date of Birth (mm/dd/yyyy) as indicated and click Submit. You will be taken to the next sign-up page. 5. Create a [employer] ID. This will be your [employer] login ID and cannot be changed, so think of one that is secure and easy to remember. 6. Create a [employer] password. You can change your password at any time. 7. Enter your Password Reset Question and Answer. This can be used at a later time if you forget your password. 8. Enter your e-mail address. You will receive e-mail notification when new information is available in [address] 9. Click Sign Up. You can now view and download portions of your medical record. 10. Click the Download Summary menu link to download a portable copy of your medical information. If you have questions, please visit the Frequently Asked Questions section of the [employer]life website. Remember, [employer] is NOT to be used for urgent needs. For medical emergencies, dial 911. Now available from your iPhone and Android! Please provide this summary of care documentation to your next provider. Your primary care clinician is listed as RONY Dickerson. If you have any questions after today's visit, please call [phone].

## 2017-11-15 NOTE — PROGRESS NOTES
Edgardo Lopez is a 69 y.o. female presenting for Blood Pressure Check (RM 1 - 3 mo follow up); Cholesterol Problem; and Diabetes  . 1. Have you been to the ER, urgent care clinic since your last visit? Hospitalized since your last visit? No    2. Have you seen or consulted any other health care providers outside of the [address] [place] [address] since your last visit? Include any pap smears or colon screening.  No

## 2017-11-15 NOTE — PROGRESS NOTES
This note will not be viewable in [address] Arslan Lundy is a 69 y.o. female and presents with Blood Pressure Check (RM 1 - 3 mo follow up); Cholesterol Problem; Diabetes; and Eye Burn (R side - clear drainage)  . Subjective:    Mrs. Mauricio Sadler presents today for follow-up blood pressure check. She has history of hyper lipidemia and diabetes as well. She has had drainage per her right eye for some time now. The drainage is clear in color but her right eyelid is inflamed and swollen. She was unfortunately diagnosed with metastatic renal cell CA and quite despondent about this. However she is now doing well with her chemotherapy regimen. She has lost a great deal of weight. She had a sacral decubitus ulcer but this is healing. She has been recording her blood sugars and all of her blood sugars been elevated in the 200s in the evenings. In the morning her blood sugar appears to be good. She has been on glipizide 5 mg one half tablet every morning. She has not been eating a great deal but her diet has been liberalized because of her weight loss.   Past Medical History:   Diagnosis Date    Anxiety 7/31/2017    2/3/17:Under a tremendous amount of stress, will continue Alprazolam prn, if using regularly consider changing to an SSRI or counseling    Cancer ([facility] [address] 08/2017    renal cell carcinoma    Cervical myelopathy ([facility] [address] 7/31/2017    2/3/17:Cervical fusion     Controlled diabetes mellitus ([facility] [address] 7/31/2017    Fatigue 7/31/2017    Hypercholesterolemia 7/31/2017    Hyperglycemia 7/31/2017    Hypertension 7/31/2017    2/3/17:Poorly controlled, lifestyle changes, repeat /90 will follow    On statin therapy 7/31/2017    Osteoporosis 7/31/2017    Post-menopausal 7/31/2017    Spinal stenosis of lumbar region with radiculopathy 7/31/2017    2/3/17:Symptoms c/w this diagnosis discussed with pt., if progresses then need MRI L spine and follow up    Vaginitis due to Candida 7/31/2017 2/3/17:Associated with antibiotic use, requests prescription med     Past Surgical History:   Procedure Laterality Date    HX ORTHOPAEDIC      2009 spinal cord surgery     HX ORTHOPAEDIC Left     knee procedure    HX OTHER SURGICAL  08/16/2017    incisional biopsy right neck mass     No Known Allergies  Current Outpatient Prescriptions   Medication Sig Dispense Refill    PAZOPanib (VOTRIENT) 200 mg tablet Take 200 mg by mouth daily. 2 tablets daily      magnesium oxide 250 mg tablet Take 250 mg by mouth nightly.  glipiZIDE (GLUCOTROL) 5 mg tablet Take 0.5 Tabs by mouth two (2) times a day. 30 Tab 1    gentamicin (GARAMYCIN) 0.3 % ophthalmic solution Administer 2 Drops to right eye four (4) times daily for 7 days. 1 Bottle 0    mirtazapine (REMERON) 30 mg tablet Take 1 Tab by mouth nightly. 30 Tab 3    oxyCODONE IR (ROXICODONE) 5 mg immediate release tablet Take 1 Tab by mouth every six (6) hours as needed for Pain. Max Daily Amount: 20 mg. 90 Tab 0    pantoprazole (PROTONIX) 40 mg tablet       sertraline (ZOLOFT) 50 mg tablet Take 50 mg by mouth daily.  carvedilol (COREG) 6.25 mg tablet Take 1 Tab by mouth two (2) times daily (with meals). 60 Tab 2    atorvastatin (LIPITOR) 40 mg tablet Take 1 Tab by mouth daily. 30 Tab 2    megestrol (MEGACE) 400 mg/10 mL (10 mL) suspension Take 5 mL by mouth daily. 100 mL 0    vit a,c & e-lutein-minerals (OCUVITE) tablet Take 1 Tab by mouth daily.  ondansetron (ZOFRAN ODT) 4 mg disintegrating tablet Take 1 Tab by mouth every eight (8) hours as needed for Nausea. 40 Tab 1    nystatin (MYCOSTATIN) powder Apply  to affected area four (4) times daily.  60 g 2     Social History     Social History    Marital status:      Spouse name: N/A    Number of children: N/A    Years of education: N/A     Social History Main Topics    Smoking status: Never Smoker    Smokeless tobacco: Never Used    Alcohol use No    Drug use: No    Sexual activity: Not Asked     Other Topics Concern    None     Social History Narrative     Family History   Problem Relation Age of Onset    Heart Disease Mother        Review of Systems  Constitutional:  negative for fevers, chills, anorexia and weight loss  Eyes:    negative for visual disturbance and irritation  ENT:    negative for tinnitus,sore throat,nasal congestion,ear pains. hoarseness  Respiratory:     negative for cough, hemoptysis, dyspnea,wheezing  CV:    negative for chest pain, palpitations, lower extremity edema  GI:    negative for nausea, vomiting, diarrhea, abdominal pain,melena  Endo:               negative for polyuria,polydipsia,polyphagia,heat intolerance  Genitourinary : negative for frequency, dysuria and hematuria  Integumentary: negative for rash and pruritus  Hematologic:   negative for easy bruising and gum/nose bleeding  Musculoskel:  negative for myalgias, arthralgias, back pain, muscle weakness, joint pain  Neurological:   negative for headaches, dizziness, vertigo, memory problems and gait   Behavl/Psych: Positive for feelings of anxiety, depression, mood changes  ROS otherwise negative      Objective:  Visit Vitals    /79 (BP 1 Location: Left arm, BP Patient Position: Sitting)    Pulse 84    Ht 5' 1\" (1.549 m)    Wt 103 lb (46.7 kg)    BMI 19.46 kg/m2     Physical Exam:   General appearance - alert, well appearing, and in no distress  Mental status - alert, oriented to person, place, and time  EYE-EDITH, EOMI, fundi normal, corneas normal, no foreign bodies  ENT-ENT exam normal, no neck nodes or sinus tenderness  Nose - normal and patent, no erythema, discharge or polyps  Mouth - mucous membranes moist, pharynx normal without lesions  Neck - supple, no significant adenopathy   Chest - clear to auscultation, no wheezes, rales or rhonchi, symmetric air entry   Heart - normal rate, regular rhythm, normal S1, S2, no murmurs, rubs, clicks or gallops   Abdomen - soft, nontender, nondistended, no masses or organomegaly  Lymph- no adenopathy palpable  Ext-peripheral pulses normal, no pedal edema, no clubbing or cyanosis  Skin-Warm and dry. no hyperpigmentation, vitiligo, or suspicious lesions  Neuro -alert, oriented, normal speech, no focal findings or movement disorder noted      Assessment/Plan:  Diagnoses and all orders for this visit:    1. Type 2 diabetes mellitus without complication, without long-term current use of insulin ([facility])    Other orders  -     glipiZIDE (GLUCOTROL) 5 mg tablet; Take 0.5 Tabs by mouth two (2) times a day. -     gentamicin (GARAMYCIN) 0.3 % ophthalmic solution; Administer 2 Drops to right eye four (4) times daily for 7 days. ICD-10-CM ICD-9-CM    1. Type 2 diabetes mellitus without complication, without long-term current use of insulin ([facility]) E11.9 250.00    Plan:    I would increase her glipizide to one half tablet twice daily. She will monitor her fingerstick blood sugars and if she has any hypoglycemic episodes she will hold her glipizide and notify me. I suspect her elevated blood sugars in the evenings are secondary to diet and poorly controlled diabetes. She is not a candidate for metformin given her elevated LFTs. Will make further recommendations based on response to this therapy. In addition she is taking magnesium as prescribed by oncology for low serum magnesium level. They are doing lab work on a weekly basis. Follow-up Disposition:  Return in about 3 months (around 2/15/2018) for follow up. I have reviewed with the patient details of the assessment and plan and all questions were answered. Relevent patient education was performed. Verbal and/or written instructions (see AVS) provided. The most recent lab findings were reviewed with the patient. Plan was discussed with patient who verbally expressed understanding. An After Visit Summary was printed and given to the patient.     Audrey Leonard MD

## 2017-11-20 NOTE — TELEPHONE ENCOUNTER
Patient states eye drop Gentamicin is not helping. She said her eye is still red and she wiped some white stuff out of her eye this morning. Can you call in another antibiotic.

## 2017-11-20 NOTE — TELEPHONE ENCOUNTER
Patient left a voicemail stating that the hospital wrote a prescription with no refills. Patient would just like to know if they need to have it refilled or not.  # [phone]

## 2017-11-21 NOTE — TELEPHONE ENCOUNTER
Labs looking better. She needs to continue taking oral magnesium.  Change labs to every two weeks from weekly schedule- CBC and CMP

## 2017-11-29 NOTE — PROGRESS NOTES
Follow-up Note         Patient: Lilian Schwartz MRN: [id number]  SSN: [id number]    YOB: 1950  Age: 69 y.o. Sex: female        Assessment:     1. Metastatic clear cell kidney cancer    Treatment:     1. On Pazopanib at reduced dose 400 mg daily - started 10/1/2017    Subjective:     Lilian Schwartz is a 69 y.o. female who I am seeing in follow up for a diagnosis of clear cell renal cell carcinoma. She noticed a lump coming up in the right side of the chest and shoulder. A CT followed by biopsy of the LN reveals the diagnosis of kidney cancer. CT of the abdomen shows a large exophytic mass in the left kidney, pleural based lung nodules and mediastinal adenopathy. She denies weight loss, pain, hematuria, bone pains etc. She is very anxious about the presumed diagnosis of cancer. She is accompanied by her . She has a grown up daughter who works at [employer]. She is suffering with uncontrolled DM and diabetic eye complication. Ms. Danisha Perez was admitted to [facility] with sepsis on 8/31/2017. During this hospital stay she was also found to have a subacute STEMI and BONG. A MRI of the brain shows she has multiple small lesions, but she is currently asymptomatic. She started Pazopanib in October and symptoms have improved. She feels great and has been working with PT to improve her strength. She is here today with her  for follow-up and to review scan results.       Review of Systems:    Constitutional: weakness, fatigue, insomnia   Eyes: negative  Ears, Nose, Mouth, Throat, and Face: negative  Respiratory: negative  Cardiovascular: negative  Gastrointestinal: improving appetite  Genitourinary:negative  Integument/Breast: negative  Hematologic/Lymphatic: negative  Musculoskeletal:negative  Neurological: negative      Past Medical History:   Diagnosis Date    Anxiety 7/31/2017    2/3/17:Under a tremendous amount of stress, will continue Alprazolam prn, if using regularly consider changing to an SSRI or counseling    Cancer ([facility] [address] 08/2017    renal cell carcinoma    Cervical myelopathy ([facility] 7/31/2017    2/3/17:Cervical fusion     Controlled diabetes mellitus ([facility] 7/31/2017    Fatigue 7/31/2017    Hypercholesterolemia 7/31/2017    Hyperglycemia 7/31/2017    Hypertension 7/31/2017    2/3/17:Poorly controlled, lifestyle changes, repeat /90 will follow    On statin therapy 7/31/2017    Osteoporosis 7/31/2017    Post-menopausal 7/31/2017    Spinal stenosis of lumbar region with radiculopathy 7/31/2017    2/3/17:Symptoms c/w this diagnosis discussed with pt., if progresses then need MRI L spine and follow up    Vaginitis due to Candida 7/31/2017    2/3/17:Associated with antibiotic use, requests prescription med     Past Surgical History:   Procedure Laterality Date    HX ORTHOPAEDIC      2009 spinal cord surgery     HX ORTHOPAEDIC Left     knee procedure    HX OTHER SURGICAL  08/16/2017    incisional biopsy right neck mass      Family History   Problem Relation Age of Onset    Heart Disease Mother      Social History   Substance Use Topics    Smoking status: Never Smoker    Smokeless tobacco: Never Used    Alcohol use No      Prior to Admission medications    Medication Sig Start Date End Date Taking? Authorizing Provider   megestrol (MEGACE) 400 mg/10 mL (10 mL) suspension Take 5 mL by mouth daily. 11/20/17  Yes Sweetie Myers NP   PAZOPanib (VOTRIENT) 200 mg tablet Take 200 mg by mouth daily. 2 tablets daily   Yes Historical Provider   magnesium oxide 250 mg tablet Take 250 mg by mouth nightly. Yes Historical Provider   glipiZIDE (GLUCOTROL) 5 mg tablet Take 0.5 Tabs by mouth two (2) times a day. 11/15/17  Yes Audrey Leonard MD   mirtazapine (REMERON) 30 mg tablet Take 1 Tab by mouth nightly. 10/9/17  Yes Deidra Lynch MD   oxyCODONE IR (ROXICODONE) 5 mg immediate release tablet Take 1 Tab by mouth every six (6) hours as needed for Pain.  Max Daily Amount: 20 mg. 10/4/17  Yes Sukumar Myers, NP   pantoprazole (PROTONIX) 40 mg tablet  9/23/17  Yes Historical Provider   sertraline (ZOLOFT) 50 mg tablet Take 50 mg by mouth daily. 9/23/17  Yes Historical Provider   carvedilol (COREG) 6.25 mg tablet Take 1 Tab by mouth two (2) times daily (with meals). 9/8/17  Yes Soheila Gallegos MD   atorvastatin (LIPITOR) 40 mg tablet Take 1 Tab by mouth daily. 9/8/17  Yes Soheila Gallegos MD   vit a,c & e-lutein-minerals (OCUVITE) tablet Take 1 Tab by mouth daily. Yes Historical Provider   nystatin (MYCOSTATIN) topical cream  10/6/17   Historical Provider   nystatin (MYCOSTATIN) 100,000 unit/mL suspension  10/3/17   Historical Provider   nystatin (MYCOSTATIN) powder Apply  to affected area four (4) times daily. 10/9/17   Pam Chisholm MD   ondansetron (ZOFRAN ODT) 4 mg disintegrating tablet Take 1 Tab by mouth every eight (8) hours as needed for Nausea. 8/23/17   Pam Chisholm MD          No Known Allergies        Objective:     Vitals:    11/29/17 0949   BP: 112/76   Pulse: 88   Resp: 16   Temp: 97.1 °F (36.2 °C)   TempSrc: Oral   SpO2: 99%   Weight: 109 lb 9.6 oz (49.7 kg)   Height: 5' 1\" (1.549 m)          Physical Exam:  GENERAL: alert, cooperative, no distress, appears stated age  EYE: negative  LYMPHATIC: Cervical, supraclavicular, and axillary nodes normal.   THROAT & NECK: normal and no erythema or exudates noted. LUNG: clear to auscultation bilaterally  HEART: regular rate and rhythm  ABDOMEN: soft, non-tender  EXTREMITIES:  no edema  SKIN: fungal infection groin area  NEUROLOGIC: negative      CT Results (most recent):    Results from Hospital Encounter encounter on 11/28/17   CT ABD PELV W CONT   Narrative INDICATION: metastatic renal cell ca    COMPARISON: August 1, 2017, August 22, 2017.     TECHNIQUE:  Following the uneventful intravenous administration of 100 cc  Isovue-300, 5 mm axial images were obtained through the chest. Coronal and  sagittal reconstructions were generated. CT dose reduction was achieved through  use of a standardized protocol tailored for this examination and automatic  exposure control for dose modulation. Subtle CT scan of the abdomen and pelvis  obtained after intravenous and oral contrast.    FINDINGS:  Most of the lung lesions are significantly decreasing size some are no longer  seen as an example a left upper lobe mass measures at this time 8 x 5 mm, it was  12 x 10. There is no pericardial or pleural effusion. The  mediastinal  adenopathy in the right upper chest also significantly decreased in size and are  mainly cystic or necrotic at this time. Measure maximal AP diameter of 37 mm on  the prior examination, it is 26 on the current. The hilar adenopathy. Also  decreased in size and now mainly cystic as well. No new adenopathy seen. There  is no shift or pneumothorax. Liver and spleen are normal in size. Small liver cyst. No evidence to suggest  metastases to the liver. Gallstones are seen in a nondistended gallbladder. The  pancreas does appear unremarkable. The left adrenal is unchanged with mass. Multiple bilateral renal masses once again demonstrated and grossly stable in  size. The a large bulky partially necrotic or cystic mass in the lower pole of  the left kidney with erosion of the General's fascia and perinephric involvement  as well as adjacent adenopathy and renal vein thrombosis. The mass appears to be  more cystic or necrotic than on the prior examination. Size is grossly stable. There is some adjacent stranding in the peritoneum and retroperitoneum are  possibly related to infiltration. There is no bowel obstruction. There is  sigmoid diverticulosis, the bladder is midline but unfilled. The uterus up  appear unremarkable. Prominent fecal stasis is demonstrated in the rectosigmoid. Aorta show patency. Review of bone windows does not suggest change or  metastases.          Impression impression: Significant improvement in the a chest involvement with decreased  size of most of the lesions, also more necrotic appearance to the mediastinal  and hilar lesions. The a left renal mass also show some more necrotic or cystic  appearance. No new metastases demonstrated. Assessment:     1. Metastatic clear cell kidney cancer    Lung nodules, mediastinal adenopathy  Left kidney primary  Left adrenal mass    ECOG PS 2  Intent of treatment - palliative    Due to poor performance status, she is not longer a candidate for cytoreductive nephrectomy. Treatment was delayed d/t hospitalization and rehab. On Pazopanib at reduced dose 400 mg daily - started 10/1/2017  Tolerating treatment   A detailed system by system evaluation of side effect was performed to assess chemotherapy related toxicity. Blood counts are acceptable. Results reviewed with the patient. CT Abd, Pelv (11/28/2017): tremendous response to therapy    Symptom management form reviewed with patient. 2. Brain metastasis     Re-do brain MRI  Will refer to Rad-onc      3. Protein calorie malnutrition - improving    > Protein supplementation      4. DM     > Stop Metformin r/t renal function  > Continue Glipizide 2.5 mg BID  > Following with Dr. Mary Fermin for management      5. Insomnia    > Continue Mirtazapine 30 mg nightly      6. Transaminitis    From Pazopanib  Improving spontaneously     Plan:       > Continue Pazopanib 400 mg daily  > Continue Mirtazapine 30 mg nightly  > Continue Oxycodone IR as needed  > Labs: CMP, Mg, and CBC in 1 month  > Follow-up in 2 months        Signed by: Gema Davenport MD                     November 29, 2017          CC. Reilly Tapia.  Mary Fermin MD

## 2017-11-29 NOTE — PROGRESS NOTES
Nighat Carlos is a 69 y.o. female here today for Met. Clear Cell Kidney Cancer f/u. Patient noted ambulating independently with rolator. On Pazopanib 400mg; pt confirmed taking medication. Patient confirmed seeing Dr. Hilda Tinsley for her hyperglycemia. Patient alert and verbal. Patient noted to have a good, positive attitude. Patient excited about how well she's doing with PT.  VS stable. Patient denies pain at this time. Patient denies N/V/D and constipation. Patient denies falls.  /76 (BP 1 Location: Left arm, BP Patient Position: Sitting)  Pulse 88  Temp 97.1 °F (36.2 °C) (Oral)   Resp 16  Ht 5' 1\" (1.549 m)  Wt 109 lb 9.6 oz (49.7 kg)  SpO2 99%  BMI 20.71 kg/m2

## 2018-01-01 ENCOUNTER — APPOINTMENT (OUTPATIENT)
Dept: ULTRASOUND IMAGING | Age: 68
DRG: 682 | End: 2018-01-01
Attending: INTERNAL MEDICINE
Payer: MEDICARE

## 2018-01-01 ENCOUNTER — HOSPICE ADMISSION (OUTPATIENT)
Dept: HOSPICE | Facility: HOSPICE | Age: 68
End: 2018-01-01
Payer: MEDICARE

## 2018-01-01 ENCOUNTER — TELEPHONE (OUTPATIENT)
Dept: INTERNAL MEDICINE CLINIC | Age: 68
End: 2018-01-01

## 2018-01-01 ENCOUNTER — HOME CARE VISIT (OUTPATIENT)
Dept: HOSPICE | Facility: HOSPICE | Age: 68
End: 2018-01-01
Payer: MEDICARE

## 2018-01-01 ENCOUNTER — HOSPITAL ENCOUNTER (OUTPATIENT)
Dept: INFUSION THERAPY | Age: 68
Discharge: HOME | End: 2018-02-14
Payer: MEDICARE

## 2018-01-01 ENCOUNTER — TELEPHONE (OUTPATIENT)
Dept: ONCOLOGY | Age: 68
End: 2018-01-01

## 2018-01-01 ENCOUNTER — HOSPITAL ENCOUNTER (INPATIENT)
Age: 68
LOS: 12 days | Discharge: HOSPICE | DRG: 682 | End: 2018-03-14
Attending: EMERGENCY MEDICINE | Admitting: INTERNAL MEDICINE
Payer: MEDICARE

## 2018-01-01 ENCOUNTER — HOME CARE VISIT (OUTPATIENT)
Dept: SCHEDULING | Facility: HOME HEALTH | Age: 68
End: 2018-01-01
Payer: MEDICARE

## 2018-01-01 ENCOUNTER — DOCUMENTATION ONLY (OUTPATIENT)
Dept: ONCOLOGY | Age: 68
End: 2018-01-01

## 2018-01-01 ENCOUNTER — HOSPITAL ENCOUNTER (OUTPATIENT)
Dept: LAB | Age: 68
Discharge: HOME | End: 2018-02-06
Payer: MEDICARE

## 2018-01-01 ENCOUNTER — HOSPITAL ENCOUNTER (OUTPATIENT)
Dept: CT IMAGING | Age: 68
Discharge: HOME | End: 2018-02-19
Attending: INTERNAL MEDICINE
Payer: MEDICARE

## 2018-01-01 ENCOUNTER — OFFICE VISIT (OUTPATIENT)
Dept: ONCOLOGY | Age: 68
End: 2018-01-01

## 2018-01-01 ENCOUNTER — HOSPITAL ENCOUNTER (OUTPATIENT)
Dept: INFUSION THERAPY | Age: 68
Discharge: HOME | End: 2018-02-09
Payer: MEDICARE

## 2018-01-01 ENCOUNTER — HOSPITAL ENCOUNTER (OUTPATIENT)
Dept: INFUSION THERAPY | Age: 68
Discharge: HOME | End: 2018-02-15
Payer: MEDICARE

## 2018-01-01 ENCOUNTER — HOSPITAL ENCOUNTER (INPATIENT)
Age: 68
LOS: 2 days | DRG: 951 | End: 2018-03-16
Attending: INTERNAL MEDICINE | Admitting: INTERNAL MEDICINE
Payer: OTHER MISCELLANEOUS

## 2018-01-01 VITALS
WEIGHT: 85 LBS | SYSTOLIC BLOOD PRESSURE: 103 MMHG | HEART RATE: 88 BPM | RESPIRATION RATE: 18 BRPM | OXYGEN SATURATION: 96 % | BODY MASS INDEX: 16.69 KG/M2 | HEIGHT: 60 IN | DIASTOLIC BLOOD PRESSURE: 65 MMHG

## 2018-01-01 VITALS
SYSTOLIC BLOOD PRESSURE: 98 MMHG | RESPIRATION RATE: 18 BRPM | HEART RATE: 94 BPM | DIASTOLIC BLOOD PRESSURE: 39 MMHG | OXYGEN SATURATION: 84 % | TEMPERATURE: 99.2 F

## 2018-01-01 VITALS
HEIGHT: 60 IN | SYSTOLIC BLOOD PRESSURE: 103 MMHG | HEART RATE: 88 BPM | OXYGEN SATURATION: 96 % | BODY MASS INDEX: 16.69 KG/M2 | WEIGHT: 85 LBS | RESPIRATION RATE: 18 BRPM | TEMPERATURE: 97.9 F | DIASTOLIC BLOOD PRESSURE: 65 MMHG

## 2018-01-01 VITALS
RESPIRATION RATE: 16 BRPM | DIASTOLIC BLOOD PRESSURE: 58 MMHG | OXYGEN SATURATION: 97 % | HEART RATE: 59 BPM | SYSTOLIC BLOOD PRESSURE: 97 MMHG | TEMPERATURE: 97.6 F

## 2018-01-01 VITALS
DIASTOLIC BLOOD PRESSURE: 60 MMHG | TEMPERATURE: 97.5 F | RESPIRATION RATE: 18 BRPM | HEART RATE: 54 BPM | SYSTOLIC BLOOD PRESSURE: 95 MMHG

## 2018-01-01 VITALS
TEMPERATURE: 97.4 F | WEIGHT: 95.2 LBS | RESPIRATION RATE: 16 BRPM | HEART RATE: 65 BPM | SYSTOLIC BLOOD PRESSURE: 102 MMHG | OXYGEN SATURATION: 97 % | BODY MASS INDEX: 17.97 KG/M2 | DIASTOLIC BLOOD PRESSURE: 67 MMHG | HEIGHT: 61 IN

## 2018-01-01 VITALS
OXYGEN SATURATION: 98 % | HEART RATE: 57 BPM | SYSTOLIC BLOOD PRESSURE: 102 MMHG | RESPIRATION RATE: 16 BRPM | DIASTOLIC BLOOD PRESSURE: 67 MMHG | TEMPERATURE: 97.5 F

## 2018-01-01 DIAGNOSIS — C64.2 KIDNEY CANCER, PRIMARY, WITH METASTASIS FROM KIDNEY TO OTHER SITE, LEFT (HCC): Primary | ICD-10-CM

## 2018-01-01 DIAGNOSIS — G93.41 ACUTE METABOLIC ENCEPHALOPATHY: ICD-10-CM

## 2018-01-01 DIAGNOSIS — I95.89 OTHER SPECIFIED HYPOTENSION: ICD-10-CM

## 2018-01-01 DIAGNOSIS — C64.9 MALIGNANT NEOPLASM OF KIDNEY, UNSPECIFIED LATERALITY (HCC): Primary | ICD-10-CM

## 2018-01-01 DIAGNOSIS — C79.9 METASTATIC ADENOCARCINOMA (HCC): ICD-10-CM

## 2018-01-01 DIAGNOSIS — R53.1 WEAKNESS: ICD-10-CM

## 2018-01-01 DIAGNOSIS — R53.1 WEAKNESS GENERALIZED: ICD-10-CM

## 2018-01-01 DIAGNOSIS — G95.9 CERVICAL MYELOPATHY (HCC): ICD-10-CM

## 2018-01-01 DIAGNOSIS — E86.0 DEHYDRATION: ICD-10-CM

## 2018-01-01 DIAGNOSIS — R19.7 DIARRHEA, UNSPECIFIED TYPE: ICD-10-CM

## 2018-01-01 DIAGNOSIS — C64.2 CLEAR CELL ADENOCARCINOMA OF LEFT KIDNEY (HCC): ICD-10-CM

## 2018-01-01 DIAGNOSIS — R53.81 DEBILITY: ICD-10-CM

## 2018-01-01 DIAGNOSIS — E27.8 ADRENAL NODULE (HCC): ICD-10-CM

## 2018-01-01 DIAGNOSIS — R53.0 NEOPLASTIC MALIGNANT RELATED FATIGUE: ICD-10-CM

## 2018-01-01 DIAGNOSIS — F41.9 ANXIETY: Primary | ICD-10-CM

## 2018-01-01 DIAGNOSIS — R63.8 INADEQUATE ORAL INTAKE: ICD-10-CM

## 2018-01-01 DIAGNOSIS — N17.9 ACUTE KIDNEY INJURY (HCC): ICD-10-CM

## 2018-01-01 DIAGNOSIS — L89.152 DECUBITUS ULCER OF SACRAL REGION, STAGE 2 (HCC): ICD-10-CM

## 2018-01-01 DIAGNOSIS — E87.1 HYPONATREMIA: ICD-10-CM

## 2018-01-01 DIAGNOSIS — E11.9 CONTROLLED TYPE 2 DIABETES MELLITUS WITHOUT COMPLICATION, WITHOUT LONG-TERM CURRENT USE OF INSULIN (HCC): ICD-10-CM

## 2018-01-01 DIAGNOSIS — N28.9 ACUTE RENAL INSUFFICIENCY: ICD-10-CM

## 2018-01-01 DIAGNOSIS — L89.022: ICD-10-CM

## 2018-01-01 DIAGNOSIS — E43 PROTEIN-CALORIE MALNUTRITION, SEVERE (HCC): ICD-10-CM

## 2018-01-01 DIAGNOSIS — N17.9 ACUTE RENAL FAILURE, UNSPECIFIED ACUTE RENAL FAILURE TYPE (HCC): Primary | ICD-10-CM

## 2018-01-01 DIAGNOSIS — C79.31 BRAIN METASTASES (HCC): ICD-10-CM

## 2018-01-01 DIAGNOSIS — C79.9 METASTATIC ADENOCARCINOMA (HCC): Primary | ICD-10-CM

## 2018-01-01 DIAGNOSIS — E87.6 HYPOKALEMIA: ICD-10-CM

## 2018-01-01 DIAGNOSIS — C64.2 MALIGNANT NEOPLASM OF LEFT KIDNEY (HCC): ICD-10-CM

## 2018-01-01 DIAGNOSIS — R52 PAIN: ICD-10-CM

## 2018-01-01 DIAGNOSIS — Z71.89 COUNSELING REGARDING ADVANCED CARE PLANNING AND GOALS OF CARE: ICD-10-CM

## 2018-01-01 DIAGNOSIS — R41.82 ALTERED MENTAL STATUS, UNSPECIFIED ALTERED MENTAL STATUS TYPE: ICD-10-CM

## 2018-01-01 DIAGNOSIS — E43 SEVERE PROTEIN-CALORIE MALNUTRITION (HCC): ICD-10-CM

## 2018-01-01 LAB
ALBUMIN SERPL-MCNC: 1.9 G/DL (ref 3.5–5)
ALBUMIN SERPL-MCNC: 2.2 G/DL (ref 3.5–5)
ALBUMIN SERPL-MCNC: 2.3 G/DL (ref 3.5–5)
ALBUMIN SERPL-MCNC: 3.1 G/DL (ref 3.5–4.8)
ALBUMIN/GLOB SERPL: 0.5 {RATIO} (ref 1.1–2.2)
ALBUMIN/GLOB SERPL: 0.5 {RATIO} (ref 1.1–2.2)
ALBUMIN/GLOB SERPL: 0.6 {RATIO} (ref 1.1–2.2)
ALBUMIN/GLOB SERPL: 1.1 {RATIO} (ref 1.2–2.2)
ALP SERPL-CCNC: 111 U/L (ref 45–117)
ALP SERPL-CCNC: 130 U/L (ref 45–117)
ALP SERPL-CCNC: 132 IU/L (ref 39–117)
ALP SERPL-CCNC: 77 U/L (ref 45–117)
ALT SERPL-CCNC: 13 U/L (ref 12–78)
ALT SERPL-CCNC: 15 IU/L (ref 0–32)
ALT SERPL-CCNC: 6 U/L (ref 12–78)
ALT SERPL-CCNC: 9 U/L (ref 12–78)
ANION GAP SERPL CALC-SCNC: 10 MMOL/L (ref 5–15)
ANION GAP SERPL CALC-SCNC: 14 MMOL/L (ref 5–15)
ANION GAP SERPL CALC-SCNC: 14 MMOL/L (ref 5–15)
ANION GAP SERPL CALC-SCNC: 17 MMOL/L (ref 5–15)
ANION GAP SERPL CALC-SCNC: 3 MMOL/L (ref 5–15)
ANION GAP SERPL CALC-SCNC: 4 MMOL/L (ref 5–15)
ANION GAP SERPL CALC-SCNC: 5 MMOL/L (ref 5–15)
ANION GAP SERPL CALC-SCNC: 8 MMOL/L (ref 5–15)
ANION GAP SERPL CALC-SCNC: 9 MMOL/L (ref 5–15)
AST SERPL-CCNC: 12 U/L (ref 15–37)
AST SERPL-CCNC: 14 IU/L (ref 0–40)
AST SERPL-CCNC: 15 U/L (ref 15–37)
AST SERPL-CCNC: 15 U/L (ref 15–37)
BASOPHILS # BLD: 0 K/UL (ref 0–0.1)
BASOPHILS NFR BLD: 0 % (ref 0–1)
BASOPHILS NFR BLD: 1 % (ref 0–1)
BILIRUB SERPL-MCNC: 0.3 MG/DL (ref 0–1.2)
BILIRUB SERPL-MCNC: 0.4 MG/DL (ref 0.2–1)
BILIRUB SERPL-MCNC: 0.4 MG/DL (ref 0.2–1)
BILIRUB SERPL-MCNC: 0.6 MG/DL (ref 0.2–1)
BUN SERPL-MCNC: 23 MG/DL (ref 6–20)
BUN SERPL-MCNC: 27 MG/DL (ref 6–20)
BUN SERPL-MCNC: 30 MG/DL (ref 8–27)
BUN SERPL-MCNC: 36 MG/DL (ref 6–20)
BUN SERPL-MCNC: 36 MG/DL (ref 6–20)
BUN SERPL-MCNC: 48 MG/DL (ref 6–20)
BUN SERPL-MCNC: 59 MG/DL (ref 6–20)
BUN SERPL-MCNC: 67 MG/DL (ref 6–20)
BUN SERPL-MCNC: 72 MG/DL (ref 6–20)
BUN SERPL-MCNC: 78 MG/DL (ref 6–20)
BUN/CREAT SERPL: 10 (ref 12–20)
BUN/CREAT SERPL: 14 (ref 12–20)
BUN/CREAT SERPL: 14 (ref 12–20)
BUN/CREAT SERPL: 17 (ref 12–20)
BUN/CREAT SERPL: 17 (ref 12–28)
BUN/CREAT SERPL: 18 (ref 12–20)
BUN/CREAT SERPL: 19 (ref 12–20)
BUN/CREAT SERPL: 20 (ref 12–20)
BUN/CREAT SERPL: 9 (ref 12–20)
BUN/CREAT SERPL: 9 (ref 12–20)
CALCIUM SERPL-MCNC: 7.1 MG/DL (ref 8.5–10.1)
CALCIUM SERPL-MCNC: 7.4 MG/DL (ref 8.5–10.1)
CALCIUM SERPL-MCNC: 7.5 MG/DL (ref 8.5–10.1)
CALCIUM SERPL-MCNC: 7.5 MG/DL (ref 8.5–10.1)
CALCIUM SERPL-MCNC: 7.9 MG/DL (ref 8.5–10.1)
CALCIUM SERPL-MCNC: 8.2 MG/DL (ref 8.5–10.1)
CALCIUM SERPL-MCNC: 8.6 MG/DL (ref 8.5–10.1)
CALCIUM SERPL-MCNC: 8.6 MG/DL (ref 8.5–10.1)
CALCIUM SERPL-MCNC: 8.6 MG/DL (ref 8.7–10.3)
CALCIUM SERPL-MCNC: 8.7 MG/DL (ref 8.5–10.1)
CHLORIDE SERPL-SCNC: 100 MMOL/L (ref 97–108)
CHLORIDE SERPL-SCNC: 101 MMOL/L (ref 97–108)
CHLORIDE SERPL-SCNC: 103 MMOL/L (ref 97–108)
CHLORIDE SERPL-SCNC: 104 MMOL/L (ref 97–108)
CHLORIDE SERPL-SCNC: 107 MMOL/L (ref 97–108)
CHLORIDE SERPL-SCNC: 110 MMOL/L (ref 97–108)
CHLORIDE SERPL-SCNC: 93 MMOL/L (ref 96–106)
CHLORIDE SERPL-SCNC: 93 MMOL/L (ref 97–108)
CHLORIDE SERPL-SCNC: 96 MMOL/L (ref 97–108)
CHLORIDE SERPL-SCNC: 97 MMOL/L (ref 97–108)
CO2 SERPL-SCNC: 12 MMOL/L (ref 21–32)
CO2 SERPL-SCNC: 13 MMOL/L (ref 21–32)
CO2 SERPL-SCNC: 15 MMOL/L (ref 21–32)
CO2 SERPL-SCNC: 20 MMOL/L (ref 18–29)
CO2 SERPL-SCNC: 22 MMOL/L (ref 21–32)
CO2 SERPL-SCNC: 28 MMOL/L (ref 21–32)
CO2 SERPL-SCNC: 32 MMOL/L (ref 21–32)
CO2 SERPL-SCNC: 42 MMOL/L (ref 21–32)
CO2 SERPL-SCNC: 42 MMOL/L (ref 21–32)
CO2 SERPL-SCNC: 44 MMOL/L (ref 21–32)
CREAT SERPL-MCNC: 1.3 MG/DL (ref 0.55–1.02)
CREAT SERPL-MCNC: 1.39 MG/DL (ref 0.55–1.02)
CREAT SERPL-MCNC: 1.74 MG/DL (ref 0.57–1)
CREAT SERPL-MCNC: 1.82 MG/DL (ref 0.55–1.02)
CREAT SERPL-MCNC: 2.49 MG/DL (ref 0.55–1.02)
CREAT SERPL-MCNC: 2.76 MG/DL (ref 0.55–1.02)
CREAT SERPL-MCNC: 4.27 MG/DL (ref 0.55–1.02)
CREAT SERPL-MCNC: 6.58 MG/DL (ref 0.55–1.02)
CREAT SERPL-MCNC: 8.14 MG/DL (ref 0.55–1.02)
CREAT SERPL-MCNC: 9.17 MG/DL (ref 0.55–1.02)
DIFFERENTIAL METHOD BLD: ABNORMAL
EOSINOPHIL # BLD: 0.1 K/UL (ref 0–0.4)
EOSINOPHIL NFR BLD: 1 % (ref 0–7)
EOSINOPHIL NFR BLD: 2 % (ref 0–7)
ERYTHROCYTE [DISTWIDTH] IN BLOOD BY AUTOMATED COUNT: 14.2 % (ref 11.5–14.5)
ERYTHROCYTE [DISTWIDTH] IN BLOOD BY AUTOMATED COUNT: 14.4 % (ref 11.5–14.5)
ERYTHROCYTE [DISTWIDTH] IN BLOOD BY AUTOMATED COUNT: 14.5 % (ref 11.5–14.5)
ERYTHROCYTE [DISTWIDTH] IN BLOOD BY AUTOMATED COUNT: 14.7 % (ref 11.5–14.5)
ERYTHROCYTE [DISTWIDTH] IN BLOOD BY AUTOMATED COUNT: 14.7 % (ref 11.5–14.5)
ERYTHROCYTE [DISTWIDTH] IN BLOOD BY AUTOMATED COUNT: 15 % (ref 11.5–14.5)
GFR SERPLBLD CREATININE-BSD FMLA CKD-EPI: 29 ML/MIN/1.73
GFR SERPLBLD CREATININE-BSD FMLA CKD-EPI: 34 ML/MIN/1.73
GLOBULIN SER CALC-MCNC: 2.8 G/DL (ref 1.5–4.5)
GLOBULIN SER CALC-MCNC: 3.1 G/DL (ref 2–4)
GLOBULIN SER CALC-MCNC: 4.6 G/DL (ref 2–4)
GLOBULIN SER CALC-MCNC: 4.6 G/DL (ref 2–4)
GLUCOSE BLD STRIP.AUTO-MCNC: 113 MG/DL (ref 65–100)
GLUCOSE BLD STRIP.AUTO-MCNC: 119 MG/DL (ref 65–100)
GLUCOSE BLD STRIP.AUTO-MCNC: 120 MG/DL (ref 65–100)
GLUCOSE BLD STRIP.AUTO-MCNC: 121 MG/DL (ref 65–100)
GLUCOSE BLD STRIP.AUTO-MCNC: 123 MG/DL (ref 65–100)
GLUCOSE BLD STRIP.AUTO-MCNC: 132 MG/DL (ref 65–100)
GLUCOSE BLD STRIP.AUTO-MCNC: 133 MG/DL (ref 65–100)
GLUCOSE BLD STRIP.AUTO-MCNC: 148 MG/DL (ref 65–100)
GLUCOSE BLD STRIP.AUTO-MCNC: 151 MG/DL (ref 65–100)
GLUCOSE BLD STRIP.AUTO-MCNC: 154 MG/DL (ref 65–100)
GLUCOSE BLD STRIP.AUTO-MCNC: 163 MG/DL (ref 65–100)
GLUCOSE BLD STRIP.AUTO-MCNC: 164 MG/DL (ref 65–100)
GLUCOSE BLD STRIP.AUTO-MCNC: 172 MG/DL (ref 65–100)
GLUCOSE BLD STRIP.AUTO-MCNC: 172 MG/DL (ref 65–100)
GLUCOSE BLD STRIP.AUTO-MCNC: 173 MG/DL (ref 65–100)
GLUCOSE BLD STRIP.AUTO-MCNC: 176 MG/DL (ref 65–100)
GLUCOSE BLD STRIP.AUTO-MCNC: 183 MG/DL (ref 65–100)
GLUCOSE BLD STRIP.AUTO-MCNC: 187 MG/DL (ref 65–100)
GLUCOSE BLD STRIP.AUTO-MCNC: 195 MG/DL (ref 65–100)
GLUCOSE BLD STRIP.AUTO-MCNC: 201 MG/DL (ref 65–100)
GLUCOSE BLD STRIP.AUTO-MCNC: 204 MG/DL (ref 65–100)
GLUCOSE BLD STRIP.AUTO-MCNC: 206 MG/DL (ref 65–100)
GLUCOSE BLD STRIP.AUTO-MCNC: 212 MG/DL (ref 65–100)
GLUCOSE BLD STRIP.AUTO-MCNC: 212 MG/DL (ref 65–100)
GLUCOSE BLD STRIP.AUTO-MCNC: 216 MG/DL (ref 65–100)
GLUCOSE BLD STRIP.AUTO-MCNC: 219 MG/DL (ref 65–100)
GLUCOSE BLD STRIP.AUTO-MCNC: 222 MG/DL (ref 65–100)
GLUCOSE BLD STRIP.AUTO-MCNC: 237 MG/DL (ref 65–100)
GLUCOSE BLD STRIP.AUTO-MCNC: 237 MG/DL (ref 65–100)
GLUCOSE BLD STRIP.AUTO-MCNC: 243 MG/DL (ref 65–100)
GLUCOSE BLD STRIP.AUTO-MCNC: 244 MG/DL (ref 65–100)
GLUCOSE BLD STRIP.AUTO-MCNC: 251 MG/DL (ref 65–100)
GLUCOSE BLD STRIP.AUTO-MCNC: 260 MG/DL (ref 65–100)
GLUCOSE BLD STRIP.AUTO-MCNC: 261 MG/DL (ref 65–100)
GLUCOSE BLD STRIP.AUTO-MCNC: 277 MG/DL (ref 65–100)
GLUCOSE BLD STRIP.AUTO-MCNC: 285 MG/DL (ref 65–100)
GLUCOSE BLD STRIP.AUTO-MCNC: 296 MG/DL (ref 65–100)
GLUCOSE BLD STRIP.AUTO-MCNC: 300 MG/DL (ref 65–100)
GLUCOSE BLD STRIP.AUTO-MCNC: 320 MG/DL (ref 65–100)
GLUCOSE BLD STRIP.AUTO-MCNC: 330 MG/DL (ref 65–100)
GLUCOSE BLD STRIP.AUTO-MCNC: 347 MG/DL (ref 65–100)
GLUCOSE BLD STRIP.AUTO-MCNC: 349 MG/DL (ref 65–100)
GLUCOSE BLD STRIP.AUTO-MCNC: 366 MG/DL (ref 65–100)
GLUCOSE BLD STRIP.AUTO-MCNC: 390 MG/DL (ref 65–100)
GLUCOSE BLD STRIP.AUTO-MCNC: 412 MG/DL (ref 65–100)
GLUCOSE BLD STRIP.AUTO-MCNC: 97 MG/DL (ref 65–100)
GLUCOSE SERPL-MCNC: 108 MG/DL (ref 65–100)
GLUCOSE SERPL-MCNC: 145 MG/DL (ref 65–99)
GLUCOSE SERPL-MCNC: 154 MG/DL (ref 65–100)
GLUCOSE SERPL-MCNC: 157 MG/DL (ref 65–100)
GLUCOSE SERPL-MCNC: 165 MG/DL (ref 65–100)
GLUCOSE SERPL-MCNC: 193 MG/DL (ref 65–100)
GLUCOSE SERPL-MCNC: 195 MG/DL (ref 65–100)
GLUCOSE SERPL-MCNC: 237 MG/DL (ref 65–100)
GLUCOSE SERPL-MCNC: 252 MG/DL (ref 65–100)
GLUCOSE SERPL-MCNC: 313 MG/DL (ref 65–100)
HCT VFR BLD AUTO: 24.3 % (ref 35–47)
HCT VFR BLD AUTO: 27 % (ref 35–47)
HCT VFR BLD AUTO: 27.1 % (ref 35–47)
HCT VFR BLD AUTO: 29.2 % (ref 35–47)
HCT VFR BLD AUTO: 34.4 % (ref 35–47)
HCT VFR BLD AUTO: 36.1 % (ref 35–47)
HGB BLD-MCNC: 11.3 G/DL (ref 11.5–16)
HGB BLD-MCNC: 12.1 G/DL (ref 11.5–16)
HGB BLD-MCNC: 8.4 G/DL (ref 11.5–16)
HGB BLD-MCNC: 8.9 G/DL (ref 11.5–16)
HGB BLD-MCNC: 9.2 G/DL (ref 11.5–16)
HGB BLD-MCNC: 9.4 G/DL (ref 11.5–16)
IMM GRANULOCYTES # BLD: 0 K/UL (ref 0–0.04)
IMM GRANULOCYTES NFR BLD AUTO: 0 % (ref 0–0.5)
IMM GRANULOCYTES NFR BLD AUTO: 1 % (ref 0–0.5)
LYMPHOCYTES # BLD: 0.3 K/UL (ref 0.8–3.5)
LYMPHOCYTES # BLD: 0.4 K/UL (ref 0.8–3.5)
LYMPHOCYTES # BLD: 0.5 K/UL (ref 0.8–3.5)
LYMPHOCYTES # BLD: 0.8 K/UL (ref 0.8–3.5)
LYMPHOCYTES NFR BLD: 12 % (ref 12–49)
LYMPHOCYTES NFR BLD: 4 % (ref 12–49)
LYMPHOCYTES NFR BLD: 5 % (ref 12–49)
LYMPHOCYTES NFR BLD: 7 % (ref 12–49)
MAGNESIUM SERPL-MCNC: 1.5 MG/DL (ref 1.6–2.4)
MAGNESIUM SERPL-MCNC: 1.7 MG/DL (ref 1.6–2.3)
MAGNESIUM SERPL-MCNC: 1.9 MG/DL (ref 1.6–2.4)
MAGNESIUM SERPL-MCNC: 2.6 MG/DL (ref 1.6–2.4)
MCH RBC QN AUTO: 31.1 PG (ref 26–34)
MCH RBC QN AUTO: 31.2 PG (ref 26–34)
MCH RBC QN AUTO: 31.3 PG (ref 26–34)
MCH RBC QN AUTO: 31.4 PG (ref 26–34)
MCH RBC QN AUTO: 31.7 PG (ref 26–34)
MCH RBC QN AUTO: 32 PG (ref 26–34)
MCHC RBC AUTO-ENTMCNC: 30.5 G/DL (ref 30–36.5)
MCHC RBC AUTO-ENTMCNC: 32.8 G/DL (ref 30–36.5)
MCHC RBC AUTO-ENTMCNC: 33.5 G/DL (ref 30–36.5)
MCHC RBC AUTO-ENTMCNC: 34.1 G/DL (ref 30–36.5)
MCHC RBC AUTO-ENTMCNC: 34.6 G/DL (ref 30–36.5)
MCHC RBC AUTO-ENTMCNC: 34.7 G/DL (ref 30–36.5)
MCV RBC AUTO: 102.5 FL (ref 80–99)
MCV RBC AUTO: 90.6 FL (ref 80–99)
MCV RBC AUTO: 90.7 FL (ref 80–99)
MCV RBC AUTO: 93.1 FL (ref 80–99)
MCV RBC AUTO: 94.8 FL (ref 80–99)
MCV RBC AUTO: 95.5 FL (ref 80–99)
MONOCYTES # BLD: 0.5 K/UL (ref 0–1)
MONOCYTES # BLD: 0.6 K/UL (ref 0–1)
MONOCYTES # BLD: 0.6 K/UL (ref 0–1)
MONOCYTES # BLD: 0.9 K/UL (ref 0–1)
MONOCYTES NFR BLD: 12 % (ref 5–13)
MONOCYTES NFR BLD: 6 % (ref 5–13)
MONOCYTES NFR BLD: 9 % (ref 5–13)
MONOCYTES NFR BLD: 9 % (ref 5–13)
NEUTS SEG # BLD: 5 K/UL (ref 1.8–8)
NEUTS SEG # BLD: 5.1 K/UL (ref 1.8–8)
NEUTS SEG # BLD: 5.8 K/UL (ref 1.8–8)
NEUTS SEG # BLD: 8.9 K/UL (ref 1.8–8)
NEUTS SEG NFR BLD: 77 % (ref 32–75)
NEUTS SEG NFR BLD: 79 % (ref 32–75)
NEUTS SEG NFR BLD: 85 % (ref 32–75)
NEUTS SEG NFR BLD: 89 % (ref 32–75)
NRBC # BLD: 0 K/UL (ref 0–0.01)
NRBC # BLD: 0.02 K/UL (ref 0–0.01)
NRBC # BLD: 0.02 K/UL (ref 0–0.01)
NRBC # BLD: 0.03 K/UL (ref 0–0.01)
NRBC BLD-RTO: 0 PER 100 WBC
NRBC BLD-RTO: 0.2 PER 100 WBC
NRBC BLD-RTO: 0.3 PER 100 WBC
NRBC BLD-RTO: 0.5 PER 100 WBC
PHOSPHATE SERPL-MCNC: 4.3 MG/DL (ref 2.6–4.7)
PLATELET # BLD AUTO: 118 K/UL (ref 150–400)
PLATELET # BLD AUTO: 164 K/UL (ref 150–400)
PLATELET # BLD AUTO: 175 K/UL (ref 150–400)
PLATELET # BLD AUTO: 177 K/UL (ref 150–400)
PLATELET # BLD AUTO: 215 K/UL (ref 150–400)
PLATELET # BLD AUTO: 237 K/UL (ref 150–400)
PMV BLD AUTO: 10.7 FL (ref 8.9–12.9)
PMV BLD AUTO: 11.2 FL (ref 8.9–12.9)
PMV BLD AUTO: 11.3 FL (ref 8.9–12.9)
PMV BLD AUTO: 11.4 FL (ref 8.9–12.9)
PMV BLD AUTO: 11.8 FL (ref 8.9–12.9)
PMV BLD AUTO: 12.6 FL (ref 8.9–12.9)
POTASSIUM SERPL-SCNC: 2.3 MMOL/L (ref 3.5–5.1)
POTASSIUM SERPL-SCNC: 2.5 MMOL/L (ref 3.5–5.1)
POTASSIUM SERPL-SCNC: 2.6 MMOL/L (ref 3.5–5.1)
POTASSIUM SERPL-SCNC: 3.1 MMOL/L (ref 3.5–5.1)
POTASSIUM SERPL-SCNC: 3.2 MMOL/L (ref 3.5–5.1)
POTASSIUM SERPL-SCNC: 3.3 MMOL/L (ref 3.5–5.1)
POTASSIUM SERPL-SCNC: 3.5 MMOL/L (ref 3.5–5.2)
PROT SERPL-MCNC: 5 G/DL (ref 6.4–8.2)
PROT SERPL-MCNC: 5.9 G/DL (ref 6–8.5)
PROT SERPL-MCNC: 6.8 G/DL (ref 6.4–8.2)
PROT SERPL-MCNC: 6.9 G/DL (ref 6.4–8.2)
RBC # BLD AUTO: 2.68 M/UL (ref 3.8–5.2)
RBC # BLD AUTO: 2.85 M/UL (ref 3.8–5.2)
RBC # BLD AUTO: 2.9 M/UL (ref 3.8–5.2)
RBC # BLD AUTO: 2.99 M/UL (ref 3.8–5.2)
RBC # BLD AUTO: 3.63 M/UL (ref 3.8–5.2)
RBC # BLD AUTO: 3.78 M/UL (ref 3.8–5.2)
RBC MORPH BLD: ABNORMAL
RBC MORPH BLD: ABNORMAL
SERVICE CMNT-IMP: ABNORMAL
SERVICE CMNT-IMP: NORMAL
SODIUM SERPL-SCNC: 130 MMOL/L (ref 136–145)
SODIUM SERPL-SCNC: 132 MMOL/L (ref 136–145)
SODIUM SERPL-SCNC: 133 MMOL/L (ref 134–144)
SODIUM SERPL-SCNC: 134 MMOL/L (ref 136–145)
SODIUM SERPL-SCNC: 139 MMOL/L (ref 136–145)
SODIUM SERPL-SCNC: 140 MMOL/L (ref 136–145)
SODIUM SERPL-SCNC: 141 MMOL/L (ref 136–145)
SODIUM SERPL-SCNC: 143 MMOL/L (ref 136–145)
T4 FREE SERPL-MCNC: 1.24 NG/DL (ref 0.82–1.77)
TSH SERPL DL<=0.005 MIU/L-ACNC: 4.12 UIU/ML (ref 0.45–4.5)
WBC # BLD AUTO: 10 K/UL (ref 3.6–11)
WBC # BLD AUTO: 6 K/UL (ref 3.6–11)
WBC # BLD AUTO: 6.5 K/UL (ref 3.6–11)
WBC # BLD AUTO: 6.9 K/UL (ref 3.6–11)
WBC # BLD AUTO: 7.3 K/UL (ref 3.6–11)
WBC # BLD AUTO: 8.6 K/UL (ref 3.6–11)

## 2018-01-01 PROCEDURE — 74011250636 HC RX REV CODE- 250/636: Performed by: PHYSICAL MEDICINE & REHABILITATION

## 2018-01-01 PROCEDURE — 74011250636 HC RX REV CODE- 250/636: Performed by: INTERNAL MEDICINE

## 2018-01-01 PROCEDURE — 96365 THER/PROPH/DIAG IV INF INIT: CPT

## 2018-01-01 PROCEDURE — 74011250637 HC RX REV CODE- 250/637: Performed by: INTERNAL MEDICINE

## 2018-01-01 PROCEDURE — 0651 HSPC ROUTINE HOME CARE

## 2018-01-01 PROCEDURE — 85025 COMPLETE CBC W/AUTO DIFF WBC: CPT | Performed by: INTERNAL MEDICINE

## 2018-01-01 PROCEDURE — 65270000015 HC RM PRIVATE ONCOLOGY

## 2018-01-01 PROCEDURE — P9047 ALBUMIN (HUMAN), 25%, 50ML: HCPCS | Performed by: INTERNAL MEDICINE

## 2018-01-01 PROCEDURE — 3336500001 HSPC ELECTION

## 2018-01-01 PROCEDURE — 74011250637 HC RX REV CODE- 250/637: Performed by: PHYSICAL MEDICINE & REHABILITATION

## 2018-01-01 PROCEDURE — 74011636320 HC RX REV CODE- 636/320: Performed by: INTERNAL MEDICINE

## 2018-01-01 PROCEDURE — 36415 COLL VENOUS BLD VENIPUNCTURE: CPT | Performed by: INTERNAL MEDICINE

## 2018-01-01 PROCEDURE — 83735 ASSAY OF MAGNESIUM: CPT | Performed by: INTERNAL MEDICINE

## 2018-01-01 PROCEDURE — 74011636637 HC RX REV CODE- 636/637: Performed by: INTERNAL MEDICINE

## 2018-01-01 PROCEDURE — 74011250636 HC RX REV CODE- 250/636

## 2018-01-01 PROCEDURE — 96374 THER/PROPH/DIAG INJ IV PUSH: CPT

## 2018-01-01 PROCEDURE — 36415 COLL VENOUS BLD VENIPUNCTURE: CPT | Performed by: NURSE PRACTITIONER

## 2018-01-01 PROCEDURE — 96361 HYDRATE IV INFUSION ADD-ON: CPT

## 2018-01-01 PROCEDURE — 36415 COLL VENOUS BLD VENIPUNCTURE: CPT | Performed by: EMERGENCY MEDICINE

## 2018-01-01 PROCEDURE — 76450000000

## 2018-01-01 PROCEDURE — 83735 ASSAY OF MAGNESIUM: CPT | Performed by: NURSE PRACTITIONER

## 2018-01-01 PROCEDURE — 65660000000 HC RM CCU STEPDOWN

## 2018-01-01 PROCEDURE — 80053 COMPREHEN METABOLIC PANEL: CPT | Performed by: INTERNAL MEDICINE

## 2018-01-01 PROCEDURE — 82962 GLUCOSE BLOOD TEST: CPT

## 2018-01-01 PROCEDURE — G0299 HHS/HOSPICE OF RN EA 15 MIN: HCPCS

## 2018-01-01 PROCEDURE — 80048 BASIC METABOLIC PNL TOTAL CA: CPT | Performed by: INTERNAL MEDICINE

## 2018-01-01 PROCEDURE — 84100 ASSAY OF PHOSPHORUS: CPT | Performed by: INTERNAL MEDICINE

## 2018-01-01 PROCEDURE — 96366 THER/PROPH/DIAG IV INF ADDON: CPT

## 2018-01-01 PROCEDURE — 77010033678 HC OXYGEN DAILY

## 2018-01-01 PROCEDURE — 97530 THERAPEUTIC ACTIVITIES: CPT | Performed by: OCCUPATIONAL THERAPIST

## 2018-01-01 PROCEDURE — 77030011256 HC DRSG MEPILEX <16IN NO BORD MOLN -A

## 2018-01-01 PROCEDURE — 74011250636 HC RX REV CODE- 250/636: Performed by: NURSE PRACTITIONER

## 2018-01-01 PROCEDURE — 74011250636 HC RX REV CODE- 250/636: Performed by: EMERGENCY MEDICINE

## 2018-01-01 PROCEDURE — 97166 OT EVAL MOD COMPLEX 45 MIN: CPT

## 2018-01-01 PROCEDURE — 85025 COMPLETE CBC W/AUTO DIFF WBC: CPT | Performed by: NURSE PRACTITIONER

## 2018-01-01 PROCEDURE — 84439 ASSAY OF FREE THYROXINE: CPT

## 2018-01-01 PROCEDURE — 76770 US EXAM ABDO BACK WALL COMP: CPT

## 2018-01-01 PROCEDURE — 99285 EMERGENCY DEPT VISIT HI MDM: CPT

## 2018-01-01 PROCEDURE — 74177 CT ABD & PELVIS W/CONTRAST: CPT

## 2018-01-01 PROCEDURE — 85025 COMPLETE CBC W/AUTO DIFF WBC: CPT | Performed by: EMERGENCY MEDICINE

## 2018-01-01 PROCEDURE — 97162 PT EVAL MOD COMPLEX 30 MIN: CPT

## 2018-01-01 PROCEDURE — 96368 THER/DIAG CONCURRENT INF: CPT

## 2018-01-01 PROCEDURE — 80053 COMPREHEN METABOLIC PANEL: CPT | Performed by: NURSE PRACTITIONER

## 2018-01-01 PROCEDURE — G8978 MOBILITY CURRENT STATUS: HCPCS

## 2018-01-01 PROCEDURE — 77030011943

## 2018-01-01 PROCEDURE — 74011000258 HC RX REV CODE- 258: Performed by: INTERNAL MEDICINE

## 2018-01-01 PROCEDURE — 96360 HYDRATION IV INFUSION INIT: CPT

## 2018-01-01 PROCEDURE — 97530 THERAPEUTIC ACTIVITIES: CPT

## 2018-01-01 PROCEDURE — 94761 N-INVAS EAR/PLS OXIMETRY MLT: CPT

## 2018-01-01 PROCEDURE — 83735 ASSAY OF MAGNESIUM: CPT

## 2018-01-01 PROCEDURE — 36415 COLL VENOUS BLD VENIPUNCTURE: CPT

## 2018-01-01 PROCEDURE — 77030038269 HC DRN EXT URIN PURWCK BARD -A

## 2018-01-01 PROCEDURE — 80053 COMPREHEN METABOLIC PANEL: CPT

## 2018-01-01 PROCEDURE — 85027 COMPLETE CBC AUTOMATED: CPT | Performed by: INTERNAL MEDICINE

## 2018-01-01 PROCEDURE — 94762 N-INVAS EAR/PLS OXIMTRY CONT: CPT

## 2018-01-01 PROCEDURE — 74011000255 HC RX REV CODE- 255: Performed by: INTERNAL MEDICINE

## 2018-01-01 PROCEDURE — G8979 MOBILITY GOAL STATUS: HCPCS

## 2018-01-01 PROCEDURE — 71260 CT THORAX DX C+: CPT

## 2018-01-01 PROCEDURE — P9045 ALBUMIN (HUMAN), 5%, 250 ML: HCPCS | Performed by: INTERNAL MEDICINE

## 2018-01-01 PROCEDURE — 84443 ASSAY THYROID STIM HORMONE: CPT

## 2018-01-01 PROCEDURE — 80053 COMPREHEN METABOLIC PANEL: CPT | Performed by: EMERGENCY MEDICINE

## 2018-01-01 PROCEDURE — 97535 SELF CARE MNGMENT TRAINING: CPT | Performed by: OCCUPATIONAL THERAPIST

## 2018-01-01 RX ORDER — MORPHINE SULFATE 20 MG/ML
10 SOLUTION ORAL
Status: DISCONTINUED | OUTPATIENT
Start: 2018-01-01 | End: 2018-01-01 | Stop reason: HOSPADM

## 2018-01-01 RX ORDER — ACETAMINOPHEN 325 MG/1
650 TABLET ORAL
Status: DISCONTINUED | OUTPATIENT
Start: 2018-01-01 | End: 2018-01-01 | Stop reason: HOSPADM

## 2018-01-01 RX ORDER — ONDANSETRON 4 MG/1
4 TABLET, ORALLY DISINTEGRATING ORAL
Status: DISCONTINUED | OUTPATIENT
Start: 2018-01-01 | End: 2018-01-01

## 2018-01-01 RX ORDER — MIRTAZAPINE 15 MG/1
30 TABLET, FILM COATED ORAL
Status: DISCONTINUED | OUTPATIENT
Start: 2018-01-01 | End: 2018-01-01 | Stop reason: HOSPADM

## 2018-01-01 RX ORDER — SERTRALINE HYDROCHLORIDE 50 MG/1
50 TABLET, FILM COATED ORAL DAILY
Status: DISCONTINUED | OUTPATIENT
Start: 2018-01-01 | End: 2018-01-01 | Stop reason: HOSPADM

## 2018-01-01 RX ORDER — HALOPERIDOL 5 MG/ML
1 INJECTION INTRAMUSCULAR
Status: DISCONTINUED | OUTPATIENT
Start: 2018-01-01 | End: 2018-01-01

## 2018-01-01 RX ORDER — SODIUM CHLORIDE 0.9 % (FLUSH) 0.9 %
5-10 SYRINGE (ML) INJECTION AS NEEDED
Status: DISCONTINUED | OUTPATIENT
Start: 2018-01-01 | End: 2018-01-01 | Stop reason: HOSPADM

## 2018-01-01 RX ORDER — POTASSIUM CHLORIDE 29.8 MG/ML
20 INJECTION INTRAVENOUS ONCE
Status: DISCONTINUED | OUTPATIENT
Start: 2018-01-01 | End: 2018-01-01

## 2018-01-01 RX ORDER — SCOLOPAMINE TRANSDERMAL SYSTEM 1 MG/1
1 PATCH, EXTENDED RELEASE TRANSDERMAL
Status: DISCONTINUED | OUTPATIENT
Start: 2018-01-01 | End: 2018-01-01 | Stop reason: HOSPADM

## 2018-01-01 RX ORDER — LORAZEPAM 2 MG/ML
1 CONCENTRATE ORAL EVERY 4 HOURS
Status: DISCONTINUED | OUTPATIENT
Start: 2018-01-01 | End: 2018-01-01 | Stop reason: HOSPADM

## 2018-01-01 RX ORDER — FENTANYL CITRATE 50 UG/ML
25 INJECTION, SOLUTION INTRAMUSCULAR; INTRAVENOUS
Status: DISCONTINUED | OUTPATIENT
Start: 2018-01-01 | End: 2018-01-01 | Stop reason: HOSPADM

## 2018-01-01 RX ORDER — HEPARIN SODIUM 5000 [USP'U]/ML
5000 INJECTION, SOLUTION INTRAVENOUS; SUBCUTANEOUS EVERY 12 HOURS
Status: DISCONTINUED | OUTPATIENT
Start: 2018-01-01 | End: 2018-01-01 | Stop reason: HOSPADM

## 2018-01-01 RX ORDER — ALPRAZOLAM 0.25 MG/1
0.25 TABLET ORAL
Status: DISCONTINUED | OUTPATIENT
Start: 2018-01-01 | End: 2018-01-01 | Stop reason: HOSPADM

## 2018-01-01 RX ORDER — MEGESTROL ACETATE 40 MG/ML
200 SUSPENSION ORAL DAILY
Status: DISCONTINUED | OUTPATIENT
Start: 2018-01-01 | End: 2018-01-01 | Stop reason: HOSPADM

## 2018-01-01 RX ORDER — DOXYLAMINE SUCCINATE 25 MG
TABLET ORAL 2 TIMES DAILY
Status: DISCONTINUED | OUTPATIENT
Start: 2018-01-01 | End: 2018-01-01

## 2018-01-01 RX ORDER — POTASSIUM CHLORIDE 7.45 MG/ML
INJECTION INTRAVENOUS
Status: COMPLETED
Start: 2018-01-01 | End: 2018-01-01

## 2018-01-01 RX ORDER — POTASSIUM CHLORIDE 14.9 MG/ML
10 INJECTION INTRAVENOUS ONCE
Status: DISCONTINUED | OUTPATIENT
Start: 2018-01-01 | End: 2018-01-01

## 2018-01-01 RX ORDER — FENTANYL CITRATE 50 UG/ML
25 INJECTION, SOLUTION INTRAMUSCULAR; INTRAVENOUS
Status: DISCONTINUED | OUTPATIENT
Start: 2018-01-01 | End: 2018-01-01

## 2018-01-01 RX ORDER — NALOXONE HYDROCHLORIDE 0.4 MG/ML
0.2 INJECTION, SOLUTION INTRAMUSCULAR; INTRAVENOUS; SUBCUTANEOUS
Status: DISCONTINUED | OUTPATIENT
Start: 2018-01-01 | End: 2018-01-01 | Stop reason: HOSPADM

## 2018-01-01 RX ORDER — GLYCOPYRROLATE 0.2 MG/ML
0.1 INJECTION INTRAMUSCULAR; INTRAVENOUS
Status: DISCONTINUED | OUTPATIENT
Start: 2018-01-01 | End: 2018-01-01 | Stop reason: HOSPADM

## 2018-01-01 RX ORDER — POTASSIUM CHLORIDE 1.5 G/1.77G
40 POWDER, FOR SOLUTION ORAL
Status: COMPLETED | OUTPATIENT
Start: 2018-01-01 | End: 2018-01-01

## 2018-01-01 RX ORDER — LORAZEPAM 2 MG/ML
1 INJECTION INTRAMUSCULAR
Status: DISCONTINUED | OUTPATIENT
Start: 2018-01-01 | End: 2018-01-01

## 2018-01-01 RX ORDER — MORPHINE SULFATE 20 MG/ML
10 SOLUTION ORAL
Status: DISCONTINUED | OUTPATIENT
Start: 2018-01-01 | End: 2018-01-01

## 2018-01-01 RX ORDER — SODIUM CHLORIDE 9 MG/ML
100 INJECTION, SOLUTION INTRAVENOUS CONTINUOUS
Status: DISCONTINUED | OUTPATIENT
Start: 2018-01-01 | End: 2018-01-01

## 2018-01-01 RX ORDER — POTASSIUM CHLORIDE 29.8 MG/ML
10 INJECTION INTRAVENOUS
Status: DISCONTINUED | OUTPATIENT
Start: 2018-01-01 | End: 2018-01-01 | Stop reason: SDUPTHER

## 2018-01-01 RX ORDER — DEXTROSE 50 % IN WATER (D50W) INTRAVENOUS SYRINGE
12.5-25 AS NEEDED
Status: DISCONTINUED | OUTPATIENT
Start: 2018-01-01 | End: 2018-01-01 | Stop reason: HOSPADM

## 2018-01-01 RX ORDER — ALPRAZOLAM 0.25 MG/1
0.25 TABLET ORAL
Qty: 90 TAB | Refills: 3 | Status: SHIPPED | OUTPATIENT
Start: 2018-01-01

## 2018-01-01 RX ORDER — GLIPIZIDE 5 MG/1
2.5 TABLET ORAL 2 TIMES DAILY
Status: DISCONTINUED | OUTPATIENT
Start: 2018-01-01 | End: 2018-01-01

## 2018-01-01 RX ORDER — MORPHINE SULFATE 2 MG/ML
2 INJECTION, SOLUTION INTRAMUSCULAR; INTRAVENOUS
Status: DISCONTINUED | OUTPATIENT
Start: 2018-01-01 | End: 2018-01-01 | Stop reason: HOSPADM

## 2018-01-01 RX ORDER — ALBUMIN HUMAN 50 G/1000ML
25 SOLUTION INTRAVENOUS ONCE
Status: COMPLETED | OUTPATIENT
Start: 2018-01-01 | End: 2018-01-01

## 2018-01-01 RX ORDER — POTASSIUM CHLORIDE 14.9 MG/ML
10 INJECTION INTRAVENOUS
Status: DISCONTINUED | OUTPATIENT
Start: 2018-01-01 | End: 2018-01-01

## 2018-01-01 RX ORDER — FENTANYL CITRATE 50 UG/ML
25-40 INJECTION, SOLUTION INTRAMUSCULAR; INTRAVENOUS
Status: DISCONTINUED | OUTPATIENT
Start: 2018-01-01 | End: 2018-01-01

## 2018-01-01 RX ORDER — ACETAMINOPHEN 650 MG/1
650 SUPPOSITORY RECTAL
Status: DISCONTINUED | OUTPATIENT
Start: 2018-01-01 | End: 2018-01-01 | Stop reason: HOSPADM

## 2018-01-01 RX ORDER — INSULIN LISPRO 100 [IU]/ML
INJECTION, SOLUTION INTRAVENOUS; SUBCUTANEOUS
Status: DISCONTINUED | OUTPATIENT
Start: 2018-01-01 | End: 2018-01-01 | Stop reason: HOSPADM

## 2018-01-01 RX ORDER — NYSTATIN 100000 [USP'U]/ML
500000 SUSPENSION ORAL 3 TIMES DAILY
Status: DISCONTINUED | OUTPATIENT
Start: 2018-01-01 | End: 2018-01-01 | Stop reason: HOSPADM

## 2018-01-01 RX ORDER — MAGNESIUM SULFATE HEPTAHYDRATE 40 MG/ML
2 INJECTION, SOLUTION INTRAVENOUS ONCE
Status: COMPLETED | OUTPATIENT
Start: 2018-01-01 | End: 2018-01-01

## 2018-01-01 RX ORDER — DOCUSATE SODIUM 100 MG/1
100 CAPSULE, LIQUID FILLED ORAL 2 TIMES DAILY
Status: DISCONTINUED | OUTPATIENT
Start: 2018-01-01 | End: 2018-01-01

## 2018-01-01 RX ORDER — ONDANSETRON 2 MG/ML
4 INJECTION INTRAMUSCULAR; INTRAVENOUS ONCE
Status: COMPLETED | OUTPATIENT
Start: 2018-01-01 | End: 2018-01-01

## 2018-01-01 RX ORDER — POLYETHYLENE GLYCOL 3350 17 G/17G
17 POWDER, FOR SOLUTION ORAL DAILY
Status: DISCONTINUED | OUTPATIENT
Start: 2018-01-01 | End: 2018-01-01

## 2018-01-01 RX ORDER — CARVEDILOL 6.25 MG/1
6.25 TABLET ORAL 2 TIMES DAILY WITH MEALS
Qty: 60 TAB | Refills: 2 | Status: SHIPPED | OUTPATIENT
Start: 2018-01-01

## 2018-01-01 RX ORDER — MORPHINE SULFATE 2 MG/ML
2 INJECTION, SOLUTION INTRAMUSCULAR; INTRAVENOUS EVERY 4 HOURS
Status: DISCONTINUED | OUTPATIENT
Start: 2018-01-01 | End: 2018-01-01

## 2018-01-01 RX ORDER — SORBITOL SOLUTION 70 %
30 SOLUTION, ORAL MISCELLANEOUS DAILY PRN
Status: DISCONTINUED | OUTPATIENT
Start: 2018-01-01 | End: 2018-01-01

## 2018-01-01 RX ORDER — MICONAZOLE NITRATE 20 MG/G
CREAM TOPICAL AS NEEDED
Status: DISCONTINUED | OUTPATIENT
Start: 2018-01-01 | End: 2018-01-01 | Stop reason: HOSPADM

## 2018-01-01 RX ORDER — ACETAMINOPHEN 325 MG/1
650 TABLET ORAL
Status: DISCONTINUED | OUTPATIENT
Start: 2018-01-01 | End: 2018-01-01

## 2018-01-01 RX ORDER — POTASSIUM CHLORIDE 7.45 MG/ML
10 INJECTION INTRAVENOUS
Status: COMPLETED | OUTPATIENT
Start: 2018-01-01 | End: 2018-01-01

## 2018-01-01 RX ORDER — POTASSIUM CHLORIDE 7.45 MG/ML
10 INJECTION INTRAVENOUS ONCE
Status: COMPLETED | OUTPATIENT
Start: 2018-01-01 | End: 2018-01-01

## 2018-01-01 RX ORDER — HYDROMORPHONE HYDROCHLORIDE 2 MG/ML
0.5 INJECTION, SOLUTION INTRAMUSCULAR; INTRAVENOUS; SUBCUTANEOUS
Status: DISCONTINUED | OUTPATIENT
Start: 2018-01-01 | End: 2018-01-01

## 2018-01-01 RX ORDER — SODIUM CHLORIDE 0.9 % (FLUSH) 0.9 %
5 SYRINGE (ML) INJECTION AS NEEDED
Status: DISCONTINUED | OUTPATIENT
Start: 2018-01-01 | End: 2018-01-01 | Stop reason: HOSPADM

## 2018-01-01 RX ORDER — FENTANYL CITRATE 50 UG/ML
25 INJECTION, SOLUTION INTRAMUSCULAR; INTRAVENOUS EVERY 4 HOURS
Status: DISCONTINUED | OUTPATIENT
Start: 2018-01-01 | End: 2018-01-01

## 2018-01-01 RX ORDER — HALOPERIDOL 5 MG/ML
1 INJECTION INTRAMUSCULAR ONCE
Status: DISCONTINUED | OUTPATIENT
Start: 2018-01-01 | End: 2018-01-01

## 2018-01-01 RX ORDER — MIRTAZAPINE 30 MG/1
30 TABLET, FILM COATED ORAL
Qty: 30 TAB | Refills: 3 | Status: SHIPPED | OUTPATIENT
Start: 2018-01-01

## 2018-01-01 RX ORDER — SODIUM CHLORIDE 9 MG/ML
50 INJECTION, SOLUTION INTRAVENOUS
Status: COMPLETED | OUTPATIENT
Start: 2018-01-01 | End: 2018-01-01

## 2018-01-01 RX ORDER — ALBUMIN HUMAN 250 G/1000ML
12.5 SOLUTION INTRAVENOUS ONCE
Status: COMPLETED | OUTPATIENT
Start: 2018-01-01 | End: 2018-01-01

## 2018-01-01 RX ORDER — SODIUM CHLORIDE 0.9 % (FLUSH) 0.9 %
10-40 SYRINGE (ML) INJECTION AS NEEDED
Status: DISCONTINUED | OUTPATIENT
Start: 2018-01-01 | End: 2018-01-01 | Stop reason: HOSPADM

## 2018-01-01 RX ORDER — MAGNESIUM SULFATE 100 %
4 CRYSTALS MISCELLANEOUS AS NEEDED
Status: DISCONTINUED | OUTPATIENT
Start: 2018-01-01 | End: 2018-01-01 | Stop reason: HOSPADM

## 2018-01-01 RX ORDER — SERTRALINE HYDROCHLORIDE 50 MG/1
50 TABLET, FILM COATED ORAL DAILY
Qty: 30 TAB | Refills: 6 | Status: SHIPPED | OUTPATIENT
Start: 2018-01-01

## 2018-01-01 RX ORDER — LORAZEPAM 2 MG/ML
1 INJECTION INTRAMUSCULAR EVERY 4 HOURS
Status: DISCONTINUED | OUTPATIENT
Start: 2018-01-01 | End: 2018-01-01

## 2018-01-01 RX ORDER — FUROSEMIDE 10 MG/ML
40 INJECTION INTRAMUSCULAR; INTRAVENOUS ONCE
Status: COMPLETED | OUTPATIENT
Start: 2018-01-01 | End: 2018-01-01

## 2018-01-01 RX ORDER — LORAZEPAM 2 MG/ML
1 CONCENTRATE ORAL
Status: DISCONTINUED | OUTPATIENT
Start: 2018-01-01 | End: 2018-01-01 | Stop reason: HOSPADM

## 2018-01-01 RX ORDER — FACIAL-BODY WIPES
10 EACH TOPICAL DAILY PRN
Status: DISCONTINUED | OUTPATIENT
Start: 2018-01-01 | End: 2018-01-01 | Stop reason: HOSPADM

## 2018-01-01 RX ORDER — MORPHINE SULFATE 20 MG/ML
8 SOLUTION ORAL
Status: DISCONTINUED | OUTPATIENT
Start: 2018-01-01 | End: 2018-01-01 | Stop reason: HOSPADM

## 2018-01-01 RX ORDER — MORPHINE SULFATE 20 MG/ML
10 SOLUTION ORAL EVERY 4 HOURS
Status: DISCONTINUED | OUTPATIENT
Start: 2018-01-01 | End: 2018-01-01 | Stop reason: HOSPADM

## 2018-01-01 RX ORDER — HALOPERIDOL 5 MG/ML
1 INJECTION INTRAMUSCULAR
Status: DISCONTINUED | OUTPATIENT
Start: 2018-01-01 | End: 2018-01-01 | Stop reason: HOSPADM

## 2018-01-01 RX ORDER — GLIPIZIDE 5 MG/1
2.5 TABLET ORAL 2 TIMES DAILY
Qty: 30 TAB | Refills: 6 | Status: SHIPPED | OUTPATIENT
Start: 2018-01-01

## 2018-01-01 RX ORDER — POTASSIUM CHLORIDE 14.9 MG/ML
10 INJECTION INTRAVENOUS
Status: DISCONTINUED | OUTPATIENT
Start: 2018-01-01 | End: 2018-01-01 | Stop reason: RX

## 2018-01-01 RX ORDER — ONDANSETRON 2 MG/ML
4 INJECTION INTRAMUSCULAR; INTRAVENOUS
Status: DISCONTINUED | OUTPATIENT
Start: 2018-01-01 | End: 2018-01-01 | Stop reason: HOSPADM

## 2018-01-01 RX ORDER — SORBITOL SOLUTION 70 %
30 SOLUTION, ORAL MISCELLANEOUS ONCE
Status: COMPLETED | OUTPATIENT
Start: 2018-01-01 | End: 2018-01-01

## 2018-01-01 RX ORDER — MAGNESIUM SULFATE HEPTAHYDRATE 40 MG/ML
4 INJECTION, SOLUTION INTRAVENOUS ONCE
Status: COMPLETED | OUTPATIENT
Start: 2018-01-01 | End: 2018-01-01

## 2018-01-01 RX ORDER — BARIUM SULFATE 20 MG/ML
900 SUSPENSION ORAL
Status: COMPLETED | OUTPATIENT
Start: 2018-01-01 | End: 2018-01-01

## 2018-01-01 RX ORDER — SODIUM CHLORIDE 0.9 % (FLUSH) 0.9 %
5-10 SYRINGE (ML) INJECTION EVERY 8 HOURS
Status: DISCONTINUED | OUTPATIENT
Start: 2018-01-01 | End: 2018-01-01 | Stop reason: HOSPADM

## 2018-01-01 RX ORDER — LORAZEPAM 2 MG/ML
1 INJECTION INTRAMUSCULAR
Status: DISCONTINUED | OUTPATIENT
Start: 2018-01-01 | End: 2018-01-01 | Stop reason: HOSPADM

## 2018-01-01 RX ORDER — METRONIDAZOLE 500 MG/1
500 TABLET ORAL 3 TIMES DAILY
Qty: 30 TAB | Refills: 0 | Status: SHIPPED | OUTPATIENT
Start: 2018-01-01 | End: 2018-01-01

## 2018-01-01 RX ORDER — SODIUM CHLORIDE 0.9 % (FLUSH) 0.9 %
10 SYRINGE (ML) INJECTION
Status: COMPLETED | OUTPATIENT
Start: 2018-01-01 | End: 2018-01-01

## 2018-01-01 RX ORDER — MORPHINE SULFATE 2 MG/ML
2 INJECTION, SOLUTION INTRAMUSCULAR; INTRAVENOUS
Status: DISCONTINUED | OUTPATIENT
Start: 2018-01-01 | End: 2018-01-01

## 2018-01-01 RX ORDER — SODIUM BICARBONATE IN D5W 150/1000ML
PLASTIC BAG, INJECTION (ML) INTRAVENOUS CONTINUOUS
Status: DISCONTINUED | OUTPATIENT
Start: 2018-01-01 | End: 2018-01-01

## 2018-01-01 RX ORDER — HEPARIN 100 UNIT/ML
500 SYRINGE INTRAVENOUS AS NEEDED
Status: ACTIVE | OUTPATIENT
Start: 2018-01-01 | End: 2018-01-01

## 2018-01-01 RX ORDER — LORAZEPAM 2 MG/ML
2 INJECTION INTRAMUSCULAR
Status: DISCONTINUED | OUTPATIENT
Start: 2018-01-01 | End: 2018-01-01

## 2018-01-01 RX ADMIN — Medication 10 ML: at 15:24

## 2018-01-01 RX ADMIN — LORAZEPAM 1 MG: 2 SOLUTION, CONCENTRATE ORAL at 20:41

## 2018-01-01 RX ADMIN — SODIUM CHLORIDE 20 MEQ: 900 INJECTION, SOLUTION INTRAVENOUS at 04:05

## 2018-01-01 RX ADMIN — FENTANYL CITRATE 25 MCG: 50 INJECTION, SOLUTION INTRAMUSCULAR; INTRAVENOUS at 06:28

## 2018-01-01 RX ADMIN — CEFTRIAXONE SODIUM 1 G: 1 INJECTION, POWDER, FOR SOLUTION INTRAMUSCULAR; INTRAVENOUS at 03:31

## 2018-01-01 RX ADMIN — LORAZEPAM 1 MG: 2 INJECTION INTRAMUSCULAR; INTRAVENOUS at 18:26

## 2018-01-01 RX ADMIN — Medication 5 ML: at 21:30

## 2018-01-01 RX ADMIN — FENTANYL CITRATE 25 MCG: 50 INJECTION, SOLUTION INTRAMUSCULAR; INTRAVENOUS at 11:45

## 2018-01-01 RX ADMIN — Medication 10 ML: at 17:30

## 2018-01-01 RX ADMIN — Medication 10 ML: at 12:21

## 2018-01-01 RX ADMIN — MAGNESIUM SULFATE HEPTAHYDRATE 4 G: 40 INJECTION, SOLUTION INTRAVENOUS at 09:18

## 2018-01-01 RX ADMIN — HEPARIN SODIUM 5000 UNITS: 5000 INJECTION, SOLUTION INTRAVENOUS; SUBCUTANEOUS at 10:56

## 2018-01-01 RX ADMIN — Medication 10 ML: at 14:50

## 2018-01-01 RX ADMIN — POTASSIUM CHLORIDE 10 MEQ: 7.46 INJECTION, SOLUTION INTRAVENOUS at 16:42

## 2018-01-01 RX ADMIN — MAGNESIUM SULFATE HEPTAHYDRATE 2 G: 40 INJECTION, SOLUTION INTRAVENOUS at 16:52

## 2018-01-01 RX ADMIN — CEFTRIAXONE SODIUM 1 G: 1 INJECTION, POWDER, FOR SOLUTION INTRAMUSCULAR; INTRAVENOUS at 00:44

## 2018-01-01 RX ADMIN — SODIUM CHLORIDE 30 MEQ: 900 INJECTION, SOLUTION INTRAVENOUS at 07:42

## 2018-01-01 RX ADMIN — Medication 10 ML: at 05:30

## 2018-01-01 RX ADMIN — ACETAMINOPHEN 650 MG: 325 TABLET ORAL at 11:03

## 2018-01-01 RX ADMIN — Medication 10 ML: at 17:09

## 2018-01-01 RX ADMIN — LORAZEPAM 1 MG: 2 INJECTION INTRAMUSCULAR; INTRAVENOUS at 11:45

## 2018-01-01 RX ADMIN — INSULIN LISPRO 5 UNITS: 100 INJECTION, SOLUTION INTRAVENOUS; SUBCUTANEOUS at 17:30

## 2018-01-01 RX ADMIN — CASTOR OIL AND BALSAM, PERU: 788; 87 OINTMENT TOPICAL at 17:54

## 2018-01-01 RX ADMIN — FENTANYL CITRATE 40 MCG: 50 INJECTION, SOLUTION INTRAMUSCULAR; INTRAVENOUS at 22:15

## 2018-01-01 RX ADMIN — FENTANYL CITRATE 25 MCG: 50 INJECTION, SOLUTION INTRAMUSCULAR; INTRAVENOUS at 17:13

## 2018-01-01 RX ADMIN — HEPARIN SODIUM 5000 UNITS: 5000 INJECTION, SOLUTION INTRAVENOUS; SUBCUTANEOUS at 20:05

## 2018-01-01 RX ADMIN — Medication 10 ML: at 14:27

## 2018-01-01 RX ADMIN — Medication 10 ML: at 21:18

## 2018-01-01 RX ADMIN — Medication 10 ML: at 04:06

## 2018-01-01 RX ADMIN — NYSTATIN 500000 UNITS: 100000 SUSPENSION ORAL at 08:27

## 2018-01-01 RX ADMIN — POTASSIUM CHLORIDE 40 MEQ: 1.5 POWDER, FOR SOLUTION ORAL at 05:35

## 2018-01-01 RX ADMIN — FENTANYL CITRATE 25 MCG: 50 INJECTION, SOLUTION INTRAMUSCULAR; INTRAVENOUS at 03:59

## 2018-01-01 RX ADMIN — NYSTATIN 500000 UNITS: 100000 SUSPENSION ORAL at 14:45

## 2018-01-01 RX ADMIN — Medication 10 ML: at 07:05

## 2018-01-01 RX ADMIN — MORPHINE SULFATE 10 MG: 20 SOLUTION ORAL at 23:09

## 2018-01-01 RX ADMIN — POTASSIUM CHLORIDE 40 MEQ: 1.5 POWDER, FOR SOLUTION ORAL at 06:58

## 2018-01-01 RX ADMIN — INSULIN LISPRO 3 UNITS: 100 INJECTION, SOLUTION INTRAVENOUS; SUBCUTANEOUS at 21:18

## 2018-01-01 RX ADMIN — HEPARIN SODIUM 5000 UNITS: 5000 INJECTION, SOLUTION INTRAVENOUS; SUBCUTANEOUS at 23:09

## 2018-01-01 RX ADMIN — NYSTATIN 500000 UNITS: 100000 SUSPENSION ORAL at 16:22

## 2018-01-01 RX ADMIN — INSULIN LISPRO 6 UNITS: 100 INJECTION, SOLUTION INTRAVENOUS; SUBCUTANEOUS at 21:17

## 2018-01-01 RX ADMIN — FENTANYL CITRATE 25 MCG: 50 INJECTION, SOLUTION INTRAMUSCULAR; INTRAVENOUS at 19:27

## 2018-01-01 RX ADMIN — Medication 10 ML: at 23:03

## 2018-01-01 RX ADMIN — SODIUM CHLORIDE 1000 ML: 900 INJECTION, SOLUTION INTRAVENOUS at 13:08

## 2018-01-01 RX ADMIN — MORPHINE SULFATE 10 MG: 20 SOLUTION ORAL at 20:41

## 2018-01-01 RX ADMIN — Medication 10 ML: at 14:20

## 2018-01-01 RX ADMIN — Medication: at 09:51

## 2018-01-01 RX ADMIN — INSULIN LISPRO 7 UNITS: 100 INJECTION, SOLUTION INTRAVENOUS; SUBCUTANEOUS at 12:58

## 2018-01-01 RX ADMIN — NYSTATIN 500000 UNITS: 100000 SUSPENSION ORAL at 16:37

## 2018-01-01 RX ADMIN — SODIUM CHLORIDE: 900 INJECTION, SOLUTION INTRAVENOUS at 11:41

## 2018-01-01 RX ADMIN — HEPARIN SODIUM 5000 UNITS: 5000 INJECTION, SOLUTION INTRAVENOUS; SUBCUTANEOUS at 08:42

## 2018-01-01 RX ADMIN — CASTOR OIL AND BALSAM, PERU: 788; 87 OINTMENT TOPICAL at 17:31

## 2018-01-01 RX ADMIN — POTASSIUM CHLORIDE 10 MEQ: 7.46 INJECTION, SOLUTION INTRAVENOUS at 08:29

## 2018-01-01 RX ADMIN — SERTRALINE 50 MG: 50 TABLET, FILM COATED ORAL at 09:42

## 2018-01-01 RX ADMIN — HEPARIN SODIUM 5000 UNITS: 5000 INJECTION, SOLUTION INTRAVENOUS; SUBCUTANEOUS at 21:31

## 2018-01-01 RX ADMIN — SERTRALINE 50 MG: 50 TABLET, FILM COATED ORAL at 10:06

## 2018-01-01 RX ADMIN — SERTRALINE 50 MG: 50 TABLET, FILM COATED ORAL at 10:08

## 2018-01-01 RX ADMIN — POTASSIUM CHLORIDE 10 MEQ: 7.45 INJECTION INTRAVENOUS at 16:42

## 2018-01-01 RX ADMIN — FENTANYL CITRATE 25 MCG: 50 INJECTION, SOLUTION INTRAMUSCULAR; INTRAVENOUS at 18:27

## 2018-01-01 RX ADMIN — HYDROMORPHONE HYDROCHLORIDE 0.5 MG: 2 INJECTION INTRAMUSCULAR; INTRAVENOUS; SUBCUTANEOUS at 03:52

## 2018-01-01 RX ADMIN — Medication 10 ML: at 06:44

## 2018-01-01 RX ADMIN — HEPARIN SODIUM 5000 UNITS: 5000 INJECTION, SOLUTION INTRAVENOUS; SUBCUTANEOUS at 08:43

## 2018-01-01 RX ADMIN — FENTANYL CITRATE 25 MCG: 50 INJECTION, SOLUTION INTRAMUSCULAR; INTRAVENOUS at 11:15

## 2018-01-01 RX ADMIN — NYSTATIN 500000 UNITS: 100000 SUSPENSION ORAL at 21:31

## 2018-01-01 RX ADMIN — CASTOR OIL AND BALSAM, PERU: 788; 87 OINTMENT TOPICAL at 21:45

## 2018-01-01 RX ADMIN — FENTANYL CITRATE 15 MCG: 50 INJECTION, SOLUTION INTRAMUSCULAR; INTRAVENOUS at 18:24

## 2018-01-01 RX ADMIN — LORAZEPAM 1 MG: 2 INJECTION INTRAMUSCULAR; INTRAVENOUS at 20:32

## 2018-01-01 RX ADMIN — MEGESTROL ACETATE 200 MG: 40 SUSPENSION ORAL at 10:02

## 2018-01-01 RX ADMIN — MIRTAZAPINE 30 MG: 15 TABLET, FILM COATED ORAL at 21:28

## 2018-01-01 RX ADMIN — LORAZEPAM 1 MG: 2 INJECTION INTRAMUSCULAR; INTRAVENOUS at 03:58

## 2018-01-01 RX ADMIN — NYSTATIN 500000 UNITS: 100000 SUSPENSION ORAL at 10:05

## 2018-01-01 RX ADMIN — NYSTATIN 500000 UNITS: 100000 SUSPENSION ORAL at 09:41

## 2018-01-01 RX ADMIN — MEGESTROL ACETATE 200 MG: 40 SUSPENSION ORAL at 10:06

## 2018-01-01 RX ADMIN — HEPARIN SODIUM 5000 UNITS: 5000 INJECTION, SOLUTION INTRAVENOUS; SUBCUTANEOUS at 08:54

## 2018-01-01 RX ADMIN — HEPARIN SODIUM 5000 UNITS: 5000 INJECTION, SOLUTION INTRAVENOUS; SUBCUTANEOUS at 09:03

## 2018-01-01 RX ADMIN — CASTOR OIL AND BALSAM, PERU: 788; 87 OINTMENT TOPICAL at 17:13

## 2018-01-01 RX ADMIN — Medication: at 15:32

## 2018-01-01 RX ADMIN — MIRTAZAPINE 30 MG: 15 TABLET, FILM COATED ORAL at 21:31

## 2018-01-01 RX ADMIN — Medication 10 ML: at 14:06

## 2018-01-01 RX ADMIN — Medication: at 22:29

## 2018-01-01 RX ADMIN — FENTANYL CITRATE 25 MCG: 50 INJECTION, SOLUTION INTRAMUSCULAR; INTRAVENOUS at 08:26

## 2018-01-01 RX ADMIN — NYSTATIN 500000 UNITS: 100000 SUSPENSION ORAL at 17:22

## 2018-01-01 RX ADMIN — MEGESTROL ACETATE 200 MG: 40 SUSPENSION ORAL at 09:41

## 2018-01-01 RX ADMIN — SODIUM CHLORIDE 50 ML/HR: 900 INJECTION, SOLUTION INTRAVENOUS at 14:27

## 2018-01-01 RX ADMIN — Medication 10 ML: at 23:08

## 2018-01-01 RX ADMIN — INSULIN LISPRO 2 UNITS: 100 INJECTION, SOLUTION INTRAVENOUS; SUBCUTANEOUS at 17:22

## 2018-01-01 RX ADMIN — CASTOR OIL AND BALSAM, PERU: 788; 87 OINTMENT TOPICAL at 10:23

## 2018-01-01 RX ADMIN — Medication 10 ML: at 06:00

## 2018-01-01 RX ADMIN — NYSTATIN 500000 UNITS: 100000 SUSPENSION ORAL at 20:34

## 2018-01-01 RX ADMIN — Medication: at 17:57

## 2018-01-01 RX ADMIN — NYSTATIN 500000 UNITS: 100000 SUSPENSION ORAL at 09:45

## 2018-01-01 RX ADMIN — CASTOR OIL AND BALSAM, PERU: 788; 87 OINTMENT TOPICAL at 09:05

## 2018-01-01 RX ADMIN — GLIPIZIDE 2.5 MG: 5 TABLET ORAL at 09:44

## 2018-01-01 RX ADMIN — CEFTRIAXONE SODIUM 1 G: 1 INJECTION, POWDER, FOR SOLUTION INTRAMUSCULAR; INTRAVENOUS at 00:12

## 2018-01-01 RX ADMIN — Medication 10 ML: at 05:45

## 2018-01-01 RX ADMIN — Medication 10 ML: at 14:21

## 2018-01-01 RX ADMIN — IOPAMIDOL 100 ML: 755 INJECTION, SOLUTION INTRAVENOUS at 14:27

## 2018-01-01 RX ADMIN — NYSTATIN 500000 UNITS: 100000 SUSPENSION ORAL at 00:04

## 2018-01-01 RX ADMIN — INSULIN LISPRO 5 UNITS: 100 INJECTION, SOLUTION INTRAVENOUS; SUBCUTANEOUS at 17:53

## 2018-01-01 RX ADMIN — CASTOR OIL AND BALSAM, PERU: 788; 87 OINTMENT TOPICAL at 09:51

## 2018-01-01 RX ADMIN — SERTRALINE 50 MG: 50 TABLET, FILM COATED ORAL at 08:42

## 2018-01-01 RX ADMIN — HEPARIN SODIUM 5000 UNITS: 5000 INJECTION, SOLUTION INTRAVENOUS; SUBCUTANEOUS at 21:59

## 2018-01-01 RX ADMIN — SORBITOL SOLUTION (BULK) 30 ML: 70 SOLUTION at 03:30

## 2018-01-01 RX ADMIN — INSULIN LISPRO 7 UNITS: 100 INJECTION, SOLUTION INTRAVENOUS; SUBCUTANEOUS at 08:40

## 2018-01-01 RX ADMIN — INSULIN LISPRO 5 UNITS: 100 INJECTION, SOLUTION INTRAVENOUS; SUBCUTANEOUS at 11:49

## 2018-01-01 RX ADMIN — INSULIN LISPRO 3 UNITS: 100 INJECTION, SOLUTION INTRAVENOUS; SUBCUTANEOUS at 11:37

## 2018-01-01 RX ADMIN — NYSTATIN 500000 UNITS: 100000 SUSPENSION ORAL at 08:59

## 2018-01-01 RX ADMIN — SODIUM CHLORIDE 40 MEQ: 900 INJECTION, SOLUTION INTRAVENOUS at 05:56

## 2018-01-01 RX ADMIN — Medication: at 23:17

## 2018-01-01 RX ADMIN — CASTOR OIL AND BALSAM, PERU: 788; 87 OINTMENT TOPICAL at 08:53

## 2018-01-01 RX ADMIN — FUROSEMIDE 40 MG: 10 INJECTION, SOLUTION INTRAMUSCULAR; INTRAVENOUS at 22:34

## 2018-01-01 RX ADMIN — MORPHINE SULFATE 8 MG: 20 SOLUTION ORAL at 18:04

## 2018-01-01 RX ADMIN — ALPRAZOLAM 0.25 MG: 0.25 TABLET ORAL at 21:18

## 2018-01-01 RX ADMIN — CASTOR OIL AND BALSAM, PERU: 788; 87 OINTMENT TOPICAL at 08:41

## 2018-01-01 RX ADMIN — INSULIN LISPRO 2 UNITS: 100 INJECTION, SOLUTION INTRAVENOUS; SUBCUTANEOUS at 16:29

## 2018-01-01 RX ADMIN — MORPHINE SULFATE 8 MG: 20 SOLUTION ORAL at 17:22

## 2018-01-01 RX ADMIN — MORPHINE SULFATE 2 MG: 2 INJECTION, SOLUTION INTRAMUSCULAR; INTRAVENOUS at 09:02

## 2018-01-01 RX ADMIN — NYSTATIN 500000 UNITS: 100000 SUSPENSION ORAL at 17:54

## 2018-01-01 RX ADMIN — INSULIN LISPRO 5 UNITS: 100 INJECTION, SOLUTION INTRAVENOUS; SUBCUTANEOUS at 22:00

## 2018-01-01 RX ADMIN — INSULIN LISPRO 5 UNITS: 100 INJECTION, SOLUTION INTRAVENOUS; SUBCUTANEOUS at 08:39

## 2018-01-01 RX ADMIN — MORPHINE SULFATE 2 MG: 2 INJECTION, SOLUTION INTRAMUSCULAR; INTRAVENOUS at 14:46

## 2018-01-01 RX ADMIN — MEGESTROL ACETATE 200 MG: 40 SUSPENSION ORAL at 08:39

## 2018-01-01 RX ADMIN — LORAZEPAM 1 MG: 2 SOLUTION, CONCENTRATE ORAL at 15:50

## 2018-01-01 RX ADMIN — Medication 10 ML: at 22:34

## 2018-01-01 RX ADMIN — HALOPERIDOL LACTATE 1 MG: 5 INJECTION, SOLUTION INTRAMUSCULAR at 16:28

## 2018-01-01 RX ADMIN — NYSTATIN 500000 UNITS: 100000 SUSPENSION ORAL at 10:58

## 2018-01-01 RX ADMIN — CASTOR OIL AND BALSAM, PERU: 788; 87 OINTMENT TOPICAL at 10:58

## 2018-01-01 RX ADMIN — Medication: at 21:18

## 2018-01-01 RX ADMIN — LORAZEPAM 1 MG: 2 SOLUTION, CONCENTRATE ORAL at 23:58

## 2018-01-01 RX ADMIN — MEGESTROL ACETATE 200 MG: 40 SUSPENSION ORAL at 09:45

## 2018-01-01 RX ADMIN — SERTRALINE 50 MG: 50 TABLET, FILM COATED ORAL at 10:56

## 2018-01-01 RX ADMIN — INSULIN LISPRO 5 UNITS: 100 INJECTION, SOLUTION INTRAVENOUS; SUBCUTANEOUS at 12:55

## 2018-01-01 RX ADMIN — MORPHINE SULFATE 10 MG: 20 SOLUTION ORAL at 15:24

## 2018-01-01 RX ADMIN — SODIUM CHLORIDE: 900 INJECTION, SOLUTION INTRAVENOUS at 13:37

## 2018-01-01 RX ADMIN — MIRTAZAPINE 30 MG: 15 TABLET, FILM COATED ORAL at 21:59

## 2018-01-01 RX ADMIN — ALBUMIN (HUMAN) 12.5 G: 0.25 INJECTION, SOLUTION INTRAVENOUS at 12:13

## 2018-01-01 RX ADMIN — HEPARIN SODIUM 5000 UNITS: 5000 INJECTION, SOLUTION INTRAVENOUS; SUBCUTANEOUS at 21:18

## 2018-01-01 RX ADMIN — MORPHINE SULFATE 2 MG: 2 INJECTION, SOLUTION INTRAMUSCULAR; INTRAVENOUS at 00:51

## 2018-01-01 RX ADMIN — Medication 10 ML: at 21:32

## 2018-01-01 RX ADMIN — ONDANSETRON HYDROCHLORIDE 4 MG: 2 INJECTION, SOLUTION INTRAMUSCULAR; INTRAVENOUS at 12:03

## 2018-01-01 RX ADMIN — SERTRALINE 50 MG: 50 TABLET, FILM COATED ORAL at 09:44

## 2018-01-01 RX ADMIN — SODIUM CHLORIDE 1000 ML: 900 INJECTION, SOLUTION INTRAVENOUS at 01:22

## 2018-01-01 RX ADMIN — CASTOR OIL AND BALSAM, PERU: 788; 87 OINTMENT TOPICAL at 18:35

## 2018-01-01 RX ADMIN — Medication: at 13:37

## 2018-01-01 RX ADMIN — FENTANYL CITRATE 40 MCG: 50 INJECTION, SOLUTION INTRAMUSCULAR; INTRAVENOUS at 10:07

## 2018-01-01 RX ADMIN — LORAZEPAM 1 MG: 2 INJECTION INTRAMUSCULAR; INTRAVENOUS at 00:02

## 2018-01-01 RX ADMIN — INSULIN LISPRO 3 UNITS: 100 INJECTION, SOLUTION INTRAVENOUS; SUBCUTANEOUS at 10:06

## 2018-01-01 RX ADMIN — HEPARIN SODIUM 5000 UNITS: 5000 INJECTION, SOLUTION INTRAVENOUS; SUBCUTANEOUS at 10:02

## 2018-01-01 RX ADMIN — HYDROMORPHONE HYDROCHLORIDE 0.5 MG: 2 INJECTION INTRAMUSCULAR; INTRAVENOUS; SUBCUTANEOUS at 08:42

## 2018-01-01 RX ADMIN — SERTRALINE 50 MG: 50 TABLET, FILM COATED ORAL at 08:59

## 2018-01-01 RX ADMIN — ACETAMINOPHEN 650 MG: 325 TABLET ORAL at 15:07

## 2018-01-01 RX ADMIN — FENTANYL CITRATE 25 MCG: 50 INJECTION, SOLUTION INTRAMUSCULAR; INTRAVENOUS at 00:02

## 2018-01-01 RX ADMIN — SODIUM CHLORIDE 1000 ML: 900 INJECTION, SOLUTION INTRAVENOUS at 15:20

## 2018-01-01 RX ADMIN — NYSTATIN 500000 UNITS: 100000 SUSPENSION ORAL at 21:26

## 2018-01-01 RX ADMIN — LORAZEPAM 1 MG: 2 SOLUTION, CONCENTRATE ORAL at 15:24

## 2018-01-01 RX ADMIN — NYSTATIN 500000 UNITS: 100000 SUSPENSION ORAL at 08:39

## 2018-01-01 RX ADMIN — SERTRALINE 50 MG: 50 TABLET, FILM COATED ORAL at 10:16

## 2018-01-01 RX ADMIN — HEPARIN SODIUM 5000 UNITS: 5000 INJECTION, SOLUTION INTRAVENOUS; SUBCUTANEOUS at 10:07

## 2018-01-01 RX ADMIN — CASTOR OIL AND BALSAM, PERU: 788; 87 OINTMENT TOPICAL at 18:00

## 2018-01-01 RX ADMIN — SODIUM CHLORIDE 1000 ML: 900 INJECTION, SOLUTION INTRAVENOUS at 19:44

## 2018-01-01 RX ADMIN — MORPHINE SULFATE 8 MG: 20 SOLUTION ORAL at 06:57

## 2018-01-01 RX ADMIN — FENTANYL CITRATE 40 MCG: 50 INJECTION, SOLUTION INTRAMUSCULAR; INTRAVENOUS at 16:27

## 2018-01-01 RX ADMIN — ONDANSETRON HYDROCHLORIDE 4 MG: 2 INJECTION, SOLUTION INTRAMUSCULAR; INTRAVENOUS at 22:15

## 2018-01-01 RX ADMIN — GLIPIZIDE 2.5 MG: 5 TABLET ORAL at 17:05

## 2018-01-01 RX ADMIN — MIRTAZAPINE 30 MG: 15 TABLET, FILM COATED ORAL at 21:18

## 2018-01-01 RX ADMIN — Medication: at 07:08

## 2018-01-01 RX ADMIN — ONDANSETRON 4 MG: 2 INJECTION INTRAMUSCULAR; INTRAVENOUS at 10:14

## 2018-01-01 RX ADMIN — MEGESTROL ACETATE 200 MG: 40 SUSPENSION ORAL at 10:17

## 2018-01-01 RX ADMIN — INSULIN LISPRO 4 UNITS: 100 INJECTION, SOLUTION INTRAVENOUS; SUBCUTANEOUS at 23:01

## 2018-01-01 RX ADMIN — HEPARIN SODIUM 5000 UNITS: 5000 INJECTION, SOLUTION INTRAVENOUS; SUBCUTANEOUS at 21:16

## 2018-01-01 RX ADMIN — BARIUM SULFATE 900 ML: 21 SUSPENSION ORAL at 14:27

## 2018-01-01 RX ADMIN — INSULIN LISPRO 2 UNITS: 100 INJECTION, SOLUTION INTRAVENOUS; SUBCUTANEOUS at 16:37

## 2018-01-01 RX ADMIN — SODIUM CHLORIDE 100 ML/HR: 900 INJECTION, SOLUTION INTRAVENOUS at 20:50

## 2018-01-01 RX ADMIN — POTASSIUM CHLORIDE 10 MEQ: 7.46 INJECTION, SOLUTION INTRAVENOUS at 10:03

## 2018-01-01 RX ADMIN — Medication 10 ML: at 05:53

## 2018-01-01 RX ADMIN — INSULIN LISPRO 3 UNITS: 100 INJECTION, SOLUTION INTRAVENOUS; SUBCUTANEOUS at 10:16

## 2018-01-01 RX ADMIN — INSULIN LISPRO 9 UNITS: 100 INJECTION, SOLUTION INTRAVENOUS; SUBCUTANEOUS at 16:58

## 2018-01-01 RX ADMIN — INSULIN LISPRO 3 UNITS: 100 INJECTION, SOLUTION INTRAVENOUS; SUBCUTANEOUS at 23:07

## 2018-01-01 RX ADMIN — Medication 10 ML: at 22:06

## 2018-01-01 RX ADMIN — HEPARIN SODIUM 5000 UNITS: 5000 INJECTION, SOLUTION INTRAVENOUS; SUBCUTANEOUS at 09:00

## 2018-01-01 RX ADMIN — CASTOR OIL AND BALSAM, PERU: 788; 87 OINTMENT TOPICAL at 18:26

## 2018-01-01 RX ADMIN — INSULIN LISPRO 2 UNITS: 100 INJECTION, SOLUTION INTRAVENOUS; SUBCUTANEOUS at 12:21

## 2018-01-01 RX ADMIN — Medication 10 ML: at 23:21

## 2018-01-01 RX ADMIN — CASTOR OIL AND BALSAM, PERU: 788; 87 OINTMENT TOPICAL at 17:32

## 2018-01-01 RX ADMIN — Medication 10 ML: at 22:00

## 2018-01-01 RX ADMIN — MORPHINE SULFATE 10 MG: 20 SOLUTION ORAL at 15:50

## 2018-01-01 RX ADMIN — SERTRALINE 50 MG: 50 TABLET, FILM COATED ORAL at 08:41

## 2018-01-01 RX ADMIN — HEPARIN SODIUM 5000 UNITS: 5000 INJECTION, SOLUTION INTRAVENOUS; SUBCUTANEOUS at 20:03

## 2018-01-01 RX ADMIN — LORAZEPAM 1 MG: 2 INJECTION INTRAMUSCULAR; INTRAVENOUS at 08:23

## 2018-01-01 RX ADMIN — INSULIN LISPRO 7 UNITS: 100 INJECTION, SOLUTION INTRAVENOUS; SUBCUTANEOUS at 13:09

## 2018-01-01 RX ADMIN — LORAZEPAM 1 MG: 2 INJECTION INTRAMUSCULAR; INTRAVENOUS at 14:14

## 2018-01-01 RX ADMIN — MIRTAZAPINE 30 MG: 15 TABLET, FILM COATED ORAL at 21:17

## 2018-01-01 RX ADMIN — MIRTAZAPINE 30 MG: 15 TABLET, FILM COATED ORAL at 02:10

## 2018-01-01 RX ADMIN — SODIUM CHLORIDE 1000 ML: 900 INJECTION, SOLUTION INTRAVENOUS at 08:28

## 2018-01-01 RX ADMIN — Medication 10 ML: at 05:58

## 2018-01-01 RX ADMIN — HEPARIN SODIUM 5000 UNITS: 5000 INJECTION, SOLUTION INTRAVENOUS; SUBCUTANEOUS at 09:04

## 2018-01-01 RX ADMIN — NYSTATIN 500000 UNITS: 100000 SUSPENSION ORAL at 10:17

## 2018-01-01 RX ADMIN — CASTOR OIL AND BALSAM, PERU: 788; 87 OINTMENT TOPICAL at 08:29

## 2018-01-01 RX ADMIN — ALBUMIN (HUMAN) 25 G: 12.5 INJECTION, SOLUTION INTRAVENOUS at 07:04

## 2018-01-01 RX ADMIN — HEPARIN SODIUM 5000 UNITS: 5000 INJECTION, SOLUTION INTRAVENOUS; SUBCUTANEOUS at 21:24

## 2018-01-01 RX ADMIN — CASTOR OIL AND BALSAM, PERU: 788; 87 OINTMENT TOPICAL at 09:10

## 2018-01-01 RX ADMIN — CASTOR OIL AND BALSAM, PERU: 788; 87 OINTMENT TOPICAL at 10:08

## 2018-01-01 RX ADMIN — HEPARIN SODIUM 5000 UNITS: 5000 INJECTION, SOLUTION INTRAVENOUS; SUBCUTANEOUS at 10:16

## 2018-01-01 RX ADMIN — MORPHINE SULFATE 10 MG: 20 SOLUTION ORAL at 23:58

## 2018-01-01 RX ADMIN — MEGESTROL ACETATE 200 MG: 40 SUSPENSION ORAL at 10:56

## 2018-01-01 RX ADMIN — INSULIN LISPRO 2 UNITS: 100 INJECTION, SOLUTION INTRAVENOUS; SUBCUTANEOUS at 21:43

## 2018-01-01 RX ADMIN — NYSTATIN 500000 UNITS: 100000 SUSPENSION ORAL at 17:30

## 2018-01-01 RX ADMIN — CASTOR OIL AND BALSAM, PERU: 788; 87 OINTMENT TOPICAL at 10:01

## 2018-01-01 RX ADMIN — CASTOR OIL AND BALSAM, PERU: 788; 87 OINTMENT TOPICAL at 16:37

## 2018-01-01 RX ADMIN — HEPARIN SODIUM 5000 UNITS: 5000 INJECTION, SOLUTION INTRAVENOUS; SUBCUTANEOUS at 09:42

## 2018-01-01 RX ADMIN — FENTANYL CITRATE 25 MCG: 50 INJECTION, SOLUTION INTRAMUSCULAR; INTRAVENOUS at 20:33

## 2018-01-01 RX ADMIN — MAGNESIUM SULFATE HEPTAHYDRATE 2 G: 40 INJECTION, SOLUTION INTRAVENOUS at 08:30

## 2018-01-01 RX ADMIN — NYSTATIN 500000 UNITS: 100000 SUSPENSION ORAL at 23:08

## 2018-01-01 RX ADMIN — Medication 10 ML: at 22:18

## 2018-01-01 RX ADMIN — SODIUM CHLORIDE 1000 ML: 900 INJECTION, SOLUTION INTRAVENOUS at 15:24

## 2018-01-01 RX ADMIN — HYDROMORPHONE HYDROCHLORIDE 0.5 MG: 2 INJECTION INTRAMUSCULAR; INTRAVENOUS; SUBCUTANEOUS at 17:57

## 2018-01-01 RX ADMIN — SODIUM CHLORIDE 50 MEQ: 900 INJECTION, SOLUTION INTRAVENOUS at 13:58

## 2018-01-01 RX ADMIN — MIRTAZAPINE 30 MG: 15 TABLET, FILM COATED ORAL at 23:09

## 2018-01-01 RX ADMIN — FENTANYL CITRATE 25 MCG: 50 INJECTION, SOLUTION INTRAMUSCULAR; INTRAVENOUS at 12:46

## 2018-01-01 RX ADMIN — Medication 10 ML: at 05:54

## 2018-01-01 RX ADMIN — SODIUM CHLORIDE 1000 ML: 900 INJECTION, SOLUTION INTRAVENOUS at 10:10

## 2018-01-01 RX ADMIN — ACETAMINOPHEN 650 MG: 325 TABLET ORAL at 19:47

## 2018-01-01 RX ADMIN — NYSTATIN 500000 UNITS: 100000 SUSPENSION ORAL at 21:17

## 2018-01-01 RX ADMIN — Medication: at 01:16

## 2018-01-01 RX ADMIN — HEPARIN SODIUM 5000 UNITS: 5000 INJECTION, SOLUTION INTRAVENOUS; SUBCUTANEOUS at 00:04

## 2018-01-01 RX ADMIN — MIRTAZAPINE 30 MG: 15 TABLET, FILM COATED ORAL at 00:04

## 2018-01-01 RX ADMIN — NYSTATIN 500000 UNITS: 100000 SUSPENSION ORAL at 10:08

## 2018-01-01 RX ADMIN — POTASSIUM CHLORIDE 10 MEQ: 7.46 INJECTION, SOLUTION INTRAVENOUS at 11:39

## 2018-01-01 RX ADMIN — NYSTATIN 500000 UNITS: 100000 SUSPENSION ORAL at 16:56

## 2018-01-01 RX ADMIN — Medication 10 ML: at 07:41

## 2018-01-02 NOTE — TELEPHONE ENCOUNTER
Requested Prescriptions     Pending Prescriptions Disp Refills    ALPRAZolam (XANAX) 0.25 mg tablet 90 Tab 0     Sig: Take 1 Tab by mouth three (3) times daily as needed for Anxiety. Max Daily Amount: 0.75 mg.        Last Refill: 7/5/17  Next Appointment:2/2/18

## 2018-01-19 NOTE — TELEPHONE ENCOUNTER
Requested Prescriptions     Pending Prescriptions Disp Refills    glipiZIDE (GLUCOTROL) 5 mg tablet 30 Tab 1     Sig: Take 0.5 Tabs by mouth two (2) times a day.        Last Refill: 11/15/17  Next Appointment:2/2/18

## 2018-01-19 NOTE — TELEPHONE ENCOUNTER
Requested Prescriptions     Pending Prescriptions Disp Refills    carvedilol (COREG) 6.25 mg tablet 60 Tab 2     Sig: Take 1 Tab by mouth two (2) times daily (with meals).        Last Refill: 12/18/17  Next Appointment:

## 2018-02-08 NOTE — TELEPHONE ENCOUNTER
vorb Dr Tahir Jones, refill Mirtazapine ( Remeron) 30 mg tab, take one tab by mouth nightly, disp 30 refill x3

## 2018-02-09 PROBLEM — E11.21 TYPE 2 DIABETES WITH NEPHROPATHY (HCC): Status: ACTIVE | Noted: 2018-01-01

## 2018-02-09 NOTE — PROGRESS NOTES
0945 Pt arrived at [facility] via wheelchair and in no distress for hydration. Assessment completed, pt was seen at Dr. Chava Martinez office today with recent history of \"dry heaves\" and diarrhea. IV started right AC, cbc drawn. Visit Vitals    /70 (BP 1 Location: Left arm, BP Patient Position: Supine)    Pulse 63    Temp 97.5 °F (36.4 °C)    Resp 18    SpO2 98%       Medications received:  NS 1000 ml IV over 1 hour  zofran 4 mg IV    Left IV flushed and removed. Specimen cup and  for toilet given to pt to obtain stool sample at home, will return to [facility]    Visit Vitals    /67    Pulse (!) 57    Temp 97.5 °F (36.4 °C)    Resp 16    SpO2 98%       1130 Tolerated treatment well, no adverse reaction noted. D/Cd from [facility] via wheelchair and in no distress accompanied by .  No more appts scheduled at [facility]

## 2018-02-09 NOTE — PROGRESS NOTES
Follow-up Note         Patient: Rashida Galarza MRN: [id number]  SSN: [id number]    YOB: 1950  Age: 77 y.o. Sex: female        Assessment:     1. Metastatic clear cell kidney cancer    Treatment:     1. On Pazopanib at reduced dose 400 mg daily - started 10/1/2017  2. SBRT completed on 1/9/2018 with Dr. Will Tsai      Subjective:     Rashida Galarza is a 77 y.o. female who I am seeing in follow up for a diagnosis of clear cell renal cell carcinoma. She noticed a lump coming up in the right side of the chest and shoulder. A CT followed by biopsy of the LN reveals the diagnosis of kidney cancer. CT of the abdomen shows a large exophytic mass in the left kidney, pleural based lung nodules and mediastinal adenopathy. She denies weight loss, pain, hematuria, bone pains etc.     A MRI of the brain shows she has multiple small lesions. She started Pazopanib in October 2017. She completed SBRT with Selene Rojas and Varinder at [facility] on 1/9/2018. She was feeling better and gaining weight till the time she started SBRT to the brain. Since then her appetite has declined. She reports diarrhea and vomiting intermittently over the last several weeks. She is loosing weight. She is here today with her  who feels that she has progressively declined since finishing radiation.       Review of Systems:    Constitutional: weakness, fatigue, insomnia   Eyes: negative  Ears, Nose, Mouth, Throat, and Face: negative  Respiratory: negative  Cardiovascular: negative  Gastrointestinal: poor appetite, diarrhea, N/V  Genitourinary:negative  Integument/Breast: negative  Hematologic/Lymphatic: negative  Musculoskeletal:negative  Neurological: negative      Past Medical History:   Diagnosis Date    Anxiety 7/31/2017    2/3/17:Under a tremendous amount of stress, will continue Alprazolam prn, if using regularly consider changing to an SSRI or counseling    Cancer ([facility] [address] 08/2017    renal cell carcinoma    Cervical myelopathy ([facility] 7/31/2017    2/3/17:Cervical fusion     [facility] 7/31/2017    Fatigue 7/31/2017    Hypercholesterolemia 7/31/2017    Hyperglycemia 7/31/2017    Hypertension 7/31/2017    2/3/17:Poorly controlled, lifestyle changes, repeat /90 will follow    On statin therapy 7/31/2017    Osteoporosis 7/31/2017    Post-menopausal 7/31/2017    Spinal stenosis of lumbar region with radiculopathy 7/31/2017    2/3/17:Symptoms c/w this diagnosis discussed with pt., if progresses then need MRI L spine and follow up    Vaginitis due to Candida 7/31/2017    2/3/17:Associated with antibiotic use, requests prescription med     Past Surgical History:   Procedure Laterality Date    HX ORTHOPAEDIC      2009 spinal cord surgery     HX ORTHOPAEDIC Left     knee procedure    HX OTHER SURGICAL  08/16/2017    incisional biopsy right neck mass      Family History   Problem Relation Age of Onset    Heart Disease Mother      Social History   Substance Use Topics    Smoking status: Never Smoker    Smokeless tobacco: Never Used    Alcohol use No      Prior to Admission medications    Medication Sig Start Date End Date Taking? Authorizing Provider   mirtazapine (REMERON) 30 mg tablet Take 1 Tab by mouth nightly. 2/8/18  Yes Vanessa Tobias MD   carvedilol (COREG) 6.25 mg tablet Take 1 Tab by mouth two (2) times daily (with meals). 1/22/18  Yes RONY Steinberg MD   glipiZIDE (GLUCOTROL) 5 mg tablet Take 0.5 Tabs by mouth two (2) times a day. 1/20/18  Yes RONY Steinberg MD   sertraline (ZOLOFT) 50 mg tablet Take 1 Tab by mouth daily. 1/11/18  Yes RONY Steinberg MD   oxyCODONE IR (ROXICODONE) 5 mg immediate release tablet Take 1 Tab by mouth every six (6) hours as needed for Pain. Max Daily Amount: 20 mg. 12/18/17  Yes Enedina Vo NP   PAZOPanib (VOTRIENT) 200 mg tablet Take 200 mg by mouth daily.  2 tablets daily   Yes Historical Provider   nystatin (MYCOSTATIN) powder Apply  to affected area four (4) times daily. 10/9/17  Yes Petra Luis MD   atorvastatin (LIPITOR) 40 mg tablet Take 1 Tab by mouth daily. 9/8/17  Yes Swathi Cary MD   vit a,c & e-lutein-minerals (OCUVITE) tablet Take 1 Tab by mouth daily. Yes Historical Provider   ondansetron (ZOFRAN ODT) 4 mg disintegrating tablet Take 1 Tab by mouth every eight (8) hours as needed for Nausea. 8/23/17  Yes Petra Luis MD   ALPRAZolam Martine Herrera) 0.25 mg tablet Take 1 Tab by mouth three (3) times daily as needed for Anxiety. Max Daily Amount: 0.75 mg. 1/2/18   C Larry Rivas MD   pantoprazole (PROTONIX) 40 mg tablet Take 1 Tab by mouth daily. 12/20/17   Petra Luis MD   nystatin (MYCOSTATIN) topical cream  10/6/17   Historical Provider   nystatin (MYCOSTATIN) 100,000 unit/mL suspension  10/3/17   Historical Provider   megestrol (MEGACE) 400 mg/10 mL (10 mL) suspension Take 5 mL by mouth daily. 11/20/17   Wash Kal Myers NP   magnesium oxide 250 mg tablet Take 250 mg by mouth nightly. Historical Provider          No Known Allergies        Objective:     Vitals:    02/09/18 0903   BP: 102/67   Pulse: 65   Resp: 16   Temp: 97.4 °F (36.3 °C)   TempSrc: Axillary   SpO2: 97%   Weight: 95 lb 3.2 oz (43.2 kg)   Height: 5' 1\" (1.549 m)          Physical Exam:  GENERAL: alert, cooperative, no distress, appears stated age  EYE: negative  LYMPHATIC: Cervical, supraclavicular, and axillary nodes normal.   THROAT & NECK: normal and no erythema or exudates noted. LUNG: clear to auscultation bilaterally  HEART: regular rate and rhythm  ABDOMEN: soft, non-tender  EXTREMITIES:  no edema  SKIN: skin discoloration  NEUROLOGIC: negative      CT Results (most recent):    Results from Hospital Encounter encounter on 11/28/17   CT ABD PELV W CONT   Narrative INDICATION: metastatic renal cell ca    COMPARISON: August 1, 2017, August 22, 2017.     TECHNIQUE:  Following the uneventful intravenous administration of 100 cc  Isovue-300, 5 mm axial images were obtained through the chest. Coronal and  sagittal reconstructions were generated. CT dose reduction was achieved through  use of a standardized protocol tailored for this examination and automatic  exposure control for dose modulation. Subtle CT scan of the abdomen and pelvis  obtained after intravenous and oral contrast.    FINDINGS:  Most of the lung lesions are significantly decreasing size some are no longer  seen as an example a left upper lobe mass measures at this time 8 x 5 mm, it was  12 x 10. There is no pericardial or pleural effusion. The  mediastinal  adenopathy in the right upper chest also significantly decreased in size and are  mainly cystic or necrotic at this time. Measure maximal AP diameter of 37 mm on  the prior examination, it is 26 on the current. The hilar adenopathy. Also  decreased in size and now mainly cystic as well. No new adenopathy seen. There  is no shift or pneumothorax. Liver and spleen are normal in size. Small liver cyst. No evidence to suggest  metastases to the liver. Gallstones are seen in a nondistended gallbladder. The  pancreas does appear unremarkable. The left adrenal is unchanged with mass. Multiple bilateral renal masses once again demonstrated and grossly stable in  size. The a large bulky partially necrotic or cystic mass in the lower pole of  the left kidney with erosion of the General's fascia and perinephric involvement  as well as adjacent adenopathy and renal vein thrombosis. The mass appears to be  more cystic or necrotic than on the prior examination. Size is grossly stable. There is some adjacent stranding in the peritoneum and retroperitoneum are  possibly related to infiltration. There is no bowel obstruction. There is  sigmoid diverticulosis, the bladder is midline but unfilled. The uterus up  appear unremarkable. Prominent fecal stasis is demonstrated in the rectosigmoid. Aorta show patency.  Review of bone windows does not suggest change or  metastases. Impression impression: Significant improvement in the a chest involvement with decreased  size of most of the lesions, also more necrotic appearance to the mediastinal  and hilar lesions. The a left renal mass also show some more necrotic or cystic  appearance. No new metastases demonstrated. Assessment:     1. Metastatic clear cell kidney cancer    Lung nodules, mediastinal adenopathy  Left kidney primary  Left adrenal mass    ECOG PS 2  Intent of treatment - palliative    Due to poor performance status, she is not longer a candidate for cytoreductive nephrectomy. Treatment was delayed d/t hospitalization and rehab. On Pazopanib at reduced dose 400 mg daily - started 10/1/2017  Skin and hair color change +  ? Nausea  ? Diarrhea  Rule out other causes such as disease progression etc  A detailed system by system evaluation of side effect was performed to assess chemotherapy related toxicity. Blood counts are acceptable. Results reviewed with the patient. CT Abd, Pelv (11/28/2017): tremendous response to therapy  Will restage her with CT    Symptom management form reviewed with patient. 2. Brain metastasis     S/p SBRT by Dr. Jasvir Maguire at [facility] - completed 1/9/2018      3. Protein calorie malnutrition    > Protein supplementation      4. DM     > Stop Metformin r/t renal function  > Continue Glipizide 2.5 mg BID  > Following with Dr. John Robins for management      5. Insomnia    > Continue Mirtazapine 30 mg nightly      6. Transaminitis    From Pazopanib  Resolved      7. Diarrhea, N/V, Dehydration    > Check c-diff  > Fluids in [facility] today  > IV Zofran in [facility] today      8. Acute renal insufficiency    Pre-renal from dehydration  IV fluid in [facility]      9. Adrenal nodule    Observation      10.  Cervical myelopathy    Asymptomatic at this time  Observation        Plan:       > Continue Pazopanib 400 mg daily  > Continue Mirtazapine 30 mg nightly  > Continue Oxycodone IR as needed  > Check stool for c-diff  > Fluids in [facility] today  > Repeat CT Chest Abd Pelv - hydrate and check labs prior to imaging  > Follow-up in 1 month        Signed by: Kasandra Skelton MD                     February 9, 2018        CC. Nicole Rock.  Jace Boucher MD

## 2018-02-09 NOTE — PROGRESS NOTES
Problem: Patient Education:  Go to Education Activity  Goal: Patient/Family Education  Outcome: Progressing Towards Goal  Oriented to room and unit

## 2018-02-09 NOTE — PROGRESS NOTES
Saqib Potter is a 77 y.o. female here today for Met. Clear Cell Kidney Cancer f/u. Labs done 2/6/18. On Pazopanib (Votrient) 400 mg daily; 10/2017. Patient needs ECHO/EKG. Patient noted ambulating with a walker; steady slow gait noted. Patient accompanied by spouse to today's appointment. Weight loss noted; pt weigh's 95.2 lbs. Today; pt weight was 109 lbs 9.6 oz on 11/29/17. Patient has very poor to no appetite. Patient states she has not had an appetite because she had a \"stomach bug\" which started about 2 weeks ago. Patient has some mild nausea. Patient denies vomiting and constipation. Patient denies pain. SOB on exertion. Patient denies numbness and tingling. Patient has swelling in her feet. Patient has a rash to her LE; pt states home health access and rash looks much better. Patient eyelashes noted to be grayish/white. Continuous watering of eye's noted; clear liquid noted; pt states her eyes water all the time. Discoloration of skin noted. Blood pressure 102/67, pulse 65, temperature 97.4 °F (36.3 °C), temperature source Axillary, resp. rate 16, height 5' 1\" (1.549 m), weight 95 lb 3.2 oz (43.2 kg), SpO2 97 %.         Health Maintenance reviewed

## 2018-02-14 NOTE — PROGRESS NOTES
Add On Appointment  [phone] Pt arrived at [facility] via wc and in no distress for IV hydration. Assessment completed, no new complaints voiced. Diarrhea x 2 weeks. IV established. Visit Vitals    BP 95/60    Pulse (!) 54    Temp 97.5 °F (36.4 °C)    Resp 18     Recent Results (from the past 12 hour(s))   METABOLIC PANEL, COMPREHENSIVE    Collection Time: 02/14/18  3:22 PM   Result Value Ref Range    Sodium 132 (L) 136 - 145 mmol/L    Potassium 2.6 (LL) 3.5 - 5.1 mmol/L    Chloride 100 97 - 108 mmol/L    CO2 22 21 - 32 mmol/L    Anion gap 10 5 - 15 mmol/L    Glucose 157 (H) 65 - 100 mg/dL    BUN 36 (H) 6 - 20 MG/DL    Creatinine 2.49 (H) 0.55 - 1.02 MG/DL    BUN/Creatinine ratio 14 12 - 20      GFR est AA 23 (L) >60 ml/min/1.73m2    GFR est non-AA 19 (L) >60 ml/min/1.73m2    Calcium 8.7 8.5 - 10.1 MG/DL    Bilirubin, total 0.4 0.2 - 1.0 MG/DL    ALT (SGPT) 13 12 - 78 U/L    AST (SGOT) 15 15 - 37 U/L    Alk. phosphatase 130 (H) 45 - 117 U/L    Protein, total 6.9 6.4 - 8.2 g/dL    Albumin 2.3 (L) 3.5 - 5.0 g/dL    Globulin 4.6 (H) 2.0 - 4.0 g/dL    A-G Ratio 0.5 (L) 1.1 - 2.2     MAGNESIUM    Collection Time: 02/14/18  3:22 PM   Result Value Ref Range    Magnesium 1.9 1.6 - 2.4 mg/dL     Medications received:  1 liter NS  Potassium 10 meq IV  Magnesium 2 gm IV    1805 Tolerated treatment well, no adverse reaction noted. IV d/c'd. D/Cd from [facility] via  and in no distress accompanied by family. Next appt 2/15 for additional IV potassium.

## 2018-02-15 NOTE — PROGRESS NOTES
0815 Pt arrived at [facility] via wc and in no distress for IV potassium. Assessment completed, no new complaints voiced. IV established in left AC. Visit Vitals    BP 97/58    Pulse (!) 59    Temp 97.6 °F (36.4 °C)    Resp 16    SpO2 97%       Medications received:  1 liter NS  Potassium 10 meq IV x 3 doses  Mag Sulfate 2 gm IV    1335 Treatment completed. Pt slept during entire treatment. Woke up and was incontinent of small amt of stool. Pt to [facility] via wc. Brief changed. Educated pt on wiping from front to back x 1 and disposing of paper. Clean brief and pt's own scrubs applied. 1355 D/Cd from [facility] via  and in no distress accompanied by spouse. No further appointments scheduled at [facility]. Pt and spouse aware of appointment for CT 2/19/18 @ 1230.

## 2018-02-19 NOTE — PROGRESS NOTES
Improvement in disease. If she is doing poorly with Votrient, we may have to either reduce the dose or switch to an alternative.

## 2018-02-20 NOTE — PROGRESS NOTES
Pt called asking if she should be on probiotics. Pt instructed OK to take probiotics. Per Dr Tahir Jones, Pt instructed to stop taking Votrient for one week and see if diarrhea improves. Pt verbalizes understanding and reports she will call next week to report if any improvement.

## 2018-02-21 NOTE — PROGRESS NOTES
Spoke with Pt's , pleased with results. Pt is taking a break from Votrient x1 week to see if diarrhea improves. Pt or  will call next week to update.

## 2018-02-28 NOTE — TELEPHONE ENCOUNTER
Spoke with Mr Lucero Youngblood at length, Mrs Lucero Youngblood is still not eating well, just small amounts at a time. Mrs Lucero Youngblood also is not getting up during the day, she lays on the couch all day then sleeps in the bed at night, only sits up for short amounts of time. He reports her bowels movements are better, not diarrhea. He was instructed to start back on the Votrient at one tab daily until upcoming appointment on 3/9/18. Offered Palliative appointment, declined.

## 2018-03-02 PROBLEM — N17.9 ACUTE KIDNEY INJURY (HCC): Status: ACTIVE | Noted: 2018-01-01

## 2018-03-03 PROBLEM — I95.9 HYPOTENSION: Status: ACTIVE | Noted: 2018-01-01

## 2018-03-03 PROBLEM — E86.0 DEHYDRATION: Status: ACTIVE | Noted: 2018-01-01

## 2018-03-03 PROBLEM — E43 SEVERE PROTEIN-CALORIE MALNUTRITION (HCC): Status: ACTIVE | Noted: 2018-01-01

## 2018-03-03 NOTE — ED NOTES
TRANSFER - OUT REPORT:    Verbal report given to Janis(name) on [facility] [place]  being transferred to PCU(unit) for routine progression of care       Report consisted of patients Situation, Background, Assessment and   Recommendations(SBAR). Information from the following report(s) SBAR, Kardex, ED Summary and Recent Results was reviewed with the receiving nurse. Lines:   Peripheral IV 03/02/18 Right Antecubital (Active)   Site Assessment Clean, dry, & intact 3/2/2018  7:40 PM   Phlebitis Assessment 0 3/2/2018  7:40 PM   Infiltration Assessment 0 3/2/2018  7:40 PM   Dressing Status Clean, dry, & intact 3/2/2018  7:40 PM   Dressing Type 4 X 4 3/2/2018  7:40 PM        Opportunity for questions and clarification was provided.       Patient transported with:   Registered Nurse

## 2018-03-03 NOTE — ED PROVIDER NOTES
EMERGENCY DEPARTMENT HISTORY AND PHYSICAL EXAM      Date: 3/2/2018  Patient Name: Anny Banegas    History of Presenting Illness     Chief Complaint   Patient presents with    Anorexia     pt is stage IV kidney cancer. Has stopped eating and drinking x 2 days, consuming less than 1 cup of liquid per day and only 1 cup of pudding during this time. History Provided By: Patient and Patient's     HPI: Anny Banegas, 77 y.o. female with PMHx significant for kidney cancer, anxiety, and HTN, presents herself to the ED with cc of decreased appetite and PO intake in the last 2 days. Per , pt has only had 1 cup of liquid per day and only 1 cup of pudding. Pt reports associated fatigue since onset of symptoms. Pt expresses her desire to leave the ED as she does not want to be seen and treated at the hospital. She notes of tired of being sick. Per , pt had a positive attitude during her stay at [address] [place] [address] after being discharged from [facility] 2 weak ago. However, when coming back home pt displayed a very negative attitude about living with her current condition. Pt is ambulatory with walker. PCP: Agatha Oakley MD    There are no other complaints, changes, or physical findings at this time. Current Facility-Administered Medications   Medication Dose Route Frequency Provider Last Rate Last Dose    sodium chloride 0.9 % bolus infusion 1,000 mL  1,000 mL IntraVENous NOW Holger Simons MD 1,000 mL/hr at 03/02/18 1944 1,000 mL at 03/02/18 1944     Current Outpatient Prescriptions   Medication Sig Dispense Refill    mirtazapine (REMERON) 30 mg tablet Take 1 Tab by mouth nightly. 30 Tab 3    carvedilol (COREG) 6.25 mg tablet Take 1 Tab by mouth two (2) times daily (with meals). 60 Tab 2    glipiZIDE (GLUCOTROL) 5 mg tablet Take 0.5 Tabs by mouth two (2) times a day. 30 Tab 6    sertraline (ZOLOFT) 50 mg tablet Take 1 Tab by mouth daily.  30 Tab 6    ALPRAZolam (XANAX) 0.25 mg tablet Take 1 Tab by mouth three (3) times daily as needed for Anxiety. Max Daily Amount: 0.75 mg. 90 Tab 3    pantoprazole (PROTONIX) 40 mg tablet Take 1 Tab by mouth daily. 30 Tab 3    oxyCODONE IR (ROXICODONE) 5 mg immediate release tablet Take 1 Tab by mouth every six (6) hours as needed for Pain. Max Daily Amount: 20 mg. 90 Tab 0    nystatin (MYCOSTATIN) topical cream       nystatin (MYCOSTATIN) 100,000 unit/mL suspension       megestrol (MEGACE) 400 mg/10 mL (10 mL) suspension Take 5 mL by mouth daily. 100 mL 0    PAZOPanib (VOTRIENT) 200 mg tablet Take 200 mg by mouth daily. 2 tablets daily      magnesium oxide 250 mg tablet Take 250 mg by mouth nightly.  nystatin (MYCOSTATIN) powder Apply  to affected area four (4) times daily. 60 g 2    atorvastatin (LIPITOR) 40 mg tablet Take 1 Tab by mouth daily. 30 Tab 2    vit a,c & e-lutein-minerals (OCUVITE) tablet Take 1 Tab by mouth daily.  ondansetron (ZOFRAN ODT) 4 mg disintegrating tablet Take 1 Tab by mouth every eight (8) hours as needed for Nausea.  40 Tab 1       Past History     Past Medical History:  Past Medical History:   Diagnosis Date    Anxiety 7/31/2017    2/3/17:Under a tremendous amount of stress, will continue Alprazolam prn, if using regularly consider changing to an SSRI or counseling    Cancer ([facility] [address] 08/2017    renal cell carcinoma    Cervical myelopathy ([facility] [address] 7/31/2017    2/3/17:Cervical fusion     Controlled diabetes mellitus ([facility] [address] 7/31/2017    Fatigue 7/31/2017    Hypercholesterolemia 7/31/2017    Hyperglycemia 7/31/2017    Hypertension 7/31/2017    2/3/17:Poorly controlled, lifestyle changes, repeat /90 will follow    On statin therapy 7/31/2017    Osteoporosis 7/31/2017    Post-menopausal 7/31/2017    Spinal stenosis of lumbar region with radiculopathy 7/31/2017    2/3/17:Symptoms c/w this diagnosis discussed with pt., if progresses then need MRI L spine and follow up    Vaginitis due to Candida 7/31/2017 2/3/17:Associated with antibiotic use, requests prescription med       Past Surgical History:  Past Surgical History:   Procedure Laterality Date    HX ORTHOPAEDIC      2009 spinal cord surgery     HX ORTHOPAEDIC Left     knee procedure    HX OTHER SURGICAL  08/16/2017    incisional biopsy right neck mass       Family History:  Family History   Problem Relation Age of Onset    Heart Disease Mother        Social History:  Social History   Substance Use Topics    Smoking status: Never Smoker    Smokeless tobacco: Never Used    Alcohol use No       Allergies:  No Known Allergies      Review of Systems   Review of Systems   Constitutional: Positive for appetite change (decreased) and fatigue. Negative for activity change, chills and fever. +decreased PO intake   HENT: Negative for congestion and sore throat. Eyes: Negative for pain and redness. Respiratory: Negative for cough, chest tightness and shortness of breath. Cardiovascular: Negative for chest pain and palpitations. Gastrointestinal: Negative for abdominal pain, diarrhea, nausea and vomiting. Genitourinary: Negative for dysuria, frequency and urgency. Musculoskeletal: Negative for back pain and neck pain. Skin: Negative for rash. Neurological: Negative for syncope, light-headedness and headaches. Psychiatric/Behavioral: Negative for confusion. + attitude change   All other systems reviewed and are negative. Physical Exam   Physical Exam   Constitutional: She is oriented to person, place, and time. She appears well-developed and well-nourished. No distress. cachetic    HENT:   Head: Normocephalic. Nose: Nose normal.   Mouth/Throat: Oropharynx is clear and moist. No oropharyngeal exudate. Eyes: Conjunctivae are normal. Pupils are equal, round, and reactive to light. No scleral icterus. Neck: Normal range of motion. Neck supple. No JVD present. No tracheal deviation present. No thyromegaly present. Cardiovascular: Normal rate, regular rhythm and intact distal pulses. Exam reveals no gallop and no friction rub. No murmur heard. Pulmonary/Chest: Effort normal and breath sounds normal. No stridor. No respiratory distress. She has no wheezes. She has no rales. Abdominal: Soft. Bowel sounds are normal. She exhibits no distension. There is no tenderness. There is no rebound and no guarding. Musculoskeletal: Normal range of motion. She exhibits no edema. Lymphadenopathy:     She has no cervical adenopathy. Neurological: She is alert and oriented to person, place, and time. No cranial nerve deficit. She exhibits normal muscle tone. Coordination normal.   Skin: Skin is warm and dry. No rash noted. She is not diaphoretic. No erythema. There is pallor. Poor skin turgor   Psychiatric: Her behavior is normal.   Flat affect;    Nursing note and vitals reviewed. Diagnostic Study Results     Labs -   Recent Results (from the past 12 hour(s))   CBC WITH AUTOMATED DIFF    Collection Time: 03/02/18  7:32 PM   Result Value Ref Range    WBC 10.0 3.6 - 11.0 K/uL    RBC 3.63 (L) 3.80 - 5.20 M/uL    HGB 11.3 (L) 11.5 - 16.0 g/dL    HCT 34.4 (L) 35.0 - 47.0 %    MCV 94.8 80.0 - 99.0 FL    MCH 31.1 26.0 - 34.0 PG    MCHC 32.8 30.0 - 36.5 g/dL    RDW 14.7 (H) 11.5 - 14.5 %    PLATELET 331 225 - 203 K/uL    MPV 11.8 8.9 - 12.9 FL    NRBC 0.2 (H) 0  WBC    ABSOLUTE NRBC 0.02 (H) 0.00 - 0.01 K/uL    NEUTROPHILS 89 (H) 32 - 75 %    LYMPHOCYTES 4 (L) 12 - 49 %    MONOCYTES 6 5 - 13 %    EOSINOPHILS 1 0 - 7 %    BASOPHILS 0 0 - 1 %    IMMATURE GRANULOCYTES 0 0.0 - 0.5 %    ABS. NEUTROPHILS 8.9 (H) 1.8 - 8.0 K/UL    ABS. LYMPHOCYTES 0.4 (L) 0.8 - 3.5 K/UL    ABS. MONOCYTES 0.6 0.0 - 1.0 K/UL    ABS. EOSINOPHILS 0.1 0.0 - 0.4 K/UL    ABS. BASOPHILS 0.0 0.0 - 0.1 K/UL    ABS. IMM.  GRANS. 0.0 0.00 - 0.04 K/UL    DF SMEAR SCANNED      RBC COMMENTS POIKILOCYTOSIS  2+        RBC COMMENTS ACANTHOCYTES PRESENT    METABOLIC PANEL, COMPREHENSIVE    Collection Time: 03/02/18  7:32 PM   Result Value Ref Range    Sodium 130 (L) 136 - 145 mmol/L    Potassium 3.3 (L) 3.5 - 5.1 mmol/L    Chloride 101 97 - 108 mmol/L    CO2 12 (LL) 21 - 32 mmol/L    Anion gap 17 (H) 5 - 15 mmol/L    Glucose 237 (H) 65 - 100 mg/dL    BUN 78 (H) 6 - 20 MG/DL    Creatinine 9.17 (H) 0.55 - 1.02 MG/DL    BUN/Creatinine ratio 9 (L) 12 - 20      GFR est AA 5 (L) >60 ml/min/1.73m2    GFR est non-AA 4 (L) >60 ml/min/1.73m2    Calcium 8.6 8.5 - 10.1 MG/DL    Bilirubin, total 0.6 0.2 - 1.0 MG/DL    ALT (SGPT) 9 (L) 12 - 78 U/L    AST (SGOT) 12 (L) 15 - 37 U/L    Alk. phosphatase 111 45 - 117 U/L    Protein, total 6.8 6.4 - 8.2 g/dL    Albumin 2.2 (L) 3.5 - 5.0 g/dL    Globulin 4.6 (H) 2.0 - 4.0 g/dL    A-G Ratio 0.5 (L) 1.1 - 2.2         Medical Decision Making   I am the first provider for this patient. I reviewed the vital signs, available nursing notes, past medical history, past surgical history, family history and social history. Vital Signs-Reviewed the patient's vital signs. Patient Vitals for the past 12 hrs:   Temp Pulse Resp BP SpO2   03/02/18 2000 - (!) 57 14 (!) 69/39 93 %   03/02/18 1945 - (!) 57 13 94/79 -   03/02/18 1930 - (!) 59 15 (!) 89/43 -   03/02/18 1915 - (!) 59 13 (!) 80/45 -   03/02/18 1854 - 64 16 (!) 74/31 96 %     Records Reviewed: Nursing Notes and Old Medical Records    Provider Notes (Medical Decision Making):   DDx: acute renal failure, metabolic abnormality, failure to thrive    ED Course:   Initial assessment performed. The patients presenting problems have been discussed, and they are in agreement with the care plan formulated and outlined with them. I have encouraged them to ask questions as they arise throughout their visit. PROGRESS NOTE:   7:08 PM  Reviewed medical record. 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     PROGRESS NOTE:   8:20 PM  Per tech, pt does not want family members present when physician is at bedside. Pt states she did not want to come to the hospital in the first place. PROGRESS NOTE:   8:30 PM  Pt has been re-examined by Britni French MD. Pt refused to have any aggressive/heroic interventions; she refused dialysis; She refused central line placement; she did agree to be admitted for iv fluids;  Britni French MD      Consult Note:  8:37 PM  Britni French MD spoke with Dr. Colette Juárez,  Specialty: Hospitalist  Discussed pt's hx, disposition, and available diagnostic and imaging results. Reviewed care plans. Consultant agrees with plans as outlined. Will come and see pt. Will admit pt to hospitalist.     CRITICAL CARE NOTE :    IMPENDING DETERIORATION -Cardiovascular, CNS and Metabolic  ASSOCIATED RISK FACTORS - Hypotension, Shock, Metabolic changes, Vascular Compromise and CNS Decompensation  MANAGEMENT- Bedside Assessment and Supervision of Care  INTERPRETATION -  Blood Pressure and lab work  INTERVENTIONS - hemodynamic mngmt and Metobolic interventions  CASE REVIEW - Hospitalist  TREATMENT RESPONSE -Improved  PERFORMED BY - Self    NOTES:    I have spent 35 minutes of critical care time involved in lab review, consultations with specialist, family decision- making, bedside attention and documentation. During this entire length of time I was immediately available to the patient . Britni French MD    Admit Note:  8:39 PM  Pt is being admitted by Dr. Colette Juárez. The results of their tests and reason(s) for their admission have been discussed with pt and/or available family. They convey agreement and understanding for the need to be admitted and for admission diagnosis. PLAN:  1. Will admit to hospitalist.     Diagnosis     Clinical Impression:   1. Acute renal failure, unspecified acute renal failure type [facility])        Attestations:   This note is prepared by [employer], acting as Scribe for MD Britni Colon MD: The scribe's documentation has been prepared under my direction and personally reviewed by me in its entirety. I confirm that the note above accurately reflects all work, treatment, procedures, and medical decision making performed by me.

## 2018-03-03 NOTE — ED NOTES
Aaron refusing to be straight cath'd for urine specimen, states that she still does not have the urge to urinate. Dr Martine Hansen informed.

## 2018-03-03 NOTE — H&P
Hospitalist Admission Note    NAME: Hugo Gardner   :  1950   MRN:  [id number]     Date/Time:  3/2/2018 11:10 PM    Patient PCP: Julissa Castellano MD  ______________________________________________________________________  Given the patient's current clinical presentation, I have a high level of concern for decompensation if discharged from the emergency department. Complex decision making was performed, which includes reviewing the patient's available past medical records, laboratory results, and x-ray films. My assessment of this patient's clinical condition and my plan of care is as follows. Assessment / Plan:  Acute kidney injury with metabolic acidosis and hypotension due to dehydration in setting of renal cell carcinoma of the L kidney, present on admission:  Poor po intake for weeks, declining at home  - UA still pending, Pt has not urinated since admission. Have asked ER to straight cath x1, but Pt is refusing procedure. - will get renal US in AM  - IV fluids with bicarb  - trying to avoid pressors if at all possible given BONG, may worsen  - nephrology not consulted, Pt would not be dialysis candidate with advanced cancer  - palliative care consult placed  Left clear renal cell carcinoma with brain/pleural/mediastinal metastasis:  on Pazopanib at reduced dose, just finished her 7 days off and restarted today. Pt also with SBRT completed on 2018 with Dr. Lieutenant Gudino. Per notes, Jeffrey Murcia was feeling better and gaining weight till the time she started SBRT to the brain. Since then her appetite has declined. She reports diarrhea and vomiting intermittently over the last several weeks. She is losing weight. \"  - hold votrient  - hydration as above  - zofran prn nausea  Severe protein calorie malnutrition:  - ensure clear added per Pt request  - nutrition consulted  Constipation: due to dehydration, no BM >1 week  - colace and miralax started    Code Status: Discussed with Pt in ER and she is clear that she wants DNR  Surrogate Decision Maker:   Christina Ar [phone]  DVT Prophylaxis: heparin    Baseline: Pt says she is no longer ambulating      Subjective:   CHIEF COMPLAINT: weakness, not eating    HISTORY OF PRESENT ILLNESS:     Jazmin Swenson is a 77 y.o.  female who presents with above. Pt with flat affect and not participating much for admission. She has been undergoing treatment for left kidney cancer with diffuse metastasis but has been declining for several weeks. She denies cough or URI sx. She has nausea and has been taking zofran. She has loss of appetite despite being on megace. She does drink 3 ensures per day per her report. She denies CP or SOB. She has not had a bowel movement in over a week. She did urinate this morning but it was very little. She has not been able to ambulate. She does say that she restarted her chemo pill after her week off today. We were asked to admit for work up and evaluation of the above problems.      Past Medical History:   Diagnosis Date    Anxiety 7/31/2017    2/3/17:Under a tremendous amount of stress, will continue Alprazolam prn, if using regularly consider changing to an SSRI or counseling    Brain metastases ([address]     Cancer ([address] 08/2017    renal cell carcinoma    Cervical myelopathy ([address] 7/31/2017    2/3/17:Cervical fusion     Clear cell adenocarcinoma of kidney ([address]     Controlled diabetes mellitus ([address] 7/31/2017    Fatigue 7/31/2017    Hypercholesterolemia 7/31/2017    Hyperglycemia 7/31/2017    Hypertension 7/31/2017    2/3/17:Poorly controlled, lifestyle changes, repeat /90 will follow    On statin therapy 7/31/2017    Osteoporosis 7/31/2017    Post-menopausal 7/31/2017    Spinal stenosis of lumbar region with radiculopathy 7/31/2017    2/3/17:Symptoms c/w this diagnosis discussed with pt., if progresses then need MRI L spine and follow up    Vaginitis due to Candida 7/31/2017 2/3/17:Associated with antibiotic use, requests prescription med        Past Surgical History:   Procedure Laterality Date    HX ORTHOPAEDIC      2009 spinal cord surgery     HX ORTHOPAEDIC Left     knee procedure    HX OTHER SURGICAL  08/16/2017    incisional biopsy right neck mass       Social History   Substance Use Topics    Smoking status: Never Smoker    Smokeless tobacco: Never Used    Alcohol use No        Family History   Problem Relation Age of Onset    Heart Disease Mother      No Known Allergies     Prior to Admission medications    Medication Sig Start Date End Date Taking? Authorizing Provider   mirtazapine (REMERON) 30 mg tablet Take 1 Tab by mouth nightly. 2/8/18   Matt Jamison MD   carvedilol (COREG) 6.25 mg tablet Take 1 Tab by mouth two (2) times daily (with meals). 1/22/18   C Marizol Moon MD   glipiZIDE (GLUCOTROL) 5 mg tablet Take 0.5 Tabs by mouth two (2) times a day. 1/20/18   Isacc Gibson MD   sertraline (ZOLOFT) 50 mg tablet Take 1 Tab by mouth daily. 1/11/18   C Marizol Moon MD   ALPRAZolam [facility]) 0.25 mg tablet Take 1 Tab by mouth three (3) times daily as needed for Anxiety. Max Daily Amount: 0.75 mg. 1/2/18   C Marizol Moon MD   pantoprazole (PROTONIX) 40 mg tablet Take 1 Tab by mouth daily. 12/20/17   Matt Jamison MD   oxyCODONE IR (ROXICODONE) 5 mg immediate release tablet Take 1 Tab by mouth every six (6) hours as needed for Pain. Max Daily Amount: 20 mg. 12/18/17   Elvis Martinez NP   nystatin (MYCOSTATIN) topical cream  10/6/17   Historical Provider   nystatin (MYCOSTATIN) 100,000 unit/mL suspension  10/3/17   Historical Provider   megestrol (MEGACE) 400 mg/10 mL (10 mL) suspension Take 5 mL by mouth daily. 11/20/17   Agnieszka Murdock NP   PAZOPanib (VOTRIENT) 200 mg tablet Take 200 mg by mouth daily. 2 tablets daily    Historical Provider   magnesium oxide 250 mg tablet Take 250 mg by mouth nightly.     Historical Provider   nystatin (MYCOSTATIN) powder Apply  to affected area four (4) times daily. 10/9/17   Agnieszka Lr MD   atorvastatin (LIPITOR) 40 mg tablet Take 1 Tab by mouth daily. 9/8/17   Jessica Tidwell MD   vit a,c & e-lutein-minerals (OCUVITE) tablet Take 1 Tab by mouth daily. Historical Provider   ondansetron (ZOFRAN ODT) 4 mg disintegrating tablet Take 1 Tab by mouth every eight (8) hours as needed for Nausea. 8/23/17   Agnieszka Lr MD       REVIEW OF SYSTEMS:     I am not able to complete the review of systems because:    The patient is intubated and sedated    The patient has altered mental status due to his acute medical problems    The patient has baseline aphasia from prior stroke(s)    The patient has baseline dementia and is not reliable historian    The patient is in acute medical distress and unable to provide information           Total of 12 systems reviewed as follows:       POSITIVE= underlined text  Negative = text not underlined  General:  fever, chills, sweats, generalized weakness, weight loss/gain,      loss of appetite   Eyes:    blurred vision, eye pain, loss of vision, double vision  ENT:    rhinorrhea, pharyngitis   Respiratory:   cough, sputum production, SOB, GARCIA, wheezing, pleuritic pain   Cardiology:   chest pain, palpitations, orthopnea, PND, edema, syncope   Gastrointestinal:  abdominal pain, N/V, diarrhea, dysphagia, constipation, bleeding   Genitourinary:  frequency, urgency, dysuria, hematuria, incontinence   Muskuloskeletal :  arthralgia, myalgia, back pain  Hematology:  easy bruising, nose or gum bleeding, lymphadenopathy   Dermatological: rash, ulceration, pruritis, color change / jaundice  Endocrine:   hot flashes or polydipsia   Neurological:  headache, dizziness, confusion, focal weakness, paresthesia,     Speech difficulties, memory loss, gait difficulty  Psychological: Feelings of anxiety, depression, agitation    Objective:   VITALS:    Visit Vitals    BP (!) 80/39    Pulse 60    Resp 21    Ht 5' (1.524 m)    Wt 38.6 kg (85 lb)    SpO2 97%    BMI 16.6 kg/m2       PHYSICAL EXAM:    General:    Thin, alert, cooperative, no distress, appears stated age. HEENT: Atraumatic, anicteric sclerae, pink conjunctivae     No oral ulcers, mucosa moist, throat clear, dentition fair  Neck:  Supple, symmetrical, thyroid: non tender  Lungs:   Clear to auscultation bilaterally. No Wheezing or Rhonchi. No rales. Chest wall:  No tenderness  No accessory muscle use. Heart:   Regular rhythm,  No murmur. No edema  Abdomen:   Soft, non-tender. Not distended. Bowel sounds normal  Extremities: No cyanosis. No clubbing,      Skin turgor normal, Capillary refill normal, Radial dial pulse 2+  Skin:     Pale. Not Jaundiced  No rashes   Psych:  Limited insight. Not depressed. Not anxious or agitated. Neurologic: EOMs intact. No facial asymmetry. No aphasia or slurred speech. Symmetrical strength, Sensation grossly intact. Alert and oriented X 4.     _______________________________________________________________________  Care Plan discussed with:    Comments   Patient x    Family      RN x    Care Manager                    Consultant:      _______________________________________________________________________  Expected  Disposition:   Home with Family x   HH/PT/OT/RN x   SNF/LTC    MYKEL    ________________________________________________________________________  TOTAL TIME:   Minutes    Critical Care Provided    39 Minutes non procedure based ([id number])      Comments     Reviewed previous records   >50% of visit spent in counseling and coordination of care  Discussion with patient and/or family and questions answered       ________________________________________________________________________  Signed: Radha Maki MD    Procedures: see electronic medical records for all procedures/Xrays and details which were not copied into this note but were reviewed prior to creation of Plan.     LAB DATA REVIEWED: Recent Results (from the past 24 hour(s))   CBC WITH AUTOMATED DIFF    Collection Time: 03/02/18  7:32 PM   Result Value Ref Range    WBC 10.0 3.6 - 11.0 K/uL    RBC 3.63 (L) 3.80 - 5.20 M/uL    HGB 11.3 (L) 11.5 - 16.0 g/dL    HCT 34.4 (L) 35.0 - 47.0 %    MCV 94.8 80.0 - 99.0 FL    MCH 31.1 26.0 - 34.0 PG    MCHC 32.8 30.0 - 36.5 g/dL    RDW 14.7 (H) 11.5 - 14.5 %    PLATELET 572 283 - 050 K/uL    MPV 11.8 8.9 - 12.9 FL    NRBC 0.2 (H) 0  WBC    ABSOLUTE NRBC 0.02 (H) 0.00 - 0.01 K/uL    NEUTROPHILS 89 (H) 32 - 75 %    LYMPHOCYTES 4 (L) 12 - 49 %    MONOCYTES 6 5 - 13 %    EOSINOPHILS 1 0 - 7 %    BASOPHILS 0 0 - 1 %    IMMATURE GRANULOCYTES 0 0.0 - 0.5 %    ABS. NEUTROPHILS 8.9 (H) 1.8 - 8.0 K/UL    ABS. LYMPHOCYTES 0.4 (L) 0.8 - 3.5 K/UL    ABS. MONOCYTES 0.6 0.0 - 1.0 K/UL    ABS. EOSINOPHILS 0.1 0.0 - 0.4 K/UL    ABS. BASOPHILS 0.0 0.0 - 0.1 K/UL    ABS. IMM. GRANS. 0.0 0.00 - 0.04 K/UL    DF SMEAR SCANNED      RBC COMMENTS POIKILOCYTOSIS  2+        RBC COMMENTS ACANTHOCYTES PRESENT    METABOLIC PANEL, COMPREHENSIVE    Collection Time: 03/02/18  7:32 PM   Result Value Ref Range    Sodium 130 (L) 136 - 145 mmol/L    Potassium 3.3 (L) 3.5 - 5.1 mmol/L    Chloride 101 97 - 108 mmol/L    CO2 12 (LL) 21 - 32 mmol/L    Anion gap 17 (H) 5 - 15 mmol/L    Glucose 237 (H) 65 - 100 mg/dL    BUN 78 (H) 6 - 20 MG/DL    Creatinine 9.17 (H) 0.55 - 1.02 MG/DL    BUN/Creatinine ratio 9 (L) 12 - 20      GFR est AA 5 (L) >60 ml/min/1.73m2    GFR est non-AA 4 (L) >60 ml/min/1.73m2    Calcium 8.6 8.5 - 10.1 MG/DL    Bilirubin, total 0.6 0.2 - 1.0 MG/DL    ALT (SGPT) 9 (L) 12 - 78 U/L    AST (SGOT) 12 (L) 15 - 37 U/L    Alk.  phosphatase 111 45 - 117 U/L    Protein, total 6.8 6.4 - 8.2 g/dL    Albumin 2.2 (L) 3.5 - 5.0 g/dL    Globulin 4.6 (H) 2.0 - 4.0 g/dL    A-G Ratio 0.5 (L) 1.1 - 2.2

## 2018-03-03 NOTE — ED NOTES
Patient discussed with Dr Jennifer Robertson in this nurses presence her wishes for minimal treatment. She is agreeable at this time for basic labs and IV fluids. Her wishes to not have family present during MD assessment were maintained. MD did update  on plan of care after discussing with patient. Patient states that she came today on the request of her  because of poor PO intake for a couple of days now. Patient states \"I just don't have an appetite\". She is frail, pale and malnourished appearing. She denies pain at this time, A&O x4.  Call light within reach.

## 2018-03-03 NOTE — PROGRESS NOTES
Spiritual Care Assessment/Progress Note  [facility]      NAME: Gato Grewal      MRN: [id number]  AGE: 77 y.o. SEX: female  Scientologist Affiliation: Non Judaism   Language: English     3/3/2018     Total Time (in minutes): 5     Spiritual Assessment begun in [facility] PROGRESSIVE CARE through conversation with:         []Patient        [] Family    [] Friend(s)        Reason for Consult: Palliative Care, Initial/Spiritual Assessment     Spiritual beliefs: (Please include comment if needed)     [] Involved in a chilo tradition/spiritual practice:     [] Supported by a chilo community:      [] Claims no spiritual orientation:      [] Seeking spiritual identity:           [] Adheres to an individual form of spirituality:      [x] Not able to assess:                     Identified resources for coping:      [] Prayer                  [] Devotional reading               [] Music                  [] Guided Imagery     [] Family/friends                 [] Pet visits     [] Other:        Interventions offered during this visit: (See comments for more details)                Plan of Care:     [] Discuss Spiritual/Cultural needs    [] Support AMD and/or advance care planning process      [] Support grieving process   [] Coordinate Rites/Rituals    [] Coordination with community clergy   [] No spiritual needs identified at this time   [] Detailed Plan of Care below (See Comments)  [] Make referral to Music Therapy  [] Make referral to Pet Therapy     [] Make referral to Addiction services  [] Make referral to [facility]  [] Make referral to Spiritual Care Partner  [] No future visits requested             Comments: Attempted to visit with patient in [phone] to complete initial spiritual assessment. Patient appeared to be sleeping and did not respond to knock on the door or her name being called. No family is present at this time. Consulted with patient's RN Matthew Valle.  Chaplains will make second attempt to connect with patient and/or family later in afternoon. Please page as needed or desired. Chaplain Steve Liriano M.Div.    Paging Service [phone])

## 2018-03-03 NOTE — PROGRESS NOTES
Problem: Falls - Risk of  Goal: *Absence of Falls  Document Deedee Fall Risk and appropriate interventions in the flowsheet.    Outcome: Progressing Towards Goal  Fall Risk Interventions:            Medication Interventions: Teach patient to arise slowly, Patient to call before getting OOB    Elimination Interventions: Patient to call for help with toileting needs

## 2018-03-03 NOTE — PROGRESS NOTES
Occupational Therapy Note  Orders received, chart reviewed. Spoke to RN who reports pt opens eyes and answers yes/no questions, otherwise minimally responsive. Pt also with BP 79/33 at rest.  Nursing reports potential for hospice placement. Will defer OT evaluation today and follow up Monday to determine plan of care and if pt appropriate for OT treatment. Thank you.     Deja Britton, OTR/L

## 2018-03-03 NOTE — CONSULTS
[facility] [place] [address] [address], [address]  W: [phone]  F: [phone]    Reason for Visit:   Dilcia Camara is a 77 y.o. female who is seen in consultation at the request of Dr. Eddy Hernandez for evaluation of metastatic kidney cancer and RF. Treatment History:   1. On Pazopanib at reduced dose 400 mg daily - started 10/1/2017  2. SBRT completed on 1/9/2018 with Dr. Concepción Calixto    History of Present Illness:   Pt seen today in hospital consult for metastatic renal cell cancer. Pt of Dr Clyde Hilton  Last note from Dr Clyde Hilton:  She noticed a lump coming up in the right side of the chest and shoulder. A CT followed by biopsy of the LN reveals the diagnosis of kidney cancer. CT of the abdomen shows a large exophytic mass in the left kidney, pleural based lung nodules and mediastinal adenopathy. She denies weight loss, pain, hematuria, bone pains etc.      A MRI of the brain shows she has multiple small lesions. She started Pazopanib in October 2017. She completed SBRT with Selene Palm and Varinder at [address] [place] [address] on 1/9/2018. She was feeling better and gaining weight till the time she started SBRT to the brain. Since then her appetite has declined. She reports diarrhea and vomiting intermittently over the last several weeks. She is loosing weight. She is here today with her  who feels that she has progressively declined since finishing radiation. Pt now admitted for RF with creat 8-9. Pt in ER stated she does not want HD.  at bedside. Pt is arousable but not conversant. Responds to husbands voice. Can nod head.          Past Medical History:   Diagnosis Date    Anxiety 7/31/2017    2/3/17:Under a tremendous amount of stress, will continue Alprazolam prn, if using regularly consider changing to an SSRI or counseling    Brain metastases ([address]     Cancer ([address] 08/2017    renal cell carcinoma    Cervical myelopathy ([address] 7/31/2017    2/3/17:Cervical fusion     Clear cell adenocarcinoma of kidney ([address]     Controlled diabetes mellitus ([address] 7/31/2017    Fatigue 7/31/2017    Hypercholesterolemia 7/31/2017    Hyperglycemia 7/31/2017    Hypertension 7/31/2017    2/3/17:Poorly controlled, lifestyle changes, repeat /90 will follow    On statin therapy 7/31/2017    Osteoporosis 7/31/2017    Post-menopausal 7/31/2017    Spinal stenosis of lumbar region with radiculopathy 7/31/2017    2/3/17:Symptoms c/w this diagnosis discussed with pt., if progresses then need MRI L spine and follow up    Vaginitis due to Candida 7/31/2017    2/3/17:Associated with antibiotic use, requests prescription med      Past Surgical History:   Procedure Laterality Date    HX ORTHOPAEDIC      2009 spinal cord surgery     HX ORTHOPAEDIC Left     knee procedure    HX OTHER SURGICAL  08/16/2017    incisional biopsy right neck mass      Social History   Substance Use Topics    Smoking status: Never Smoker    Smokeless tobacco: Never Used    Alcohol use No      Family History   Problem Relation Age of Onset    Heart Disease Mother      Current Facility-Administered Medications   Medication Dose Route Frequency    cefTRIAXone (ROCEPHIN) 1 g/50 mL NS IVPB  1 g IntraVENous Q24H    potassium chloride 50 mEq in 0.9% sodium chloride 500 mL infusion  50 mEq IntraVENous ONCE    insulin lispro (HUMALOG) injection   SubCUTAneous AC&HS    sodium bicarbonate 150 mEq/1000 mL D5W (premix)   IntraVENous CONTINUOUS    ALPRAZolam (XANAX) tablet 0.25 mg  0.25 mg Oral TID PRN    glipiZIDE (GLUCOTROL) tablet 2.5 mg  2.5 mg Oral BID    megestrol (MEGACE) 400 mg/10 mL (10 mL) oral suspension 200 mg  200 mg Oral DAILY    mirtazapine (REMERON) tablet 30 mg  30 mg Oral QHS    sertraline (ZOLOFT) tablet 50 mg  50 mg Oral DAILY    sodium chloride (NS) flush 5-10 mL  5-10 mL IntraVENous Q8H    sodium chloride (NS) flush 5-10 mL  5-10 mL IntraVENous PRN    acetaminophen (TYLENOL) tablet 650 mg  650 mg Oral Q4H PRN    ondansetron (ZOFRAN) injection 4 mg  4 mg IntraVENous Q4H PRN    heparin (porcine) injection 5,000 Units  5,000 Units SubCUTAneous Q12H    nystatin (MYCOSTATIN) 100,000 unit/mL oral suspension 500,000 Units  500,000 Units Oral TID      No Known Allergies     Review of Systems: A complete review of systems was obtained, negative except as described above. Physical Exam:     Visit Vitals    BP 92/47    Pulse 64    Temp 96.1 °F (35.6 °C)    Resp 16    Ht 5' (1.524 m)    Wt 85 lb (38.6 kg)    SpO2 99%    BMI 16.6 kg/m2     ECOG PS: 4  General: No distress  Eyes:  anicteric sclerae  HENT: Atraumatic, dry MM  Neck: Supple  Respiratory: clear with slow respirations  CV: Normal rate, regular rhythm,  GI: Soft, nontender, nondistended  MS: general weakness  Skin: pale, warm  Psych:  Arousable, nods head, responds to husbands voice      Results:     Lab Results   Component Value Date/Time    WBC 8.6 03/03/2018 03:21 AM    HGB 9.2 (L) 03/03/2018 03:21 AM    HCT 27.0 (L) 03/03/2018 03:21 AM    PLATELET 351 58/75/3361 03:21 AM    MCV 93.1 03/03/2018 03:21 AM    ABS.  NEUTROPHILS 8.9 (H) 03/02/2018 07:32 PM    Hemoglobin (POC) 11.2 (L) 08/16/2017 11:42 AM    Hematocrit (POC) 33 (L) 08/16/2017 11:42 AM     Lab Results   Component Value Date/Time    Sodium 134 (L) 03/03/2018 03:21 AM    Potassium 2.5 (LL) 03/03/2018 03:21 AM    Chloride 107 03/03/2018 03:21 AM    CO2 13 (LL) 03/03/2018 03:21 AM    Glucose 165 (H) 03/03/2018 03:21 AM    BUN 72 (H) 03/03/2018 03:21 AM    Creatinine 8.14 (H) 03/03/2018 03:21 AM    GFR est AA 6 (L) 03/03/2018 03:21 AM    GFR est non-AA 5 (L) 03/03/2018 03:21 AM    Calcium 7.9 (L) 03/03/2018 03:21 AM    Sodium (POC) 135 (L) 08/16/2017 11:42 AM    Potassium (POC) 4.0 08/16/2017 11:42 AM    Chloride (POC) 98 08/16/2017 11:42 AM    Glucose (POC) 206 (H) 03/03/2018 11:37 AM    BUN (POC) 13 08/16/2017 11:42 AM    Creatinine (POC) 0.9 08/16/2017 11:42 AM    Calcium, ionized (POC) 1.49 (H) 08/16/2017 11:42 AM     Lab Results   Component Value Date/Time    Bilirubin, total 0.6 03/02/2018 07:32 PM    ALT (SGPT) 9 (L) 03/02/2018 07:32 PM    AST (SGOT) 12 (L) 03/02/2018 07:32 PM    Alk. phosphatase 111 03/02/2018 07:32 PM    Protein, total 6.8 03/02/2018 07:32 PM    Albumin 2.2 (L) 03/02/2018 07:32 PM    Globulin 4.6 (H) 03/02/2018 07:32 PM       Records reviewed and summarized above. Pathology report(s) reviewed above. Radiology report(s) reviewed above. AssessmentPLAN:     1) metastatic renal cell cancer to brain post brain radiation. Pt of Dr Clay Mcintosh. Records reviewed. Reviewed hx from CC and per pt's  at bedside. Was on pazopanib and had good response by CTs of body 2/18. Recent treatment for brain mets and pt's  reports overall decline since then. Pt presented to ER after weeks of decline and diarrhea with RF. Pt stated in ER that she does not want HD. Long d/w  today as he states he has not talked with any doctors since admit. Reviewed overall situation as best I could considering I am on call doctor. Pt responds some to  but not conversant. We reviewed poor prognosis with HD and likely very short survival/ could be days. Pt's  understands prognosis. We asked pt again about HD and she did not say yes that she wanted it. Pt did say yes that she wanted continued supportive care. After long discussion,  and I decided to continue current supportive care for now. Would recommend transitioning to hospice if needed. Pt is DNR.  understands that pt may die soon.  appropriately upset and sad. Will get   for support. 2.  No pain. 3. Low k replacing. I appreciate the opportunity to participate in Ms. Ambar Amaya care. Call if questions.      Signed By: Rob Dougherty,

## 2018-03-03 NOTE — PROGRESS NOTES
Initial Nutrition Assessment:    INTERVENTIONS/RECOMMENDATIONS:   · Due to severe malnutrition and suspect pt will not be meeting nutrition needs PO, alternate route of nutrition via tube feed should be discussed if it meets pt GOC  · Pt is at risk for refeeding syndrome, monitor lytes  · Continue current diet  · Ensure Clear TID and ensure pudding TID  · Consider MVI    ASSESSMENT:   Chart reviewed, medically noted for renal cell carcinoma with brain/pleural/mediastinal metastasis and PMH shown below. Nutrition consulted due to poor PO intake and weight loss PTA. Unable to get detailed history from pt. She was teary eyed and did not want to seem to participate in assessment so visit was kept short. She did confirm she was consuming ensure clear TID PTA (~600 kcal and 21 g protein) and does not care for ensure shakes. She agreed to try ensure pudding but declined other more Kcal dense supplements. Current weight is pt stated however if accurate pt has lost 10 lbs (10%) in the past month and 24 lbs (22%) in the past 3 months. Pt meets ASPEN criteria for severe malnutrition, see below.      Meets Criteria for Acute Malnutrition   [x] Severe Malnutrition, as evidenced by:   [x] Moderate muscle wasting, loss of subcutaneous fat   [x] Nutritional intake of <50% of recommended intake for >5 days   [x] Weight loss of >1-2% in 1 week, >5% in 1 month, >7.5% in 3 months, or >10% in 6 months   [] Moderate-severe edema   []Moderate Malnutrition, as evidenced by:   [] Mild muscle wasting, loss of subcutaneous fat   [] Nutritional intake <75% of recommended intake for >1 week   [] Weight loss of 1-2% in 1 week, 5% in 1 month, 7.5% in 3 months, or 10% in 6 months   [] Mild edema      Past Medical History:   Diagnosis Date    Anxiety 7/31/2017    2/3/17:Under a tremendous amount of stress, will continue Alprazolam prn, if using regularly consider changing to an SSRI or counseling    [facility]     [facility] 08/2017 renal cell carcinoma    Cervical myelopathy ([facility]) 7/31/2017    2/3/17:Cervical fusion     Clear cell adenocarcinoma of kidney ([address]     Controlled diabetes mellitus ([address] 7/31/2017    Fatigue 7/31/2017    Hypercholesterolemia 7/31/2017    Hyperglycemia 7/31/2017    Hypertension 7/31/2017    2/3/17:Poorly controlled, lifestyle changes, repeat /90 will follow    On statin therapy 7/31/2017    Osteoporosis 7/31/2017    Post-menopausal 7/31/2017    Spinal stenosis of lumbar region with radiculopathy 7/31/2017    2/3/17:Symptoms c/w this diagnosis discussed with pt., if progresses then need MRI L spine and follow up    Vaginitis due to Candida 7/31/2017    2/3/17:Associated with antibiotic use, requests prescription med       Diet Order: Mechanical soft  % Eaten:  No data found.     Pertinent Medications: [x]Reviewed: Colace, glipizide, humalog, remeron, miralax  Pertinent Labs: [x]Reviewed: K+ 2.5,   Food Allergies: [x]NKFA  []Other   Last BM: 3/3  Edema:        []RUE   []LUE   []RLE   []LLE      Pressure Injury:      [] Stage I   [] Stage II   [] Stage III   [] Stage IV      Wt Readings from Last 30 Encounters:   03/02/18 38.6 kg (85 lb)   02/09/18 43.2 kg (95 lb 3.2 oz)   11/29/17 49.7 kg (109 lb 9.6 oz)   11/15/17 46.7 kg (103 lb)   10/30/17 46 kg (101 lb 6.4 oz)   10/09/17 44 kg (97 lb)   09/10/17 56.7 kg (125 lb)   09/10/17 60 kg (132 lb 4.8 oz)   09/07/17 58.2 kg (128 lb 4.9 oz)   08/25/17 51.3 kg (113 lb)   08/23/17 52.1 kg (114 lb 12.8 oz)   08/23/17 52.2 kg (115 lb)   08/16/17 50.6 kg (111 lb 8.8 oz)   08/10/17 53.4 kg (117 lb 12.8 oz)   07/31/17 55.3 kg (122 lb)       Anthropometrics:   Height: 5' (152.4 cm) Weight: 38.6 kg (85 lb)   IBW (%IBW):   ( ) UBW (%UBW):   (  %)   Last Weight Metrics:  Weight Loss Metrics 3/2/2018 2/9/2018 11/29/2017 11/15/2017 10/30/2017 10/9/2017 9/10/2017   Today's Wt 85 lb 95 lb 3.2 oz 109 lb 9.6 oz 103 lb 101 lb 6.4 oz 97 lb 125 lb   BMI 16.6 kg/m2 17.99 kg/m2 20.71 kg/m2 19.46 kg/m2 19.16 kg/m2 18.94 kg/m2 24.41 kg/m2       BMI: Body mass index is 16.6 kg/(m^2). This BMI is indicative of:   [x]Underweight    []Normal    []Overweight    [] Obesity   [] Extreme Obesity (BMI>40)     Estimated Nutrition Needs (Based on):   1250 Kcals/day (BMR: 875 x 1.5) , 50 g (1.3 g/kg) Protein  Carbohydrate: At Least 130 g/day  Fluids: 1250 mL/day (1ml/kcal) or per primary team    NUTRITION DIAGNOSES:   Problem:  Inadequate protein-energy intake      Etiology: related to lack of appetite and nausea     Signs/Symptoms: as evidenced by 10% wt loss x 1 month      NUTRITION INTERVENTIONS:  Meals/Snacks: General/healthful diet   Supplements: Commercial supplement              GOAL:   consume >25% of meals and ONS in 1-3 days    LEARNING NEEDS (Diet, Food/Nutrient-Drug Interaction):    [x] None Identified   [] Identified and Education Provided/Documented   [] Identified and Pt declined/was not appropriate     Cultureal, Methodist, OR Ethnic Dietary Needs:    [x] None Identified   [] Identified and Addressed     [x] Interdisciplinary Care Plan Reviewed/Documented    [x] Discharge Planning: Kcal and protein dense foods  3-4 ONS per day    MONITORING /EVALUATION:      Food/Nutrient Intake Outcomes:  Total energy intake  Physical Signs/Symptoms Outcomes: Weight/weight change, Electrolyte and renal profile, GI, Glucose profile    NUTRITION RISK:    [x] High              [] Moderate           []  Low  []  Minimal/Uncompromised    PT SEEN FOR:    [x]  MD Consult: []Calorie Count      []Diabetic Diet Education        []Diet Education     []Electrolyte Management     [x]General Nutrition Management and Supplements     []Management of Tube Feeding     []TPN Recommendations    []  RN Referral:  []MST score >=2     []Enteral/Parenteral Nutrition PTA     []Pregnant: Gestational DM or Multigestation     []Pressure Ulcer/Wound Care needs        []  Low BMI  []  LOS Referral       Radha Washington RDN  Pager [phone]  Weekend Pager [phone]

## 2018-03-03 NOTE — PROGRESS NOTES
Spiritual Care Assessment/Progress Note  [facility]      NAME: Yvrose Swartz      MRN: [id number]  AGE: 77 y.o. SEX: female  Druze Affiliation: Non Sabianism   Language: English     3/3/2018     Total Time (in minutes): 37     Spiritual Assessment begun in [facility] PROGRESSIVE CARE through conversation with:         []Patient        [x] Family    [] Friend(s)        Reason for Consult: Request by staff     Spiritual beliefs: (Please include comment if needed)     [x] Involved in a chilo tradition/spiritual practice:     [] Supported by a chilo community:      [] Claims no spiritual orientation:      [] Seeking spiritual identity:           [] Adheres to an individual form of spirituality:      [] Not able to assess:                     Identified resources for coping:      [] Prayer                  [] Devotional reading               [] Music                  [] Guided Imagery     [x] Family/friends                 [] Pet visits     [] Other:        Interventions offered during this visit: (See comments for more details)          Family/Friend(s):  Affirmation of emotions/emotional suffering, Catharsis/review of pertinent events in supportive environment, Coping skills reviewed/reinforced, Iconic (affirming the presence of God/Higher Power), Normalization of emotional/spiritual concerns, Prayer (assurance of)     Plan of Care:     [x] Discuss Spiritual/Cultural needs    [] Support AMD and/or advance care planning process      [x] Support grieving process   [] Coordinate Rites/Rituals    [] Coordination with community clergy   [] No spiritual needs identified at this time   [] Detailed Plan of Care below (See Comments)  [] Make referral to Music Therapy  [] Make referral to Pet Therapy     [] Make referral to Addiction services  [] Make referral to [facility]  [] Make referral to Spiritual Care Partner  [] No future visits requested             Comments: Responded to call from RN Srinivasan Pappas, to provide support to patient's . Introduced self and explained role of chaplains in the hospital. Shortly after start of visit patient's daughter, Shanel Grew, entered and immediately became very emotional. Provided comforting presence and empathetic listening as patient's  and daughter shared patient's diagnosis and medical journey with . Family is accepting of patient's prognosis but made several statements indicating their desire for answers as to what may have caused her rapid decline in the past few days.  is also expressing his grief in the form of frustration that the patient would not seek help sooner when she first began not feeling well. Family is experiencing appropriate anticipatory grief and shared that they are open to continued  support. Advised of  availability and assured of ongoing support as needed and desired. Chaplain Julio Pascual M.Div.    Paging Service [phone])

## 2018-03-03 NOTE — PROGRESS NOTES
PROGRESS NOTE    NAME:  Barbie Tobin   :   1950   MRN:   [id number]     Date/Time:  3/3/2018 9:18 AM  Subjective:   History:  Chart reviewed and patient seen and examined this AM and D/W her nurse all events noted. She was admitted yesterday with acute renal failure and metabolic acidosis with associated hypotension in the face of Metastatic clear cell carcinoma of Kidney. She remains mildly hypotensive and is receiving Albumin. She is extremely weak w/o N/V; however has no appetite. She has no SOB or cardio/respiratory c/o. There are no  c/o. She has no other c/o on complete ROS except extremely weak.       Medications reviewed:  Current Facility-Administered Medications   Medication Dose Route Frequency    cefTRIAXone (ROCEPHIN) 1 g/50 mL NS IVPB  1 g IntraVENous Q24H    insulin lispro (HUMALOG) injection   SubCUTAneous AC&HS    sodium bicarbonate 150 mEq/1000 mL D5W (premix)   IntraVENous CONTINUOUS    ALPRAZolam (XANAX) tablet 0.25 mg  0.25 mg Oral TID PRN    glipiZIDE (GLUCOTROL) tablet 2.5 mg  2.5 mg Oral BID    megestrol (MEGACE) 400 mg/10 mL (10 mL) oral suspension 200 mg  200 mg Oral DAILY    mirtazapine (REMERON) tablet 30 mg  30 mg Oral QHS    sertraline (ZOLOFT) tablet 50 mg  50 mg Oral DAILY    sodium chloride (NS) flush 5-10 mL  5-10 mL IntraVENous Q8H    sodium chloride (NS) flush 5-10 mL  5-10 mL IntraVENous PRN    acetaminophen (TYLENOL) tablet 650 mg  650 mg Oral Q4H PRN    ondansetron (ZOFRAN) injection 4 mg  4 mg IntraVENous Q4H PRN    docusate sodium (COLACE) capsule 100 mg  100 mg Oral BID    heparin (porcine) injection 5,000 Units  5,000 Units SubCUTAneous Q12H    polyethylene glycol (MIRALAX) packet 17 g  17 g Oral DAILY    nystatin (MYCOSTATIN) 100,000 unit/mL oral suspension 500,000 Units  500,000 Units Oral TID        Objective:   Vitals:  Visit Vitals    BP (!) 79/33    Pulse (!) 59    Temp 96.1 °F (35.6 °C)    Resp 14    Ht 5' (1.524 m)    Wt 85 lb (38.6 kg)    SpO2 100%    BMI 16.6 kg/m2      O2 Device: Room air Temp (24hrs), Av.6 °F (35.9 °C), Min:96 °F (35.6 °C), Max:97.5 °F (36.4 °C)      Last 24hr Input/Output:    Intake/Output Summary (Last 24 hours) at 18  Last data filed at 18 0831   Gross per 24 hour   Intake                0 ml   Output                1 ml   Net               -1 ml        PHYSICAL EXAM:  General:     Very frail appearing, alert, cooperative, moderate distress, appears stated age. Head:    Normocephalic, without obvious abnormality, atraumatic. Temporal wasting  Eyes:    Conjunctivae/corneas clear. PERRLA  Nose:   Nares normal. No drainage or sinus tenderness. Throat:     Lips, mucosa, and tongue normal.  No Thrush  Neck:   Supple, symmetrical,  no adenopathy, thyroid: non tender     no carotid bruit and no JVD. Back:     Symmetric,  No CVA tenderness. Lungs:    Clear to auscultation bilaterally with decreased BS. No Wheezing or Rhonchi. No rales. Heart:    Regular rate and rhythm,  no murmur, rub or gallop. Abdomen:    Soft, non-tender. Not distended. Bowel sounds normal. No masses  Extremities:  Extremities normal, atraumatic, No cyanosis. No edema. No clubbing  Lymph nodes:  Cervical, supraclavicular normal.  Neurologic:  Generalized decreased strength, Alert and oriented X 3.    Skin:                 No rash      Lab Data Reviewed:    Recent Results (from the past 24 hour(s))   CBC WITH AUTOMATED DIFF    Collection Time: 18  7:32 PM   Result Value Ref Range    WBC 10.0 3.6 - 11.0 K/uL    RBC 3.63 (L) 3.80 - 5.20 M/uL    HGB 11.3 (L) 11.5 - 16.0 g/dL    HCT 34.4 (L) 35.0 - 47.0 %    MCV 94.8 80.0 - 99.0 FL    MCH 31.1 26.0 - 34.0 PG    MCHC 32.8 30.0 - 36.5 g/dL    RDW 14.7 (H) 11.5 - 14.5 %    PLATELET 454 024 - 378 K/uL    MPV 11.8 8.9 - 12.9 FL    NRBC 0.2 (H) 0  WBC    ABSOLUTE NRBC 0.02 (H) 0.00 - 0.01 K/uL    NEUTROPHILS 89 (H) 32 - 75 %    LYMPHOCYTES 4 (L) 12 - 49 %    MONOCYTES 6 5 - 13 %    EOSINOPHILS 1 0 - 7 %    BASOPHILS 0 0 - 1 %    IMMATURE GRANULOCYTES 0 0.0 - 0.5 %    ABS. NEUTROPHILS 8.9 (H) 1.8 - 8.0 K/UL    ABS. LYMPHOCYTES 0.4 (L) 0.8 - 3.5 K/UL    ABS. MONOCYTES 0.6 0.0 - 1.0 K/UL    ABS. EOSINOPHILS 0.1 0.0 - 0.4 K/UL    ABS. BASOPHILS 0.0 0.0 - 0.1 K/UL    ABS. IMM. GRANS. 0.0 0.00 - 0.04 K/UL    DF SMEAR SCANNED      RBC COMMENTS POIKILOCYTOSIS  2+        RBC COMMENTS ACANTHOCYTES PRESENT    METABOLIC PANEL, COMPREHENSIVE    Collection Time: 03/02/18  7:32 PM   Result Value Ref Range    Sodium 130 (L) 136 - 145 mmol/L    Potassium 3.3 (L) 3.5 - 5.1 mmol/L    Chloride 101 97 - 108 mmol/L    CO2 12 (LL) 21 - 32 mmol/L    Anion gap 17 (H) 5 - 15 mmol/L    Glucose 237 (H) 65 - 100 mg/dL    BUN 78 (H) 6 - 20 MG/DL    Creatinine 9.17 (H) 0.55 - 1.02 MG/DL    BUN/Creatinine ratio 9 (L) 12 - 20      GFR est AA 5 (L) >60 ml/min/1.73m2    GFR est non-AA 4 (L) >60 ml/min/1.73m2    Calcium 8.6 8.5 - 10.1 MG/DL    Bilirubin, total 0.6 0.2 - 1.0 MG/DL    ALT (SGPT) 9 (L) 12 - 78 U/L    AST (SGOT) 12 (L) 15 - 37 U/L    Alk.  phosphatase 111 45 - 117 U/L    Protein, total 6.8 6.4 - 8.2 g/dL    Albumin 2.2 (L) 3.5 - 5.0 g/dL    Globulin 4.6 (H) 2.0 - 4.0 g/dL    A-G Ratio 0.5 (L) 1.1 - 2.2     GLUCOSE, POC    Collection Time: 03/02/18 11:21 PM   Result Value Ref Range    Glucose (POC) 204 (H) 65 - 100 mg/dL    Performed by Devin Mancini    METABOLIC PANEL, BASIC    Collection Time: 03/03/18  3:21 AM   Result Value Ref Range    Sodium 134 (L) 136 - 145 mmol/L    Potassium 2.5 (LL) 3.5 - 5.1 mmol/L    Chloride 107 97 - 108 mmol/L    CO2 13 (LL) 21 - 32 mmol/L    Anion gap 14 5 - 15 mmol/L    Glucose 165 (H) 65 - 100 mg/dL    BUN 72 (H) 6 - 20 MG/DL    Creatinine 8.14 (H) 0.55 - 1.02 MG/DL    BUN/Creatinine ratio 9 (L) 12 - 20      GFR est AA 6 (L) >60 ml/min/1.73m2    GFR est non-AA 5 (L) >60 ml/min/1.73m2    Calcium 7.9 (L) 8.5 - 10.1 MG/DL   CBC W/O DIFF    Collection Time: 03/03/18  3:21 AM   Result Value Ref Range    WBC 8.6 3.6 - 11.0 K/uL    RBC 2.90 (L) 3.80 - 5.20 M/uL    HGB 9.2 (L) 11.5 - 16.0 g/dL    HCT 27.0 (L) 35.0 - 47.0 %    MCV 93.1 80.0 - 99.0 FL    MCH 31.7 26.0 - 34.0 PG    MCHC 34.1 30.0 - 36.5 g/dL    RDW 14.5 11.5 - 14.5 %    PLATELET 449 408 - 007 K/uL    MPV 10.7 8.9 - 12.9 FL    NRBC 0.0 0  WBC    ABSOLUTE NRBC 0.00 0.00 - 0.01 K/uL   MAGNESIUM    Collection Time: 03/03/18  3:21 AM   Result Value Ref Range    Magnesium 1.9 1.6 - 2.4 mg/dL   GLUCOSE, POC    Collection Time: 03/03/18  7:45 AM   Result Value Ref Range    Glucose (POC) 222 (H) 65 - 100 mg/dL    Performed by ANNALISA CHRISTENSEN)          Assessment/Plan:     Principal Problem:    Acute kidney injury ([facility] (3/2/2018)    Active Problems:    Controlled diabetes mellitus ([facility] (7/31/2017)      [facility] carcinoma ([facility] (8/28/2017)      Hypotension (3/3/2018)      Dehydration (3/3/2018)      Severe protein-calorie malnutrition ([facility] (3/3/2018)       ___________________________________________________  PLAN:    1. Follow Creat, 9.17 adm to 8.24 now  2. IV Hydration  3. Albumin to support BP  4. Replete K  5. Follow BS with DM and treat SSI  6. Push Nutrition as tolerated  7. Palliative care consult  8. DNR per patient wishes  5.   Other orders reviewed and continued    Prognosis poor    40 minutes spent in direct care of this critically ill patient today            ___________________________________________________    Attending Physician: Lalitha Melgar MD

## 2018-03-03 NOTE — PROGRESS NOTES
TRANSFER - IN REPORT:    Verbal report received from Jasmine(name) on Marleni Lobe  being received from ED(unit) for routine progression of care      Report consisted of patients Situation, Background, Assessment and   Recommendations(SBAR). Information from the following report(s) SBAR, Kardex, ED Summary, [facility] and Recent Results was reviewed with the receiving nurse. Opportunity for questions and clarification was provided. Assessment completed upon patients arrival to unit and care assumed.          Primary Nurse Osvaldo Cody RN and Sarah Henry RN performed a dual skin assessment on this patient Impairment noted- see wound doc flow sheet  Antonio score is 13

## 2018-03-03 NOTE — PROGRESS NOTES
Nursing requests holding PT. Per nursing, pt minimally responds to question, BP 79/33 at this time. Palliative medicine has been consulted. Will follow up for PT Evaluation.     Maryam Mock

## 2018-03-03 NOTE — PROGRESS NOTES
PCU SHIFT NURSING NOTE      Bedside and Verbal shift change report given to Jose Miguel Bowser (oncoming nurse) by Karen Rossi (offgoing nurse). Report included the following information SBAR, Kardex, MAR, Recent Results and Med Rec Status. Shift Summary:       0040 Pt resting in bed  0451 Paged Dr. Konstantin De La Cruz about critical results (K = 2.5) and (CO2 = 13)  0430 Paged Dr. Konstantin De La Cruz x [phone]  [phone] with Dr. Vinicius Garnett concerning pt's BP, HR, and labs and new orders received for albumin and potassium             Admission Date 3/2/2018   Admission Diagnosis Acute kidney injury ([facility]   Consults IP CONSULT TO PALLIATIVE CARE - PROVIDER  IP CONSULT TO ONCOLOGY        Consults   []PT   []OT   []Speech   []Case Management      [] Palliative      Cardiac Monitoring Order   []Yes   []No     IV drips   []Yes    Drip:                            Dose:  Drip:                            Dose:  Drip:                            Dose:   [x]No     GI Prophylaxis   []Yes   []No         DVT Prophylaxis   SCDs:             Haja stockings:         [] Medication   []Contraindicated   []None      Activity Level           Purposeful Rounding every 1-2 hour? [x]Yes   Yip Score  Total Score: 2   Bed Alarm (If score 3 or >)   []Yes   [] Refused (See signed refusal form in chart)   Antonio Score      Antonio Score (if score 14 or less)   []PMT consult   []Wound Care consult      []Specialty bed   [] Nutrition consult          Needs prior to discharge:   Home O2 required:    []Yes   []No    If yes, how much O2 required?     Other:    Last Bowel Movement:        Influenza Vaccine          Pneumonia Vaccine           Diet Active Orders   Diet    DIET MECHANICAL SOFT      LDAs               Peripheral IV 03/02/18 Right Antecubital (Active)   Site Assessment Clean, dry, & intact 3/3/2018 12:41 AM   Phlebitis Assessment 0 3/2/2018  7:40 PM   Infiltration Assessment 0 3/2/2018  7:40 PM   Dressing Status Clean, dry, & intact 3/2/2018  7:40 PM Dressing Type 4 X 4 3/2/2018  7:40 PM                      Urinary Catheter      Intake & Output        Readmission Risk Assessment Tool Score High Risk            29       Total Score        2 . Living with Significant Other. Assisted Living. LTAC. SNF. or   Rehab    4 IP Visits Last 12 Months (1-3=4, 4=9, >4=11)    5 Pt.  Coverage (Medicare=5 , Medicaid, or Self-Pay=4)    18 Charlson Comorbidity Score (Age + Comorbid Conditions)        Criteria that do not apply:    Has Seen PCP in Last 6 Months (Yes=3, No=0)    Patient Length of Stay (>5 days = 3)       Expected Length of Stay - - -   Actual Length of Stay 1

## 2018-03-04 NOTE — ROUTINE PROCESS
Bedside and Verbal shift change report given to Brianne MARTINEZ (oncoming nurse) by Sherri Curran (offgoing nurse).  Report included the following information SBAR, Kardex, Intake/Output, MAR, Recent Results and Cardiac Rhythm NSr.

## 2018-03-04 NOTE — CONSULTS
[facility] [place] [address] [address]  W: [phone]  F: [phone]    Reason for Visit:   Thong Vanegas is a 77 y.o. female who is seen in consultation at the request of Dr. Virginia Hernandez for evaluation of metastatic kidney cancer and RF. Treatment History:   1. On Pazopanib at reduced dose 400 mg daily - started 10/1/2017  2. SBRT completed on 1/9/2018 with Dr. Workman Brochure    History of Present Illness:   Pt seen today in hospital consult for metastatic renal cell cancer. Pt of Dr Raquel Brown  Last note from Dr Raquel Brown:  She noticed a lump coming up in the right side of the chest and shoulder. A CT followed by biopsy of the LN reveals the diagnosis of kidney cancer. CT of the abdomen shows a large exophytic mass in the left kidney, pleural based lung nodules and mediastinal adenopathy. She denies weight loss, pain, hematuria, bone pains etc.   A MRI of the brain shows she has multiple small lesions. She started Pazopanib in October 2017. She completed SBRT with Selene Saini and Varinder at [facility] on 1/9/2018. She was feeling better and gaining weight till the time she started SBRT to the brain. Since then her appetite has declined. She reports diarrhea and vomiting intermittently over the last several weeks. She is loosing weight. She is here today with her  who feels that she has progressively declined since finishing radiation. Pt now admitted for RF with creat 8-9. Pt in ER stated she does not want HD.  at bedside. Pt is arousable but not conversant. Responds to husbands voice. Can nod head. Interim Hx:  Pt seen today for f/u of metastatic renal cell cancer and ARF. Pt is in bed barely arousable. No family at bedside. Pt comfortable over night per nursing. No new issues. Creat down to 6.          Past Medical History:   Diagnosis Date    Anxiety 7/31/2017    2/3/17:Under a tremendous amount of stress, will continue Alprazolam prn, if using regularly consider changing to an SSRI or counseling    [facility] ([facility]     Cancer ([facility] 08/2017    renal cell carcinoma    Cervical myelopathy ([facility] 7/31/2017    2/3/17:Cervical fusion     Clear cell adenocarcinoma of kidney ([facility]     [facility] 7/31/2017    Fatigue 7/31/2017    Hypercholesterolemia 7/31/2017    Hyperglycemia 7/31/2017    Hypertension 7/31/2017    2/3/17:Poorly controlled, lifestyle changes, repeat /90 will follow    On statin therapy 7/31/2017    Osteoporosis 7/31/2017    Post-menopausal 7/31/2017    Spinal stenosis of lumbar region with radiculopathy 7/31/2017    2/3/17:Symptoms c/w this diagnosis discussed with pt., if progresses then need MRI L spine and follow up    Vaginitis due to Candida 7/31/2017    2/3/17:Associated with antibiotic use, requests prescription med      Past Surgical History:   Procedure Laterality Date    HX ORTHOPAEDIC      2009 spinal cord surgery     HX ORTHOPAEDIC Left     knee procedure    HX OTHER SURGICAL  08/16/2017    incisional biopsy right neck mass      Social History   Substance Use Topics    Smoking status: Never Smoker    Smokeless tobacco: Never Used    Alcohol use No      Family History   Problem Relation Age of Onset    Heart Disease Mother      Current Facility-Administered Medications   Medication Dose Route Frequency    cefTRIAXone (ROCEPHIN) 1 g/50 mL NS IVPB  1 g IntraVENous Q24H    insulin lispro (HUMALOG) injection   SubCUTAneous AC&HS    sodium bicarbonate 150 mEq/1000 mL D5W (premix)   IntraVENous CONTINUOUS    ALPRAZolam (XANAX) tablet 0.25 mg  0.25 mg Oral TID PRN    glipiZIDE (GLUCOTROL) tablet 2.5 mg  2.5 mg Oral BID    megestrol (MEGACE) 400 mg/10 mL (10 mL) oral suspension 200 mg  200 mg Oral DAILY    mirtazapine (REMERON) tablet 30 mg  30 mg Oral QHS    sertraline (ZOLOFT) tablet 50 mg  50 mg Oral DAILY    sodium chloride (NS) flush 5-10 mL  5-10 mL IntraVENous Q8H  sodium chloride (NS) flush 5-10 mL  5-10 mL IntraVENous PRN    acetaminophen (TYLENOL) tablet 650 mg  650 mg Oral Q4H PRN    ondansetron (ZOFRAN) injection 4 mg  4 mg IntraVENous Q4H PRN    heparin (porcine) injection 5,000 Units  5,000 Units SubCUTAneous Q12H    nystatin (MYCOSTATIN) 100,000 unit/mL oral suspension 500,000 Units  500,000 Units Oral TID      No Known Allergies     Review of Systems: A complete review of systems was obtained, negative except as described above. Physical Exam:     Visit Vitals    /56    Pulse 84    Temp 97.9 °F (36.6 °C)    Resp 18    Ht 5' (1.524 m)    Wt 85 lb (38.6 kg)    SpO2 97%    Breastfeeding No    BMI 16.6 kg/m2     ECOG PS: 4  General: No distress  Eyes:  anicteric sclerae  HENT: Atraumatic, dry MM  Neck: Supple  Respiratory: clear with slow respirations  CV: Normal rate, regular rhythm,  GI: Soft, nontender, nondistended  MS: general weakness  Skin: pale, warm  Psych:  Arousable, but not opening eyes this am      Results:     Lab Results   Component Value Date/Time    WBC 6.0 03/04/2018 03:37 AM    HGB 8.4 (L) 03/04/2018 03:37 AM    HCT 24.3 (L) 03/04/2018 03:37 AM    PLATELET 600 18/61/0178 03:37 AM    MCV 90.7 03/04/2018 03:37 AM    ABS.  NEUTROPHILS 5.1 03/04/2018 03:37 AM    Hemoglobin (POC) 11.2 (L) 08/16/2017 11:42 AM    Hematocrit (POC) 33 (L) 08/16/2017 11:42 AM     Lab Results   Component Value Date/Time    Sodium 139 03/04/2018 03:37 AM    Potassium 3.1 (L) 03/04/2018 03:37 AM    Chloride 110 (H) 03/04/2018 03:37 AM    CO2 15 (LL) 03/04/2018 03:37 AM    Glucose 154 (H) 03/04/2018 03:37 AM    BUN 67 (H) 03/04/2018 03:37 AM    Creatinine 6.58 (H) 03/04/2018 03:37 AM    GFR est AA 8 (L) 03/04/2018 03:37 AM    GFR est non-AA 6 (L) 03/04/2018 03:37 AM    Calcium 7.5 (L) 03/04/2018 03:37 AM    Sodium (POC) 135 (L) 08/16/2017 11:42 AM    Potassium (POC) 4.0 08/16/2017 11:42 AM    Chloride (POC) 98 08/16/2017 11:42 AM    Glucose (POC) 237 (H) 03/04/2018 08:12 AM    BUN (POC) 13 08/16/2017 11:42 AM    Creatinine (POC) 0.9 08/16/2017 11:42 AM    Calcium, ionized (POC) 1.49 (H) 08/16/2017 11:42 AM     Lab Results   Component Value Date/Time    Bilirubin, total 0.4 03/04/2018 03:37 AM    ALT (SGPT) 6 (L) 03/04/2018 03:37 AM    AST (SGOT) 15 03/04/2018 03:37 AM    Alk. phosphatase 77 03/04/2018 03:37 AM    Protein, total 5.0 (L) 03/04/2018 03:37 AM    Albumin 1.9 (L) 03/04/2018 03:37 AM    Globulin 3.1 03/04/2018 03:37 AM       Records reviewed and summarized above. Pathology report(s) reviewed above. Radiology report(s) reviewed above. AssessmentPLAN:     1) metastatic renal cell cancer to brain post brain radiation. Pt of Dr Gregg Sandhu. Records reviewed. Reviewed hx from CC and per pt's  at bedside. Was on pazopanib and had good response by CTs of body 2/18. Recent treatment for brain mets and pt's  reports overall decline since then. Pt presented to ER after weeks of decline and diarrhea with RF. Pt stated in ER that she does not want HD. Long d/w  today as he states he has not talked with any doctors since admit. Reviewed overall situation as best I could considering I am on call doctor. Pt responds some to  but not conversant. We reviewed poor prognosis with HD and likely very short survival/ could be days. Pt's  understands prognosis. We asked pt again about HD and she did not say yes that she wanted it. Pt did say yes that she wanted continued supportive care. After long discussion,  and I decided to continue current supportive care for now. Would recommend transitioning to hospice if needed. Pt is DNR. Pt clinically same today. No family at bedside. Creat better with IVF. Continue supportive care. Would get palliative care/ hospice in am.     2.  No pain. Appears comfortable. 3. Low k replacing per IM.      I appreciate the opportunity to participate in Ms. Kelsi Alexandria care.  Call if questions.  D/w nursing this am.     Signed By: Debarah Leyden,

## 2018-03-04 NOTE — PROGRESS NOTES
PROGRESS NOTE    NAME:  Stacy Stroud   :   1950   MRN:   [id number]     Date/Time:  3/4/2018 10:32 AM  Subjective:   History:  Chart reviewed and patient seen and examined this AM and D/W her nurse all events noted. She was admitted on 3/2 with acute renal failure and metabolic acidosis with associated hypotension in the face of Metastatic clear cell carcinoma of Kidney. Her BP has improved with IV fluids and after receiving Albumin. She is extremely weak w/o N/V; however has no appetite. She has no SOB or cardio/respiratory c/o. There are no  c/o. She has no other c/o on complete ROS except extremely weak.       Medications reviewed:  Current Facility-Administered Medications   Medication Dose Route Frequency    cefTRIAXone (ROCEPHIN) 1 g/50 mL NS IVPB  1 g IntraVENous Q24H    insulin lispro (HUMALOG) injection   SubCUTAneous AC&HS    sodium bicarbonate 150 mEq/1000 mL D5W (premix)   IntraVENous CONTINUOUS    ALPRAZolam (XANAX) tablet 0.25 mg  0.25 mg Oral TID PRN    glipiZIDE (GLUCOTROL) tablet 2.5 mg  2.5 mg Oral BID    megestrol (MEGACE) 400 mg/10 mL (10 mL) oral suspension 200 mg  200 mg Oral DAILY    mirtazapine (REMERON) tablet 30 mg  30 mg Oral QHS    sertraline (ZOLOFT) tablet 50 mg  50 mg Oral DAILY    sodium chloride (NS) flush 5-10 mL  5-10 mL IntraVENous Q8H    sodium chloride (NS) flush 5-10 mL  5-10 mL IntraVENous PRN    acetaminophen (TYLENOL) tablet 650 mg  650 mg Oral Q4H PRN    ondansetron (ZOFRAN) injection 4 mg  4 mg IntraVENous Q4H PRN    heparin (porcine) injection 5,000 Units  5,000 Units SubCUTAneous Q12H    nystatin (MYCOSTATIN) 100,000 unit/mL oral suspension 500,000 Units  500,000 Units Oral TID        Objective:   Vitals:  Visit Vitals    /56    Pulse 84    Temp 97.9 °F (36.6 °C)    Resp 18    Ht 5' (1.524 m)    Wt 85 lb (38.6 kg)    SpO2 97%    Breastfeeding No    BMI 16.6 kg/m2      O2 Device: Room air Temp (24hrs), Av.3 °F (36.3 °C), Min:96.1 °F (35.6 °C), Max:97.9 °F (36.6 °C)      Last 24hr Input/Output:    Intake/Output Summary (Last 24 hours) at 03/04/18 1033  Last data filed at 03/04/18 0758   Gross per 24 hour   Intake          1845.83 ml   Output                0 ml   Net          1845.83 ml        PHYSICAL EXAM:  General:     Very frail appearing, alert, cooperative, moderate distress, appears stated age. Head:    Normocephalic, without obvious abnormality, atraumatic. Temporal wasting  Eyes:    Conjunctivae/corneas clear. PERRLA  Nose:   Nares normal. No drainage or sinus tenderness. Throat:     Lips, mucosa, and tongue normal.  No Thrush  Neck:   Supple, symmetrical,  no adenopathy, thyroid: non tender     no carotid bruit and no JVD. Back:     Symmetric,  No CVA tenderness. Lungs:    Clear to auscultation bilaterally with decreased BS. No Wheezing or Rhonchi. No rales. Heart:    Regular rate and rhythm,  no murmur, rub or gallop. Abdomen:    Soft, non-tender. Not distended. Bowel sounds normal. No masses  Extremities:  Extremities normal, atraumatic, No cyanosis. No edema. No clubbing  Lymph nodes:  Cervical, supraclavicular normal.  Neurologic:  Generalized decreased strength, Alert and oriented X 3.    Skin:                 No rash      Lab Data Reviewed:    Recent Results (from the past 24 hour(s))   GLUCOSE, POC    Collection Time: 03/03/18 11:37 AM   Result Value Ref Range    Glucose (POC) 206 (H) 65 - 100 mg/dL    Performed by ANNALISA GASPAR(CON)    GLUCOSE, POC    Collection Time: 03/03/18  4:24 PM   Result Value Ref Range    Glucose (POC) 121 (H) 65 - 100 mg/dL    Performed by Roshan Hercules, POC    Collection Time: 03/03/18  9:24 PM   Result Value Ref Range    Glucose (POC) 119 (H) 65 - 100 mg/dL    Performed by Patsy Aguirre, COMPREHENSIVE    Collection Time: 03/04/18  3:37 AM   Result Value Ref Range    Sodium 139 136 - 145 mmol/L    Potassium 3.1 (L) 3.5 - 5.1 mmol/L    Chloride 110 (H) 97 - 108 mmol/L    CO2 15 (LL) 21 - 32 mmol/L    Anion gap 14 5 - 15 mmol/L    Glucose 154 (H) 65 - 100 mg/dL    BUN 67 (H) 6 - 20 MG/DL    Creatinine 6.58 (H) 0.55 - 1.02 MG/DL    BUN/Creatinine ratio 10 (L) 12 - 20      GFR est AA 8 (L) >60 ml/min/1.73m2    GFR est non-AA 6 (L) >60 ml/min/1.73m2    Calcium 7.5 (L) 8.5 - 10.1 MG/DL    Bilirubin, total 0.4 0.2 - 1.0 MG/DL    ALT (SGPT) 6 (L) 12 - 78 U/L    AST (SGOT) 15 15 - 37 U/L    Alk. phosphatase 77 45 - 117 U/L    Protein, total 5.0 (L) 6.4 - 8.2 g/dL    Albumin 1.9 (L) 3.5 - 5.0 g/dL    Globulin 3.1 2.0 - 4.0 g/dL    A-G Ratio 0.6 (L) 1.1 - 2.2     CBC WITH AUTOMATED DIFF    Collection Time: 03/04/18  3:37 AM   Result Value Ref Range    WBC 6.0 3.6 - 11.0 K/uL    RBC 2.68 (L) 3.80 - 5.20 M/uL    HGB 8.4 (L) 11.5 - 16.0 g/dL    HCT 24.3 (L) 35.0 - 47.0 %    MCV 90.7 80.0 - 99.0 FL    MCH 31.3 26.0 - 34.0 PG    MCHC 34.6 30.0 - 36.5 g/dL    RDW 14.7 (H) 11.5 - 14.5 %    PLATELET 176 986 - 988 K/uL    MPV 11.4 8.9 - 12.9 FL    NRBC 0.5 (H) 0  WBC    ABSOLUTE NRBC 0.03 (H) 0.00 - 0.01 K/uL    NEUTROPHILS 85 (H) 32 - 75 %    LYMPHOCYTES 5 (L) 12 - 49 %    MONOCYTES 9 5 - 13 %    EOSINOPHILS 1 0 - 7 %    BASOPHILS 0 0 - 1 %    IMMATURE GRANULOCYTES 0 0.0 - 0.5 %    ABS. NEUTROPHILS 5.1 1.8 - 8.0 K/UL    ABS. LYMPHOCYTES 0.3 (L) 0.8 - 3.5 K/UL    ABS. MONOCYTES 0.5 0.0 - 1.0 K/UL    ABS. EOSINOPHILS 0.1 0.0 - 0.4 K/UL    ABS. BASOPHILS 0.0 0.0 - 0.1 K/UL    ABS. IMM.  GRANS. 0.0 0.00 - 0.04 K/UL    DF AUTOMATED     MAGNESIUM    Collection Time: 03/04/18  3:37 AM   Result Value Ref Range    Magnesium 1.9 1.6 - 2.4 mg/dL   PHOSPHORUS    Collection Time: 03/04/18  3:37 AM   Result Value Ref Range    Phosphorus 4.3 2.6 - 4.7 MG/DL   GLUCOSE, POC    Collection Time: 03/04/18  8:12 AM   Result Value Ref Range    Glucose (POC) 237 (H) 65 - 100 mg/dL    Performed by Otto Neely          Assessment/Plan:     Principal Problem:    Acute kidney injury ([facility] (3/2/2018)    Active Problems:    Controlled diabetes mellitus ([facility] (7/31/2017)      Clear cell renal cell carcinoma ([facility] [facility] (8/28/2017)      Hypotension (3/3/2018)      Dehydration (3/3/2018)      Severe protein-calorie malnutrition ([facility] [facility] (3/3/2018)       ___________________________________________________  PLAN:    1. Follow Creat, 9.17 adm to 6.58 now  2. IV Hydration  3. Albumin as needed to support BP  4. Replete K, now up to 3.1 from 2.5  5. Follow BS with DM and treat SSI  6. Push Nutrition as tolerated  7. Palliative care consult  8. DNR per patient wishes  5. Other orders reviewed and continued  10.  Oncology notes reviewed and help appreciated    Prognosis extremely poor              ___________________________________________________    Attending Physician: Filipe Strauss MD

## 2018-03-05 NOTE — CONSULTS
Palliative Medicine Consult  Farhan: [phone] ([phone])    Patient Name: Feliz Yadav  YOB: 1950    Date of Initial Consult: 03/05/2018  Reason for Consult: care decisions  Requesting Provider: Dr. Radha Fleming   Primary Care Physician: Shea Johnson MD     SUMMARY:   Feliz Yadav is a 77 y.o. woman w/ PMH most significant for metastatic RCC on pazopanib and s/p completion of SBRT on 01/09/2018 who was admitted on 3/2/2018 from home for BONG. Current medical issues leading to Palliative Medicine involvement include: care decisions in setting of overall worsening of her condition, PMH noted above, dx's below. Active problems: BONG w/ metabolic acidosis 2/2 dehydration  RCC w/ mets to brain, pleura, and mediastinum  Protein calorie malnutrition  Constipation     PALLIATIVE DIAGNOSES:   1. Altered mental status  2. Poor PO intake  3. Pain  4. Weakness  5. Debility  6. Counseling regarding goals of care and advance care planning       PLAN:   Brief Summary of Plan  -visited w/ pt in her rm.  Rosendo Malagon) and long-time friend Saleem Valentin) at bedside.   -family meeting held. Confirmed goals of care and advance care planning. See below. -we will continue to establish therapeutic alliance as well as provide psychosocial support    1. Goals of Care/Goals of Medical Treatment- confirmed   confirms wish for full, restorative treatment and all measures that support this w/ the following exceptions:  NO to intubation if respiratory distress or failure outside of cardiopulmonary arrest  NO to pressors  NO to HD    But  YES to CPAP or BiPAP    2. Advance Care Planning- pt's code status   Patient confirms code status as DO NOT ATTEMPT RESUSCITATION (DNAR) / [address] (AND): If pt has cardiopulmonary arrest, then   NO to chest compressions  NO to cardiac shock  NO to ACLS meds  NO to intubation    Pt does not have AMD on file. Surrogate decision maker is .      3. Information Sharing-     03/05/2018   noted that someone told him that I would be visiting. He said that his understanding was we would provide advice in guiding discussions of plan of care. I provided more details about our team and its role in their care.  and Virl Jose Manuel verbalized understanding of all the points made including the following:  -we are an interdisciplinary team serving as a bridge of communication and advocate on behalf of the patient and family when needed  -we are a support care service that, when needed, assists w/ sx mgmt  -though hospice is \"under\" palliative medicine, our service is not hospice nor does a consult visit by our service mean that hospice is being or should be considered  -we are not here to make medical decisions, and our being consulted does not equate to a change in a patient's overall condition  -our involvement is independent of clinical trajectory and/or medical decisions made    We spoke about pt's condition. They demonstrated a good understanding of pt's condition.  became tearful as we spoke. He said that now it seems Ivan Ruder is taking its course\" as pt is refusing to drink or eat. He is having a hard time with this b/c initially he did not understand why someone (pt) would not try to do something if it can mean she might get better. I recognized the difficulty they are having including naming emotions evident. We spoke about range of medical treatment.  is clear on his wishes as noted above. We discussed pt's having signed \"DDNR\" form. Emphasis was placed on honoring pt's wishes.  fully support this. They shared that pt is as stubborn as anyone can be. She likes to read a lot. She has a list of books and authors that she has read.  thinks she may have read 150 books in a time period of over a yr. They have 1 child, Assunta Spurling. Assunta Spurling called in the middle of family meeting.    spoke w/ her briefly. Ally Hunt and pt have been friends for 48 yrs. They met when pt was working at [employer]. 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 Pt likes to stay at home.  laughed as he said that she never got along with a woman as her boss. Questions and concerns addressed. Supportive listening provided throughout family meeting.  gave permission for me to visit later as well as tomorrow. They are appreciative of the care being provided here. 4. Psychosocial Support- We will continue to support patient and family during this difficult hospitalization    5. Spiritual- at this time, there are no apparent spiritual concerns. 6. Symptom Management- later in day, bedside RN asked for change to opioid regimen given pt's response and continued low BP.   -d/c Dilaudid order  -start Fentanyl 25 mcg IV q4h prn pain, SOB, or tachypnea w/ HOLD parameters. Later, this was changed to Fentanyl 25-40 mcg IV q4h prn pain, SOB, or tachypnea w/ HOLD parameters given pt's response to original Fentanyl prn order. I have discussed with patients bedside RN, Fauzia Perea. I have discussed with primary medical team attending, Dr. Sabi Jolley. Time and input appreciated. I have discussed with our palliative care IDT. Thank you for this consult that has provided us with the opportunity to be involved in this patient's care. We will continue to follow along with you. Should you have any questions or concerns prior to our next visit at the bedside, please do not hesitate to contact us at [phone] Aisha Farmer MD  Palliative Care Team     GOALS OF CARE / TREATMENT PREFERENCES:     GOALS OF CARE:  Patient/Health Care Proxy Stated Goals:  Other (comment)   wishes for full, restorative treatment and all measures that support this w/ exception of the following:  NO to intubation if respiratory distress or failure outside of cardiopulmonary arrest  NO to pressors  NO to HD    But  YES to CPAP or BiPAP    TREATMENT PREFERENCES:   Code Status: DNR   Pt's code status is DO NOT ATTEMPT RESUSCITATION (DNAR) / [address] (AND): If pt has cardiopulmonary arrest, then   NO to chest compressions  NO to cardiac shock  NO to ACLS meds  NO to intubation    Pt has \"DDNR\" form on file that she signed. Advance Care Planning:  Advance Care Planning 3/3/2018   Patient's Healthcare Decision Maker is: Legal Next of Kin   Primary Decision Maker Name Varun Martin   Primary Decision Maker Phone Number [phone]   Primary Decision Maker Relationship to Patient -   Confirm Advance Directive None   Patient Would Like to Complete Advance Directive No   Does the patient have other document types Do Not Resuscitate       Medical Interventions: Other (comment)   Limited medical interventions. See plan. Other Instructions: none at this time        Other:    As far as possible, the palliative care team has discussed with patient / health care proxy about goals of care / treatment preferences for patient. HISTORY:     History obtained from: family, chart, bedside RN    CHIEF COMPLAINT: pt does not communicate cc during my visit    HPI/SUBJECTIVE:    The patient is:   [] Verbal and participatory  [x] Non-participatory due to:  Current condition  Pt is a 78 y/o WF w/ PMH noted above who presented w/ weakness and not eating. She has been undergoing tx for metastatic RCC but her overall condition has declined over several wks.  notes that she has not done well since receiving brain radiation. He says that she is very stubborn but it is not like her to not eat at all. Pt has had nausea for which Zofran prn had not really helped. Pt was on Megace but has had no appetite. She apparently has drank 3 Ensure cans per day. No BM x >1 wk. Pt did not urinate on the day of ED presentation.   Pt has not been able to walk on day of ED presentation. Pt was admitted for further w/u and mgmt. Clinical Pain Assessment (nonverbal scale for severity on nonverbal patients):   Clinical Pain Assessment  Severity: 0     Activity (Movement): Lying quietly, normal position    Duration: for how long has pt been experiencing pain (e.g., 2 days, 1 month, years)  Frequency: how often pain is an issue (e.g., several times per day, once every few days, constant)     FUNCTIONAL ASSESSMENT:     Palliative Performance Scale (PPS):  PPS: 30     PSYCHOSOCIAL/SPIRITUAL SCREENING:     Palliative IDT has assessed this patient for cultural preferences / practices and a referral made as appropriate to needs (Cultural Services, Patient Advocacy, Ethics, etc.)    Advance Care Planning:  Advance Care Planning 3/3/2018   Patient's Healthcare Decision Maker is: Legal Next of Tracey Jacobson   Primary Decision Maker Name Jair Laguna   Primary Decision Maker Phone Number [phone]   Primary Decision Maker Relationship to Patient -   Confirm Advance Directive None   Patient Would Like to Complete Advance Directive No   Does the patient have other document types Do Not Resuscitate       Any spiritual / Temple concerns:  [] Yes /  [x] No    Caregiver Burnout:  [] Yes /  [x] No /  [] No Caregiver Present      Anticipatory grief assessment:   [x] Normal  / [] Maladaptive       ESAS Anxiety:      ESAS Depression:          REVIEW OF SYSTEMS:     Positive and pertinent negative findings in ROS are noted above in HPI. The following systems were [] reviewed / [x] unable to be reviewed as noted in HPI  Other findings are noted below. Systems: constitutional, ears/nose/mouth/throat, respiratory, gastrointestinal, genitourinary, musculoskeletal, integumentary, neurologic, psychiatric, endocrine. Positive findings noted below.   Modified ESAS Completed by: provider           Pain: 0           Dyspnea: 0           Stool Occurrence(s): 1        PHYSICAL EXAM:     From RN flowsheet:  Wt Readings from Last 3 Encounters:   03/02/18 85 lb (38.6 kg)   02/09/18 95 lb 3.2 oz (43.2 kg)   11/29/17 109 lb 9.6 oz (49.7 kg)     Blood pressure 105/46, pulse 92, temperature 98.7 °F (37.1 °C), resp. rate 14, height 5' (1.524 m), weight 85 lb (38.6 kg), SpO2 95 %, not currently breastfeeding.     Pain Scale 1: Numeric (0 - 10)  Pain Intensity 1: 6  Pain Onset 1: within 30 minutes  Pain Location 1: Generalized  Pain Orientation 1: Anterior  Pain Description 1: Aching  Pain Intervention(s) 1: Medication (see MAR)  Last bowel movement, if known:     Gen: sick appearing, weak, lying in bed rotated to her R, sheets pulled up to her chest, in NAD  HEENT: NC/AT, dry lips, NC securely in place  Respiratory: breathing not labored, symmetric  Skin: warm, dry  Neurologic: awake w/ eyes open but very minimally engages in conversationf  Psychiatric: limited exam d/t condition  Blunted affect  No obvious signs of agitation  Unable to demonstrate capacity regarding current medical treatment     HISTORY:     Principal Problem:    Acute kidney injury ([address] (3/2/2018)    Active Problems:    Controlled diabetes mellitus ([address] (7/31/2017)      Clear cell renal cell carcinoma ([address] (8/28/2017)      Hypotension (3/3/2018)      Dehydration (3/3/2018)      Severe protein-calorie malnutrition ([address] (3/3/2018)      Past Medical History:   Diagnosis Date    Anxiety 7/31/2017    2/3/17:Under a tremendous amount of stress, will continue Alprazolam prn, if using regularly consider changing to an SSRI or counseling    Brain metastases ([address]     Cancer ([address] 08/2017    renal cell carcinoma    Cervical myelopathy ([address] 7/31/2017    2/3/17:Cervical fusion     Clear cell adenocarcinoma of kidney ([address]     Controlled diabetes mellitus ([address] 7/31/2017    Fatigue 7/31/2017    Hypercholesterolemia 7/31/2017    Hyperglycemia 7/31/2017    Hypertension 7/31/2017    2/3/17:Poorly controlled, lifestyle changes, repeat /90 will follow    On statin therapy 7/31/2017    Osteoporosis 7/31/2017    Post-menopausal 7/31/2017    Spinal stenosis of lumbar region with radiculopathy 7/31/2017    2/3/17:Symptoms c/w this diagnosis discussed with pt., if progresses then need MRI L spine and follow up    Vaginitis due to Candida 7/31/2017    2/3/17:Associated with antibiotic use, requests prescription med      Past Surgical History:   Procedure Laterality Date    HX ORTHOPAEDIC      2009 spinal cord surgery     HX ORTHOPAEDIC Left     knee procedure    HX OTHER SURGICAL  08/16/2017    incisional biopsy right neck mass      Family History   Problem Relation Age of Onset    Heart Disease Mother       History reviewed, no pertinent family history.   Social History   Substance Use Topics    Smoking status: Never Smoker    Smokeless tobacco: Never Used    Alcohol use No     No Known Allergies   Current Facility-Administered Medications   Medication Dose Route Frequency    miconazole (SECURA) 2 % extra thick cream   Topical PRN    balsam peru-castor oil (VENELEX)  mg/gram ointment   Topical BID    fentaNYL citrate (PF) injection 25-40 mcg  25-40 mcg IntraVENous Q4H PRN    insulin lispro (HUMALOG) injection   SubCUTAneous AC&HS    sodium bicarbonate 150 mEq/1000 mL D5W (premix)   IntraVENous CONTINUOUS    ALPRAZolam (XANAX) tablet 0.25 mg  0.25 mg Oral TID PRN    megestrol (MEGACE) 400 mg/10 mL (10 mL) oral suspension 200 mg  200 mg Oral DAILY    mirtazapine (REMERON) tablet 30 mg  30 mg Oral QHS    sertraline (ZOLOFT) tablet 50 mg  50 mg Oral DAILY    sodium chloride (NS) flush 5-10 mL  5-10 mL IntraVENous Q8H    sodium chloride (NS) flush 5-10 mL  5-10 mL IntraVENous PRN    acetaminophen (TYLENOL) tablet 650 mg  650 mg Oral Q4H PRN    ondansetron (ZOFRAN) injection 4 mg  4 mg IntraVENous Q4H PRN    heparin (porcine) injection 5,000 Units  5,000 Units SubCUTAneous Q12H    nystatin (MYCOSTATIN) 100,000 unit/mL oral suspension 500,000 Units  500,000 Units Oral TID          LAB AND IMAGING FINDINGS:     Lab Results   Component Value Date/Time    WBC 7.3 03/05/2018 03:48 AM    HGB 9.4 (L) 03/05/2018 03:48 AM    PLATELET 355 (L) 43/67/0084 03:48 AM     Lab Results   Component Value Date/Time    Sodium 141 03/05/2018 03:48 AM    Potassium 2.6 (LL) 03/05/2018 03:48 AM    Chloride 104 03/05/2018 03:48 AM    CO2 28 03/05/2018 03:48 AM    BUN 59 (H) 03/05/2018 03:48 AM    Creatinine 4.27 (H) 03/05/2018 03:48 AM    Calcium 7.1 (L) 03/05/2018 03:48 AM    Magnesium 1.5 (L) 03/05/2018 03:48 AM    Phosphorus 4.3 03/04/2018 03:37 AM      Lab Results   Component Value Date/Time    AST (SGOT) 15 03/04/2018 03:37 AM    Alk. phosphatase 77 03/04/2018 03:37 AM    Protein, total 5.0 (L) 03/04/2018 03:37 AM    Albumin 1.9 (L) 03/04/2018 03:37 AM    Globulin 3.1 03/04/2018 03:37 AM     No results found for: INR, PTMR, PTP, PT1, PT2, APTT   No results found for: IRON, FE, TIBC, IBCT, PSAT, FERR   No results found for: PH, PCO2, PO2  No components found for: [place]   Lab Results   Component Value Date/Time    CK 49 08/31/2017 06:47 PM                Total time: 70 min  Counseling / coordination time, spent as noted above: 55 min  > 50% counseling / coordination?: yes    Prolonged service was provided for  []30 min   []75 min in face to face time in the presence of the patient, spent as noted above. Time Start:   Time End:   Note: this can only be billed with 53049 (initial) or 91461 (follow up). If multiple start / stop times, list each separately.

## 2018-03-05 NOTE — ROUTINE PROCESS
Bedside and Verbal shift change report given to [address] (oncoming nurse) by Eduin Olguin RN (offgoing nurse). Report included the following information SBAR, Kardex, Intake/Output, MAR and Recent Results.

## 2018-03-05 NOTE — WOUND CARE
Wound Care consult: Chart reviewed and patient assessed for her wounds on the sacrum that were present on admission. Pt. Is end stage renal CA  Assessment / Recommended treatment: There are scabs and open skin on the sacrum but most of the the skin blanches around the area. The scabbed areas do not erin but all of the skin is completely raw and painful. We will treat it like a Pressure injury and keep the pressure off of it by turning her at least every two hours and keeping her as dry as possible. Pt. Did agree to have turns when the staff are changing the incontinence pad. Today the Secura antifungal cream was placed by the staff due to reddened areas in the folds that are moist and have satellite lesions (mild). The buttocks with macerated and the skin was cleansed with just warm water and patted dry. The Secura antifungal was applied to the sacrum to dry the wound area. Venelex to be applied to the area this evening.    Asher Huber RN, BSN, [employer]

## 2018-03-05 NOTE — PROGRESS NOTES
Attempted to see pt for PT eval. Entered room and  stated that she was in a significant amount of pain and refused at this time. Noted palliative is to follow pt. Nursing stated she had been refusing most care this morning. Will defer and follow as appropriate.

## 2018-03-05 NOTE — CDMP QUERY
3/4  Dr. Janelle Oconnor :  Please clarify if this patient is (was) being treated/managed for:     => hyponatremia in setting of Na [phone]  => Other explanation of clinical findings  => Unable to determine (no explanation for clinical findings)    The medical record reflects the following clinical findings, treatment, and risk factors. Risk Factors:  weakness, not eating  Clinical Indicators:  Na [phone], dehydration, not eating, BONG/metabolic acidosis, hypotension, malnutrition,   Treatment: serial labs, Megace, Na Bicarb to ivf, Albumin, 2 liters NS, [email or web address],     Please clarify and document your clinical opinion in the progress notes and discharge summary including the definitive and/or presumptive diagnosis, (suspected or probable), related to the above clinical findings. Please include clinical findings supporting your diagnosis. Thank Annemarie Morataya  [address]; CPA;3719

## 2018-03-05 NOTE — CDMP QUERY
1/4  Dr. Eric Holt :  Please clarify if this patient is being treated/managed for:     => Pressure ulcer of sacral region, stage 2 POA  => Other explanation of clinical findings  => Unable to determine (no explanation for clinical findings)    The medical record reflects the following clinical findings, treatment, and risk factors. Risk Factors:  presents for weakness/not eating  Clinical Indicators: RN noted stage 2 to sacral region; registered dietitian noted: severe malnutrition and suspect pt will not be meeting nutrition needs PO, alternate route of nutrition via tube feed should be discussed if it meets pt GOC; Pt is at risk for refeeding syndrome, monitor lytes·   Treatment: Continue current diet; · Ensure Clear TID and ensure pudding TID    Please clarify and document your clinical opinion in the progress notes and discharge summary including the definitive and/or presumptive diagnosis, (suspected or probable), related to the above clinical findings. Please include clinical findings supporting your diagnosis. Thank Linda López  [address]; QOX;4653

## 2018-03-05 NOTE — PROGRESS NOTES
Nutrition Assessment:    INTERVENTIONS/RECOMMENDATIONS:   Meals/Snacks: General/healthful diet: Mechanical soft diet  Supplements: Commercial supplement: Continue ensure clear TID, d/c ensure pudding    ASSESSMENT:   Patient medically noted for BONG, dehydration, hypotension, and severe malnutrition. PMH for DM, renal cell carcinoma with brain/pleural/mediastinal mets, and HTN. Patient resting at time of visit; spoke to family at bedside. Family able to get patient to drink a few sips of ensure clear this morning but nothing else. She did not like the ensure pudding and would prefer plain vanilla pudding instead. Encouraged family to use menu/room service if patient were to wake up and request something in particular or if they would like to order her favorite foods. Nursing reports she has been refusing almost everything this morning. Palliative care to see today. Continue liberalized diet + ONS for now. Encourage intake of meals as tolerated. Patient continues to meet ASPEN criteria for acute severe malnutrition. Meets Criteria for Acute Malnutrition   [x] Severe Malnutrition, as evidenced by:   [x] Moderate muscle wasting, loss of subcutaneous fat   [x] Nutritional intake of <50% of recommended intake for >5 days   [x] Weight loss of >1-2% in 1 week, >5% in 1 month, >7.5% in 3 months, or >10% in 6 months   [] Moderate-severe edema   []Moderate Malnutrition, as evidenced by:   [] Mild muscle wasting, loss of subcutaneous fat   [] Nutritional intake <75% of recommended intake for >1 week   [] Weight loss of 1-2% in 1 week, 5% in 1 month, 7.5% in 3 months, or 10% in 6 months   [] Mild edema        Diet Order: Mechanical soft (ensure clear TID, ensure pudding TID)  % Eaten:  No data found.     Pertinent Medications: [x] Reviewed []Other: Humalog, Megace, Remeron, Zoloft, KCl  Pertinent Labs: [x]Reviewed  []Other: K+ 2.6, -[phone], BUN 59, Cr 4.27  Food Allergies: [x]None []Other:     Last BM: 3/3 [x]Active     []Hyperactive  []Hypoactive       [] Absent  BS  Skin:    [] Intact   [] Incision  [x] Breakdown (stage 2 sacrum, left elbow per flowsheets)  []Edema   []Other:    Anthropometrics: Height: 5' (152.4 cm) Weight: 38.6 kg (85 lb)    IBW (%IBW):   ( ) UBW (%UBW):   (  %)    BMI: Body mass index is 16.6 kg/(m^2). This BMI is indicative of:  [x]Underweight   []Normal   []Overweight   [] Obesity   [] Extreme Obesity (BMI>40)  Last Weight Metrics:  Weight Loss Metrics 3/2/2018 2/9/2018 11/29/2017 11/15/2017 10/30/2017 10/9/2017 9/10/2017   Today's Wt 85 lb 95 lb 3.2 oz 109 lb 9.6 oz 103 lb 101 lb 6.4 oz 97 lb 125 lb   BMI 16.6 kg/m2 17.99 kg/m2 20.71 kg/m2 19.46 kg/m2 19.16 kg/m2 18.94 kg/m2 24.41 kg/m2       Estimated Nutrition Needs (Based on): 1581 Kcals/day (BMR (832) x 1.3AF +500kcal) , 51 g (-59g (1.3-1.5 g/kg bw)) Protein  Carbohydrate: At Least 130 g/day  Fluids: 1500 mL/day     Pt expected to meet estimated nutrient needs: []Yes [x]No    NUTRITION DIAGNOSES:   Problem:  Malnutrition      Etiology: related to renal cell carcinoma     Signs/Symptoms: as evidenced by poor PO, 10.5% weight loss x 1 months      NUTRITION INTERVENTIONS:  Meals/Snacks: General/healthful diet   Supplements: Commercial supplement              GOAL:   PO intake >25% of meals/supplement next 1-3 days    NUTRITION MONITORING AND EVALUATION   Food/Nutrient Intake Outcomes:  Total energy intake  Physical Signs/Symptoms Outcomes: Weight/weight change, Electrolyte and renal profile, GI profile, Glucose profile    Previous Goal Met:    [x] Met              [] Progressing Towards Goal              [] Not Progressing Towards Goal   Previous Recommendations:   [x] Implemented          [] Not Implemented          [] Not Applicable    LEARNING NEEDS (Diet, Food/Nutrient-Drug Interaction):    [x] None Identified   [] Identified and Education Provided/Documented   [] Identified and Pt declined/was not appropriate     Cultural, Christian, OR Ethnic Dietary Needs:    [x] None Identified   [] Identified and Addressed     [x] Interdisciplinary Care Plan Reviewed/Documented    [x] Discharge Planning: Regular/mechanical soft diet without therapeutic restrictions    [] Participated in Interdisciplinary Rounds    NUTRITION RISK:    [x] High              [] Moderate           []  Low  []  Minimal/Uncompromised      Gisele Benoit  Pager [phone]              Weekend Pager [phone]

## 2018-03-05 NOTE — PROGRESS NOTES
Responded to request from Leslie Sarmiento, RN to visit with patient's  on PCU. Patient was awake appearing very tired.  Reji Gaona was holding her hand. Provided empathic listening and emotional support as he shared that his wife has fought for a long time. Held her hand offering words of comfort and reassurance.  requested prayer which was offered at bedside. Reminded him of availability of pastoral support.   Shalini Asif, [facility], [facility], [address] [place] [address] Avenue    [address] Paging Service  [phone])

## 2018-03-05 NOTE — PROGRESS NOTES
PROGRESS NOTE    NAME:  Roldan Calvillo   :   1950   MRN:   [id number]     Date/Time:  3/5/2018 7:35 AM  Subjective:   History: Mrs. Brandon Youssef is minimally responsive this morning. She opens eyes to voice. She will follow simple commands and is able to squeeze my hand. She is nonverbal.  She tries to nod in response to questions.       Medications reviewed:  Current Facility-Administered Medications   Medication Dose Route Frequency    potassium chloride 30 mEq in 0.9% sodium chloride 300 mL infusion  30 mEq IntraVENous ONCE    HYDROmorphone (DILAUDID) injection 0.5 mg  0.5 mg IntraVENous Q4H PRN    insulin lispro (HUMALOG) injection   SubCUTAneous AC&HS    sodium bicarbonate 150 mEq/1000 mL D5W (premix)   IntraVENous CONTINUOUS    ALPRAZolam (XANAX) tablet 0.25 mg  0.25 mg Oral TID PRN    glipiZIDE (GLUCOTROL) tablet 2.5 mg  2.5 mg Oral BID    megestrol (MEGACE) 400 mg/10 mL (10 mL) oral suspension 200 mg  200 mg Oral DAILY    mirtazapine (REMERON) tablet 30 mg  30 mg Oral QHS    sertraline (ZOLOFT) tablet 50 mg  50 mg Oral DAILY    sodium chloride (NS) flush 5-10 mL  5-10 mL IntraVENous Q8H    sodium chloride (NS) flush 5-10 mL  5-10 mL IntraVENous PRN    acetaminophen (TYLENOL) tablet 650 mg  650 mg Oral Q4H PRN    ondansetron (ZOFRAN) injection 4 mg  4 mg IntraVENous Q4H PRN    heparin (porcine) injection 5,000 Units  5,000 Units SubCUTAneous Q12H    nystatin (MYCOSTATIN) 100,000 unit/mL oral suspension 500,000 Units  500,000 Units Oral TID        Objective:   Vitals:  Visit Vitals    BP 99/60 (BP 1 Location: Left arm, BP Patient Position: At rest)    Pulse 89    Temp 98.6 °F (37 °C)    Resp 15    Ht 5' (1.524 m)    Wt 85 lb (38.6 kg)    SpO2 93%    Breastfeeding No    BMI 16.6 kg/m2      O2 Device: Room air Temp (24hrs), Av.9 °F (36.6 °C), Min:96.2 °F (35.7 °C), Max:98.6 °F (37 °C)      Last 24hr Input/Output:    Intake/Output Summary (Last 24 hours) at 18 0714  Last data filed at 03/05/18 0600   Gross per 24 hour   Intake          2818.75 ml   Output                0 ml   Net          2818.75 ml        PHYSICAL EXAM:  General:    Awake, nonverbal, no distress, appears stated age. Head:    Normocephalic, without obvious abnormality, atraumatic. Eyes:    Conjunctivae/corneas clear. PERRLA  Nose:   Nares normal. No drainage or sinus tenderness. Throat:     Lips, mucosa, and tongue normal.  Dry mucous membranes. Neck:   Supple, symmetrical,  no adenopathy, thyroid: non tender     no carotid bruit and no JVD. Back:     Symmetric,  No CVA tenderness. Lungs:    Clear to auscultation bilaterally. No Wheezing or Rhonchi. No rales. Heart:    Regular rate and rhythm,  no murmur, rub or gallop. Abdomen:    Soft, non-tender. Not distended. Bowel sounds normal. No masses  Extremities:  Extremities normal, atraumatic, No cyanosis. Trace edema.  No clubbing  Lymph nodes:  Cervical, supraclavicular normal.  Neurologic: General debility and weakness, able to squeeze with the left hand unable to squeeze with right hand question right sided weakness  Skin:                No rash      Lab Data Reviewed:    Recent Results (from the past 24 hour(s))   GLUCOSE, POC    Collection Time: 03/04/18  8:12 AM   Result Value Ref Range    Glucose (POC) 237 (H) 65 - 100 mg/dL    Performed by [address] [place] [address], POC    Collection Time: 03/04/18 11:32 AM   Result Value Ref Range    Glucose (POC) 243 (H) 65 - 100 mg/dL    Performed by Danny Parra    GLUCOSE, POC    Collection Time: 03/04/18  4:45 PM   Result Value Ref Range    Glucose (POC) 349 (H) 65 - 100 mg/dL    Performed by Josh Ac    GLUCOSE, POC    Collection Time: 03/04/18  8:55 PM   Result Value Ref Range    Glucose (POC) 347 (H) 65 - 100 mg/dL    [facility]formed by [facility] , BASIC    Collection Time: 03/05/18  3:48 AM   Result Value Ref Range    Sodium 141 136 - 145 mmol/L    Potassium 2.6 (LL) 3.5 - 5.1 mmol/L Chloride 104 97 - 108 mmol/L    CO2 28 21 - 32 mmol/L    Anion gap 9 5 - 15 mmol/L    Glucose 193 (H) 65 - 100 mg/dL    BUN 59 (H) 6 - 20 MG/DL    Creatinine 4.27 (H) 0.55 - 1.02 MG/DL    BUN/Creatinine ratio 14 12 - 20      GFR est AA 12 (L) >60 ml/min/1.73m2    GFR est non-AA 10 (L) >60 ml/min/1.73m2    Calcium 7.1 (L) 8.5 - 10.1 MG/DL   CBC WITH AUTOMATED DIFF    Collection Time: 03/05/18  3:48 AM   Result Value Ref Range    WBC 7.3 3.6 - 11.0 K/uL    RBC 2.99 (L) 3.80 - 5.20 M/uL    HGB 9.4 (L) 11.5 - 16.0 g/dL    HCT 27.1 (L) 35.0 - 47.0 %    MCV 90.6 80.0 - 99.0 FL    MCH 31.4 26.0 - 34.0 PG    MCHC 34.7 30.0 - 36.5 g/dL    RDW 15.0 (H) 11.5 - 14.5 %    PLATELET 416 (L) 656 - 400 K/uL    MPV 12.6 8.9 - 12.9 FL    NRBC 0.3 (H) 0  WBC    ABSOLUTE NRBC 0.02 (H) 0.00 - 0.01 K/uL    NEUTROPHILS 79 (H) 32 - 75 %    LYMPHOCYTES 7 (L) 12 - 49 %    MONOCYTES 12 5 - 13 %    EOSINOPHILS 2 0 - 7 %    BASOPHILS 0 0 - 1 %    IMMATURE GRANULOCYTES 1 (H) 0.0 - 0.5 %    ABS. NEUTROPHILS 5.8 1.8 - 8.0 K/UL    ABS. LYMPHOCYTES 0.5 (L) 0.8 - 3.5 K/UL    ABS. MONOCYTES 0.9 0.0 - 1.0 K/UL    ABS. EOSINOPHILS 0.1 0.0 - 0.4 K/UL    ABS. BASOPHILS 0.0 0.0 - 0.1 K/UL    ABS. IMM. GRANS. 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 Replete mag and K gently given acute renal failure  2. Creatinine improved, continue IV fluids, monitor volume status, renal following, patient/family declined hemodialysis  3.   Patient remains DNR per her bossman, metastatic renal cell cancer with metastases to brain with possible neurological deficit  4.   Palliative medicine, patient apparently doing well with active treatment until recently with radiation therapy for brain metastases            ___________________________________________________    Attending Physician: Julieta Aranda MD

## 2018-03-05 NOTE — PROGRESS NOTES
0700: Change of shift report received from Bracken, RN. Patient on room air, O2 saturation variable (89-91%). VSS.     0800: Patient placed on 2L NC for saturation 88-89% and holding; initial assessment completed; patient not interactive; opens eyes to verbal prompting, but not participating in care; does not answer any LOC questions; only response given is when nasal canula placed on patient and patient moans. Lung sounds diminished literally; abdomen intact, soft, active bowel sounds; stage 2 wound noted on left elbow with mepilex covering, and on buttocks, mepilex present; excoriation noted in groin, secura cream ordered PRN; no venelex ordered for wounds, ordered at this time. Pedal and post-tibial pulses are present, but faint. 0830: Patient refusing PT/OT at this time,  in agreement     [phone]: Patient's  finds nurse and states patient has a headache and generalized body pain, PRN dilaudid given    1000: MD Amador Cough with palliative speaks with nurse concerning consult, states he will be in to speak with patient and  this afternoon. 1131: Patient's blood pressure 80/45, upon recheck blood pressure 84/38. MD Delvin Joiner paged. Will speak with MD concerning correctional insulin but refusing food at this time. Previous AC doses have been held as order states, but blood sugar trending up. MD Rea Butler saw patient over the weekend and wrote albumin PRN for BP support, but no actual order obtained. Will recommend. 1151: Difficulty with reaching MD Delvin Joiner after two phone calls, finally able to reach nurse at practice to relay message to MD. Awaiting callback at this time. 1200: Reassessment performed; changes as noted in flowsheet     1209: Orders received from MD Delvin Joiner for 25% albumin. 1227: BP 90/40 after initiation of albumin; will monitor. 1337:  Albumin fails to get patient's BP out of the 80s - MD Delvin Joiner called; palliative medicine also paged to devise plan of care moving forward     1423: MD Yessica Salinas takes  to conference room to discuss plans moving forward; BP now 90/41.    1550:  paged;  very tearful at bedside     65: Spoke with MD Yessica Salinas concerning patient's options for pain medication (tylenol, and dilaudid) - concern for BP issues and nurse requests something other than dilaudid. MD Yessica Salinas orders 25 mcg fentanuyl q4 hrs and d/c's dilaudid. 1900: Change of shift report given to Yan Dobbs RN.

## 2018-03-05 NOTE — PROGRESS NOTES
Full, initial consult note to follow. Paged by Lloyd Addison, bedside RN. Pt's condition is worsening. Family at bedside. Met w/ pt, , and long-time friend. Pt does not demonstrate capacity regarding current medical tx.  confirmed goals of care. Continue w/ full, restorative tx and all measures w/ exception of the following limitatations:  NO to intubation if respiratory distress or failure outside of cardiopulmonary arrest  NO to pressors  NO to HD    But  YES to CPAP or BiPAP    Regarding code status, pt has \"DDNR\" form that she signed. Code status is DNAR/AND. New pink sheet completed to reflect the above. Updated comments of code status to have the above. D/w Lloyd Addison after family meeting. D/w Dr. Sheridan Monae before and after family meeting. Time and input appreciated.      Eric Sandhu MD  Palliative Care Team

## 2018-03-05 NOTE — PROGRESS NOTES
OT referral received, chart reviewed, and spoke with nursing in an attempt to see patient for evaluation. Nursing reports patient is in too much pain to participate in therapy today and recommends deferring until tomorrow. OT deferred and wiill follow up tomorrow for evaluation as requested.

## 2018-03-05 NOTE — PROGRESS NOTES
Progress Note         Patient: Lamont Dior MRN: [id number]  SSN: [id number]    YOB: 1950  Age: 77 y.o. Sex: female        Assessment:     1. Metastatic clear cell kidney cancer    Treatment:     1. On Pazopanib at reduced dose 400 mg daily - started 10/1/2017  2. SBRT completed on 1/9/2018 with Dr. Verónica Jolley      Subjective:     Lamont Dior is a 77 y.o. female with a diagnosis of clear cell renal cell carcinoma. She noticed a lump coming up in the right side of the chest and shoulder. A CT followed by biopsy of the LN reveals the diagnosis of kidney cancer. CT of the abdomen shows a large exophytic mass in the left kidney, pleural based lung nodules and mediastinal adenopathy. A MRI of the brain shows she has multiple small lesions. She started Pazopanib in October 2017. She completed SBRT with Selene Chavarria and Varinder at [facility] on 1/9/2018. She was feeling better and gaining weight till the time she started SBRT to the brain. Since then her appetite has declined along with diarrhea, vomiting, and weight loss. Ms. Jessa Trotter presented to the ED on Friday with complaints of progressively worsening appetite and poor PO intake. She was admitted for acute kidney injury with metabolic acidosis and hypotension d/t dehydration. She is resting comfortably in bed and denies pain. She is minimally responsive but is able to communicate by nodding. Her  is at the bedside.     Review of Systems:    Constitutional: weakness, fatigue  Eyes: negative  Ears, Nose, Mouth, Throat, and Face: negative  Respiratory: negative  Cardiovascular: negative  Gastrointestinal: poor appetite, diarrhea, N/V  Genitourinary:negative  Integument/Breast: negative  Hematologic/Lymphatic: negative  Musculoskeletal:negative  Neurological: negative      Past Medical History:   Diagnosis Date    Anxiety 7/31/2017    2/3/17:Under a tremendous amount of stress, will continue Alprazolam prn, if using regularly consider changing to an SSRI or counseling    [facility] ([facility]     Cancer ([facility] 08/2017    renal cell carcinoma    Cervical myelopathy ([facility] 7/31/2017    2/3/17:Cervical fusion     Clear cell adenocarcinoma of kidney ([facility]     Controlled diabetes mellitus ([facility] 7/31/2017    Fatigue 7/31/2017    Hypercholesterolemia 7/31/2017    Hyperglycemia 7/31/2017    Hypertension 7/31/2017    2/3/17:Poorly controlled, lifestyle changes, repeat /90 will follow    On statin therapy 7/31/2017    Osteoporosis 7/31/2017    Post-menopausal 7/31/2017    Spinal stenosis of lumbar region with radiculopathy 7/31/2017    2/3/17:Symptoms c/w this diagnosis discussed with pt., if progresses then need MRI L spine and follow up    Vaginitis due to Candida 7/31/2017    2/3/17:Associated with antibiotic use, requests prescription med     Past Surgical History:   Procedure Laterality Date    HX ORTHOPAEDIC      2009 spinal cord surgery     HX ORTHOPAEDIC Left     knee procedure    HX OTHER SURGICAL  08/16/2017    incisional biopsy right neck mass      Family History   Problem Relation Age of Onset    Heart Disease Mother      Social History   Substance Use Topics    Smoking status: Never Smoker    Smokeless tobacco: Never Used    Alcohol use No      Prior to Admission medications    Medication Sig Start Date End Date Taking? Authorizing Provider   mirtazapine (REMERON) 30 mg tablet Take 1 Tab by mouth nightly. 2/8/18   Kenzie Fritz MD   carvedilol (COREG) 6.25 mg tablet Take 1 Tab by mouth two (2) times daily (with meals). 1/22/18   RONY Valverde MD   glipiZIDE (GLUCOTROL) 5 mg tablet Take 0.5 Tabs by mouth two (2) times a day. 1/20/18   Julieta Aranda MD   sertraline (ZOLOFT) 50 mg tablet Take 1 Tab by mouth daily. 1/11/18   RONY Valverde MD   ALPRAZolam [facility]) 0.25 mg tablet Take 1 Tab by mouth three (3) times daily as needed for Anxiety.  Max Daily Amount: 0.75 mg. 1/2/18   RONY Valverde MD   pantoprazole (PROTONIX) 40 mg tablet Take 1 Tab by mouth daily. 12/20/17   Tyrell Koehler MD   oxyCODONE IR (ROXICODONE) 5 mg immediate release tablet Take 1 Tab by mouth every six (6) hours as needed for Pain. Max Daily Amount: 20 mg. 12/18/17   Dinora Aguilera NP   nystatin (MYCOSTATIN) topical cream  10/6/17   Historical Provider   nystatin (MYCOSTATIN) 100,000 unit/mL suspension  10/3/17   Historical Provider   megestrol (MEGACE) 400 mg/10 mL (10 mL) suspension Take 5 mL by mouth daily. 11/20/17   Hali Holder NP   PAZOPanib (VOTRIENT) 200 mg tablet Take 200 mg by mouth daily. 2 tablets daily    Historical Provider   magnesium oxide 250 mg tablet Take 250 mg by mouth nightly. Historical Provider   nystatin (MYCOSTATIN) powder Apply  to affected area four (4) times daily. 10/9/17   Tyrell Koehler MD   atorvastatin (LIPITOR) 40 mg tablet Take 1 Tab by mouth daily. 9/8/17   Herber Do MD   vit a,c & e-lutein-minerals (OCUVITE) tablet Take 1 Tab by mouth daily. Historical Provider   ondansetron (ZOFRAN ODT) 4 mg disintegrating tablet Take 1 Tab by mouth every eight (8) hours as needed for Nausea. 8/23/17   Tyrell Koehler MD          No Known Allergies        Objective:     Vitals:    03/05/18 1227 03/05/18 1332 03/05/18 1347 03/05/18 1410   BP: 90/40 (!) 87/37 (!) 85/39 90/41   Pulse:    92   Resp:       Temp:       SpO2:    96%   Weight:       Height:              Physical Exam:  GENERAL: alert, cooperative, no distress, appears stated age  EYE: negative  LYMPHATIC: Cervical, supraclavicular, and axillary nodes normal.   THROAT & NECK: normal and no erythema or exudates noted.    LUNG: clear to auscultation bilaterally  HEART: regular rate and rhythm  ABDOMEN: soft, non-tender  EXTREMITIES:  no edema  SKIN: skin discoloration  NEUROLOGIC: negative      CT Results (most recent):    Results from Hospital Encounter encounter on 02/19/18   CT ABD PELV W CONT   Narrative INDICATION: Restaging disease . Metastatic renal cell carcinoma. COMPARISON: CT 11/28/2017. CT 8/22/2017. TECHNIQUE:  Following the uneventful intravenous administration of 100 cc  Isovue-300, 5 mm axial images were obtained through the chest, abdomen, and  pelvis. Oral contrast was not administered. Coronal and sagittal  reconstructions were generated. CT dose reduction was achieved through use of a  standardized protocol tailored for this examination and automatic exposure  control for dose modulation. FINDINGS:    THYROID: Stable subcentimeter hypodense nodules. Grossly normal in size. MEDIASTINUM: Upper right low density denis tissue on the right side of the  trachea appears reduced in size from prior measuring 1.2 x 2.2 cm, previous 1.8  x 2.9 cm (2, 9). MC: Right inferior hilar denis tissue measures 1.3 x 1.9 cm, previously 2.1 x  2.4 cm (2, 25). THORACIC AORTA: No dissection or aneurysm. MAIN PULMONARY ARTERY: Normal in caliber. TRACHEA/BRONCHI: Patent. ESOPHAGUS: No wall thickening or dilatation. HEART:  The visualized heart is normal in size without pericardial effusion. PLEURA: No effusion or pneumothorax. LUNGS: Left lung pulmonary masses slightly enlarged in size measuring 7 x 10 mm  left upper lobe, previously 5 x 8 mm (3, 12) and 12 x 12 mm left upper lobe,  previously 10 x 11 mm (3, 17) with increasing small nodular densities inferior  to the second lesion. Left lower lobe nodule measures 6 x 6 mm, previously 6 x 7  mm (3, 34). Additional smaller lesions appear stable. LIVER: Left lobe cyst and too small characterize punctate hypodensity right lobe  appears stable. No suspicious lesions or biliary ductal dilation. GALLBLADDER: Calcified gallstones within the lumen. Otherwise unremarkable. SPLEEN: Unremarkable. PANCREAS: No mass or duct dilation. ADRENALS: The left adrenal mass appears centrally necrotic and low-density  measuring 1.3 x 2.7 cm, previously 1.1 x 2.4 cm.  Right adrenal is unremarkable. KIDNEYS: Large heterogeneous necrotic lower pole left renal mass redemonstrated  measuring 6.7 x 8.7 cm, previously 7.6 x 9.5 cm. Left renal vein thrombosis  persists. Small cysts and hypodense lesions which were seen to be multiple  enhancing masses on prior CT of 8/22/2017 in both kidneys appear grossly stable  from prior to most recent prior examination with minimal residual enhancing  tissue. Earlene Belch STOMACH: Unremarkable. SMALL BOWEL: No dilatation or wall thickening. COLON: No dilation or wall thickening. APPENDIX: Unremarkable. PERITONEUM: No ascites or pneumoperitoneum. RETROPERITONEUM: Persistent soft tissue density inferior to left renal hilum  without clear mass appears less avidly enhancing but unchanged overall in size  measuring approximately 2.1 x 2.3 cm (2, 70). REPRODUCTIVE ORGANS: Uterus and ovaries appear unremarkable. URINARY BLADDER: No mass or calculus. BONES: Degenerative spine change with grade 1 anterolisthesis of L4-S1 with  associated spinal canal and neural foraminal stenosis and incompletely  visualized lower cervical ACDF plate and screws. ADDITIONAL COMMENTS: N/A         Impression IMPRESSION:  Findings overall are of continued response to therapy with  reduction in size of right upper mediastinal, right inferior hilar, and primary  left renal mass lesions. Increase in size of largest of left lobe pulmonary  nodules is slight and increase in size of left adrenal gland appears may be due  to increased necrosis. No new sites of metastatic disease within the thorax,  abdomen or pelvis with incidental findings as above including cholelithiasis. Assessment:     1. Metastatic clear cell kidney cancer    Lung nodules, mediastinal adenopathy  Left kidney primary  Left adrenal mass    ECOG PS 2  Intent of treatment - palliative    Due to poor performance status, she is not longer a candidate for cytoreductive nephrectomy.   Treatment was delayed d/t hospitalization and rehab. On Pazopanib at reduced dose 400 mg daily - started 10/1/2017  Break from treatment d/t on-going diarrhea, restarted on 2/28/2018 at reduced dose of 200 mg daily    CT Abd, Pelv (2/19/2018): improvement in disease    Despite good response to therapy, patient's performance status continues to decline. Per , she has not been out of bed except to go to the bathroom for the last couple of weeks. Discussion with patient's  regarding poor prognosis and transition to comfort care. Patient nodded in agreement to move forward with comfort care.       2. Brain metastasis     S/p SBRT by Dr. Koby Barton and [address] [place] [address] Dr at [address] [place] [address] - completed 1/9/2018      Plan:     > Supportive care  > Transition to hospice - may be appropriate for in-patient hospice      Signed by: Avis Damon NP                     March 5, 2018

## 2018-03-05 NOTE — CDMP QUERY
4/4    Dr. Mayra Grimes :  Please clarify if this patient is (was) being treated/managed for:     => Hypokalemia in setting of  K 2.5--3.3, dehydration, not eating, BONG/metabolic acidosis  => Other explanation of clinical findings  => Unable to determine (no explanation for clinical findings)    The medical record reflects the following clinical findings, treatment, and risk factors. Risk Factors:  weakness, not eating  Clinical Indicators: K 2.5--3.3, dehydration, not eating, BONG/metabolic acidosis, hypotension, malnutrition,   Treatment: serial labs, KCL replacement, Na Bicarb to ivf, Albumin, 2 liters [email or web address]     Please clarify and document your clinical opinion in the progress notes and discharge summary including the definitive and/or presumptive diagnosis, (suspected or probable), related to the above clinical findings. Please include clinical findings supporting your diagnosis. Thank Prateek Higuera  [address]; INQ;[phone]

## 2018-03-05 NOTE — CDMP QUERY
2/4  Dr. Norris Draper :  Please clarify if this patient is being treated/managed for:     => Pressure ulcer of left elbow, stage 2 POA  => Other explanation of clinical findings  => Unable to determine (no explanation for clinical findings)    The medical record reflects the following clinical findings, treatment, and risk factors. Risk Factors:  presents for weakness/not eating  Clinical Indicators: RN noted stage 2 to elbow; registered dietitian noted: severe malnutrition and suspect pt will not be meeting nutrition needs PO, alternate route of nutrition via tube feed should be discussed if it meets pt GOC; Pt is at risk for refeeding syndrome, monitor lytes·   Treatment: Continue current diet; · Ensure Clear TID and ensure pudding TID    Please clarify and document your clinical opinion in the progress notes and discharge summary including the definitive and/or presumptive diagnosis, (suspected or probable), related to the above clinical findings. Please include clinical findings supporting your diagnosis. Thank Joslyn Moore  [address]; CLOVER;[phone]

## 2018-03-06 PROBLEM — L89.152 DECUBITUS ULCER OF SACRAL REGION, STAGE 2 (HCC): Status: ACTIVE | Noted: 2018-01-01

## 2018-03-06 NOTE — PROGRESS NOTES
PROGRESS NOTE    NAME:  Gato Grewal   :   1950   MRN:   [id number]     Date/Time:  3/6/2018 7:35 AM  Subjective:   History: More alert and responsive this morning, verbal and answers questions appropriately, denies pain or shortness of breath, denies abdominal pain, nausea.       Medications reviewed:  Current Facility-Administered Medications   Medication Dose Route Frequency    miconazole (SECURA) 2 % extra thick cream   Topical PRN    balsam peru-castor oil (VENELEX)  mg/gram ointment   Topical BID    fentaNYL citrate (PF) injection 25-40 mcg  25-40 mcg IntraVENous Q4H PRN    insulin lispro (HUMALOG) injection   SubCUTAneous AC&HS    sodium bicarbonate 150 mEq/1000 mL D5W (premix)   IntraVENous CONTINUOUS    ALPRAZolam (XANAX) tablet 0.25 mg  0.25 mg Oral TID PRN    megestrol (MEGACE) 400 mg/10 mL (10 mL) oral suspension 200 mg  200 mg Oral DAILY    mirtazapine (REMERON) tablet 30 mg  30 mg Oral QHS    sertraline (ZOLOFT) tablet 50 mg  50 mg Oral DAILY    sodium chloride (NS) flush 5-10 mL  5-10 mL IntraVENous Q8H    sodium chloride (NS) flush 5-10 mL  5-10 mL IntraVENous PRN    acetaminophen (TYLENOL) tablet 650 mg  650 mg Oral Q4H PRN    ondansetron (ZOFRAN) injection 4 mg  4 mg IntraVENous Q4H PRN    heparin (porcine) injection 5,000 Units  5,000 Units SubCUTAneous Q12H    nystatin (MYCOSTATIN) 100,000 unit/mL oral suspension 500,000 Units  500,000 Units Oral TID        Objective:   Vitals:  Visit Vitals    /52 (BP 1 Location: Left arm, BP Patient Position: At rest)    Pulse 73    Temp 97.9 °F (36.6 °C)    Resp 17    Ht 5' (1.524 m)    Wt 85 lb (38.6 kg)    SpO2 97%    Breastfeeding No    BMI 16.6 kg/m2    O2 Flow Rate (L/min): 2 l/min O2 Device: Nasal cannula Temp (24hrs), Av.5 °F (36.9 °C), Min:97.9 °F (36.6 °C), Max:99.6 °F (37.6 °C)      Last 24hr Input/Output:    Intake/Output Summary (Last 24 hours) at 18 0833  Last data filed at 18 5996 Gross per 24 hour   Intake             2800 ml   Output                0 ml   Net             2800 ml        PHYSICAL EXAM:  General:    Awake,  no distress, appears stated age. Head:    Normocephalic, without obvious abnormality, atraumatic. Eyes:    Conjunctivae/corneas clear. PERRLA  Nose:   Nares normal. No drainage or sinus tenderness. Throat:     Lips, mucosa, and tongue normal.  Dry mucous membranes. Neck:   Supple, symmetrical,  no adenopathy, thyroid: non tender     no carotid bruit and no JVD. Back:     Symmetric,  No CVA tenderness. Lungs:    Clear to auscultation bilaterally. No Wheezing or Rhonchi. No rales. Heart:    Regular rate and rhythm,  no murmur, rub or gallop. Abdomen:    Soft, non-tender. Not distended. Bowel sounds normal. No masses  Extremities:  Extremities normal, atraumatic, No cyanosis. Trace edema.  No clubbing  Lymph nodes:  Cervical, supraclavicular normal.  Neurologic: Grossly nonfocal  Skin:                Stage II sacral decubitus ulcer      Lab Data Reviewed:    Recent Results (from the past 24 hour(s))   GLUCOSE, POC    Collection Time: 03/05/18 11:23 AM   Result Value Ref Range    Glucose (POC) 212 (H) 65 - 100 mg/dL    Performed by Jeronimo Vu    GLUCOSE, POC    Collection Time: 03/05/18  4:48 PM   Result Value Ref Range    Glucose (POC) 212 (H) 65 - 100 mg/dL    Performed by Harriet Selby    GLUCOSE, POC    Collection Time: 03/05/18  9:03 PM   Result Value Ref Range    Glucose (POC) 412 (H) 65 - 100 mg/dL    Performed by Manual Brought    METABOLIC PANEL, BASIC    Collection Time: 03/06/18  2:35 AM   Result Value Ref Range    Sodium 143 136 - 145 mmol/L    Potassium 2.6 (LL) 3.5 - 5.1 mmol/L    Chloride 103 97 - 108 mmol/L    CO2 32 21 - 32 mmol/L    Anion gap 8 5 - 15 mmol/L    Glucose 252 (H) 65 - 100 mg/dL    BUN 48 (H) 6 - 20 MG/DL    Creatinine 2.76 (H) 0.55 - 1.02 MG/DL    BUN/Creatinine ratio 17 12 - 20      GFR est AA 21 (L) >60 ml/min/1.73m2 GFR est non-AA 17 (L) >60 ml/min/1.73m2    Calcium 7.5 (L) 8.5 - 10.1 MG/DL   MAGNESIUM    Collection Time: 03/06/18  2:35 AM   Result Value Ref Range    Magnesium 2.6 (H) 1.6 - 2.4 mg/dL         Assessment/Plan:     Principal Problem:    Acute kidney injury ([facility] (3/2/2018)    Active Problems:    Controlled diabetes mellitus ([facility] (7/31/2017)      [facility] (8/28/2017)      Hypotension (3/3/2018)      Dehydration (3/3/2018)      Severe protein-calorie malnutrition ([facility] (3/3/2018)      Decubitus ulcer of sacral region, stage 2 (3/6/2018)       ___________________________________________________  PLAN:    1. Mag repleted, potassium still low with improving creatinine, IV potassium  2. Creatinine improved, continue IV fluids, monitor volume status, appreciate nephrology consultation, hemodialysis declined  3. 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   Palliative medicine, patient apparently doing well with active treatment until recently with radiation therapy for brain metastases, appears improved today, continue supportive care            ___________________________________________________    Attending Physician: Barry Baca MD

## 2018-03-06 NOTE — PROGRESS NOTES
Occupational Therapy  Chart reviewed. Referral for OT received. Pt family member reported pt in increased pain despite receiving pain medication. Palliative is following and planning to return to adjust pain medications. RN reported pt is refusing to turn due to pain. Will defer OT evaluation at this time and continue to follow.  Emilia Tran MS, OTR/L

## 2018-03-06 NOTE — CDMP QUERY
Dr. Ibrahima Simon :  Please clarify if this patient is being treated/managed for:     => Metabolic encephalopathy in setting of BONG/Acidosis, hypotension, hyponatremia, hypokalemia,   => Other explanation of clinical findings  => Unable to determine (no explanation for clinical findings)    The medical record reflects the following clinical findings, treatment, and risk factors. Risk Factors:  weakness/not eating  Clinical Indicators:  minimally responsive this morning. She opens eyes to voice;able to follow simple commands, non verbal,   Treatment: replace all electrolytes, ensure, Megace, KCL iv, serial labs    Please clarify and document your clinical opinion in the progress notes and discharge summary including the definitive and/or presumptive diagnosis, (suspected or probable), related to the above clinical findings. Please include clinical findings supporting your diagnosis. Thank Majo Resendez RN [address] [place] [address] Street; SLN;[phone]

## 2018-03-06 NOTE — PROGRESS NOTES
Verbal report given to oncoming nurse ALISSA Edgar RN    Report consisted of patients Situation, Background, Assessment, and   Recommendations    Information was reviewed with the receiving nurse. Opportunity for questions and clarification was provided.

## 2018-03-06 NOTE — PROGRESS NOTES
PCU SHIFT NURSING NOTE      Bedside and Verbal shift change report given to Dhaval Garner RN (oncoming nurse) by Bianca Booth RN (offgoing nurse). Report included the following information SBAR, Kardex, Intake/Output, MAR, Recent Results and Cardiac Rhythm NSR. Shift Summary: Received pt lying in bed. Oriented to person and place. 2L/NC on. Bed alarm on. Incont B&B. Will monitor. 2215 Pt constantly gagging and states hurting all over. Admin Zofran 4mg IV and then admin Fentanyl 40mcg SIVP. Will monitor. 2332 pt sleeping comfortably. No noted distress. Had to take bp manually as decreased from zofran & fentanyl IV earlier. Will monitor. 0319 call from lab on K of 2.6, which is same result as yesterday despite infusion of Abi Prajapati on K result for this am of 2.6, which is same as yesterday despite admin of 30meq of KCL IV. New orders noted for 20meq KCL IV x 1.     0600 pt cleaned of incontinent loose brown bm and urination with help of [employer] Team.                Admission Date 3/2/2018   Admission Diagnosis Acute kidney injury ([facility] [address]   Consults IP CONSULT TO PALLIATIVE CARE - PROVIDER  IP CONSULT TO ONCOLOGY        Consults   [x]PT   [x]OT   []Speech   []Case Management      [] Palliative      Cardiac Monitoring Order   [x]Yes   []No     IV drips   [x]Yes    Drip:                            Dose:  Drip:                            Dose:  Drip:                            Dose:   []No     GI Prophylaxis   []Yes   []No         DVT Prophylaxis   SCDs:             Haja stockings:         [] Medication   []Contraindicated   []None      Activity Level Activity Level: Bed Rest     Activity Assistance: Complete care   Purposeful Rounding every 1-2 hour?    [x]Yes   Yip Score  Total Score: 2   Bed Alarm (If score 3 or >)   [x]Yes   [] Refused (See signed refusal form in chart)   Antonio Score  Antonio Score: 11   Antonio Score (if score 14 or less)   []PMT consult   [x]Wound Care consult      []Specialty bed   [] Nutrition consult          Needs prior to discharge:   Home O2 required:    [x]Yes   []No    If yes, how much O2 required? Other:    Last Bowel Movement: Last Bowel Movement Date: 03/05/18      Influenza Vaccine Received Flu Vaccine for Current Season (usually Sept-March): No    Patient/Guardian Refused (Notify MD): Yes   Pneumonia Vaccine           Diet Active Orders   Diet    DIET MECHANICAL SOFT      LDAs               Peripheral IV 03/02/18 Right Antecubital (Active)   Site Assessment Clean, dry, & intact 3/5/2018  7:49 PM   Phlebitis Assessment 0 3/5/2018  7:49 PM   Infiltration Assessment 0 3/5/2018  7:49 PM   Dressing Status Clean, dry, & intact 3/5/2018  7:49 PM   Dressing Type Transparent 3/5/2018  7:49 PM   Hub Color/Line Status Pink;Capped;Flushed;Patent 3/5/2018  7:49 PM   Alcohol Cap Used Yes 3/5/2018  7:49 PM       Peripheral IV 03/05/18 Right Forearm (Active)   Site Assessment Clean, dry, & intact 3/5/2018  7:49 PM   Phlebitis Assessment 0 3/5/2018  7:49 PM   Infiltration Assessment 0 3/5/2018  7:49 PM   Dressing Status Clean, dry, & intact 3/5/2018  7:49 PM   Dressing Type Transparent 3/5/2018  7:49 PM   Hub Color/Line Status Blue;Flushed;Patent; Infusing 3/5/2018  7:49 PM   Alcohol Cap Used Yes 3/5/2018  7:49 PM                      Urinary Catheter      Intake & Output   Date 03/04/18 1900 - 03/05/18 0659 03/05/18 0700 - 03/06/18 0659   Shift 7249-9069 24 Hour Total 8840-0096 4607-8553 24 Hour Total   I  N  T  A  K  E   I.V.  (mL/kg/hr) 1600 2818.8 1550  (3.4)  1550      I.V. 100 100         Volume (sodium bicarbonate 150 mEq/1000 mL D5W (premix)) 1500 2718.8 1250  1250      Volume (sodium chloride 0.9 % bolus infusion 1,000 mL)   300  300      Volume (cefTRIAXone (ROCEPHIN) 1 g/50 mL NS IVPB)  0       Shift Total  (mL/kg) 1600  (41.5) 2818.8  (73.1) 1550  (40.2)  1550  (40.2)   O  U  T  P  U  T   Urine  (mL/kg/hr)           Urine Occurrence(s) 4 x 10 x 3 x 1 x 4 x    Stool           Stool Occurrence(s)   2 x  2 x    Shift Total  (mL/kg)        NET 1600 2818.8 1550  1550   Weight (kg) 38.6 38.6 38.6 38.6 38.6         Readmission Risk Assessment Tool Score High Risk            36       Total Score        3 Has Seen PCP in Last 6 Months (Yes=3, No=0)    2 . Living with Significant Other. Assisted Living. LTAC. SNF. or   Rehab    4 IP Visits Last 12 Months (1-3=4, 4=9, >4=11)    5 Pt.  Coverage (Medicare=5 , Medicaid, or Self-Pay=4)    22 Charlson Comorbidity Score (Age + Comorbid Conditions)        Criteria that do not apply:    Patient Length of Stay (>5 days = 3)       Expected Length of Stay 4d 7h   Actual Length of Stay 3

## 2018-03-06 NOTE — PROGRESS NOTES
Physical therapy note:   Chart reviewed. Pt family member reported pt in increased pain despite receiving pain medication. Palliative is following and planning to return to adjust pain medications. RN reported pt is refusing to turn due to pain. Will defer PT evaluation at this time and continue to follow. Kim Barraza, PT, DPT

## 2018-03-06 NOTE — PROGRESS NOTES
Palliative Medicine  [place]: [phone] ([phone])  [facility]: [phone])      Resuscitation Status: DNR   Durable DNR addressed? [] Yes   [x] No    [] Not Applicable    Advance Care Planning 3/3/2018   Patient's Healthcare Decision Maker is: Legal Next of Kin   Primary Decision Maker Name Alberto Alex   Primary Decision Maker Phone Number [phone]   Primary Decision Maker Relationship to Patient -   Confirm Advance Directive None   Patient Would Like to Complete Advance Directive No   Does the patient have other document types Do Not Resuscitate           Patient / Family Encounter Documentation    Participants (names): Patient and , Alberto Alex. Narrative: Patient more alert and awake today but still lethargic. Alex Virk feels that patient may be able to voice her feelings today as she is more alert. Alex Virk concerned about patient's \"headaches\", noted she could not get comfortable earlier this morning, was given pain medicine for the headache. Per Wendi Valdivia they going to find out what is causing these headaches? \" He would like to know reason for them. When patient asked if she was hurting now, she denied any discomfort. Alex Virk felt the pain medication likely helped. LCSW spoke to patient and asked her some questions such as John Blackburnluca is important to you at this time\"; patient answered, \"I don't know\". Asked John Reyes has your cancer doctor told you\"; \"I'm not sure\" per patient. When asked if she would want to be kept comfortable, \"patient noted, I guess so\". When specific questions were asked about more aggressive measures such as artificial means of eating with a tube, patient shook her head \"no\", when asked about machines for breathing, patient said, \"I have not thought about that\". LCSW feels that patient is not able to make complicated decisions regarding her healthcare by herself at this time, despite moments of being alert.  Shared this with Alex Virk and although he feels he knows how patient feels about some issues, he and patient have not had long and hard discussions about \"what ifs\". Srinivasa Wylie shared that they had been receiving positive reports from the oncologists until recently. They were hopeful until this past week when patient has declined dramatically. Explained to Srinivasa Condonfernando that he may still have to be a voice for patient with decisions, but could certainly include her in any discussions, if she is able to be awake for these. Also discussed with Katmichelle Dejan if he had heard about comfort care or hospice and if that was something that he may be interested in. Explained how hospice differs from current regimen (focus on comfort without artificial means of hydration or nutrition, no additional medical interventions). Asked Srinivasa Dejan what quality of life would patient want and if they are able to care for her outside the hospital/at home. Srinivasa Wylie noted patient would want to be comfortable, he is aware that her life is limited, but he is still processing her sudden decline. He noted that he and his daughter work and that they would have to hire help if patient had to return home. Srinivasa Wylie asked if patient could be admitted to inpatient hospice, explained that there are criteria for this and that hospice team would have to evaluate patient. Also explained to Sriinvasa Wylie that patient could continue declining in light of her sudden decline over the past week. Encouraged Srinivasa Wylie to speak with Dr Ana Palmer, who is patient's attending MD, gave him phone number to call office and see if he can come in tomorrow when Dr Ana Palmer makes rounds. Spoke to patient's RN on floor regarding patient and above discussion. Discussed case with Dr. Adryan Campuzano who is also following patient from palliative medicine. Psychosocial Issues Identified: Continue to follow and offer support, assist with education on options regarding keeping patient comfortable.   will need to be the decision maker, patient alert, but not fully able to make complicated decisions. Patient has declined significantly in last week, likely to continue to decline. Appears to be at end stage of life, based on her diagnosis.  noted, \"we thought we were going to lose her yesterday\". Goals of Care / Plan: Prolong life with comfort. Explore hospice path, if  is interested and ready for this.

## 2018-03-06 NOTE — PROGRESS NOTES
Bedside report received from [facility], RN                   Assessment, Background, Procedure summary, Intake/Output, MAR, and recent results discussed. Care assumed.

## 2018-03-07 PROBLEM — G93.41 ACUTE METABOLIC ENCEPHALOPATHY: Status: ACTIVE | Noted: 2018-01-01

## 2018-03-07 NOTE — PROGRESS NOTES
Progress Note         Patient: Vaibhav Lee MRN: [id number]  SSN: [id number]    YOB: 1950  Age: 77 y.o. Sex: female        Assessment:     1. Metastatic clear cell kidney cancer    Treatment:     1. On Pazopanib at reduced dose 400 mg daily - started 10/1/2017  2. SBRT completed on 1/9/2018 with Dr. Amy Portillo      Subjective:     Vaibhav Lee is a 77 y.o. female with a diagnosis of clear cell renal cell carcinoma. She noticed a lump coming up in the right side of the chest and shoulder. A CT followed by biopsy of the LN reveals the diagnosis of kidney cancer. CT of the abdomen shows a large exophytic mass in the left kidney, pleural based lung nodules and mediastinal adenopathy. A MRI of the brain shows she has multiple small lesions. She started Pazopanib in October 2017. She completed SBRT with Selene Lawrence and Varinder at [facility] on 1/9/2018. She was feeling better and gaining weight till the time she started SBRT to the brain. Since then her appetite has declined along with diarrhea, vomiting, and weight loss. Ms. Naun Decker presented to the ED on Friday with complaints of progressively worsening appetite and poor PO intake. She was admitted for acute kidney injury with metabolic acidosis and hypotension d/t dehydration. She is resting comfortably in bed and denies pain. She is more vocal today and has been drinking ensure. She refused physical therapy today.       Review of Systems:    Constitutional: weakness, fatigue  Eyes: negative  Ears, Nose, Mouth, Throat, and Face: negative  Respiratory: negative  Cardiovascular: negative  Gastrointestinal: poor appetite, diarrhea, N/V  Genitourinary:negative  Integument/Breast: negative  Hematologic/Lymphatic: negative  Musculoskeletal:negative  Neurological: negative      Past Medical History:   Diagnosis Date    Acute metabolic encephalopathy 8/9/1326    Anxiety 7/31/2017    2/3/17:Under a tremendous amount of stress, will continue Alprazolam prn, if using regularly consider changing to an SSRI or counseling    Brain metastases ([facility] [address]     Cancer ([facility] [address] 08/2017    renal cell carcinoma    Cervical myelopathy ([facility] [address] 7/31/2017    2/3/17:Cervical fusion     Clear cell adenocarcinoma of kidney ([facility] [address]     Controlled diabetes mellitus ([facility] [address] 7/31/2017    Decubitus ulcer of sacral region, stage 2 3/6/2018    Fatigue 7/31/2017    Hypercholesterolemia 7/31/2017    Hyperglycemia 7/31/2017    Hypertension 7/31/2017    2/3/17:Poorly controlled, lifestyle changes, repeat /90 will follow    On statin therapy 7/31/2017    Osteoporosis 7/31/2017    Post-menopausal 7/31/2017    Spinal stenosis of lumbar region with radiculopathy 7/31/2017    2/3/17:Symptoms c/w this diagnosis discussed with pt., if progresses then need MRI L spine and follow up    Vaginitis due to Candida 7/31/2017    2/3/17:Associated with antibiotic use, requests prescription med     Past Surgical History:   Procedure Laterality Date    HX ORTHOPAEDIC      2009 spinal cord surgery     HX ORTHOPAEDIC Left     knee procedure    HX OTHER SURGICAL  08/16/2017    incisional biopsy right neck mass      Family History   Problem Relation Age of Onset    Heart Disease Mother      Social History   Substance Use Topics    Smoking status: Never Smoker    Smokeless tobacco: Never Used    Alcohol use No      Prior to Admission medications    Medication Sig Start Date End Date Taking? Authorizing Provider   mirtazapine (REMERON) 30 mg tablet Take 1 Tab by mouth nightly. 2/8/18   Bob Jason MD   carvedilol (COREG) 6.25 mg tablet Take 1 Tab by mouth two (2) times daily (with meals). 1/22/18   RONY Sneed MD   glipiZIDE (GLUCOTROL) 5 mg tablet Take 0.5 Tabs by mouth two (2) times a day. 1/20/18   Ander Ware MD   sertraline (ZOLOFT) 50 mg tablet Take 1 Tab by mouth daily.  1/11/18   RONY Sneed MD   ALPRAZolam Mignon Craft) 0.25 mg tablet Take 1 Tab by mouth three (3) times daily as needed for Anxiety. Max Daily Amount: 0.75 mg. 1/2/18   RONY Perez MD   pantoprazole (PROTONIX) 40 mg tablet Take 1 Tab by mouth daily. 12/20/17   Thanh Newby MD   oxyCODONE IR (ROXICODONE) 5 mg immediate release tablet Take 1 Tab by mouth every six (6) hours as needed for Pain. Max Daily Amount: 20 mg. 12/18/17   Guy Morales NP   nystatin (MYCOSTATIN) topical cream  10/6/17   Historical Provider   nystatin (MYCOSTATIN) 100,000 unit/mL suspension  10/3/17   Historical Provider   megestrol (MEGACE) 400 mg/10 mL (10 mL) suspension Take 5 mL by mouth daily. 11/20/17   Naheed Villaseñor NP   PAZOPanib (VOTRIENT) 200 mg tablet Take 200 mg by mouth daily. 2 tablets daily    Historical Provider   magnesium oxide 250 mg tablet Take 250 mg by mouth nightly. Historical Provider   nystatin (MYCOSTATIN) powder Apply  to affected area four (4) times daily. 10/9/17   Thanh Newby MD   atorvastatin (LIPITOR) 40 mg tablet Take 1 Tab by mouth daily. 9/8/17   Drea Carney MD   vit a,c & e-lutein-minerals (OCUVITE) tablet Take 1 Tab by mouth daily. Historical Provider   ondansetron (ZOFRAN ODT) 4 mg disintegrating tablet Take 1 Tab by mouth every eight (8) hours as needed for Nausea. 8/23/17   Thanh Newby MD          No Known Allergies        Objective:     Vitals:    03/07/18 0626 03/07/18 0807 03/07/18 1113 03/07/18 1449   BP: 114/58 96/51 126/60 94/42   Pulse: 62 78 99 97   Resp:  16 16 16   Temp:  98.2 °F (36.8 °C) 98.1 °F (36.7 °C) 99 °F (37.2 °C)   SpO2: (!) 89% 96% 96% 96%   Weight:       Height:              Physical Exam:  GENERAL: alert, cooperative, no distress, appears stated age  EYE: negative  LYMPHATIC: Cervical, supraclavicular, and axillary nodes normal.   THROAT & NECK: normal and no erythema or exudates noted.    LUNG: clear to auscultation bilaterally  HEART: regular rate and rhythm  ABDOMEN: soft, non-tender  EXTREMITIES:  no edema  SKIN: skin discoloration  NEUROLOGIC: negative      CT Results (most recent):    Results from Hospital Encounter encounter on 02/19/18   CT ABD PELV W CONT   Narrative INDICATION: Restaging disease . Metastatic renal cell carcinoma. COMPARISON: CT 11/28/2017. CT 8/22/2017. TECHNIQUE:  Following the uneventful intravenous administration of 100 cc  Isovue-300, 5 mm axial images were obtained through the chest, abdomen, and  pelvis. Oral contrast was not administered. Coronal and sagittal  reconstructions were generated. CT dose reduction was achieved through use of a  standardized protocol tailored for this examination and automatic exposure  control for dose modulation. FINDINGS:    THYROID: Stable subcentimeter hypodense nodules. Grossly normal in size. MEDIASTINUM: Upper right low density denis tissue on the right side of the  trachea appears reduced in size from prior measuring 1.2 x 2.2 cm, previous 1.8  x 2.9 cm (2, 9). MC: Right inferior hilar denis tissue measures 1.3 x 1.9 cm, previously 2.1 x  2.4 cm (2, 25). THORACIC AORTA: No dissection or aneurysm. MAIN PULMONARY ARTERY: Normal in caliber. TRACHEA/BRONCHI: Patent. ESOPHAGUS: No wall thickening or dilatation. HEART:  The visualized heart is normal in size without pericardial effusion. PLEURA: No effusion or pneumothorax. LUNGS: Left lung pulmonary masses slightly enlarged in size measuring 7 x 10 mm  left upper lobe, previously 5 x 8 mm (3, 12) and 12 x 12 mm left upper lobe,  previously 10 x 11 mm (3, 17) with increasing small nodular densities inferior  to the second lesion. Left lower lobe nodule measures 6 x 6 mm, previously 6 x 7  mm (3, 34). Additional smaller lesions appear stable. LIVER: Left lobe cyst and too small characterize punctate hypodensity right lobe  appears stable. No suspicious lesions or biliary ductal dilation. GALLBLADDER: Calcified gallstones within the lumen. Otherwise unremarkable. SPLEEN: Unremarkable.   PANCREAS: No mass or duct dilation. ADRENALS: The left adrenal mass appears centrally necrotic and low-density  measuring 1.3 x 2.7 cm, previously 1.1 x 2.4 cm. Right adrenal is unremarkable. KIDNEYS: Large heterogeneous necrotic lower pole left renal mass redemonstrated  measuring 6.7 x 8.7 cm, previously 7.6 x 9.5 cm. Left renal vein thrombosis  persists. Small cysts and hypodense lesions which were seen to be multiple  enhancing masses on prior CT of 8/22/2017 in both kidneys appear grossly stable  from prior to most recent prior examination with minimal residual enhancing  tissue. Asher Dials STOMACH: Unremarkable. SMALL BOWEL: No dilatation or wall thickening. COLON: No dilation or wall thickening. APPENDIX: Unremarkable. PERITONEUM: No ascites or pneumoperitoneum. RETROPERITONEUM: Persistent soft tissue density inferior to left renal hilum  without clear mass appears less avidly enhancing but unchanged overall in size  measuring approximately 2.1 x 2.3 cm (2, 70). REPRODUCTIVE ORGANS: Uterus and ovaries appear unremarkable. URINARY BLADDER: No mass or calculus. BONES: Degenerative spine change with grade 1 anterolisthesis of L4-S1 with  associated spinal canal and neural foraminal stenosis and incompletely  visualized lower cervical ACDF plate and screws. ADDITIONAL COMMENTS: N/A         Impression IMPRESSION:  Findings overall are of continued response to therapy with  reduction in size of right upper mediastinal, right inferior hilar, and primary  left renal mass lesions. Increase in size of largest of left lobe pulmonary  nodules is slight and increase in size of left adrenal gland appears may be due  to increased necrosis. No new sites of metastatic disease within the thorax,  abdomen or pelvis with incidental findings as above including cholelithiasis. Assessment:     1.  Metastatic clear cell kidney cancer    Lung nodules, mediastinal adenopathy  Left kidney primary  Left adrenal mass    ECOG PS 2  Intent of treatment - palliative    Due to poor performance status, she is not longer a candidate for cytoreductive nephrectomy. Treatment was delayed d/t hospitalization and rehab. On Pazopanib at reduced dose 400 mg daily - started 10/1/2017  Break from treatment d/t on-going diarrhea, restarted on 2/28/2018 at reduced dose of 200 mg daily    CT Abd, Pelv (2/19/2018): improvement in disease    Despite good response to therapy, patient's performance status continues to decline. Per , she has not been out of bed except to go to the bathroom for the last couple of weeks. Discussion with patient's  regarding poor prognosis and transition to comfort care. Per , patient said several times this morning that she said \"just let me go\". He feels that she has given up. When asked what she wants she replied \"I don't know\". 2. Brain metastasis     S/p SBRT by Dr. Fer Elizabeth and [address] [place] [address] Dr at [address] [place] [address] - completed 1/9/2018      Plan:       > Transition to comfort care and hospice      Signed by: Marcela Raygoza NP                     March 7, 2018           Attending Physician Note:     I have reviewed the history, physical examination, assessment and the plan of the care of the patient. I saw the patient with [place] and I participated in the examination and critical decision making. I agree with the note as stated above. Ms. Kesha Yuen is a lady with metastatic clear cell kidney cancer. Her health and will to live has declined. She is best suited for Hospice. I recommended that to the patient and her  again.        Signed by: Agnieszka Lr MD                     March 8, 2018

## 2018-03-07 NOTE — PROGRESS NOTES
Occupational Therapy  Chart reviewed, referral received. Cleared by RN to see pt, pt received pain medications earlier. Met with pt and introduced self and role of therapy while in the hospital. Pt declining even attempts to sit EOB today. Pt also declined to have therapist attempt evaluation again tomorrow. However,  at bedside, encouraging and requesting therapy follow back tomorrow as he wants to talk to patient regarding POC. PTA, pt stated she was able to ambulate with RW to bathroom independently.  Will follow up tomorrow per family request. Agustin Lei, OT

## 2018-03-07 NOTE — PROGRESS NOTES
PCU SHIFT NURSING NOTE      Bedside verbal shift change report given to Eva Hutchinson (oncoming nurse) by Karl Melendez (offgoing nurse). Report included the following information SBAR, Kardex, Intake/Output, Recent Results and Cardiac Rhythm NSR.    0335: Patient states she has generalized pain. When offered pain medication, patient refused. Will continue to assess and offer when appropriate. 0500: Dr. Joslyn Sadler made aware of critical potassium and CO2. Ordered 10 meq of IV potassium x4 runs. Admission Date 3/2/2018   Admission Diagnosis Acute kidney injury ([facility] [address]   Consults IP CONSULT TO PALLIATIVE CARE - PROVIDER  IP CONSULT TO ONCOLOGY        Consults   []PT   []OT   []Speech   []Case Management      [] Palliative      Cardiac Monitoring Order   []Yes   []No     IV drips   []Yes    Drip:                            Dose:  Drip:                            Dose:  Drip:                            Dose:   []No     GI Prophylaxis   []Yes   []No         DVT Prophylaxis   SCDs:             Haja stockings:         [] Medication   []Contraindicated   []None      Activity Level Activity Level: Bed Rest     Activity Assistance: Complete care   Purposeful Rounding every 1-2 hour? []Yes   Yip Score  Total Score: 3   Bed Alarm (If score 3 or >)   []Yes   [] Refused (See signed refusal form in chart)   Antonio Score  Antonio Score: 10   Antonio Score (if score 14 or less)   []PMT consult   []Wound Care consult      []Specialty bed   [] Nutrition consult          Needs prior to discharge:   Home O2 required:    []Yes   []No    If yes, how much O2 required?     Other:    Last Bowel Movement: Last Bowel Movement Date: 03/06/18      Influenza Vaccine Received Flu Vaccine for Current Season (usually Sept-March): No    Patient/Guardian Refused (Notify MD): Yes   Pneumonia Vaccine           Diet Active Orders   Diet    DIET MECHANICAL SOFT      LDAs               Peripheral IV 03/02/18 Right Antecubital (Active)   Site Assessment Clean, dry, & intact 3/6/2018 11:34 PM   Phlebitis Assessment 0 3/6/2018 11:34 PM   Infiltration Assessment 0 3/6/2018 11:34 PM   Dressing Status Clean, dry, & intact 3/6/2018 11:34 PM   Dressing Type Tape;Transparent 3/6/2018 11:34 PM   Hub Color/Line Status Pink;Flushed 3/6/2018 11:34 PM   Alcohol Cap Used Yes 3/6/2018  2:47 AM       Peripheral IV 03/05/18 Right Forearm (Active)   Site Assessment Clean, dry, & intact 3/6/2018 11:34 PM   Phlebitis Assessment 0 3/6/2018 11:34 PM   Infiltration Assessment 0 3/6/2018 11:34 PM   Dressing Status Clean, dry, & intact 3/6/2018 11:34 PM   Dressing Type Tape;Transparent 3/6/2018 11:34 PM   Hub Color/Line Status Blue;Flushed 3/6/2018 11:34 PM   Alcohol Cap Used Yes 3/6/2018  2:47 AM                      Urinary Catheter      Intake & Output   Date 03/06/18 0700 - 03/07/18 0659 03/07/18 0700 - 03/08/18 0659   Shift 3910-2305 4244-7118 24 Hour Total 4325-3788 5238-2402 24 Hour Total   I  N  T  A  K  E   Shift Total  (mL/kg)         O  U  T  P  U  T   Urine  (mL/kg/hr)            Urine Occurrence(s) 1 x 2 x 3 x       Shift Total  (mL/kg)         NET         Weight (kg) 38.6 38.6 38.6 38.6 38.6 38.6         Readmission Risk Assessment Tool Score High Risk            36       Total Score        3 Has Seen PCP in Last 6 Months (Yes=3, No=0)    2 . Living with Significant Other. Assisted Living. LTAC. SNF. or   Rehab    4 IP Visits Last 12 Months (1-3=4, 4=9, >4=11)    5 Pt.  Coverage (Medicare=5 , Medicaid, or Self-Pay=4)    22 Charlson Comorbidity Score (Age + Comorbid Conditions)        Criteria that do not apply:    Patient Length of Stay (>5 days = 3)       Expected Length of Stay 4d 7h   Actual Length of Stay 5

## 2018-03-07 NOTE — DIABETES MGMT
DTC Progress Note    Recommendations/ Comments: If appropriate, please consider adding Lantus 10 units daily to help with hyperglycemia    Chart reviewed on Anny Banegas. Patient is a 77 y.o. female with known diabetes on  Glipizide at home. A1c:   Lab Results   Component Value Date/Time    Hemoglobin A1c 7.5 (H) 08/31/2017 03:19 PM       Recent Glucose Results:   Lab Results   Component Value Date/Time     (H) 03/07/2018 03:45 AM    GLUCPOC 330 (H) 03/07/2018 11:34 AM    GLUCPOC 244 (H) 03/07/2018 08:30 AM    GLUCPOC 366 (H) 03/06/2018 09:30 PM        Lab Results   Component Value Date/Time    Creatinine 1.82 (H) 03/07/2018 03:45 AM     Estimated Creatinine Clearance: 17.5 mL/min (based on Cr of 1.82). Active Orders   Diet    DIET MECHANICAL SOFT        PO intake: No data found. Current hospital DM medication: Lispro Correctional insulin with normal sensitivity      Will continue to follow as needed. Thank you.   Jeb Rhoades, 64 [address], [address]  Office:  [phone]

## 2018-03-07 NOTE — PROGRESS NOTES
Chart reviewed. Cleared by RN to see pt as pt has recently received pain medication. Met with pt and introduced self and role of therapy while in the hospital. Pt declining even attempts to sit EOB today. Pt also declined to have therapist attempt evaluation again tomorrow. However,  at bedside, encouraging and requesting therapy follow back tomorrow as he wants to talk to patient regarding POC. PTA, pt stated she was able to ambulate with RW to bathroom independently.  Will follow up tomorrow per family request.     Lopez Alberts, PT, DPT

## 2018-03-07 NOTE — CONSULTS
Palliative Medicine Consult  Farhan: [phone] ([phone])    Patient Name: Vaibhav Lee  YOB: 1950    Date of Initial Consult: 03/05/2018  Reason for Consult: care decisions  Requesting Provider: Dr. Yanira Fraga   Primary Care Physician: Ander Ware MD     SUMMARY:   Vaibhav Lee is a 77 y.o. woman w/ PMH most significant for metastatic RCC on pazopanib and s/p completion of SBRT on 01/09/2018 who was admitted on 3/2/2018 from home for BONG. Current medical issues leading to Palliative Medicine involvement include: care decisions in setting of overall worsening of her condition, PMH noted above, dx's below. Active problems: BONG w/ metabolic acidosis 2/2 dehydration  RCC w/ mets to brain, pleura, and mediastinum  Protein calorie malnutrition  Constipation     PALLIATIVE DIAGNOSES:   1. Altered mental status  2. Poor PO intake  3. Pain  4. Weakness  5. Debility  6. Counseling regarding goals of care and advance care planning       PLAN:   Brief Summary of Plan  -visited w/ pt in her rm.  Luanne Burns) at bedside.   -further discussed goals of care. See below  -we will continue to establish therapeutic alliance as well as provide psychosocial support    1. Goals of Care/Goals of Medical Treatment- confirmed   confirms wish for full, restorative treatment and all measures that support this w/ the following exceptions:  NO to intubation if respiratory distress or failure outside of cardiopulmonary arrest  NO to pressors  NO to HD    But  YES to CPAP or BiPAP    It is possible that goals of care may change after pt and  speak w/ Dr. Haven Martines in the AM (03/07/2018). 2. Advance Care Planning- pt's code status   Patient confirms code status as DO NOT ATTEMPT RESUSCITATION (DNAR) / [address] (AND): If pt has cardiopulmonary arrest, then   NO to chest compressions  NO to cardiac shock  NO to ACLS meds  NO to intubation    Pt does not have AMD on file. Surrogate decision maker is . 3. Information Sharing-   03/06/2018   wished for me to speak w/ pt. I had to return later in the day. I sat and spoke w/ pt and . Pt is clear that she wishes to transition to comfort care. However, they wish to wait to speak w/ Dr. Sabi Jolley in the AM before making any possible change in goals of care. 03/05/2018   noted that someone told him that I would be visiting. He said that his understanding was we would provide advice in guiding discussions of plan of care. I provided more details about our team and its role in their care.  and Ally Hunt verbalized understanding of all the points made including the following:  -we are an interdisciplinary team serving as a bridge of communication and advocate on behalf of the patient and family when needed  -we are a support care service that, when needed, assists w/ sx mgmt  -though hospice is \"under\" palliative medicine, our service is not hospice nor does a consult visit by our service mean that hospice is being or should be considered  -we are not here to make medical decisions, and our being consulted does not equate to a change in a patient's overall condition  -our involvement is independent of clinical trajectory and/or medical decisions made    We spoke about pt's condition. They demonstrated a good understanding of pt's condition.  became tearful as we spoke. He said that now it seems Bala Flatten is taking its course\" as pt is refusing to drink or eat. He is having a hard time with this b/c initially he did not understand why someone (pt) would not try to do something if it can mean she might get better. I recognized the difficulty they are having including naming emotions evident. We spoke about range of medical treatment.  is clear on his wishes as noted above. We discussed pt's having signed \"DDNR\" form. Emphasis was placed on honoring pt's wishes.  fully support this. They shared that pt is as stubborn as anyone can be. She likes to read a lot. She has a list of books and authors that she has read.  thinks she may have read 150 books in a time period of over a yr. They have 1 child, Solange Laboy. Solange Laboy called in the middle of family meeting.  spoke w/ her briefly. Sharri Armendariz and pt have been friends for 48 yrs. They met when pt was working at [employer]. 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 She retired. Pt likes to stay at home.  laughed as he said that she never got along with a woman as her boss. Questions and concerns addressed. Supportive listening provided throughout family meeting.  gave permission for me to visit later as well as tomorrow. They are appreciative of the care being provided here. 4. Psychosocial Support- We will continue to support patient and family during this difficult hospitalization    5. Spiritual- at this time, there are no apparent spiritual concerns. 6. Symptom Management-   -continue Fentanyl 25-40 mcg IV q4h prn pain, SOB, or tachypnea w/ HOLD parameters     I have discussed with patients bedside RN, Carmen Castillo  I have discussed with oncology NP, Eduard Patterson. Time and input appreciated. I have discussed with our palliative care IDT. Thank you for this consult that has provided us with the opportunity to be involved in this patient's care. We will continue to follow along with you. Should you have any questions or concerns prior to our next visit at the bedside, please do not hesitate to contact us at [phone] Shea Orozco MD  Palliative Care Team     GOALS OF CARE / TREATMENT PREFERENCES:     GOALS OF CARE:  Patient/Health Care Proxy Stated Goals:  Other (comment)   wishes for full, restorative treatment and all measures that support this w/ exception of the following:  NO to intubation if respiratory distress or failure outside of cardiopulmonary arrest  NO to pressors  NO to HD    But  YES to CPAP or BiPAP    TREATMENT PREFERENCES:   Code Status: DNR   Pt's code status is DO NOT ATTEMPT RESUSCITATION (DNAR) / [address] (AND): If pt has cardiopulmonary arrest, then   NO to chest compressions  NO to cardiac shock  NO to ACLS meds  NO to intubation    Pt has \"DDNR\" form on file that she signed. Advance Care Planning:  Advance Care Planning 3/3/2018   Patient's Healthcare Decision Maker is: Legal Next of Kin   Primary Decision Maker Name Andres Anderson   Primary Decision Maker Phone Number [phone]   Primary Decision Maker Relationship to Patient -   Confirm Advance Directive None   Patient Would Like to Complete Advance Directive No   Does the patient have other document types Do Not Resuscitate       Medical Interventions: Other (comment)   Limited medical interventions. See plan. Other Instructions: none at this time        Other:    As far as possible, the palliative care team has discussed with patient / health care proxy about goals of care / treatment preferences for patient. HISTORY:     History obtained from: pt, family, chart    CHIEF COMPLAINT: R sided HA    HPI/SUBJECTIVE:    The patient is:   [x] Verbal and participatory  [] Non-participatory due to:    No o/n events or issues  R sided HA behind R ear  Aching  Comes and goes  None now    Pt is a 76 y/o WF w/ PMH noted above who presented w/ weakness and not eating. She has been undergoing tx for metastatic RCC but her overall condition has declined over several wks.  notes that she has not done well since receiving brain radiation. He says that she is very stubborn but it is not like her to not eat at all. Pt has had nausea for which Zofran prn had not really helped. Pt was on Megace but has had no appetite. She apparently has drank 3 Ensure cans per day. No BM x >1 wk.   Pt did not urinate on the day of ED presentation. Pt has not been able to walk on day of ED presentation. Pt was admitted for further w/u and mgmt. Clinical Pain Assessment (nonverbal scale for severity on nonverbal patients):   Clinical Pain Assessment  Severity: 0     Activity (Movement): Lying quietly, normal position    Duration: for how long has pt been experiencing pain (e.g., 2 days, 1 month, years)  Frequency: how often pain is an issue (e.g., several times per day, once every few days, constant)     FUNCTIONAL ASSESSMENT:     Palliative Performance Scale (PPS):  PPS: 30     PSYCHOSOCIAL/SPIRITUAL SCREENING:     Palliative IDT has assessed this patient for cultural preferences / practices and a referral made as appropriate to needs (Cultural Services, Patient Advocacy, Ethics, etc.)    Advance Care Planning:  Advance Care Planning 3/3/2018   Patient's Healthcare Decision Maker is: Legal Next of Tracey 67   Primary Decision Maker Name Joanie Fuller   Primary Decision Maker Phone Number [phone]   Primary Decision Maker Relationship to Patient -   Confirm Advance Directive None   Patient Would Like to Complete Advance Directive No   Does the patient have other document types Do Not Resuscitate       Any spiritual / Sikhism concerns:  [] Yes /  [x] No    Caregiver Burnout:  [] Yes /  [x] No /  [] No Caregiver Present      Anticipatory grief assessment:   [x] Normal  / [] Maladaptive       ESAS Anxiety:      ESAS Depression:          REVIEW OF SYSTEMS:     Positive and pertinent negative findings in ROS are noted above in HPI. The following systems were [] reviewed / [x] unable to be reviewed as noted in HPI  Other findings are noted below. Systems: constitutional, ears/nose/mouth/throat, respiratory, gastrointestinal, genitourinary, musculoskeletal, integumentary, neurologic, psychiatric, endocrine. Positive findings noted below.   Modified ESAS Completed by: provider           Pain: 0           Dyspnea: 0 Stool Occurrence(s): 1        PHYSICAL EXAM:     From RN flowsheet:  Wt Readings from Last 3 Encounters:   03/02/18 85 lb (38.6 kg)   02/09/18 95 lb 3.2 oz (43.2 kg)   11/29/17 109 lb 9.6 oz (49.7 kg)     Blood pressure 107/43, pulse 82, temperature 98.3 °F (36.8 °C), resp. rate 16, height 5' (1.524 m), weight 85 lb (38.6 kg), SpO2 95 %, not currently breastfeeding. Pain Scale 1: Numeric (0 - 10)  Pain Intensity 1: 0  Pain Onset 1: within 30 minutes  Pain Location 1: Head  Pain Orientation 1: Anterior  Pain Description 1: Aching  Pain Intervention(s) 1: Medication (see MAR)  Last bowel movement, if known:     Gen: sick appearing, weak, lying in bed, in NAD  HEENT: NC/AT, dry lips, NC securely in place  Respiratory: breathing not labored, symmetric  Skin: warm, dry  Neurologic: awake, alert, and engages in conversation to a much greater extent  Psychiatric: limited exam d/t condition  Less blunted affect  No obvious signs of agitation  Per Appelbaum criteria, pt demonstrates capacity regarding transition to comfort care. More specifically,   -pt demonstrates understanding of the decision/choice communicated and is able to indicate a decision/choice  -pt demonstrates understanding of relevant information. Pt is able to paraphrase shared information.    -pt appreciates situation and its consequences.   -pt demonstrates the ability to reason about options including comparing options and consequences and provide reasons for selection of option     HISTORY:     Principal Problem:    Acute kidney injury ([address] (3/2/2018)    Active Problems:    Controlled diabetes mellitus ([address] (7/31/2017)      Clear cell renal cell carcinoma ([address] (8/28/2017)      Hypotension (3/3/2018)      Dehydration (3/3/2018)      Severe protein-calorie malnutrition ([address] (3/3/2018)      Decubitus ulcer of sacral region, stage 2 (3/6/2018)      Past Medical History:   Diagnosis Date    Anxiety 7/31/2017    2/3/17:Under a tremendous amount of stress, will continue Alprazolam prn, if using regularly consider changing to an SSRI or counseling    Brain metastases [facility] [address]     Cancer ([facility] [address] 08/2017    renal cell carcinoma    Cervical myelopathy ([facility] [address] 7/31/2017    2/3/17:Cervical fusion     Clear cell adenocarcinoma of kidney ([facility] [address]     Controlled diabetes mellitus ([facility] [address] 7/31/2017    Decubitus ulcer of sacral region, stage 2 3/6/2018    Fatigue 7/31/2017    Hypercholesterolemia 7/31/2017    Hyperglycemia 7/31/2017    Hypertension 7/31/2017    2/3/17:Poorly controlled, lifestyle changes, repeat /90 will follow    On statin therapy 7/31/2017    Osteoporosis 7/31/2017    Post-menopausal 7/31/2017    Spinal stenosis of lumbar region with radiculopathy 7/31/2017    2/3/17:Symptoms c/w this diagnosis discussed with pt., if progresses then need MRI L spine and follow up    Vaginitis due to Candida 7/31/2017    2/3/17:Associated with antibiotic use, requests prescription med      Past Surgical History:   Procedure Laterality Date    HX ORTHOPAEDIC      2009 spinal cord surgery     HX ORTHOPAEDIC Left     knee procedure    HX OTHER SURGICAL  08/16/2017    incisional biopsy right neck mass      Family History   Problem Relation Age of Onset    Heart Disease Mother       History reviewed, no pertinent family history.   Social History   Substance Use Topics    Smoking status: Never Smoker    Smokeless tobacco: Never Used    Alcohol use No     No Known Allergies   Current Facility-Administered Medications   Medication Dose Route Frequency    glucose chewable tablet 16 g  4 Tab Oral PRN    dextrose (D50W) injection syrg 12.5-25 g  12.5-25 g IntraVENous PRN    glucagon (GLUCAGEN) injection 1 mg  1 mg IntraMUSCular PRN    miconazole (SECURA) 2 % extra thick cream   Topical PRN    balsam peru-castor oil (VENELEX)  mg/gram ointment   Topical BID    fentaNYL citrate (PF) injection 25-40 mcg  25-40 mcg IntraVENous Q4H PRN    insulin lispro (HUMALOG) injection   SubCUTAneous AC&HS    sodium bicarbonate 150 mEq/1000 mL D5W (premix)   IntraVENous CONTINUOUS    ALPRAZolam (XANAX) tablet 0.25 mg  0.25 mg Oral TID PRN    megestrol (MEGACE) 400 mg/10 mL (10 mL) oral suspension 200 mg  200 mg Oral DAILY    mirtazapine (REMERON) tablet 30 mg  30 mg Oral QHS    sertraline (ZOLOFT) tablet 50 mg  50 mg Oral DAILY    sodium chloride (NS) flush 5-10 mL  5-10 mL IntraVENous Q8H    sodium chloride (NS) flush 5-10 mL  5-10 mL IntraVENous PRN    acetaminophen (TYLENOL) tablet 650 mg  650 mg Oral Q4H PRN    ondansetron (ZOFRAN) injection 4 mg  4 mg IntraVENous Q4H PRN    heparin (porcine) injection 5,000 Units  5,000 Units SubCUTAneous Q12H    nystatin (MYCOSTATIN) 100,000 unit/mL oral suspension 500,000 Units  500,000 Units Oral TID          LAB AND IMAGING FINDINGS:     Lab Results   Component Value Date/Time    WBC 7.3 03/05/2018 03:48 AM    HGB 9.4 (L) 03/05/2018 03:48 AM    PLATELET 153 (L) 07/51/0078 03:48 AM     Lab Results   Component Value Date/Time    Sodium 143 03/06/2018 02:35 AM    Potassium 2.6 (LL) 03/06/2018 02:35 AM    Chloride 103 03/06/2018 02:35 AM    CO2 32 03/06/2018 02:35 AM    BUN 48 (H) 03/06/2018 02:35 AM    Creatinine 2.76 (H) 03/06/2018 02:35 AM    Calcium 7.5 (L) 03/06/2018 02:35 AM    Magnesium 2.6 (H) 03/06/2018 02:35 AM    Phosphorus 4.3 03/04/2018 03:37 AM      Lab Results   Component Value Date/Time    AST (SGOT) 15 03/04/2018 03:37 AM    Alk.  phosphatase 77 03/04/2018 03:37 AM    Protein, total 5.0 (L) 03/04/2018 03:37 AM    Albumin 1.9 (L) 03/04/2018 03:37 AM    Globulin 3.1 03/04/2018 03:37 AM     No results found for: INR, PTMR, PTP, PT1, PT2, APTT   No results found for: IRON, FE, TIBC, IBCT, PSAT, FERR   No results found for: PH, PCO2, PO2  No components found for: [place]   Lab Results   Component Value Date/Time    CK 49 08/31/2017 06:47 PM                Total time: 50 min  Counseling / coordination time, spent as noted above:  35 min  > 50% counseling / coordination?: yes    Prolonged service was provided for  []30 min   []75 min in face to face time in the presence of the patient, spent as noted above. Time Start:   Time End:   Note: this can only be billed with 88079 (initial) or 23063 (follow up). If multiple start / stop times, list each separately.

## 2018-03-07 NOTE — PROGRESS NOTES
0700: Bedside shift change report given to Majo Rodriguez RN (oncoming nurse) by Bola Melo RN (offgoing nurse). Report included the following information SBAR, Kardex, ED Summary, Procedure Summary, Intake/Output, MAR, Recent Results and Cardiac Rhythm NSR.    1130: Patient is resting comfortably in bed. VSS. Patient has no complaints. Will continue to monitor. 1500: Patient resting in bed. VSS. Spoke with Dr. Alicia Sanchez, will place transfer for patient to be moved from step down to telemetry. Will continue to monitor patient. 1845: Spoke with on call MD, Dr. Sharyn Lundborg, updated about patient SOB, wheezing, and new edema in LUE. Per MD will decrease IV fluid rate to [place] Pick. 1910: Bedside shift change report given to MICHELLE Decker (oncoming nurse) by Majo Rodriguez RN (offgoing nurse). Report included the following information SBAR, Kardex, ED Summary, Procedure Summary, Intake/Output, MAR, Recent Results and Cardiac Rhythm NSR.     1920: Patient SOB, in pain, VS documented, RR 30, lung sounds have scattered wheezing. MD page. IV Fentanyl given. 1935: Patient resting and stating she feel much better. New Vital signs documented. RR is 24.  Patient still has wheezing but does not seem as SOB

## 2018-03-07 NOTE — PROGRESS NOTES
Palliative Medicine  [place]: [phone] ([phone])  [facility]: [phone] ([phone])      Resuscitation Status: DNR   Durable DNR addressed? [] Yes   [] No    [x] Not Applicable    Advance Care Planning 3/3/2018   Patient's Healthcare Decision Maker is: Legal Next of Kin   Primary Decision Maker Name Ritika Molina   Primary Decision Maker Phone Number [phone]   Primary Decision Maker Relationship to Patient -   Confirm Advance Directive None   Patient Would Like to Complete Advance Directive No   Does the patient have other document types Do Not Resuscitate           Patient / Family Encounter Documentation    Participants (names): Ritika Molina -  and Kaylyn Orozco - patient's friend. Narrative: Patient awake in bed, able to communicate verbally and express needs, alert and oriented X 3. Patient unable to get comfortable in bed and appeared slightly irritable. She notes she \"hurts all over\". Not able to get comfortable. According to her  Meera Cortés, patient declined her pain medication earlier this morning. She is now agreeable to having some. Meera Cortés understands and explained that it is important for patient to stay \"on top of the pain and not let it get ahead of you\". Patient also seemed to have some dry heaves during this meeting. Patient voiced, \"I want this to be over with\". , Meera Cortés, feels hopeful that patient's creatinine values are getting better and shared that patient was able to \"help herself move in the bed\". Meera Cortés is not ready to make the decision for comfort only. He notes \"if things were looking worse then I get it\". Supported Meera Cortés in his feelings, indicated that the current mode of medical treatment can continue if they want. He was able to meet with Dr Andrew Smith this morning and felt positive about that. He would like to meet with Dr. Michael Duckworth later today.      Psychosocial Issues Identified: Patient doing somewhat better, but emotionally she is not hopeful and she is in discomfort, willing to take pain medicine at time of visit.  would like patient to \"hang on\" and not \"give up\". Discordant feelings between them. Patient somewhat irritable, likely due to pain. Goals of Care / Plan: Continue current mode of treatment. Emotional support to continue while patient here.

## 2018-03-07 NOTE — PROGRESS NOTES
PROGRESS NOTE    NAME:  Long Strange   :   1950   MRN:   [id number]     Date/Time:  3/7/2018 7:35 AM  Subjective:   History: More alert and responsive this morning, verbal and answers questions appropriately, pain left lower abdomen and flank, no headache, no nausea      Medications reviewed:  Current Facility-Administered Medications   Medication Dose Route Frequency    glucose chewable tablet 16 g  4 Tab Oral PRN    dextrose (D50W) injection syrg 12.5-25 g  12.5-25 g IntraVENous PRN    glucagon (GLUCAGEN) injection 1 mg  1 mg IntraMUSCular PRN    miconazole (SECURA) 2 % extra thick cream   Topical PRN    balsam peru-castor oil (VENELEX)  mg/gram ointment   Topical BID    fentaNYL citrate (PF) injection 25-40 mcg  25-40 mcg IntraVENous Q4H PRN    insulin lispro (HUMALOG) injection   SubCUTAneous AC&HS    sodium bicarbonate 150 mEq/1000 mL D5W (premix)   IntraVENous CONTINUOUS    ALPRAZolam (XANAX) tablet 0.25 mg  0.25 mg Oral TID PRN    megestrol (MEGACE) 400 mg/10 mL (10 mL) oral suspension 200 mg  200 mg Oral DAILY    mirtazapine (REMERON) tablet 30 mg  30 mg Oral QHS    sertraline (ZOLOFT) tablet 50 mg  50 mg Oral DAILY    sodium chloride (NS) flush 5-10 mL  5-10 mL IntraVENous Q8H    sodium chloride (NS) flush 5-10 mL  5-10 mL IntraVENous PRN    acetaminophen (TYLENOL) tablet 650 mg  650 mg Oral Q4H PRN    ondansetron (ZOFRAN) injection 4 mg  4 mg IntraVENous Q4H PRN    heparin (porcine) injection 5,000 Units  5,000 Units SubCUTAneous Q12H    nystatin (MYCOSTATIN) 100,000 unit/mL oral suspension 500,000 Units  500,000 Units Oral TID        Objective:   Vitals:  Visit Vitals    /58    Pulse 62    Temp 98.6 °F (37 °C)    Resp 16    Ht 5' (1.524 m)    Wt 85 lb (38.6 kg)    SpO2 (!) 89%    Breastfeeding No    BMI 16.6 kg/m2    O2 Flow Rate (L/min): 2 l/min O2 Device: Nasal cannula Temp (24hrs), Av.5 °F (36.9 °C), Min:98.3 °F (36.8 °C), Max:98.6 °F (37 °C)      Last 24hr Input/Output:  No intake or output data in the 24 hours ending 03/07/18 0820     PHYSICAL EXAM:  General:    Awake,  no distress, appears stated age. Head:    Normocephalic, without obvious abnormality, atraumatic. Eyes:    Conjunctivae/corneas clear. PERRLA  Nose:   Nares normal. No drainage or sinus tenderness. Throat:     Lips, mucosa, and tongue normal.  Dry mucous membranes. Neck:   Supple, symmetrical,  no adenopathy, thyroid: non tender     no carotid bruit and no JVD. Back:     Symmetric,  No CVA tenderness. Lungs:    Clear to auscultation bilaterally. No Wheezing or Rhonchi. No rales. Heart:    Regular rate and rhythm,  no murmur, rub or gallop. Abdomen:    Soft, minimal tenderness left flank. Not distended. Bowel sounds normal. No masses  Extremities:  Extremities normal, atraumatic, No cyanosis. Trace edema.  No clubbing  Lymph nodes:  Cervical, supraclavicular normal.  Neurologic: Grossly nonfocal  Skin:                Stage II sacral decubitus ulcer      Lab Data Reviewed:    Recent Results (from the past 24 hour(s))   GLUCOSE, POC    Collection Time: 03/06/18 11:42 AM   Result Value Ref Range    Glucose (POC) 300 (H) 65 - 100 mg/dL    Performed by [employer]    GLUCOSE, POC    Collection Time: 03/06/18  4:38 PM   Result Value Ref Range    Glucose (POC) 390 (H) 65 - 100 mg/dL    Performed by [employer]    GLUCOSE, POC    Collection Time: 03/06/18  9:30 PM   Result Value Ref Range    Glucose (POC) 366 (H) 65 - 100 mg/dL    Performed by Verlan Barthel    METABOLIC PANEL, BASIC    Collection Time: 03/07/18  3:45 AM   Result Value Ref Range    Sodium 143 136 - 145 mmol/L    Potassium 2.3 (LL) 3.5 - 5.1 mmol/L    Chloride 97 97 - 108 mmol/L    CO2 42 (HH) 21 - 32 mmol/L    Anion gap 4 (L) 5 - 15 mmol/L    Glucose 195 (H) 65 - 100 mg/dL    BUN 36 (H) 6 - 20 MG/DL    Creatinine 1.82 (H) 0.55 - 1.02 MG/DL    BUN/Creatinine ratio 20 12 - 20      GFR est AA 33 (L) >60 ml/min/1.73m2    GFR est non-AA 27 (L) >60 ml/min/1.73m2    Calcium 7.4 (L) 8.5 - 10.1 MG/DL         Assessment/Plan:     Principal Problem:    Acute kidney injury ([facility] (3/2/2018)    Active Problems:    Controlled diabetes mellitus ([facility] (7/31/2017)      Clear cell renal cell carcinoma ([facility] (8/28/2017)      Hypotension (3/3/2018)      Dehydration (3/3/2018)      Severe protein-calorie malnutrition ([facility] (3/3/2018)      Decubitus ulcer of sacral region, stage 2 (3/6/2018)      Acute metabolic encephalopathy (6/3/7158)       ___________________________________________________  PLAN:    1. Mag repleted, potassium still low with improving creatinine, IV potassium  2. Creatinine improved, continue IV fluids, monitor volume status, appreciate nephrology consultation, hemodialysis declined  3. 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 Palliative medicine, patient apparently doing well with active treatment until recently with radiation therapy for brain metastases, appears improved today, continue supportive care  5. After discussion with  and patient she wishes to continue IV fluids or potassium replacement for any reversible underlying problems that she may respond to. Creatinine is improving. Continue sliding scale insulin for elevated blood glucose. These may be discontinued if patient decides on comfort care only and does not wish to further have any treatments or medications. Otherwise primary goal is comfort care. If transition is made to hospice to go home the  is very concerned about having the resources to attend to his wife on a continuous basis.             ___________________________________________________    Attending Physician: Shea Johnson MD

## 2018-03-08 PROBLEM — L89.022: Status: ACTIVE | Noted: 2018-01-01

## 2018-03-08 NOTE — PROGRESS NOTES
Nutrition Assessment:    INTERVENTIONS/RECOMMENDATIONS:   Meals/Snacks: General/healthful diet: Mechanical Soft  Supplements: Commercial supplement: Ensure Clear TID  Encourage PO intake   ASSESSMENT:   Chart reviewed. Pt sleeping soundly at time of visit and  was not in room. Pt had an untouched tray at bedside from breakfast. According to chart, pt's appetite has improved slightly. Encourage pt to eat and drink ensure clear. Will follow up w/ pt and check on appetite and supplement consumption. Diet Order: Mechanical soft (ensure clear TID, ensure pudding TID)  % Eaten:  No data found. Pertinent Medications: [x] Reviewed []Other: heparin, humalog, remeron   Pertinent Labs: [x]Reviewed  []Other: gluc [phone], K 3.2, MG 2.6  Food Allergies: [x]None []Other:     Last BM: 3/7  []Active     []Hyperactive  []Hypoactive       [] Absent  BS  Skin:    [] Intact   [] Incision  [] Breakdown   [x]Edema   [x]Other: Pressure Injury (x2)    Anthropometrics: Height: 5' (152.4 cm) Weight: 38.6 kg (85 lb)    IBW (%IBW):   ( ) UBW (%UBW):   (  %)    BMI: Body mass index is 16.6 kg/(m^2). This BMI is indicative of:  [x]Underweight   []Normal   []Overweight   [] Obesity   [] Extreme Obesity (BMI>40)  Last Weight Metrics:  Weight Loss Metrics 3/2/2018 2/9/2018 11/29/2017 11/15/2017 10/30/2017 10/9/2017 9/10/2017   Today's Wt 85 lb 95 lb 3.2 oz 109 lb 9.6 oz 103 lb 101 lb 6.4 oz 97 lb 125 lb   BMI 16.6 kg/m2 17.99 kg/m2 20.71 kg/m2 19.46 kg/m2 19.16 kg/m2 18.94 kg/m2 24.41 kg/m2       Estimated Nutrition Needs (Based on): 1581 Kcals/day (BMR (832) x 1.3AF +500kcal) , 51 g (-59g (1.3-1.5 g/kg bw)) Protein  Carbohydrate:  At Least 130 g/day  Fluids: 1600 mL/day    NUTRITION DIAGNOSES:   Problem:  Malnutrition      Etiology: related to renal cell carcinoma     Signs/Symptoms: as evidenced by poor PO, 10.5% weight loss x 1 months    Previous Nutrition Dx:  [] Resolved  [] Unresolved           [x] Progressing    NUTRITION INTERVENTIONS:  Meals/Snacks: General/healthful diet   Supplements: Commercial supplement              GOAL:   PO intake >25% of meals/supplement next 1-3 days    NUTRITION MONITORING AND EVALUATION   Food/Nutrient Intake Outcomes:  Total energy intake  Physical Signs/Symptoms Outcomes: Weight/weight change, Electrolyte and renal profile, GI profile, Glucose profile    Previous Goal Met:   [] Met              [x] Progressing Towards Goal              [] Not Progressing Towards Goal   Previous Recommendations:   [x] Implemented          [] Not Implemented          [] Not Applicable    LEARNING NEEDS (Diet, Food/Nutrient-Drug Interaction):    [x] None Identified   [] Identified and Education Provided/Documented   [] Identified and Pt declined/was not appropriate     Cultural, Lutheran, OR Ethnic Dietary Needs:    [x] None Identified   [] Identified and Addressed     [x] Interdisciplinary Care Plan Reviewed/Documented    [x] Discharge Planning: Continue a general/healthful diet adequate in calories and protein    [] Participated in Interdisciplinary Rounds    NUTRITION RISK:    [x] Patient At Nutritional Risk    [x] High              [] Moderate/Mild           []  Low     [] Patient Not At [address]  Pager [phone]  Weekend Pager [phone]

## 2018-03-08 NOTE — PROGRESS NOTES
Primary Nurse Katie Ladd, MICHELLE and [address] [facility] , RN performed a dual skin assessment on this patient Impairment noted- see wound doc flow sheet  Antonio score is 11    Impairment noted to sacrum & left elbow  Heels boggy, RLE pink, but blanchable

## 2018-03-08 NOTE — PROGRESS NOTES
TRANSFER - IN REPORT:    Verbal report received from Miracle(name) on Hilario   being received from PCU(unit) for routine progression of care      Report consisted of patients Situation, Background, Assessment and   Recommendations(SBAR). Information from the following report(s) SBAR, Kardex, Intake/Output, MAR, Recent Results and Med Rec Status was reviewed with the receiving nurse. Opportunity for questions and clarification was provided. Assessment completed upon patients arrival to unit and care assumed.

## 2018-03-08 NOTE — PROGRESS NOTES
Bedside and Verbal shift change report given to Jamila (oncoming nurse) by Nahomi Maria (offgoing nurse). Report included the following information SBAR, Kardex, Intake/Output, MAR, Recent Results and Med Rec Status.

## 2018-03-08 NOTE — PROGRESS NOTES
Occupational Therapy Goals  Initiated 3/8/2018  1. Patient will perform UB dressing with minimal assistance/contact guard assist within 7 day(s). 2. Patient will perform seated anterior bathing with minimal assistance/contact guard assist within 7 day(s). 3. Patient will perform seated LB dressing with moderate assistance within 7 day(s). 4. Patient will perform toilet transfers to [facility] with RW with moderate assistance within 7 day(s). Occupational Therapy EVALUATION  Patient: Vaibhav Lee (84 y.o. female)  Date: 3/8/2018  Primary Diagnosis: Acute kidney injury [facility])        Precautions:   Fall    ASSESSMENT :  Based on the objective data described below, the patient presents with poor processing with significantly delayed responses, generalized weakness, R UE edema elbows to digits, impaired mobility and decline in functional status. Pt with hx of CA, agreeable to attempt therapy today. When asked to squeeze or move UEs in assessment for strength/ROM, pt did not actively move arms nor follow commands. Question processing vs behavioral given pt's extremely flat and depressed affect. However, noted pt was able to move arms to adjust gown, prop on L UE for support on EOB and grab onto bed rails with both hands when returned to supine. She was total A x 2 for supine to sit, good static sitting balance EOB x 10 minutes. Attempted one trial sit to stand with mod-max A x 2 but pt unable to achieve full upright posture with use of RW. She is needing max to total A for ADLs at this time. Pt stated she wants to continue with OT and PT while admitted. Will follow 2x/week for week trial pending her active participation. Pt would continue to benefit from continued palliative care support and possible hospice pending family and medical POC. Patient will benefit from skilled intervention to address the above impairments.   Patients rehabilitation potential is considered to be Poor   Factors which may influence rehabilitation potential include:   []             None noted  []             Mental ability/status  [x]             Medical condition  []             Home/family situation and support systems  []             Safety awareness  []             Pain tolerance/management  []             Other:      PLAN :  Recommendations and Planned Interventions:  [x]               Self Care Training                  [x]        Therapeutic Activities  [x]               Functional Mobility Training    []        Cognitive Retraining  [x]               Therapeutic Exercises           [x]        Endurance Activities  [x]               Balance Training                   []        Neuromuscular Re-Education  []               Visual/Perceptual Training     [x]   Home Safety Training  [x]               Patient Education                 [x]        Family Training/Education  []               Other (comment):    Frequency/Duration: Patient will be followed by occupational therapy 2 times a week to address goals for one week trial.  Discharge Recommendations: TBD- Hospice? Further Equipment Recommendations for Discharge: TBD     SUBJECTIVE:   Patient stated Ok.     OBJECTIVE DATA SUMMARY:   HISTORY:   Past Medical History:   Diagnosis Date    Acute metabolic encephalopathy 6/1/7336    Anxiety 7/31/2017    2/3/17:Under a tremendous amount of stress, will continue Alprazolam prn, if using regularly consider changing to an SSRI or counseling    Brain metastases ([address]     Cancer ([address] 08/2017    renal cell carcinoma    Cervical myelopathy ([address] 7/31/2017    2/3/17:Cervical fusion     Clear cell adenocarcinoma of kidney ([address]     Controlled diabetes mellitus ([address] 7/31/2017    Decubitus ulcer of sacral region, stage 2 3/6/2018    Fatigue 7/31/2017    Hypercholesterolemia 7/31/2017    Hyperglycemia 7/31/2017    Hypertension 7/31/2017    2/3/17:Poorly controlled, lifestyle changes, repeat /90 will follow    On statin therapy 7/31/2017    Osteoporosis 7/31/2017    Post-menopausal 7/31/2017    Spinal stenosis of lumbar region with radiculopathy 7/31/2017    2/3/17:Symptoms c/w this diagnosis discussed with pt., if progresses then need MRI L spine and follow up    Vaginitis due to Candida 7/31/2017    2/3/17:Associated with antibiotic use, requests prescription med     Past Surgical History:   Procedure Laterality Date    HX ORTHOPAEDIC      2009 spinal cord surgery     HX ORTHOPAEDIC Left     knee procedure    HX OTHER SURGICAL  08/16/2017    incisional biopsy right neck mass       Prior Level of Function/Environment/Context: pt not forthcoming, extremely flat affect. Per , pt was ambulating with RW to bathroom  Occupations in which the patient is/was successful, what are the barriers preventing that success:   Performance Patterns (routines, roles, habits, and rituals):   Personal Interests and/or values:   Expanded or extensive additional review of patient history: metatastic CA    Home Situation  Home Environment: Private residence  # Steps to Enter: 0  One/Two Story Residence: One story  Living Alone: No  Support Systems: Family member(s), [employer], Spouse/Significant Other/Partner  Patient Expects to be Discharged to[de-identified] Private residence  Current DME Used/Available at Home: None  [x]  Right hand dominant   []  Left hand dominant    EXAMINATION OF PERFORMANCE DEFICITS:  Cognitive/Behavioral Status:  Neurologic State: Alert  Orientation Level: Oriented to person;Oriented to place;Oriented to situation;Oriented to time  Cognition: No command following             Skin: intact    Edema: R elbow to digits    Hearing:   Auditory  Auditory Impairment: None    Vision/Perceptual:    Tracking: Able to track stimulus in all quadrants w/o difficulty                                Range of Motion:    AROM: Generally decreased, functional  PROM: Generally decreased, functional                      Strength:    Strength: Generally decreased, functional                Coordination:     Fine Motor Skills-Upper: Left Impaired;Right Impaired    Gross Motor Skills-Upper: Left Impaired;Right Impaired         Balance:  Sitting: Intact  Standing: Impaired  Standing - Static: Poor    Functional Mobility and Transfers for ADLs:  Bed Mobility:  Rolling: Maximum assistance  Supine to Sit: Maximum assistance  Sit to Supine: Maximum assistance  Scooting: Maximum assistance    Transfers:  Sit to Stand: Total assistance  Stand to Sit: Total assistance    ADL Assessment:  Feeding: Maximum assistance to total A, noted to functionally hold onto bed rails during transfer A but did not squeeze hands upon command    Oral Facial Hygiene/Grooming: Maximum assistance to total a. placed in L hand, verbal cue to wash face, poor processing    Bathing: Total assistance    Upper Body Dressing: Total assistance    Lower Body Dressing: Total assistance    Toileting: Total assistance                ADL Intervention and task modifications:                             Functional Measure:  Barthel Index:    Bathin  Bladder: 0  Bowels: 5  Groomin  Dressin  Feedin  Mobility: 0  Stairs: 0  Toilet Use: 0  Transfer (Bed to Chair and Back): 0  Total: 5       Barthel and G-code impairment scale:  Percentage of impairment CH  0% CI  1-19% CJ  20-39% CK  40-59% CL  60-79% CM  80-99% CN  100%   Barthel Score 0-100 100 99-80 79-60 59-40 20-39 1-19   0   Barthel Score 0-20 20 17-19 13-16 9-12 5-8 1-4 0      The Barthel ADL Index: Guidelines  1. The index should be used as a record of what a patient does, not as a record of what a patient could do. 2. The main aim is to establish degree of independence from any help, physical or verbal, however minor and for whatever reason. 3. The need for supervision renders the patient not independent. 4. A patient's performance should be established using the best available evidence.  Asking the patient, friends/relatives and nurses are the usual sources, but direct observation and common sense are also important. However direct testing is not needed. 5. Usually the patient's performance over the preceding 24-48 hours is important, but occasionally longer periods will be relevant. 6. Middle categories imply that the patient supplies over 50 per cent of the effort. 7. Use of aids to be independent is allowed. Italia Munson., Barthel, D.W. (3175). Functional evaluation: the Barthel Index. [address] [facility] (14)2. DAMIEN Sebastian, Lukas Daniel.Cheryle., [place], [address] [facility] (1999). Measuring the change indisability after inpatient rehabilitation; comparison of the responsiveness of the Barthel Index and Functional [place] Measure. Journal of Neurology, Neurosurgery, and Psychiatry, 66(4), 708-599. MUNIRA Rai, GIULIA Oconnor, & Michael Gibbs MJATIN. (2004.) Assessment of post-stroke quality of life in cost-effectiveness studies: The usefulness of the Barthel Index and the EuroQoL-5D. Quality of Life Research, 13, 932-67         G codes: In compliance with CMSs Claims Based Outcome Reporting, the following G-code set was chosen for this patient based on their primary functional limitation being treated: The outcome measure chosen to determine the severity of the functional limitation was the barthel with a score of 5/100 which was correlated with the impairment scale. ?  Self Care:     - CURRENT STATUS: CM - 80%-99% impaired, limited or restricted    - GOAL STATUS: CL - 60%-79% impaired, limited or restricted    - D/C STATUS:  ---------------To be determined---------------     Occupational Therapy Evaluation Charge Determination   History Examination Decision-Making   HIGH Complexity : Extensive review of history including physical, cognitive and psychosocial history  HIGH Complexity : 5 or more performance deficits relating to physical, cognitive , or psychosocial skils that result in activity limitations and / or participation restrictions HIGH Complexity : Patient presents with comorbidities that affect occupational performance. Signifigant modification of tasks or assistance (eg, physical or verbal) with assessment (s) is necessary to enable patient to complete evaluation       Based on the above components, the patient evaluation is determined to be of the following complexity level: HIGH   Pain:  Pain Scale 1: Numeric (0 - 10)  Pain Intensity 1: 0              Activity Tolerance:   VSS  Please refer to the flowsheet for vital signs taken during this treatment. After treatment:   [] Patient left in no apparent distress sitting up in chair  [x] Patient left in no apparent distress in bed  [x] Call bell left within reach  [x] Nursing notified  [] Caregiver present  [x] Bed alarm activated    COMMUNICATION/EDUCATION:   The patients plan of care was discussed with: Physical Therapist and Registered Nurse.  [] Home safety education was provided and the patient/caregiver indicated understanding. [x] Patient/family have participated as able in goal setting and plan of care. [] Patient/family agree to work toward stated goals and plan of care. [] Patient understands intent and goals of therapy, but is neutral about his/her participation. [] Patient is unable to participate in goal setting and plan of care. This patients plan of care is appropriate for delegation to \[facility] [place] [facility]\"".     Thank you for this referral.  Sugey Stephenson OT  Time Calculation: 30 mins

## 2018-03-08 NOTE — ROUTINE PROCESS
TRANSFER - OUT REPORT:    Verbal report given to Atiya MARTINEZ(name) on Namrata Contreras  being transferred to Oncology(unit) for routine progression of care       Report consisted of patients Situation, Background, Assessment and   Recommendations(SBAR). Information from the following report(s) Kardex, Intake/Output, MAR and Recent Results was reviewed with the receiving nurse. Lines:   Peripheral IV 03/02/18 Right Antecubital (Active)   Site Assessment Clean, dry, & intact 3/8/2018  8:12 AM   Phlebitis Assessment 0 3/8/2018  8:12 AM   Infiltration Assessment 0 3/8/2018  8:12 AM   Dressing Status Clean, dry, & intact 3/8/2018  8:12 AM   Dressing Type Transparent 3/8/2018  8:12 AM   Hub Color/Line Status Pink 3/8/2018  8:12 AM   Alcohol Cap Used No 3/8/2018  8:12 AM       Peripheral IV 03/05/18 Right Forearm (Active)   Site Assessment Clean, dry, & intact 3/8/2018  8:12 AM   Phlebitis Assessment 0 3/8/2018  8:12 AM   Infiltration Assessment 0 3/8/2018  8:12 AM   Dressing Status Clean, dry, & intact 3/8/2018  8:12 AM   Dressing Type Transparent 3/8/2018  8:12 AM   Hub Color/Line Status Blue 3/8/2018  8:12 AM   Alcohol Cap Used No 3/8/2018  8:12 AM        Opportunity for questions and clarification was provided.       Patient transported with:   [facility]

## 2018-03-08 NOTE — PROGRESS NOTES
PROGRESS NOTE    NAME:  Lamont Dior   :   1950   MRN:   [id number]     Date/Time:  3/8/2018 7:35 AM  Subjective:   History: More alert and responsive this morning, verbal and answers questions appropriately, no pain, she states she is taking some fluids in and eating small amounts, generally weak      Medications reviewed:  Current Facility-Administered Medications   Medication Dose Route Frequency    glucose chewable tablet 16 g  4 Tab Oral PRN    dextrose (D50W) injection syrg 12.5-25 g  12.5-25 g IntraVENous PRN    glucagon (GLUCAGEN) injection 1 mg  1 mg IntraMUSCular PRN    miconazole (SECURA) 2 % extra thick cream   Topical PRN    balsam peru-castor oil (VENELEX)  mg/gram ointment   Topical BID    fentaNYL citrate (PF) injection 25-40 mcg  25-40 mcg IntraVENous Q4H PRN    insulin lispro (HUMALOG) injection   SubCUTAneous AC&HS    sodium bicarbonate 150 mEq/1000 mL D5W (premix)   IntraVENous CONTINUOUS    ALPRAZolam (XANAX) tablet 0.25 mg  0.25 mg Oral TID PRN    megestrol (MEGACE) 400 mg/10 mL (10 mL) oral suspension 200 mg  200 mg Oral DAILY    mirtazapine (REMERON) tablet 30 mg  30 mg Oral QHS    sertraline (ZOLOFT) tablet 50 mg  50 mg Oral DAILY    sodium chloride (NS) flush 5-10 mL  5-10 mL IntraVENous Q8H    sodium chloride (NS) flush 5-10 mL  5-10 mL IntraVENous PRN    acetaminophen (TYLENOL) tablet 650 mg  650 mg Oral Q4H PRN    ondansetron (ZOFRAN) injection 4 mg  4 mg IntraVENous Q4H PRN    heparin (porcine) injection 5,000 Units  5,000 Units SubCUTAneous Q12H    nystatin (MYCOSTATIN) 100,000 unit/mL oral suspension 500,000 Units  500,000 Units Oral TID        Objective:   Vitals:  Visit Vitals    BP 92/41 (BP 1 Location: Right arm, BP Patient Position: At rest)    Pulse 94    Temp 98.2 °F (36.8 °C)    Resp 17    Ht 5' (1.524 m)    Wt 85 lb (38.6 kg)    SpO2 94%    Breastfeeding No    BMI 16.6 kg/m2    O2 Flow Rate (L/min): 4 l/min O2 Device: Nasal cannula Temp (24hrs), Av.4 °F (36.9 °C), Min:97.7 °F (36.5 °C), Max:99.3 °F (37.4 °C)      Last 24hr Input/Output:    Intake/Output Summary (Last 24 hours) at 18 0806  Last data filed at 18 0600   Gross per 24 hour   Intake              150 ml   Output              700 ml   Net             -550 ml        PHYSICAL EXAM:  General:    Awake,  no distress, appears stated age. Head:    Normocephalic, without obvious abnormality, atraumatic. Eyes:    Conjunctivae/corneas clear. PERRLA  Nose:   Nares normal. No drainage or sinus tenderness. Throat:     Lips, mucosa, and tongue normal.  Dry mucous membranes. Neck:   Supple, symmetrical,  no adenopathy, thyroid: non tender     no carotid bruit and no JVD. Back:     Symmetric,  No CVA tenderness. Lungs:    Clear to auscultation bilaterally. No Wheezing or Rhonchi. No rales. Heart:    Regular rate and rhythm,  no murmur, rub or gallop. Abdomen:    Soft, minimal tenderness left flank. Not distended. Bowel sounds normal. No masses  Extremities:  Extremities normal, atraumatic, No cyanosis. Trace edema.  No clubbing  Lymph nodes:  Cervical, supraclavicular normal.  Neurologic: Grossly nonfocal  Skin:                Stage II sacral decubitus ulcer      Lab Data Reviewed:    Recent Results (from the past 24 hour(s))   GLUCOSE, POC    Collection Time: 18  8:30 AM   Result Value Ref Range    Glucose (POC) 244 (H) 65 - 100 mg/dL    Performed by [address] [place] [address], POC    Collection Time: 18 11:34 AM   Result Value Ref Range    Glucose (POC) 330 (H) 65 - 100 mg/dL    Performed by Edgar Stoner, POC    Collection Time: 18  4:59 PM   Result Value Ref Range    Glucose (POC) 296 (H) 65 - 100 mg/dL    Performed by Daniel Tiwari    GLUCOSE, POC    Collection Time: 18  9:50 PM   Result Value Ref Range    Glucose (POC) 148 (H) 65 - 100 mg/dL    Performed by [address], BASIC    Collection Time: 18  4:58 AM Result Value Ref Range    Sodium 143 136 - 145 mmol/L    Potassium 3.2 (L) 3.5 - 5.1 mmol/L    Chloride 96 (L) 97 - 108 mmol/L    CO2 42 (HH) 21 - 32 mmol/L    Anion gap 5 5 - 15 mmol/L    Glucose 108 (H) 65 - 100 mg/dL    BUN 27 (H) 6 - 20 MG/DL    Creatinine 1.39 (H) 0.55 - 1.02 MG/DL    BUN/Creatinine ratio 19 12 - 20      GFR est AA 45 (L) >60 ml/min/1.73m2    GFR est non-AA 37 (L) >60 ml/min/1.73m2    Calcium 8.2 (L) 8.5 - 10.1 MG/DL   GLUCOSE, POC    Collection Time: 03/08/18  7:53 AM   Result Value Ref Range    Glucose (POC) 113 (H) 65 - 100 mg/dL    Performed by Florencia Pacific          Assessment/Plan:     Principal Problem:    Acute kidney injury ([facility] [address] (3/2/2018)    Active Problems:    Controlled diabetes mellitus ([facility] [address] (7/31/2017)      Clear cell renal cell carcinoma ([facility] [address] (8/28/2017)      Hypotension (3/3/2018)      Dehydration (3/3/2018)      Severe protein-calorie malnutrition ([address] (3/3/2018)      Decubitus ulcer of sacral region, stage 2 (3/6/2018)      Acute metabolic encephalopathy (7/7/4919)      Decubitus ulcer of left elbow, stage 2 (3/8/2018)       ___________________________________________________  PLAN:    1.  K improved, Crt improved,   2. DC IVF, DC tele, OOB as tolerates  3. 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 Palliative medicine, patient apparently doing well with active treatment until recently with radiation therapy for brain metastases, appears improved today, continue supportive care. Read and appreciate Dr. Geovanny Cedeno notes. 5. After discussion with  and patient she wishes to continue treating anything that is reversible or treatable. Creatinine is improving. Continue sliding scale insulin for elevated blood glucose. These may be discontinued if patient decides on comfort care only and does not wish to further have any treatments or medications. Otherwise primary goal is comfort care.   If transition is made to hospice to go home the  is very concerned about having the resources to attend to his wife on a continuous basis.             ___________________________________________________    Attending Physician: Octavio Sanchez MD

## 2018-03-08 NOTE — CONSULTS
Palliative Medicine Consult  Farhan: [phone] ([phone])    Patient Name: Yobany Flores  YOB: 1950    Date of Initial Consult: 03/05/2018  Reason for Consult: care decisions  Requesting Provider: Dr. Samra Bennett   Primary Care Physician: Manuel Smyth MD     SUMMARY:   Yobany Flores is a 77 y.o. woman w/ PMH most significant for metastatic RCC on pazopanib and s/p completion of SBRT on 01/09/2018 who was admitted on 3/2/2018 from home for BONG. Current medical issues leading to Palliative Medicine involvement include: care decisions in setting of overall worsening of her condition, PMH noted above, dx's below. Active problems: BONG w/ metabolic acidosis 2/2 dehydration  RCC w/ mets to brain, pleura, and mediastinum  Protein calorie malnutrition  Constipation     PALLIATIVE DIAGNOSES:   1. Altered mental status, resolved  2. Poor PO intake, improved  3. Pain  4. Weakness  5. Debility  6. Counseling regarding goals of care and advance care planning     PLAN:   Brief Summary of Plan  -visited w/ pt in her rm. No family at bedside.   -Sesar Jewell (JESSEE) and I visited earlier but  was not at bedside in AM.  -Please refer to Anastasiya's note from earlier today.   -I visited w/ pt again later in the day. Then called and spoke w/   -note goals of care as below  -we will continue to establish therapeutic alliance as well as provide psychosocial support    1. Goals of Care/Goals of Medical Treatment- confirmed   confirms wish for full, restorative treatment and all measures that support this w/ the following exceptions:  NO to intubation if respiratory distress or failure outside of cardiopulmonary arrest  NO to pressors  NO to HD    But  YES to CPAP or BiPAP    No change in the above (03/07/2018)    2. Advance Care Planning- pt's code status   Patient confirms code status as DO NOT ATTEMPT RESUSCITATION (DNAR) / [address] (AND):     If pt has cardiopulmonary arrest, then   NO to chest compressions  NO to cardiac shock  NO to ACLS meds  NO to intubation    Pt does not have AMD on file. Surrogate decision maker is . 3. Information Sharing-   03/07/2018  Pt says she is doing better. No complaints at this time. I called and spoke w/ . He said that he planned on not coming to the hospital tomorrow to give pt some time and space. He is hoping that she works w/ therapy. He wishes to do what she wants. He mentioned that heme onc team came by again. Dr. Chadwick Aiken sat down and spoke w/ pt. If pt wishes to work toward getting her overall condition better, then it will happen. We can reach out to  at any time. He is going to work smith. But he can be at hospital in 15 min.  thought Dr. Nuria Ceron and I would speak today. I relayed that I did not have opportunity to do this. I also apologized for not being able to catch him at bedside earlier today. It is clear that we are to continue w/ current goals of care as noted above. 03/06/2018   wished for me to speak w/ pt. I had to return later in the day. I sat and spoke w/ pt and . Pt is clear that she wishes to transition to comfort care. However, they wish to wait to speak w/ Dr. Nuria Ceron in the AM before making any possible change in goals of care. 03/05/2018   noted that someone told him that I would be visiting. He said that his understanding was we would provide advice in guiding discussions of plan of care. I provided more details about our team and its role in their care.     and Wong Barnes verbalized understanding of all the points made including the following:  -we are an interdisciplinary team serving as a bridge of communication and advocate on behalf of the patient and family when needed  -we are a support care service that, when needed, assists w/ sx mgmt  -though hospice is \"under\" palliative medicine, our service is not hospice nor does a consult visit by our service mean that hospice is being or should be considered  -we are not here to make medical decisions, and our being consulted does not equate to a change in a patient's overall condition  -our involvement is independent of clinical trajectory and/or medical decisions made    We spoke about pt's condition. They demonstrated a good understanding of pt's condition.  became tearful as we spoke. He said that now it seems Edwin Pulliam is taking its course\" as pt is refusing to drink or eat. He is having a hard time with this b/c initially he did not understand why someone (pt) would not try to do something if it can mean she might get better. I recognized the difficulty they are having including naming emotions evident. We spoke about range of medical treatment.  is clear on his wishes as noted above. We discussed pt's having signed \"DDNR\" form. Emphasis was placed on honoring pt's wishes.  fully support this. They shared that pt is as stubborn as anyone can be. She likes to read a lot. She has a list of books and authors that she has read.  thinks she may have read 150 books in a time period of over a yr. They have 1 child, Solange Laboy. Solange Laboy called in the middle of family meeting.  spoke w/ her briefly. Sharri Armendariz and pt have been friends for 48 yrs. They met when pt was working at [employer]. 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 Pt likes to stay at home.  laughed as he said that she never got along with a woman as her boss. Questions and concerns addressed. Supportive listening provided throughout family meeting.  gave permission for me to visit later as well as tomorrow. They are appreciative of the care being provided here.      4. Psychosocial Support- We will continue to support patient and family during this difficult hospitalization    5. Spiritual- at this time, there are no apparent spiritual concerns. 6. Symptom Management-   -continue Fentanyl 25-40 mcg IV q4h prn pain, SOB, or tachypnea w/ HOLD parameters     I have discussed with patients bedside RN, Caprice Fernandez  Time and input appreciated. I have discussed with our palliative care IDT. Thank you for this consult that has provided us with the opportunity to be involved in this patient's care. We will continue to follow along with you. Should you have any questions or concerns prior to our next visit at the bedside, please do not hesitate to contact us at [phone] Vidhya Lemus MD  Palliative Care Team     GOALS OF CARE / TREATMENT PREFERENCES:     GOALS OF CARE:  Patient/Health Care Proxy Stated Goals: Other (comment)   wishes for full, restorative treatment and all measures that support this w/ exception of the following:  NO to intubation if respiratory distress or failure outside of cardiopulmonary arrest  NO to pressors  NO to HD    But  YES to CPAP or BiPAP    TREATMENT PREFERENCES:   Code Status: DNR   Pt's code status is DO NOT ATTEMPT RESUSCITATION (DNAR) / ALLOW NATURAL DEATH (AND): If pt has cardiopulmonary arrest, then   NO to chest compressions  NO to cardiac shock  NO to ACLS meds  NO to intubation    Pt has \"DDNR\" form on file that she signed. Advance Care Planning:  Advance Care Planning 3/3/2018   Patient's Healthcare Decision Maker is: Legal Next of Kin   Primary Decision Maker Name Tahir Hurst   Primary Decision Maker Phone Number [phone]   Primary Decision Maker Relationship to Patient -   Confirm Advance Directive None   Patient Would Like to Complete Advance Directive No   Does the patient have other document types Do Not Resuscitate       Medical Interventions: Other (comment)   Limited medical interventions. See plan.      Other Instructions: none at this time        Other:    As far as possible, the palliative care team has discussed with patient / health care proxy about goals of care / treatment preferences for patient. HISTORY:     History obtained from: pt, family, chart    CHIEF COMPLAINT: R sided HA    HPI/SUBJECTIVE:    The patient is:   [x] Verbal and participatory  [] Non-participatory due to:    No o/n events or issues  No pain  No complaints    Pt is a 76 y/o WF w/ PMH noted above who presented w/ weakness and not eating. She has been undergoing tx for metastatic RCC but her overall condition has declined over several wks.  notes that she has not done well since receiving brain radiation. He says that she is very stubborn but it is not like her to not eat at all. Pt has had nausea for which Zofran prn had not really helped. Pt was on Megace but has had no appetite. She apparently has drank 3 Ensure cans per day. No BM x >1 wk. Pt did not urinate on the day of ED presentation. Pt has not been able to walk on day of ED presentation. Pt was admitted for further w/u and mgmt.      Clinical Pain Assessment (nonverbal scale for severity on nonverbal patients):   Clinical Pain Assessment  Severity: 0     Activity (Movement): Lying quietly, normal position    Duration: for how long has pt been experiencing pain (e.g., 2 days, 1 month, years)  Frequency: how often pain is an issue (e.g., several times per day, once every few days, constant)     FUNCTIONAL ASSESSMENT:     Palliative Performance Scale (PPS):  PPS: 30     PSYCHOSOCIAL/SPIRITUAL SCREENING:     Palliative IDT has assessed this patient for cultural preferences / practices and a referral made as appropriate to needs (Cultural Services, Patient Advocacy, Ethics, etc.)    Advance Care Planning:  Advance Care Planning 3/3/2018   Patient's Healthcare Decision Maker is: Legal Next of Tracey 67   Primary Decision Maker Name Alessio Alva   Primary Decision Maker Phone Number [phone]   Primary Decision Maker Relationship to Patient -   Confirm Advance Directive None   Patient Would Like to Complete Advance Directive No   Does the patient have other document types Do Not Resuscitate       Any spiritual / Anglican concerns:  [] Yes /  [x] No    Caregiver Burnout:  [] Yes /  [x] No /  [] No Caregiver Present      Anticipatory grief assessment:   [x] Normal  / [] Maladaptive       ESAS Anxiety:      ESAS Depression:          REVIEW OF SYSTEMS:     Positive and pertinent negative findings in ROS are noted above in HPI. The following systems were [] reviewed / [x] unable to be reviewed as noted in HPI  Other findings are noted below. Systems: constitutional, ears/nose/mouth/throat, respiratory, gastrointestinal, genitourinary, musculoskeletal, integumentary, neurologic, psychiatric, endocrine. Positive findings noted below. Modified ESAS Completed by: provider           Pain: 0           Dyspnea: 0           Stool Occurrence(s): 2        PHYSICAL EXAM:     From RN flowsheet:  Wt Readings from Last 3 Encounters:   03/02/18 85 lb (38.6 kg)   02/09/18 95 lb 3.2 oz (43.2 kg)   11/29/17 109 lb 9.6 oz (49.7 kg)     Blood pressure 122/55, pulse (!) 101, temperature 98.2 °F (36.8 °C), resp. rate 24, height 5' (1.524 m), weight 85 lb (38.6 kg), SpO2 93 %, not currently breastfeeding.     Pain Scale 1: Numeric (0 - 10)  Pain Intensity 1: 0  Pain Onset 1: within 30 minutes  Pain Location 1: Abdomen  Pain Orientation 1: Left  Pain Description 1: Aching  Pain Intervention(s) 1: Medication (see MAR)  Last bowel movement, if known:     Gen: sick appearing, weak but appears to have more energy today, lying in bed, in NAD  HEENT: NC/AT, dry lips, NC securely in place  Respiratory: breathing not labored, symmetric  Skin: warm, dry  Neurologic: asleep but easily wakes up, engages in conversation to a much greater extent  Psychiatric: limited exam d/t condition  Less blunted affect  No obvious signs of agitation  Per Appelbaum criteria, pt demonstrates capacity regarding transition to comfort care. More specifically,   -pt demonstrates understanding of the decision/choice communicated and is able to indicate a decision/choice  -pt demonstrates understanding of relevant information. Pt is able to paraphrase shared information.    -pt appreciates situation and its consequences.   -pt demonstrates the ability to reason about options including comparing options and consequences and provide reasons for selection of option     HISTORY:     Principal Problem:    Acute kidney injury ([address] (3/2/2018)    Active Problems:    Controlled diabetes mellitus ([address] (7/31/2017)      Clear cell renal cell carcinoma ([address] (8/28/2017)      Hypotension (3/3/2018)      Dehydration (3/3/2018)      Severe protein-calorie malnutrition ([address] (3/3/2018)      Decubitus ulcer of sacral region, stage 2 (3/6/2018)      Acute metabolic encephalopathy (8/5/6675)      Past Medical History:   Diagnosis Date    Acute metabolic encephalopathy 6/4/0274    Anxiety 7/31/2017    2/3/17:Under a tremendous amount of stress, will continue Alprazolam prn, if using regularly consider changing to an SSRI or counseling    Brain metastases ([address]     Cancer ([address] 08/2017    renal cell carcinoma    Cervical myelopathy ([address] 7/31/2017    2/3/17:Cervical fusion     Clear cell adenocarcinoma of kidney ([address]     Controlled diabetes mellitus ([address] 7/31/2017    Decubitus ulcer of sacral region, stage 2 3/6/2018    Fatigue 7/31/2017    Hypercholesterolemia 7/31/2017    Hyperglycemia 7/31/2017    Hypertension 7/31/2017    2/3/17:Poorly controlled, lifestyle changes, repeat /90 will follow    On statin therapy 7/31/2017    Osteoporosis 7/31/2017    Post-menopausal 7/31/2017    Spinal stenosis of lumbar region with radiculopathy 7/31/2017    2/3/17:Symptoms c/w this diagnosis discussed with pt., if progresses then need MRI L spine and follow up    Vaginitis due to Candida 7/31/2017 2/3/17:Associated with antibiotic use, requests prescription med      Past Surgical History:   Procedure Laterality Date    HX ORTHOPAEDIC      2009 spinal cord surgery     HX ORTHOPAEDIC Left     knee procedure    HX OTHER SURGICAL  08/16/2017    incisional biopsy right neck mass      Family History   Problem Relation Age of Onset    Heart Disease Mother       History reviewed, no pertinent family history.   Social History   Substance Use Topics    Smoking status: Never Smoker    Smokeless tobacco: Never Used    Alcohol use No     No Known Allergies   Current Facility-Administered Medications   Medication Dose Route Frequency    glucose chewable tablet 16 g  4 Tab Oral PRN    dextrose (D50W) injection syrg 12.5-25 g  12.5-25 g IntraVENous PRN    glucagon (GLUCAGEN) injection 1 mg  1 mg IntraMUSCular PRN    miconazole (SECURA) 2 % extra thick cream   Topical PRN    balsam peru-castor oil (VENELEX)  mg/gram ointment   Topical BID    fentaNYL citrate (PF) injection 25-40 mcg  25-40 mcg IntraVENous Q4H PRN    insulin lispro (HUMALOG) injection   SubCUTAneous AC&HS    sodium bicarbonate 150 mEq/1000 mL D5W (premix)   IntraVENous CONTINUOUS    ALPRAZolam (XANAX) tablet 0.25 mg  0.25 mg Oral TID PRN    megestrol (MEGACE) 400 mg/10 mL (10 mL) oral suspension 200 mg  200 mg Oral DAILY    mirtazapine (REMERON) tablet 30 mg  30 mg Oral QHS    sertraline (ZOLOFT) tablet 50 mg  50 mg Oral DAILY    sodium chloride (NS) flush 5-10 mL  5-10 mL IntraVENous Q8H    sodium chloride (NS) flush 5-10 mL  5-10 mL IntraVENous PRN    acetaminophen (TYLENOL) tablet 650 mg  650 mg Oral Q4H PRN    ondansetron (ZOFRAN) injection 4 mg  4 mg IntraVENous Q4H PRN    heparin (porcine) injection 5,000 Units  5,000 Units SubCUTAneous Q12H    nystatin (MYCOSTATIN) 100,000 unit/mL oral suspension 500,000 Units  500,000 Units Oral TID          LAB AND IMAGING FINDINGS:     Lab Results   Component Value Date/Time    WBC 7.3 03/05/2018 03:48 AM    HGB 9.4 (L) 03/05/2018 03:48 AM    PLATELET 951 (L) 06/93/3145 03:48 AM     Lab Results   Component Value Date/Time    Sodium 143 03/07/2018 03:45 AM    Potassium 2.3 (LL) 03/07/2018 03:45 AM    Chloride 97 03/07/2018 03:45 AM    CO2 42 (HH) 03/07/2018 03:45 AM    BUN 36 (H) 03/07/2018 03:45 AM    Creatinine 1.82 (H) 03/07/2018 03:45 AM    Calcium 7.4 (L) 03/07/2018 03:45 AM    Magnesium 2.6 (H) 03/06/2018 02:35 AM    Phosphorus 4.3 03/04/2018 03:37 AM      Lab Results   Component Value Date/Time    AST (SGOT) 15 03/04/2018 03:37 AM    Alk. phosphatase 77 03/04/2018 03:37 AM    Protein, total 5.0 (L) 03/04/2018 03:37 AM    Albumin 1.9 (L) 03/04/2018 03:37 AM    Globulin 3.1 03/04/2018 03:37 AM     No results found for: INR, PTMR, PTP, PT1, PT2, APTT   No results found for: IRON, FE, TIBC, IBCT, PSAT, FERR   No results found for: PH, PCO2, PO2  No components found for: [place]   Lab Results   Component Value Date/Time    CK 49 08/31/2017 06:47 PM           Total time: 35 min  Counseling / coordination time, spent as noted above:  20 min  > 50% counseling / coordination?: yes    Prolonged service was provided for  []30 min   []75 min in face to face time in the presence of the patient, spent as noted above. Time Start:   Time End:   Note: this can only be billed with 91327 (initial) or 81368 (follow up). If multiple start / stop times, list each separately.

## 2018-03-08 NOTE — PROGRESS NOTES
1930- during report patient got extremely anxious and short of breath. dayshift nurse had just called doctor to report that patient was getting more and more short of breath and edematous, so the patients maintenance IVF was changed to a [facility]. Oxygen saturation dropped and patient was wheezy with crackles and extremely anxious. Fentanyl given and doctor paged. Increased NC to 4L. Patient calmed down and was doing much better. Followed up with physician and new order for Lasix was given. Will continue to monitor. Purewick placed for Incontinence.

## 2018-03-08 NOTE — PROGRESS NOTES
Problem: Mobility Impaired (Adult and Pediatric)  Goal: *Acute Goals and Plan of Care (Insert Text)  Physical Therapy Goals  Initiated 3/8/2018  1. Patient will move from supine to sit and sit to supine  in bed with moderate assistance  within 7 day(s). 2.  Patient will perform sit to stand with moderate assistance  within 7 day(s). 3.  Patient will sit EOB and perform LE exercises with good sitting balance within 7 day(s). 4.  Patient will be able to SPT with mod A within 7 day(s). physical Therapy EVALUATION  Patient: Hilario Morataya (38 y.o. female)  Date: 3/8/2018  Primary Diagnosis: Acute kidney injury [facility])        Precautions:   Fall    ASSESSMENT :  Based on the objective data described below, the patient presents with significant weakness, decreased activity tolerance and overall poor functional mobility. She was able to ambulate to the bathroom with RW prior to admission. She was received in supine and cleared by nursing to mobilize. Pt VSS, but very pale and limited participation. She is agreeable to all tasks asked and agreed to have PT follow her. She required max A for all bed mobility and pt resisting at times. Once on the EOB she presented with good sitting balance. When asked to mobilize LEs and arms limited movement, but later on in session she was able to move extremities in an increased ROM than when performed via command. Sat EOB for 10 minutes. Attempted to stand with total A, only able to barely clear bottom. She was returned to sitting and then supine. Very poor processing vs behavior, clearly depressed. She would benefit from continued palliative support and possible hospice? ?? At the end of the session pt was in fetal position and dry heaving. Nursing aware to assist.     Will trail PT to see pt tolerance. Patient will benefit from skilled intervention to address the above impairments.   Patients rehabilitation potential is considered to be Guarded  Factors which may influence rehabilitation potential include:   []         None noted  [x]         Mental ability/status  []         Medical condition  []         Home/family situation and support systems  []         Safety awareness  []         Pain tolerance/management  []         Other:      PLAN :  Recommendations and Planned Interventions:  [x]           Bed Mobility Training             []    Neuromuscular Re-Education  [x]           Transfer Training                   []    Orthotic/Prosthetic Training  []           Gait Training                         []    Modalities  [x]           Therapeutic Exercises           []    Edema Management/Control  [x]           Therapeutic Activities            [x]    Patient and Family Training/Education  []           Other (comment):    Frequency/Duration: Patient will be followed by physical therapy  2 times a week to address goals. Discharge Recommendations: [facility] and hospice? ?? Further Equipment Recommendations for Discharge: TBD     SUBJECTIVE:   Patient stated ok.     OBJECTIVE DATA SUMMARY:   HISTORY:    Past Medical History:   Diagnosis Date    Acute metabolic encephalopathy 0/6/9871    Anxiety 7/31/2017    2/3/17:Under a tremendous amount of stress, will continue Alprazolam prn, if using regularly consider changing to an SSRI or counseling    Brain metastases ([address]     Cancer ([facility] [address] 08/2017    renal cell carcinoma    Cervical myelopathy ([address] 7/31/2017    2/3/17:Cervical fusion     Clear cell adenocarcinoma of kidney ([address]     Controlled diabetes mellitus ([address] 7/31/2017    Decubitus ulcer of sacral region, stage 2 3/6/2018    Fatigue 7/31/2017    Hypercholesterolemia 7/31/2017    Hyperglycemia 7/31/2017    Hypertension 7/31/2017    2/3/17:Poorly controlled, lifestyle changes, repeat /90 will follow    On statin therapy 7/31/2017    Osteoporosis 7/31/2017    Post-menopausal 7/31/2017    Spinal stenosis of lumbar region with radiculopathy 7/31/2017    2/3/17:Symptoms c/w this diagnosis discussed with pt., if progresses then need MRI L spine and follow up    Vaginitis due to Candida 7/31/2017    2/3/17:Associated with antibiotic use, requests prescription med     Past Surgical History:   Procedure Laterality Date    HX ORTHOPAEDIC      2009 spinal cord surgery     HX ORTHOPAEDIC Left     knee procedure    HX OTHER SURGICAL  08/16/2017    incisional biopsy right neck mass     Prior Level of Function/Home Situation: pt was using a RW and ambulating into the bathroom prior to admission  Personal factors and/or comorbidities impacting plan of care:     Home Situation  Home Environment: Private residence  # Steps to Enter: 0  One/Two Story Residence: One story  Living Alone: No  Support Systems: Family member(s), Confucianist / chilo community, Spouse/Significant Other/Partner  Patient Expects to be Discharged to[de-identified] Private residence  Current DME Used/Available at Home: None    EXAMINATION/PRESENTATION/DECISION MAKING:   Critical Behavior:  Neurologic State: Alert  Orientation Level: Oriented to person, Oriented to place, Oriented to situation, Oriented to time  Cognition: No command following     Hearing: Auditory  Auditory Impairment: None  Skin:  Intact, pale  Edema: R hand  Range Of Motion:  AROM: Generally decreased, functional           PROM: Generally decreased, functional           Strength:    Strength: Generally decreased, functional                    Tone & Sensation:                                  Coordination:     Vision:      Functional Mobility:  Bed Mobility:  Rolling: Maximum assistance  Supine to Sit: Maximum assistance  Sit to Supine: Maximum assistance  Scooting: Maximum assistance  Transfers:  Sit to Stand:  Total assistance  Stand to Sit: Total assistance                       Balance:   Sitting: Intact  Standing: Impaired  Standing - Static: Poor  Ambulation/Gait Training:   unsafe to attempt                        Functional Measure:  Barthel Index:    Bathin  Bladder: 0  Bowels: 5  Groomin  Dressin  Feedin  Mobility: 0  Stairs: 0  Toilet Use: 0  Transfer (Bed to Chair and Back): 0  Total: 5       Barthel and G-code impairment scale:  Percentage of impairment CH  0% CI  1-19% CJ  20-39% CK  40-59% CL  60-79% CM  80-99% CN  100%   Barthel Score 0-100 100 99-80 79-60 59-40 20-39 1-19   0   Barthel Score 0-20 20 17-19 13-16 9-12 5-8 1-4 0      The Barthel ADL Index: Guidelines  1. The index should be used as a record of what a patient does, not as a record of what a patient could do. 2. The main aim is to establish degree of independence from any help, physical or verbal, however minor and for whatever reason. 3. The need for supervision renders the patient not independent. 4. A patient's performance should be established using the best available evidence. Asking the patient, friends/relatives and nurses are the usual sources, but direct observation and common sense are also important. However direct testing is not needed. 5. Usually the patient's performance over the preceding 24-48 hours is important, but occasionally longer periods will be relevant. 6. Middle categories imply that the patient supplies over 50 per cent of the effort. 7. Use of aids to be independent is allowed. Italia Munson., Barthel, D.W. (8150). Functional evaluation: the Barthel Index. [facility] (14)2. DAMIEN Sebastian, Lukas Daniel., Cheryle Gandhi., [place], [address] [place] [address] (). Measuring the change indisability after inpatient rehabilitation; comparison of the responsiveness of the Barthel Index and Functional [place] Measure. Journal of Neurology, Neurosurgery, and Psychiatry, 66(4), 519-54. Rayna Pollock, N.J.A, GIULIA Oconnor, & Michael Gibbs M.A. (2004.) Assessment of post-stroke quality of life in cost-effectiveness studies: The usefulness of the Barthel Index and the EuroQoL-5D.  [facility], 49, 919-88 G codes: In compliance with CMSs Claims Based Outcome Reporting, the following G-code set was chosen for this patient based on their primary functional limitation being treated: The outcome measure chosen to determine the severity of the functional limitation was the barthel with a score of 5/100 which was correlated with the impairment scale. ? Mobility - Walking and Moving Around:     - CURRENT STATUS: CM - 80%-99% impaired, limited or restricted    - GOAL STATUS: CL - 60%-79% impaired, limited or restricted    - D/C STATUS:  ---------------To be determined---------------      Physical Therapy Evaluation Charge Determination   History Examination Presentation Decision-Making   HIGH Complexity :3+ comorbidities / personal factors will impact the outcome/ POC  HIGH Complexity : 4+ Standardized tests and measures addressing body structure, function, activity limitation and / or participation in recreation  MEDIUM Complexity : Evolving with changing characteristics  Other outcome measures barthel  HIGH       Based on the above components, the patient evaluation is determined to be of the following complexity level: MEDIUM    Pain:  Pain Scale 1: Numeric (0 - 10)  Pain Intensity 1: 0              Activity Tolerance:   VSS  Please refer to the flowsheet for vital signs taken during this treatment. After treatment:   []         Patient left in no apparent distress sitting up in chair  [x]         Patient left in no apparent distress in bed  [x]         Call bell left within reach  [x]         Nursing notified  []         Caregiver present  [x]         Bed alarm activated    COMMUNICATION/EDUCATION:   The patients plan of care was discussed with: Occupational Therapist and Registered Nurse. [x]         Fall prevention education was provided and the patient/caregiver indicated understanding. []         Patient/family have participated as able in goal setting and plan of care.   [] Patient/family agree to work toward stated goals and plan of care. []         Patient understands intent and goals of therapy, but is neutral about his/her participation. [x]         Patient is unable to participate in goal setting and plan of care.     Thank you for this referral.  Emily Isaacs, PT, DPT   Time Calculation: 25 mins

## 2018-03-09 NOTE — PROGRESS NOTES
Dr. Tiffanie Reyes called me and left msg earlier in the day. I was not able to call back until now. D/w him regarding pt's condition and conversations I had w/ pt and . Please refer to my progress note from earlier today. Time and input appreciated.     Jeanna Campbell MD  Palliative Care Team

## 2018-03-09 NOTE — PROGRESS NOTES
Problem: Falls - Risk of  Goal: *Absence of Falls  Document Deedee Fall Risk and appropriate interventions in the flowsheet.    Outcome: Progressing Towards Goal  Fall Risk Interventions:  Mobility Interventions: Assess mobility with egress test    Mentation Interventions: Adequate sleep, hydration, pain control    Medication Interventions: Bed/chair exit alarm    Elimination Interventions: Bed/chair exit alarm    History of Falls Interventions: Bed/chair exit alarm

## 2018-03-09 NOTE — PROGRESS NOTES
CM acknowledged consult for transition to comfort care/hospice. Referral sent to [facility] via CC for information session.       Jethro Segovia RN, BSN, ACM   - Medical Oncology  [phone]

## 2018-03-09 NOTE — PROGRESS NOTES
Palliative Medicine  [place]: [phone])  [facility]: [phone] ([phone])      Resuscitation Status: DNR   Durable DNR addressed? [] Yes   [x] No    [] Not Applicable    Advance Care Planning 3/3/2018   Patient's Healthcare Decision Maker is: Legal Next of Kin   Primary Decision Maker Name Kathrine Gonzalez   Primary Decision Maker Phone Number [phone]   Primary Decision Maker Relationship to Patient -   Confirm Advance Directive None   Patient Would Like to Complete Advance Directive No   Does the patient have other document types Do Not Resuscitate      Patient awake and alert, in bed, somewhat restless and noting she is \"very tired, I can't go on like this\". Complaining of abdominal pain. With patient is her long time friend, Nikko Richard, who has been with her for about half an hour. Patient unable to get comfortable, LCSW asked her if she would like some pain medicine, if available. Patient initially stated \"no\" but due to continued restlessness and furrowed brow, LCSW let patient know I would check with her RN to see if there was pain medication she could have. Patient was okay with this. Spoke to RN, Deana Armendariz who noted that she would follow up on this request and that patient did have tylenol and Fentanyl but patient no longer has an IV site. LCSW informed Dr Stephenie Li regarding above. He will stop by and see patient. Consult for hospice information visit noted in chart. Discussed with Dr Jeanette Cruz that  Rebecca Waldropshorn may or may not be aware of this consult and unsure if he is ready for this discussion. May be better to wait until next week for follow up on this. Rebecca Crouch is at work and won't be present at the hospital until after his work day. Patient and  will be supported as they make this journey.

## 2018-03-09 NOTE — CONSULTS
Palliative Medicine Consult  Farhan: [phone] ([phone])    Patient Name: Emmy Aburto  YOB: 1950    Date of Initial Consult: 03/05/2018  Reason for Consult: care decisions  Requesting Provider: Dr. Nithya Moore   Primary Care Physician: Joselin Castellano MD     SUMMARY:   Emmy Aburto is a 77 y.o. woman w/ PMH most significant for metastatic RCC on pazopanib and s/p completion of SBRT on 01/09/2018 who was admitted on 3/2/2018 from home for BONG. Current medical issues leading to Palliative Medicine involvement include: care decisions in setting of overall worsening of her condition, PMH noted above, dx's below. Active problems: BONG w/ metabolic acidosis 2/2 dehydration  RCC w/ mets to brain, pleura, and mediastinum  Protein calorie malnutrition  Constipation     PALLIATIVE DIAGNOSES:   1. Altered mental status, resolved  2. Poor PO intake, improved  3. Pain  4. Weakness  5. Debility  6. Counseling regarding goals of care and advance care planning     PLAN:   Brief Summary of Plan  -visited w/ pt in her . Margaret Nair, friend, is sitting at bedside.   -goals of care are maintained. See below. They are not for comfort care at this time  -I again tried to reach out to d/w Dr. Amelia Zacarias. Called and left a msg.    -Noted consult to  for hospice liaison to visit w/ pt and family. I called  to provide update and also get update from him. I approached the subject of hospice liaison staff reaching out to him. He has asked for them to hold off until Monday, 03/12/2018, to call him. He said that he is receptive to speak w/ them but is asking for a couple of days given what he has seen as improvement, though very small, in pt's condition overall. I have relayed to our hospice liaison staff  -we will continue to establish therapeutic alliance as well as provide psychosocial support    1.  Goals of Care/Goals of Medical Treatment- confirmed   confirms wish for full, restorative treatment and all measures that support this w/ the following exceptions:  NO to intubation if respiratory distress or failure outside of cardiopulmonary arrest  NO to pressors  NO to HD    But  YES to CPAP or BiPAP    No change in the above (03/09/2018)    2. Advance Care Planning- pt's code status   Patient confirms code status as DO NOT ATTEMPT RESUSCITATION (DNAR) / [address] (AND): If pt has cardiopulmonary arrest, then   NO to chest compressions  NO to cardiac shock  NO to ACLS meds  NO to intubation    Pt does not have AMD on file. Surrogate decision maker is . 3. Information Sharing-   03/09/2018  Pt is doing ok but has belly pain. Pt does not provide details other than it is better. She then says her pain level is okay. When I asked her to rate pain, she says it is 7/10. Pt agrees to IV replacement. I provided info regarding pain mgmt w/ linking IV order to PO order so that pt has pain med regimen available w/ or w/o IV access. See below. Pt said that she drank some Ensure this morning. See above regarding conversation I had w/  over the phone. While he is receptive to speaking w/ hospice liaison staff, he wishes to wait until Monday to be reached by them. 03/08/2018  No significant info sharing w/ pt today. She was resting w/ eyes closed earlier today. When I met up w/ , he noted that he had just gotten here. This was just before 1800. We spoke about pt's day. I relayed what I d/w OT. He was pleased to hear that she worked w/ OT staff some. He was told by bedside RN that she is refusing PO meds and PO intake. He remains hopeful and is looking forward to hearing from Dr. Uriah Taylor tomorrow sometime. I relayed to him that I called and left msg for Dr. Uriah Taylor. I plan to try to reach him again tomorrow. Supportive listening provided throughout our conversation. 03/07/2018  Pt says she is doing better.    No complaints at this time.     I called and spoke w/ . He said that he planned on not coming to the hospital tomorrow to give pt some time and space. He is hoping that she works w/ therapy. He wishes to do what she wants. He mentioned that heme onc team came by again. Dr. Tres Monge sat down and spoke w/ pt. If pt wishes to work toward getting her overall condition better, then it will happen. We can reach out to  at any time. He is going to work [employer]ith. But he can be at hospital in 15 min.  thought Dr. Baltazar Frausto and I would speak today. I relayed that I did not have opportunity to do this. I also apologized for not being able to catch him at bedside earlier today. It is clear that we are to continue w/ current goals of care as noted above. 03/06/2018   wished for me to speak w/ pt. I had to return later in the day. I sat and spoke w/ pt and . Pt is clear that she wishes to transition to comfort care. However, they wish to wait to speak w/ Dr. Baltazar Frausto in the AM before making any possible change in goals of care. 03/05/2018   noted that someone told him that I would be visiting. He said that his understanding was we would provide advice in guiding discussions of plan of care. I provided more details about our team and its role in their care.     and Benard Opitz verbalized understanding of all the points made including the following:  -we are an interdisciplinary team serving as a bridge of communication and advocate on behalf of the patient and family when needed  -we are a support care service that, when needed, assists w/ sx mgmt  -though hospice is \"under\" palliative medicine, our service is not hospice nor does a consult visit by our service mean that hospice is being or should be considered  -we are not here to make medical decisions, and our being consulted does not equate to a change in a patient's overall condition  -our involvement is independent of clinical trajectory and/or medical decisions made    We spoke about pt's condition. They demonstrated a good understanding of pt's condition.  became tearful as we spoke. He said that now it seems Jennifer Parker is taking its course\" as pt is refusing to drink or eat. He is having a hard time with this b/c initially he did not understand why someone (pt) would not try to do something if it can mean she might get better. I recognized the difficulty they are having including naming emotions evident. We spoke about range of medical treatment.  is clear on his wishes as noted above. We discussed pt's having signed \"DDNR\" form. Emphasis was placed on honoring pt's wishes.  fully support this. They shared that pt is as stubborn as anyone can be. She likes to read a lot. She has a list of books and authors that she has read.  thinks she may have read 150 books in a time period of over a yr. They have 1 child, Randolph. Randolph called in the middle of family meeting.  spoke w/ her briefly. Fredy Jiang and pt have been friends for 48 yrs. They met when pt was working at [employer]. 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 Pt likes to stay at home.  laughed as he said that she never got along with a woman as her boss. Questions and concerns addressed. Supportive listening provided throughout family meeting.  gave permission for me to visit later as well as tomorrow. They are appreciative of the care being provided here. 4. Psychosocial Support- We will continue to support patient and family during this difficult hospitalization    5. Spiritual- at this time, there are no apparent spiritual concerns.      6. Symptom Management-   -d/c Fentanyl 25-40 mcg IV q4h prn pain, SOB, or tachypnea w/ HOLD parameters   -start Fentanyl 25 mcg IV q4h prn pain, SOB, or tachypnea  Linked to/OR  Roxanol 8 mg PO q4h prn pain, SOB, tachypnea w/ HOLD parameters    Fentanyl 25 mcg is 7.5 OME  Fentanyl 40 mcg is 12 OME    I have discussed with Jarad Montejo, bedside RN. I have discussed with Karen Gentile, hospice RN. Time and input appreciated. I have discussed with our palliative care IDT. Thank you for this consult that has provided us with the opportunity to be involved in this patient's care. We will continue to follow along with you. Should you have any questions or concerns prior to our next visit at the bedside, please do not hesitate to contact us at [phone] Migdalia Grace MD  Palliative Care Team     GOALS OF CARE / TREATMENT PREFERENCES:     GOALS OF CARE:  Patient/Health Care Proxy Stated Goals: Other (comment)   wishes for full, restorative treatment and all measures that support this w/ exception of the following:  NO to intubation if respiratory distress or failure outside of cardiopulmonary arrest  NO to pressors  NO to HD    But  YES to CPAP or BiPAP    TREATMENT PREFERENCES:   Code Status: DNR   Pt's code status is DO NOT ATTEMPT RESUSCITATION (DNAR) / ALLOW NATURAL DEATH (AND): If pt has cardiopulmonary arrest, then   NO to chest compressions  NO to cardiac shock  NO to ACLS meds  NO to intubation    Pt has \"DDNR\" form on file that she signed. Advance Care Planning:  Advance Care Planning 3/3/2018   Patient's Healthcare Decision Maker is: Legal Next of Kin   Primary Decision Maker Name Armani Bocanegra   Primary Decision Maker Phone Number [phone]   Primary Decision Maker Relationship to Patient -   Confirm Advance Directive None   Patient Would Like to Complete Advance Directive No   Does the patient have other document types Do Not Resuscitate       Medical Interventions: Other (comment)   Limited medical interventions. See plan.      Other Instructions: none at this time        Other:    As far as possible, the palliative care team has discussed with patient / health care proxy about goals of care / treatment preferences for patient. HISTORY:     History obtained from: pt, family, chart    CHIEF COMPLAINT: R sided HA    HPI/SUBJECTIVE:    The patient is:   [x] Verbal and participatory  [] Non-participatory due to:    No o/n events or issues  abd pain 7/10  Per bedside RN, IV access lost.  Pt was refusing to have IV replaced. Pt is a 76 y/o WF w/ PMH noted above who presented w/ weakness and not eating. She has been undergoing tx for metastatic RCC but her overall condition has declined over several wks.  notes that she has not done well since receiving brain radiation. He says that she is very stubborn but it is not like her to not eat at all. Pt has had nausea for which Zofran prn had not really helped. Pt was on Megace but has had no appetite. She apparently has drank 3 Ensure cans per day. No BM x >1 wk. Pt did not urinate on the day of ED presentation. Pt has not been able to walk on day of ED presentation. Pt was admitted for further w/u and mgmt.      Clinical Pain Assessment (nonverbal scale for severity on nonverbal patients):   Clinical Pain Assessment  Severity: 7  Location: belly  Character: pt does not provide more details  Duration: since this AM  Factors: Tylenol helped, she said but pain is still 7/10  Frequency: constant     Activity (Movement): Lying quietly, normal position    Duration: for how long has pt been experiencing pain (e.g., 2 days, 1 month, years)  Frequency: how often pain is an issue (e.g., several times per day, once every few days, constant)     FUNCTIONAL ASSESSMENT:     Palliative Performance Scale (PPS):  PPS: 40     PSYCHOSOCIAL/SPIRITUAL SCREENING:     Palliative IDT has assessed this patient for cultural preferences / practices and a referral made as appropriate to needs (Cultural Services, Patient Advocacy, Ethics, etc.)    Advance Care Planning:  Advance Care Planning 3/3/2018   Patient's Healthcare Decision Maker is: Legal Next of Tracey 67   Primary Decision Maker Name Austin Rosa   Primary Decision Maker Phone Number [phone]   Primary Decision Maker Relationship to Patient -   Confirm Advance Directive None   Patient Would Like to Complete Advance Directive No   Does the patient have other document types Do Not Resuscitate       Any spiritual / [facility]aritan concerns:  [] Yes /  [x] No    Caregiver Burnout:  [] Yes /  [x] No /  [] No Caregiver Present      Anticipatory grief assessment:   [x] Normal  / [] Maladaptive       ESAS Anxiety:      ESAS Depression:          REVIEW OF SYSTEMS:     Positive and pertinent negative findings in ROS are noted above in HPI. The following systems were [x] reviewed / [] unable to be reviewed as noted in HPI  Other findings are noted below. Systems: constitutional, ears/nose/mouth/throat, respiratory, gastrointestinal, genitourinary, musculoskeletal, integumentary, neurologic, psychiatric, endocrine. Positive findings noted below. Modified ESAS Completed by: provider   Fatigue: 2 Drowsiness: 0     Pain: 7     Nausea: 0   Anorexia: 5 Dyspnea: 0           Stool Occurrence(s): 0        PHYSICAL EXAM:     From RN flowsheet:  Wt Readings from Last 3 Encounters:   03/02/18 85 lb (38.6 kg)   02/09/18 95 lb 3.2 oz (43.2 kg)   11/29/17 109 lb 9.6 oz (49.7 kg)     Blood pressure 91/64, pulse (!) 113, temperature 98.6 °F (37 °C), resp. rate 20, height 5' (1.524 m), weight 85 lb (38.6 kg), SpO2 95 %, not currently breastfeeding.     Pain Scale 1: Numeric (0 - 10)  Pain Intensity 1: 8  Pain Onset 1: within 30 minutes  Pain Location 1: Abdomen  Pain Orientation 1: Left  Pain Description 1: Aching  Pain Intervention(s) 1: Medication (see MAR)  Last bowel movement, if known:     Gen: sick appearing, weak, sitting in bed, in NAD  HEENT: NC/AT, dry lips  Respiratory: breathing not labored, symmetric  Neurologic: awake, alert  Psychiatric: blunted affect  No obvious signs of agitation or anxiety  Speech rate is mildly slowed, volume is reduced  Per Appelbaum criteria, pt demonstrates capacity regarding IV replacement and pain mgmt. More specifically,   -pt demonstrates understanding of the decision/choice communicated and is able to indicate a decision/choice  -pt demonstrates understanding of relevant information. Pt is able to paraphrase shared information.    -pt appreciates situation and its consequences.   -pt demonstrates the ability to reason about options including comparing options and consequences and provide reasons for selection of option       HISTORY:     Principal Problem:    Acute kidney injury ([address] (3/2/2018)    Active Problems:    Controlled diabetes mellitus ([address] (7/31/2017)      Clear cell renal cell carcinoma ([address] (8/28/2017)      Hypotension (3/3/2018)      Dehydration (3/3/2018)      Severe protein-calorie malnutrition ([address] (3/3/2018)      Decubitus ulcer of sacral region, stage 2 (3/6/2018)      Acute metabolic encephalopathy (9/8/0948)      Decubitus ulcer of left elbow, stage 2 (3/8/2018)      Past Medical History:   Diagnosis Date    Acute metabolic encephalopathy 7/6/8013    Anxiety 7/31/2017    2/3/17:Under a tremendous amount of stress, will continue Alprazolam prn, if using regularly consider changing to an SSRI or counseling    Brain metastases ([address]     Cancer ([address] 08/2017    renal cell carcinoma    Cervical myelopathy ([address] 7/31/2017    2/3/17:Cervical fusion     Clear cell adenocarcinoma of kidney ([address]     Controlled diabetes mellitus ([address] 7/31/2017    Decubitus ulcer of sacral region, stage 2 3/6/2018    Fatigue 7/31/2017    Hypercholesterolemia 7/31/2017    Hyperglycemia 7/31/2017    Hypertension 7/31/2017    2/3/17:Poorly controlled, lifestyle changes, repeat /90 will follow    On statin therapy 7/31/2017    Osteoporosis 7/31/2017    Post-menopausal 7/31/2017    Spinal stenosis of lumbar region with radiculopathy 7/31/2017    2/3/17:Symptoms c/w this diagnosis discussed with pt., if progresses then need MRI L spine and follow up    Vaginitis due to Candida 7/31/2017    2/3/17:Associated with antibiotic use, requests prescription med      Past Surgical History:   Procedure Laterality Date    HX ORTHOPAEDIC      2009 spinal cord surgery     HX ORTHOPAEDIC Left     knee procedure    HX OTHER SURGICAL  08/16/2017    incisional biopsy right neck mass      Family History   Problem Relation Age of Onset    Heart Disease Mother       History reviewed, no pertinent family history.   Social History   Substance Use Topics    Smoking status: Never Smoker    Smokeless tobacco: Never Used    Alcohol use No     No Known Allergies   Current Facility-Administered Medications   Medication Dose Route Frequency    fentaNYL citrate (PF) injection 25 mcg  25 mcg IntraVENous Q4H PRN    Or    morphine (ROXANOL) 100 mg/5 mL (20 mg/mL) concentrated solution 8 mg  8 mg Oral Q4H PRN    naloxone (NARCAN) syringe 0.2 mg  0.2 mg IntraVENous EVERY 2 MINUTES AS NEEDED    glucose chewable tablet 16 g  4 Tab Oral PRN    dextrose (D50W) injection syrg 12.5-25 g  12.5-25 g IntraVENous PRN    glucagon (GLUCAGEN) injection 1 mg  1 mg IntraMUSCular PRN    miconazole (SECURA) 2 % extra thick cream   Topical PRN    balsam peru-castor oil (VENELEX)  mg/gram ointment   Topical BID    insulin lispro (HUMALOG) injection   SubCUTAneous AC&HS    ALPRAZolam (XANAX) tablet 0.25 mg  0.25 mg Oral TID PRN    megestrol (MEGACE) 400 mg/10 mL (10 mL) oral suspension 200 mg  200 mg Oral DAILY    mirtazapine (REMERON) tablet 30 mg  30 mg Oral QHS    sertraline (ZOLOFT) tablet 50 mg  50 mg Oral DAILY    sodium chloride (NS) flush 5-10 mL  5-10 mL IntraVENous Q8H    sodium chloride (NS) flush 5-10 mL  5-10 mL IntraVENous PRN    acetaminophen (TYLENOL) tablet 650 mg  650 mg Oral Q4H PRN    ondansetron (ZOFRAN) injection 4 mg  4 mg IntraVENous Q4H PRN    heparin (porcine) injection 5,000 Units  5,000 Units SubCUTAneous Q12H    nystatin (MYCOSTATIN) 100,000 unit/mL oral suspension 500,000 Units  500,000 Units Oral TID          LAB AND IMAGING FINDINGS:     Lab Results   Component Value Date/Time    WBC 7.3 03/05/2018 03:48 AM    HGB 9.4 (L) 03/05/2018 03:48 AM    PLATELET 218 (L) 34/47/9176 03:48 AM     Lab Results   Component Value Date/Time    Sodium 143 03/08/2018 04:58 AM    Potassium 3.2 (L) 03/08/2018 04:58 AM    Chloride 96 (L) 03/08/2018 04:58 AM    CO2 42 (HH) 03/08/2018 04:58 AM    BUN 27 (H) 03/08/2018 04:58 AM    Creatinine 1.39 (H) 03/08/2018 04:58 AM    Calcium 8.2 (L) 03/08/2018 04:58 AM    Magnesium 2.6 (H) 03/06/2018 02:35 AM    Phosphorus 4.3 03/04/2018 03:37 AM      Lab Results   Component Value Date/Time    AST (SGOT) 15 03/04/2018 03:37 AM    Alk. phosphatase 77 03/04/2018 03:37 AM    Protein, total 5.0 (L) 03/04/2018 03:37 AM    Albumin 1.9 (L) 03/04/2018 03:37 AM    Globulin 3.1 03/04/2018 03:37 AM     No results found for: INR, PTMR, PTP, PT1, PT2, APTT   No results found for: IRON, FE, TIBC, IBCT, PSAT, FERR   No results found for: PH, PCO2, PO2  No components found for: [place]   Lab Results   Component Value Date/Time    CK 49 08/31/2017 06:47 PM           Total time: 35 min  Counseling / coordination time, spent as noted above:  20 min  > 50% counseling / coordination?: yes    Prolonged service was provided for  []30 min   []75 min in face to face time in the presence of the patient, spent as noted above. Time Start:   Time End:   Note: this can only be billed with 36769 (initial) or 63779 (follow up). If multiple start / stop times, list each separately.

## 2018-03-09 NOTE — PROGRESS NOTES
PROGRESS NOTE    NAME:  Dahiana Hightower   :   1950   MRN:   [id number]     Date/Time:  3/9/2018 7:35 AM  Subjective:   History: More alert and responsive this morning, verbal and answers questions appropriately, no pain, she states she is taking some fluids in and eating small amounts, generally weak. Functionally pt. Is alert but dependent for all ADLs.       Medications reviewed:  Current Facility-Administered Medications   Medication Dose Route Frequency    glucose chewable tablet 16 g  4 Tab Oral PRN    dextrose (D50W) injection syrg 12.5-25 g  12.5-25 g IntraVENous PRN    glucagon (GLUCAGEN) injection 1 mg  1 mg IntraMUSCular PRN    miconazole (SECURA) 2 % extra thick cream   Topical PRN    balsam peru-castor oil (VENELEX)  mg/gram ointment   Topical BID    fentaNYL citrate (PF) injection 25-40 mcg  25-40 mcg IntraVENous Q4H PRN    insulin lispro (HUMALOG) injection   SubCUTAneous AC&HS    ALPRAZolam (XANAX) tablet 0.25 mg  0.25 mg Oral TID PRN    megestrol (MEGACE) 400 mg/10 mL (10 mL) oral suspension 200 mg  200 mg Oral DAILY    mirtazapine (REMERON) tablet 30 mg  30 mg Oral QHS    sertraline (ZOLOFT) tablet 50 mg  50 mg Oral DAILY    sodium chloride (NS) flush 5-10 mL  5-10 mL IntraVENous Q8H    sodium chloride (NS) flush 5-10 mL  5-10 mL IntraVENous PRN    acetaminophen (TYLENOL) tablet 650 mg  650 mg Oral Q4H PRN    ondansetron (ZOFRAN) injection 4 mg  4 mg IntraVENous Q4H PRN    heparin (porcine) injection 5,000 Units  5,000 Units SubCUTAneous Q12H    nystatin (MYCOSTATIN) 100,000 unit/mL oral suspension 500,000 Units  500,000 Units Oral TID        Objective:   Vitals:  Visit Vitals    BP 91/64 (BP 1 Location: Left arm, BP Patient Position: At rest)    Pulse (!) 113    Temp 98.6 °F (37 °C)    Resp 20    Ht 5' (1.524 m)    Wt 85 lb (38.6 kg)    SpO2 95%    Breastfeeding No    BMI 16.6 kg/m2    O2 Flow Rate (L/min): 4 l/min O2 Device: Room air Temp (24hrs), Av.2 °F (36.8 °C), Min:97.5 °F (36.4 °C), Max:98.7 °F (37.1 °C)      Last 24hr Input/Output:    Intake/Output Summary (Last 24 hours) at 03/09/18 0827  Last data filed at 03/09/18 0604   Gross per 24 hour   Intake             1000 ml   Output                0 ml   Net             1000 ml        PHYSICAL EXAM:  General:    Awake,  no distress, appears stated age. Head:    Normocephalic, without obvious abnormality, atraumatic. Eyes:    Conjunctivae/corneas clear. PERRLA  Nose:   Nares normal. No drainage or sinus tenderness. Throat:     Lips, mucosa, and tongue normal.  Dry mucous membranes. Neck:   Supple, symmetrical,  no adenopathy, thyroid: non tender     no carotid bruit and no JVD. Back:     Symmetric,  No CVA tenderness. , sacral decub   Lungs:    Clear to auscultation bilaterally. No Wheezing or Rhonchi. No rales. Heart:    Regular rate and rhythm,  no murmur, rub or gallop. Abdomen:    Soft, minimal tenderness left flank. Not distended.   Bowel sounds normal. No masses  Extremities:  edema RUE, arm dressed, decub left olecranon ,  No clubbing  Lymph nodes:  Cervical, supraclavicular normal.  Neurologic: Grossly nonfocal  Skin:                Stage II sacral decubitus ulcer      Lab Data Reviewed:    Recent Results (from the past 24 hour(s))   GLUCOSE, POC    Collection Time: 03/08/18 11:06 AM   Result Value Ref Range    Glucose (POC) 120 (H) 65 - 100 mg/dL    Performed by Casimiro Gill    GLUCOSE, POC    Collection Time: 03/08/18  4:48 PM   Result Value Ref Range    Glucose (POC) 123 (H) 65 - 100 mg/dL    Performed by Dona Suazo    GLUCOSE, POC    Collection Time: 03/08/18  9:02 PM   Result Value Ref Range    Glucose (POC) 173 (H) 65 - 100 mg/dL    Performed by Dorinda Boston (PCT)    GLUCOSE, POC    Collection Time: 03/09/18  7:59 AM   Result Value Ref Range    Glucose (POC) 261 (H) 65 - 100 mg/dL    Performed by Michael Steen          Assessment/Plan:     Principal Problem:    Acute kidney injury ([facility] (3/2/2018)    Active Problems:    Controlled diabetes mellitus ([facility] (7/31/2017)      Clear cell renal cell carcinoma ([facility] (8/28/2017)      Hypotension (3/3/2018)      Dehydration (3/3/2018)      Severe protein-calorie malnutrition ([facility] (3/3/2018)      Decubitus ulcer of sacral region, stage 2 (3/6/2018)      Acute metabolic encephalopathy (7/8/5308)      Decubitus ulcer of left elbow, stage 2 (3/8/2018)       ___________________________________________________  PLAN:    1.  K improved, Crt improved,   2. DC IVF, DC tele, OOB as tolerates  3. 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 Palliative medicine, patient apparently doing well with active treatment until recently with radiation therapy for brain metastases, appears improved today, continue supportive care. Read and appreciate Dr. Giovany Allen notes. 5. After discussion with  and patient she wishes to continue treating anything that is reversible or treatable. Creatinine is improving. Continue sliding scale insulin for elevated blood glucose. These may be discontinued if patient decides on comfort care only and does not wish to further have any treatments or medications. Otherwise primary goal is comfort care. If transition is made to hospice to go home the  is very concerned about having the resources to attend to his wife on a continuous basis.   Will consult Hospice.          ___________________________________________________    Attending Physician: Isacc Gibson MD

## 2018-03-09 NOTE — HOSPICE
[facility]  [phone]     Patient Name: Roldan Calvillo  YOB: 1950  Age: 77 y.o. [facility] RN Note:  Hospice consult received, reviewing chart. Will follow up with Unit Nurse and Care Manager to discuss plan of care, patient status and discharge disposition within the hour. Thank you for the opportunity to be of service to this patient.     Corazon Duong RN [facility]

## 2018-03-09 NOTE — CONSULTS
Palliative Medicine Consult  Farhan: [phone] ([phone]    Patient Name: Tacho Ried  YOB: 1950    Date of Initial Consult: 03/05/2018  Reason for Consult: care decisions  Requesting Provider: Dr. Florina Suazo   Primary Care Physician: Kim Andrade MD     SUMMARY:   Tacho Reid is a 77 y.o. woman w/ PMH most significant for metastatic RCC on pazopanib and s/p completion of SBRT on 01/09/2018 who was admitted on 3/2/2018 from home for BONG. Current medical issues leading to Palliative Medicine involvement include: care decisions in setting of overall worsening of her condition, PMH noted above, dx's below. Active problems: BONG w/ metabolic acidosis 2/2 dehydration  RCC w/ mets to brain, pleura, and mediastinum  Protein calorie malnutrition  Constipation     PALLIATIVE DIAGNOSES:   1. Altered mental status, resolved  2. Poor PO intake, improved  3. Pain  4. Weakness  5. Debility  6. Counseling regarding goals of care and advance care planning     PLAN:   Brief Summary of Plan  -visited w/ pt in her PCU . No family at bedside.   -visited again after pt was transferred to oncology fl. I spoke w/  outside of rm  -goals of care are maintained. See below.   -tried to reach out to d/w Dr. Lloyd Marquez. Called and left a msg. I will try to reach out to him again tomorrow.  -we will continue to establish therapeutic alliance as well as provide psychosocial support    1. Goals of Care/Goals of Medical Treatment- confirmed   confirms wish for full, restorative treatment and all measures that support this w/ the following exceptions:  NO to intubation if respiratory distress or failure outside of cardiopulmonary arrest  NO to pressors  NO to HD    But  YES to CPAP or BiPAP    No change in the above (03/08/2018)    2. Advance Care Planning- pt's code status   Patient confirms code status as DO NOT ATTEMPT RESUSCITATION (DNAR) / [address] (AND):     If pt has cardiopulmonary arrest, then   NO to chest compressions  NO to cardiac shock  NO to ACLS meds  NO to intubation    Pt does not have AMD on file. Surrogate decision maker is . 3. Information Sharing-   03/08/2018  No significant info sharing w/ pt today. She was resting w/ eyes closed earlier today. When I met up w/ , he noted that he had just gotten here. This was just before 1800. We spoke about pt's day. I relayed what I d/w OT. He was pleased to hear that she worked w/ OT staff some. He was told by bedside RN that she is refusing PO meds and PO intake. He remains hopeful and is looking forward to hearing from Dr. Erica Schroeder tomorrow sometime. I relayed to him that I called and left [facility] for Dr. Erica Schroeder. I plan to try to reach him again tomorrow. Supportive listening provided throughout our conversation. 03/07/2018  Pt says she is doing better. No complaints at this time. I called and spoke w/ . He said that he planned on not coming to the hospital tomorrow to give pt some time and space. He is hoping that she works w/ therapy. He wishes to do what she wants. He mentioned that heme onc team came by again. Dr. Giovanni Aburto sat down and spoke w/ pt. If pt wishes to work toward getting her overall condition better, then it will happen. We can reach out to  at any time. He is going to work [employer]ith. But he can be at hospital in 15 min.  thought Dr. Erica Schroeder and I would speak today. I relayed that I did not have opportunity to do this. I also apologized for not being able to catch him at bedside earlier today. It is clear that we are to continue w/ current goals of care as noted above. 03/06/2018   wished for me to speak w/ pt. I had to return later in the day. I sat and spoke w/ pt and . Pt is clear that she wishes to transition to comfort care.   However, they wish to wait to speak w/ Dr. Erica Schroeder in the AM before making any possible change in goals of care. 03/05/2018   noted that someone told him that I would be visiting. He said that his understanding was we would provide advice in guiding discussions of plan of care. I provided more details about our team and its role in their care.  and Serg Duval verbalized understanding of all the points made including the following:  -we are an interdisciplinary team serving as a bridge of communication and advocate on behalf of the patient and family when needed  -we are a support care service that, when needed, assists w/ sx mgmt  -though hospice is \"under\" palliative medicine, our service is not hospice nor does a consult visit by our service mean that hospice is being or should be considered  -we are not here to make medical decisions, and our being consulted does not equate to a change in a patient's overall condition  -our involvement is independent of clinical trajectory and/or medical decisions made    We spoke about pt's condition. They demonstrated a good understanding of pt's condition.  became tearful as we spoke. He said that now it seems Cherellerachel López is taking its course\" as pt is refusing to drink or eat. He is having a hard time with this b/c initially he did not understand why someone (pt) would not try to do something if it can mean she might get better. I recognized the difficulty they are having including naming emotions evident. We spoke about range of medical treatment.  is clear on his wishes as noted above. We discussed pt's having signed \"DDNR\" form. Emphasis was placed on honoring pt's wishes.  fully support this. They shared that pt is as stubborn as anyone can be. She likes to read a lot. She has a list of books and authors that she has read.  thinks she may have read 150 books in a time period of over a yr. They have 1 child, Ijeoma Singh.   Ijeoma Mariselyue called in the middle of family meeting.  spoke w/ her briefly. Margo Martinez and pt have been friends for 48 yrs. They met when pt was working at [employer]. 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 Pt likes to stay at home.  laughed as he said that she never got along with a woman as her boss. Questions and concerns addressed. Supportive listening provided throughout family meeting.  gave permission for me to visit later as well as tomorrow. They are appreciative of the care being provided here. 4. Psychosocial Support- We will continue to support patient and family during this difficult hospitalization    5. Spiritual- at this time, there are no apparent spiritual concerns. 6. Symptom Management-   -continue Fentanyl 25-40 mcg IV q4h prn pain, SOB, or tachypnea w/ HOLD parameters     I have discussed with JEROME Leigh. Time and input appreciated. I have discussed with our palliative care IDT. Thank you for this consult that has provided us with the opportunity to be involved in this patient's care. We will continue to follow along with you. Should you have any questions or concerns prior to our next visit at the bedside, please do not hesitate to contact us at [phone] Juan C Norton MD  Palliative Care Team     GOALS OF CARE / TREATMENT PREFERENCES:     GOALS OF CARE:  Patient/Health Care Proxy Stated Goals: Other (comment)   wishes for full, restorative treatment and all measures that support this w/ exception of the following:  NO to intubation if respiratory distress or failure outside of cardiopulmonary arrest  NO to pressors  NO to HD    But  YES to CPAP or BiPAP    TREATMENT PREFERENCES:   Code Status: DNR   Pt's code status is DO NOT ATTEMPT RESUSCITATION (DNAR) / ALLOW NATURAL DEATH (AND):     If pt has cardiopulmonary arrest, then   NO to chest compressions  NO to cardiac shock  NO to ACLS meds  NO to intubation    Pt has \"DDNR\" form on file that she signed. Advance Care Planning:  Advance Care Planning 3/3/2018   Patient's Healthcare Decision Maker is: Legal Next of Kin   Primary Decision Maker Name Bert Dowell   Primary Decision Maker Phone Number [phone]   Primary Decision Maker Relationship to Patient -   Confirm Advance Directive None   Patient Would Like to Complete Advance Directive No   Does the patient have other document types Do Not Resuscitate       Medical Interventions: Other (comment)   Limited medical interventions. See plan. Other Instructions: none at this time        Other:    As far as possible, the palliative care team has discussed with patient / health care proxy about goals of care / treatment preferences for patient. HISTORY:     History obtained from: pt, family, chart    CHIEF COMPLAINT: R sided HA    HPI/SUBJECTIVE:    The patient is:   [x] Verbal and participatory  [] Non-participatory due to:    No o/n events or issues  No pain  No complaints    Pt is a 78 y/o WF w/ PMH noted above who presented w/ weakness and not eating. She has been undergoing tx for metastatic RCC but her overall condition has declined over several wks.  notes that she has not done well since receiving brain radiation. He says that she is very stubborn but it is not like her to not eat at all. Pt has had nausea for which Zofran prn had not really helped. Pt was on Megace but has had no appetite. She apparently has drank 3 Ensure cans per day. No BM x >1 wk. Pt did not urinate on the day of ED presentation. Pt has not been able to walk on day of ED presentation. Pt was admitted for further w/u and mgmt.      Clinical Pain Assessment (nonverbal scale for severity on nonverbal patients):   Clinical Pain Assessment  Severity: 0     Activity (Movement): Lying quietly, normal position    Duration: for how long has pt been experiencing pain (e.g., 2 days, 1 month, years)  Frequency: how often pain is an issue (e.g., several times per day, once every few days, constant)     FUNCTIONAL ASSESSMENT:     Palliative Performance Scale (PPS):  PPS: 30     PSYCHOSOCIAL/SPIRITUAL SCREENING:     Palliative IDT has assessed this patient for cultural preferences / practices and a referral made as appropriate to needs (Cultural Services, Patient Advocacy, Ethics, etc.)    Advance Care Planning:  Advance Care Planning 3/3/2018   Patient's Healthcare Decision Maker is: Legal Next of Tracey Jacobson   Primary Decision Maker Name Ritika Molina   Primary Decision Maker Phone Number [phone]   Primary Decision Maker Relationship to Patient -   Confirm Advance Directive None   Patient Would Like to Complete Advance Directive No   Does the patient have other document types Do Not Resuscitate       Any spiritual / Cheondoism concerns:  [] Yes /  [x] No    Caregiver Burnout:  [] Yes /  [x] No /  [] No Caregiver Present      Anticipatory grief assessment:   [x] Normal  / [] Maladaptive       ESAS Anxiety:      ESAS Depression:          REVIEW OF SYSTEMS:     Positive and pertinent negative findings in ROS are noted above in HPI. The following systems were [] reviewed / [x] unable to be reviewed as noted in HPI  Other findings are noted below. Systems: constitutional, ears/nose/mouth/throat, respiratory, gastrointestinal, genitourinary, musculoskeletal, integumentary, neurologic, psychiatric, endocrine. Positive findings noted below. Modified ESAS Completed by: provider           Pain: 0           Dyspnea: 0           Stool Occurrence(s): 1        PHYSICAL EXAM:     From RN flowsheet:  Wt Readings from Last 3 Encounters:   03/02/18 85 lb (38.6 kg)   02/09/18 95 lb 3.2 oz (43.2 kg)   11/29/17 109 lb 9.6 oz (49.7 kg)     Blood pressure 112/55, pulse (!) 102, temperature 98.7 °F (37.1 °C), resp. rate 20, height 5' (1.524 m), weight 85 lb (38.6 kg), SpO2 95 %, not currently breastfeeding.     Pain Scale 1: Numeric (0 - 10)  Pain Intensity 1: 0  Pain Onset 1: within 30 minutes  Pain Location 1: Abdomen  Pain Orientation 1: Left  Pain Description 1: Aching  Pain Intervention(s) 1: Medication (see MAR)  Last bowel movement, if known:     Gen: sick appearing, lying in bed, in NAD  HEENT: NC/AT, dry lips, NC securely in place  Respiratory: breathing not labored, symmetric  Neurologic: asleep  Psychiatric: limited exam d/t condition  No obvious signs of agitation     HISTORY:     Principal Problem:    Acute kidney injury ([address] (3/2/2018)    Active Problems:    Controlled diabetes mellitus ([address] (7/31/2017)      Clear cell renal cell carcinoma ([address] (8/28/2017)      Hypotension (3/3/2018)      Dehydration (3/3/2018)      Severe protein-calorie malnutrition ([address] (3/3/2018)      Decubitus ulcer of sacral region, stage 2 (3/6/2018)      Acute metabolic encephalopathy (1/8/6151)      Decubitus ulcer of left elbow, stage 2 (3/8/2018)      Past Medical History:   Diagnosis Date    Acute metabolic encephalopathy 3/1/0406    Anxiety 7/31/2017    2/3/17:Under a tremendous amount of stress, will continue Alprazolam prn, if using regularly consider changing to an SSRI or counseling    Brain metastases ([address]     Cancer ([address] 08/2017    renal cell carcinoma    Cervical myelopathy ([address] 7/31/2017    2/3/17:Cervical fusion     Clear cell adenocarcinoma of kidney ([address]     Controlled diabetes mellitus ([address] 7/31/2017    Decubitus ulcer of sacral region, stage 2 3/6/2018    Fatigue 7/31/2017    Hypercholesterolemia 7/31/2017    Hyperglycemia 7/31/2017    Hypertension 7/31/2017    2/3/17:Poorly controlled, lifestyle changes, repeat /90 will follow    On statin therapy 7/31/2017    Osteoporosis 7/31/2017    Post-menopausal 7/31/2017    Spinal stenosis of lumbar region with radiculopathy 7/31/2017    2/3/17:Symptoms c/w this diagnosis discussed with pt., if progresses then need MRI L spine and follow up    Vaginitis due to Candida 7/31/2017    2/3/17:Associated with antibiotic use, requests prescription med      Past Surgical History:   Procedure Laterality Date    HX ORTHOPAEDIC      2009 spinal cord surgery     HX ORTHOPAEDIC Left     knee procedure    HX OTHER SURGICAL  08/16/2017    incisional biopsy right neck mass      Family History   Problem Relation Age of Onset    Heart Disease Mother       History reviewed, no pertinent family history.   Social History   Substance Use Topics    Smoking status: Never Smoker    Smokeless tobacco: Never Used    Alcohol use No     No Known Allergies   Current Facility-Administered Medications   Medication Dose Route Frequency    glucose chewable tablet 16 g  4 Tab Oral PRN    dextrose (D50W) injection syrg 12.5-25 g  12.5-25 g IntraVENous PRN    glucagon (GLUCAGEN) injection 1 mg  1 mg IntraMUSCular PRN    miconazole (SECURA) 2 % extra thick cream   Topical PRN    balsam peru-castor oil (VENELEX)  mg/gram ointment   Topical BID    fentaNYL citrate (PF) injection 25-40 mcg  25-40 mcg IntraVENous Q4H PRN    insulin lispro (HUMALOG) injection   SubCUTAneous AC&HS    ALPRAZolam (XANAX) tablet 0.25 mg  0.25 mg Oral TID PRN    megestrol (MEGACE) 400 mg/10 mL (10 mL) oral suspension 200 mg  200 mg Oral DAILY    mirtazapine (REMERON) tablet 30 mg  30 mg Oral QHS    sertraline (ZOLOFT) tablet 50 mg  50 mg Oral DAILY    sodium chloride (NS) flush 5-10 mL  5-10 mL IntraVENous Q8H    sodium chloride (NS) flush 5-10 mL  5-10 mL IntraVENous PRN    acetaminophen (TYLENOL) tablet 650 mg  650 mg Oral Q4H PRN    ondansetron (ZOFRAN) injection 4 mg  4 mg IntraVENous Q4H PRN    heparin (porcine) injection 5,000 Units  5,000 Units SubCUTAneous Q12H    nystatin (MYCOSTATIN) 100,000 unit/mL oral suspension 500,000 Units  500,000 Units Oral TID          LAB AND IMAGING FINDINGS:     Lab Results   Component Value Date/Time    WBC 7.3 03/05/2018 03:48 AM    HGB 9.4 (L) 03/05/2018 03:48 AM PLATELET 863 (L) 93/44/9397 03:48 AM     Lab Results   Component Value Date/Time    Sodium 143 03/08/2018 04:58 AM    Potassium 3.2 (L) 03/08/2018 04:58 AM    Chloride 96 (L) 03/08/2018 04:58 AM    CO2 42 (HH) 03/08/2018 04:58 AM    BUN 27 (H) 03/08/2018 04:58 AM    Creatinine 1.39 (H) 03/08/2018 04:58 AM    Calcium 8.2 (L) 03/08/2018 04:58 AM    Magnesium 2.6 (H) 03/06/2018 02:35 AM    Phosphorus 4.3 03/04/2018 03:37 AM      Lab Results   Component Value Date/Time    AST (SGOT) 15 03/04/2018 03:37 AM    Alk. phosphatase 77 03/04/2018 03:37 AM    Protein, total 5.0 (L) 03/04/2018 03:37 AM    Albumin 1.9 (L) 03/04/2018 03:37 AM    Globulin 3.1 03/04/2018 03:37 AM     No results found for: INR, PTMR, PTP, PT1, PT2, APTT   No results found for: IRON, FE, TIBC, IBCT, PSAT, FERR   No results found for: PH, PCO2, PO2  No components found for: [place]   Lab Results   Component Value Date/Time    CK 49 08/31/2017 06:47 PM           Total time: 25 min  Counseling / coordination time, spent as noted above:  15 min  > 50% counseling / coordination?: yes    Prolonged service was provided for  []30 min   []75 min in face to face time in the presence of the patient, spent as noted above. Time Start:   Time End:   Note: this can only be billed with 31914 (initial) or 04063 (follow up). If multiple start / stop times, list each separately.

## 2018-03-09 NOTE — HOSPICE
Consult noted. Spoke with Dr. Rosemary Rushing who had discussed plan of care with family. Family is open to informational session with hospice but does not want to have it until Monday. Hospice intake will follow up Monday.

## 2018-03-10 NOTE — PROGRESS NOTES
Oncology Nursing Communication Tool  7:25 PM  3/9/2018     Bedside shift change report given to MICHELLE Olivo (incoming nurse) by Charity Teran (outgoing nurse) on [facility]. Report included the following information SBAR, Kardex, Intake/Output, MAR and Recent Results. Shift Summary:       Issues for physician to address: Oncology Shift Note   Admission Date 3/2/2018   Admission Diagnosis Acute kidney injury ([facility] [address]   Code Status DNR   Consults IP CONSULT TO PALLIATIVE CARE - PROVIDER  IP CONSULT TO ONCOLOGY      Cardiac Monitoring [] Yes [] No      Purposeful Hourly Rounding [] Yes    Deedee Score Total Score: 3   Deedee score 3 or > [] Bed Alarm [] Avasys [] 1:1 sitter [] Patient refused (Place signed refusal form in chart)      Pain Managed [] Yes [] No    Key Pain Meds             oxyCODONE IR (ROXICODONE) 5 mg immediate release tablet Take 1 Tab by mouth every six (6) hours as needed for Pain. Max Daily Amount: 20 mg. Influenza Vaccine Received Flu Vaccine for Current Season (usually Sept-March): No    Patient/Guardian Refused (Notify MD): Yes      Oxygen needs? [] Room air Oxygen @  []1L    []2L    []3L   []4L    []5L   []6L     Use home O2? [] Yes [] No  Perform O2 challenge test using  smartphrase (.oxygenchallenge)      Last bowel movement Last Bowel Movement Date: 03/07/18  bowel movement      Urinary Catheter             LDAs               Peripheral IV 03/09/18 Right Antecubital (Active)   Site Assessment Clean, dry, & intact 3/9/2018  3:00 PM   Phlebitis Assessment 0 3/9/2018  3:00 PM   Infiltration Assessment 0 3/9/2018  3:00 PM   Dressing Status Clean, dry, & intact 3/9/2018  3:00 PM   Dressing Type Tape;Transparent 3/9/2018  3:00 PM   Hub Color/Line Status Blue;Patent 3/9/2018  3:00 PM                         Readmission Risk Assessment Tool Score High Risk            39       Total Score        3 Has Seen PCP in Last 6 Months (Yes=3, No=0)    2 . Living with Significant Other. Assisted Living. LTAC. SNF. or   Rehab    3 Patient Length of Stay (>5 days = 3)    4 IP Visits Last 12 Months (1-3=4, 4=9, >4=11)    5 Pt.  Coverage (Medicare=5 , Medicaid, or Self-Pay=4)    22 Charlson Comorbidity Score (Age + Comorbid Conditions)       Expected Length of Stay 4d 7h   Actual Length of Stay [facility]

## 2018-03-10 NOTE — PROGRESS NOTES
Oncology Nursing Communication Tool  7:11 AM  3/10/2018     Bedside shift change report given to Wes Prater, RN (incoming nurse) by Diane Villarreal, RN (outgoing nurse) on [facility]. Report included the following information SBAR, Kardex, MAR and Accordion. Shift Summary: Pt squirming in the bed, tele-sitter now in room, xanax at bedtime to help with sleep, roxanol this morning for abd pain      Issues for physician to address: Oncology Shift Note   Admission Date 3/2/2018   Admission Diagnosis Acute kidney injury ([facility] [address]   Code Status DNR   Consults IP CONSULT TO PALLIATIVE CARE - PROVIDER  IP CONSULT TO ONCOLOGY      Cardiac Monitoring [] Yes [x] No      Purposeful Hourly Rounding [x] Yes    Deedee Score Total Score: 3   Deedee score 3 or > [x] Bed Alarm [x] Avasys [] 1:1 sitter [] Patient refused (Place signed refusal form in chart)      Pain Managed [x] Yes [] No    Key Pain Meds             oxyCODONE IR (ROXICODONE) 5 mg immediate release tablet Take 1 Tab by mouth every six (6) hours as needed for Pain. Max Daily Amount: 20 mg. Influenza Vaccine Received Flu Vaccine for Current Season (usually Sept-March): No    Patient/Guardian Refused (Notify MD): Yes      Oxygen needs?  [] Room air Oxygen @  []1L    [x]2L    []3L   []4L    []5L   []6L     Use home O2? [] Yes [] No  Perform O2 challenge test using  smartphrase (.oxygenchallenge)      Last bowel movement Last Bowel Movement Date: 03/07/18  bowel movement      Urinary Catheter             LDAs               Peripheral IV 03/09/18 Right Antecubital (Active)   Site Assessment Clean, dry, & intact 3/10/2018  3:16 AM   Phlebitis Assessment 0 3/10/2018  3:16 AM   Infiltration Assessment 0 3/10/2018  3:16 AM   Dressing Status Clean, dry, & intact 3/10/2018  3:16 AM   Dressing Type Transparent;Tape 3/10/2018  3:16 AM   Hub Color/Line Status Blue;Capped 3/10/2018  3:16 AM                         Readmission Risk Assessment Tool Score High Risk            39       Total Score        3 Has Seen PCP in Last 6 Months (Yes=3, No=0)    2 . Living with Significant Other. Assisted Living. LTAC. SNF. or   Rehab    3 Patient Length of Stay (>5 days = 3)    4 IP Visits Last 12 Months (1-3=4, 4=9, >4=11)    5 Pt.  Coverage (Medicare=5 , Medicaid, or Self-Pay=4)    22 Charlson Comorbidity Score (Age + Comorbid Conditions)       Expected Length of Stay 4d 7h   Actual Length of Stay [facility] Jerrell, RN

## 2018-03-10 NOTE — PROGRESS NOTES
[address] [place] [address]  [phone]      Medical Progress Note      NAME: Rosaland Favre   :  1950  MRM:  [id number]    Date/Time: 3/10/2018  7:33 AM         Subjective:     Alert. Trying to slide out of bed. Denies pain. Answers questions. No pain.     Past Medical History:   Diagnosis Date    Acute metabolic encephalopathy     Anxiety 2017    2/3/17:Under a tremendous amount of stress, will continue Alprazolam prn, if using regularly consider changing to an SSRI or counseling    Brain metastases ([address]     Cancer ([facility] [address] 2017    renal cell carcinoma    Cervical myelopathy ([address] 2017    2/3/17:Cervical fusion     Clear cell adenocarcinoma of kidney ([address]     Controlled diabetes mellitus ([facility] [address] 2017    Decubitus ulcer of sacral region, stage 2 3/6/2018    Fatigue 2017    Hypercholesterolemia 2017    Hyperglycemia 2017    Hypertension 2017    2/3/17:Poorly controlled, lifestyle changes, repeat /90 will follow    On statin therapy 2017    Osteoporosis 2017    Post-menopausal 2017    Spinal stenosis of lumbar region with radiculopathy 2017    2/3/17:Symptoms c/w this diagnosis discussed with pt., if progresses then need MRI L spine and follow up    Vaginitis due to Candida 2017    2/3/17:Associated with antibiotic use, requests prescription med       ROS:  General: negative for fever, chills, sweats, weakness  Respiratory:  negative for cough, sputum production, SOB, wheezing, GARCIA, pleuritic pain  Cardiology:  negative for chest pain, palpitations, orthopnea, PND, edema, syncope   Gastrointestinal: negative for abdominal pain, N/V, dysphagia, change in bowel habits, bleeding  Muskuloskeletal : negative for arthralgia, myalgia  Neurological: negative for headache, dizziness, confusion, focal weakness, paresthesia, memory loss, gait disturbance  Psychological: negative for anxiety, depression, agitation  Review of systems otherwise negative         Objective:       Vitals:        Last 24hrs VS reviewed since prior progress note.  Most recent are:    Visit Vitals    /52 (BP 1 Location: Left arm, BP Patient Position: At rest)    Pulse 91    Temp 98.5 °F (36.9 °C)    Resp 20    Ht 5' (1.524 m)    Wt 85 lb (38.6 kg)    SpO2 96%    Breastfeeding No    BMI 16.6 kg/m2     SpO2 Readings from Last 6 Encounters:   03/09/18 96%   02/15/18 97%   02/09/18 98%   02/09/18 97%   11/29/17 99%   10/30/17 96%    O2 Flow Rate (L/min): 4 l/min   No intake or output data in the 24 hours ending 03/10/18 0733     Telemetry reviewed:   normal sinus rhythm  Tubes:   n/a  X-Ray:  N/A   Procedures:   N/A    Exam:     General   Alert, trying to slide out of bed, in NAD  Respiratory   Clear To Auscultation bilaterally - no wheezes, rales, rhonchi, or crackles  Cardiology  Regular Rate and Rythmn  - no murmurs, rubs or gallops  Abdominal  Soft, non-tender, non-distended, positive bowel sounds, no hepatosplenomegaly  Extremities  RUE edema  Neurological  No focal neurological deficits noted  Psychological  Oriented x 2  Exam otherwise negative     Lab Data Reviewed: (see below)    Medications Reviewed: (see below)    ______________________________________________________________________    Medications:     Current Facility-Administered Medications   Medication Dose Route Frequency    fentaNYL citrate (PF) injection 25 mcg  25 mcg IntraVENous Q4H PRN    Or    morphine (ROXANOL) 100 mg/5 mL (20 mg/mL) concentrated solution 8 mg  8 mg Oral Q4H PRN    naloxone (NARCAN) injection 0.2 mg  0.2 mg IntraVENous EVERY 2 MINUTES AS NEEDED    glucose chewable tablet 16 g  4 Tab Oral PRN    dextrose (D50W) injection syrg 12.5-25 g  12.5-25 g IntraVENous PRN    glucagon (GLUCAGEN) injection 1 mg  1 mg IntraMUSCular PRN    miconazole (SECURA) 2 % extra thick cream   Topical PRN    balsam peru-castor oil [facility])  mg/gram ointment   Topical BID    insulin lispro (HUMALOG) injection   SubCUTAneous AC&HS    ALPRAZolam (XANAX) tablet 0.25 mg  0.25 mg Oral TID PRN    megestrol (MEGACE) 400 mg/10 mL (10 mL) oral suspension 200 mg  200 mg Oral DAILY    mirtazapine (REMERON) tablet 30 mg  30 mg Oral QHS    sertraline (ZOLOFT) tablet 50 mg  50 mg Oral DAILY    sodium chloride (NS) flush 5-10 mL  5-10 mL IntraVENous Q8H    sodium chloride (NS) flush 5-10 mL  5-10 mL IntraVENous PRN    acetaminophen (TYLENOL) tablet 650 mg  650 mg Oral Q4H PRN    ondansetron (ZOFRAN) injection 4 mg  4 mg IntraVENous Q4H PRN    heparin (porcine) injection 5,000 Units  5,000 Units SubCUTAneous Q12H    nystatin (MYCOSTATIN) 100,000 unit/mL oral suspension 500,000 Units  500,000 Units Oral TID            Lab Review:     No results for input(s): WBC, HGB, HCT, PLT, HGBEXT, HCTEXT, PLTEXT in the last 72 hours. Recent Labs      03/08/18   0458   NA  143   K  3.2*   CL  96*   CO2  42*   GLU  108*   BUN  27*   CREA  1.39*   CA  8.2*     Lab Results   Component Value Date/Time    Glucose (POC) 251 (H) 03/09/2018 09:12 PM    Glucose (POC) 260 (H) 03/09/2018 04:21 PM    Glucose (POC) 285 (H) 03/09/2018 11:20 AM    Glucose (POC) 261 (H) 03/09/2018 07:59 AM    Glucose (POC) 173 (H) 03/08/2018 09:02 PM     No results for input(s): PH, PCO2, PO2, HCO3, FIO2 in the last 72 hours. No results for input(s): INR in the last 72 hours.     No lab exists for component: INREXT         Assessment:     Principal Problem:    Acute kidney injury ([facility] [address] (3/2/2018)    Active Problems:    Controlled diabetes mellitus ([facility] [address] (7/31/2017)      Clear cell renal cell carcinoma ([facility] [address] (8/28/2017)      Hypotension (3/3/2018)      Dehydration (3/3/2018)      Severe protein-calorie malnutrition ([facility] [address] (3/3/2018)      Decubitus ulcer of sacral region, stage 2 (3/6/2018)      Acute metabolic encephalopathy (6/5/4703)      Decubitus ulcer of left elbow, stage 2 (3/8/2018)           Plan:     Risk of deterioration: medium             1. Follow labs    2. PT/OT  3. DNR  4.  Family to talk with hospice Monday                     Care Plan discussed with: Patient and Nursing Staff    Discussed:  Care Plan    Prophylaxis:  Hep SQ    Disposition:  ???  hospice           ___________________________________________________    Attending Physician: Ghulam Correia MD

## 2018-03-10 NOTE — PROGRESS NOTES
Oncology Nursing Communication Tool  2:57 PM  3/10/2018     Bedside shift change report given to Melissa Kelly RN (incoming nurse) by Roni Pettit (outgoing nurse) on [facility]. Report included the following information SBAR, Kardex, Intake/Output, MAR and Recent Results. Shift Summary:       Issues for physician to address: Oncology Shift Note   Admission Date 3/2/2018   Admission Diagnosis Acute kidney injury ([facility] [address]   Code Status DNR   Consults IP CONSULT TO PALLIATIVE CARE - PROVIDER  IP CONSULT TO ONCOLOGY      Cardiac Monitoring [] Yes [] No      Purposeful Hourly Rounding [] Yes    Deedee Score Total Score: 3   Deedee score 3 or > [] Bed Alarm [] Avasys [] 1:1 sitter [] Patient refused (Place signed refusal form in chart)      Pain Managed [] Yes [] No    Key Pain Meds             oxyCODONE IR (ROXICODONE) 5 mg immediate release tablet Take 1 Tab by mouth every six (6) hours as needed for Pain. Max Daily Amount: 20 mg. Influenza Vaccine Received Flu Vaccine for Current Season (usually Sept-March): No    Patient/Guardian Refused (Notify MD): Yes      Oxygen needs?  [] Room air Oxygen @  []1L    []2L    []3L   []4L    []5L   []6L     Use home O2? [] Yes [] No  Perform O2 challenge test using  smartphrase (.oxygenchallenge)      Last bowel movement Last Bowel Movement Date: 03/09/18  bowel movement      Urinary Catheter             LDAs               Peripheral IV 03/09/18 Right Antecubital (Active)   Site Assessment Clean, dry, & intact 3/10/2018  2:55 PM   Phlebitis Assessment 0 3/10/2018  2:55 PM   Infiltration Assessment 0 3/10/2018  2:55 PM   Dressing Status Clean, dry, & intact 3/10/2018  2:55 PM   Dressing Type Tape;Transparent 3/10/2018  2:55 PM   Hub Color/Line Status Blue;Patent 3/10/2018  2:55 PM                         Readmission Risk Assessment Tool Score High Risk            39       Total Score        3 Has Seen PCP in Last 6 Months (Yes=3, No=0)    2 . Living with Significant Other. Assisted Living. LTAC. SNF. or   Rehab    3 Patient Length of Stay (>5 days = 3)    4 IP Visits Last 12 Months (1-3=4, 4=9, >4=11)    5 Pt.  Coverage (Medicare=5 , Medicaid, or Self-Pay=4)    22 Charlson Comorbidity Score (Age + Comorbid Conditions)       Expected Length of Stay 4d 7h   Actual Length of Stay [facility]

## 2018-03-11 NOTE — PROGRESS NOTES
Bedside shift change report given to Akiko Pavon RN (oncoming nurse) by Natividad Echevarria RN (offgoing nurse). Report included the following information SBAR, Kardex, Intake/Output, MAR and Recent Results. 1051: Dr. Mitzy Naylor returned my page. MD notified of not being able to get an IV or labs on the patient this morning. MD recommends to call the PICC team, but the PICC team is not answering the phone calls. MD notified of this and is okay with it for now. 2502: CCU nurse unable to get IV or labs for patient.

## 2018-03-11 NOTE — PROGRESS NOTES
Oncology Nursing Communication Tool  7:23 PM  3/10/2018     Bedside shift change report given to Mignon Mane RN (incoming nurse) by Hannah Davies RN (outgoing nurse) on [facility]. Report included the following information SBAR. Shift Summary:       Issues for physician to address: Oncology Shift Note   Admission Date 3/2/2018   Admission Diagnosis Acute kidney injury ([facility] [address]   Code Status DNR   Consults IP CONSULT TO PALLIATIVE CARE - PROVIDER  IP CONSULT TO ONCOLOGY      Cardiac Monitoring [] Yes [] No      Purposeful Hourly Rounding [] Yes    Ededee Score Total Score: 3   Deedee score 3 or > [] Bed Alarm [] Avasys [] 1:1 sitter [] Patient refused (Place signed refusal form in chart)      Pain Managed [x] Yes [] No    Key Pain Meds             oxyCODONE IR (ROXICODONE) 5 mg immediate release tablet Take 1 Tab by mouth every six (6) hours as needed for Pain. Max Daily Amount: 20 mg. Influenza Vaccine Received Flu Vaccine for Current Season (usually Sept-March): No    Patient/Guardian Refused (Notify MD): Yes      Oxygen needs? [] Room air Oxygen @  []1L    []2L    []3L   []4L    []5L   []6L     Use home O2? [] Yes [] No  Perform O2 challenge test using  smartphrase (.oxygenchallenge)      Last bowel movement Last Bowel Movement Date: 03/09/18  bowel movement      Urinary Catheter             LDAs               Peripheral IV 03/09/18 Right Antecubital (Active)   Site Assessment Clean, dry, & intact 3/10/2018  2:55 PM   Phlebitis Assessment 0 3/10/2018  2:55 PM   Infiltration Assessment 0 3/10/2018  2:55 PM   Dressing Status Clean, dry, & intact 3/10/2018  2:55 PM   Dressing Type Tape;Transparent 3/10/2018  2:55 PM   Hub Color/Line Status Blue;Patent 3/10/2018  2:55 PM                         Readmission Risk Assessment Tool Score High Risk            39       Total Score        3 Has Seen PCP in Last 6 Months (Yes=3, No=0)    2 . Living with Significant Other.  Assisted Living. LTAC. SNF. or   Rehab    3 Patient Length of Stay (>5 days = 3)    4 IP Visits Last 12 Months (1-3=4, 4=9, >4=11)    5 Pt.  Coverage (Medicare=5 , Medicaid, or Self-Pay=4)    22 Charlson Comorbidity Score (Age + Comorbid Conditions)       Expected Length of Stay 4d 7h   Actual Length of Stay [address], RN

## 2018-03-11 NOTE — PROGRESS NOTES
Problem: Falls - Risk of  Goal: *Absence of Falls  Document Deedee Fall Risk and appropriate interventions in the flowsheet.    Outcome: Not Progressing Towards Goal  Fall Risk Interventions:  Mobility Interventions: Communicate number of staff needed for ambulation/transfer, Utilize walker, cane, or other assitive device    Mentation Interventions: Bed/chair exit alarm, Door open when patient unattended, Room close to nurse's station    Medication Interventions: Assess postural VS orthostatic hypotension, Patient to call before getting OOB, Teach patient to arise slowly, Utilize gait belt for transfers/ambulation    Elimination Interventions: Call light in reach, Patient to call for help with toileting needs, Toileting schedule/hourly rounds    History of Falls Interventions: Door open when patient unattended, Room close to nurse's station

## 2018-03-11 NOTE — PROGRESS NOTES
[address] [place] [address]  [phone]      Medical Progress Note      NAME: Jana Parish   :  1950  MRM:  [id number]    Date/Time: 3/11/2018  6:47 AM         Subjective:     Alert. Denies pain. Answers questions.  Feels better than yesterday    Past Medical History:   Diagnosis Date    Acute metabolic encephalopathy 1502    Anxiety 2017    2/3/17:Under a tremendous amount of stress, will continue Alprazolam prn, if using regularly consider changing to an SSRI or counseling    Brain metastases ([address]     Cancer ([facility] [address] 2017    renal cell carcinoma    Cervical myelopathy ([address] 2017    2/3/17:Cervical fusion     Clear cell adenocarcinoma of kidney ([address]     Controlled diabetes mellitus ([facility] [address] 2017    Decubitus ulcer of sacral region, stage 2 3/6/2018    Fatigue 2017    Hypercholesterolemia 2017    Hyperglycemia 2017    Hypertension 2017    2/3/17:Poorly controlled, lifestyle changes, repeat /90 will follow    On statin therapy 2017    Osteoporosis 2017    Post-menopausal 2017    Spinal stenosis of lumbar region with radiculopathy 2017    2/3/17:Symptoms c/w this diagnosis discussed with pt., if progresses then need MRI L spine and follow up    Vaginitis due to Candida 2017    2/3/17:Associated with antibiotic use, requests prescription med       ROS:  General: negative for fever, chills, sweats, weakness  Respiratory:  negative for cough, sputum production, SOB, wheezing, GARCIA, pleuritic pain  Cardiology:  negative for chest pain, palpitations, orthopnea, PND, edema, syncope   Gastrointestinal: negative for abdominal pain, N/V, dysphagia, change in bowel habits, bleeding  Muskuloskeletal : negative for arthralgia, myalgia  Neurological: negative for headache, dizziness, confusion, focal weakness, paresthesia, memory loss, gait disturbance  Psychological: negative for anxiety, depression, agitation  Review of systems otherwise negative         Objective:       Vitals:        Last 24hrs VS reviewed since prior progress note.  Most recent are:    Visit Vitals    /57 (BP 1 Location: Left arm, BP Patient Position: At rest)    Pulse 94    Temp 98.4 °F (36.9 °C)    Resp 16    Ht 5' (1.524 m)    Wt 85 lb (38.6 kg)    SpO2 96%    Breastfeeding No    BMI 16.6 kg/m2     SpO2 Readings from Last 6 Encounters:   03/10/18 96%   02/15/18 97%   02/09/18 98%   02/09/18 97%   11/29/17 99%   10/30/17 96%    O2 Flow Rate (L/min): 4 l/min   No intake or output data in the 24 hours ending 03/11/18 0647     Telemetry reviewed:   normal sinus rhythm  Tubes:   n/a  X-Ray:  N/A   Procedures:   N/A    Exam:     General   Alert, lying flat in bed, in NAD  Respiratory   Clear To Auscultation bilaterally - no wheezes, rales, rhonchi, or crackles  Cardiology  Regular Rate and Rythmn  - no murmurs, rubs or gallops  Abdominal  Soft, non-tender, non-distended, positive bowel sounds, no hepatosplenomegaly  Extremities  RUE edema  Neurological  No focal neurological deficits noted  Psychological  Oriented x 2  Exam otherwise negative     Lab Data Reviewed: (see below)    Medications Reviewed: (see below)    ______________________________________________________________________    Medications:     Current Facility-Administered Medications   Medication Dose Route Frequency    fentaNYL citrate (PF) injection 25 mcg  25 mcg IntraVENous Q4H PRN    Or    morphine (ROXANOL) 100 mg/5 mL (20 mg/mL) concentrated solution 8 mg  8 mg Oral Q4H PRN    naloxone (NARCAN) injection 0.2 mg  0.2 mg IntraVENous EVERY 2 MINUTES AS NEEDED    glucose chewable tablet 16 g  4 Tab Oral PRN    dextrose (D50W) injection syrg 12.5-25 g  12.5-25 g IntraVENous PRN    glucagon (GLUCAGEN) injection 1 mg  1 mg IntraMUSCular PRN    miconazole (SECURA) 2 % extra thick cream   Topical PRN    balsam peru-castor oil (VENELEX)  mg/gram ointment   Topical BID    insulin lispro (HUMALOG) injection   SubCUTAneous AC&HS    ALPRAZolam (XANAX) tablet 0.25 mg  0.25 mg Oral TID PRN    megestrol (MEGACE) 400 mg/10 mL (10 mL) oral suspension 200 mg  200 mg Oral DAILY    mirtazapine (REMERON) tablet 30 mg  30 mg Oral QHS    sertraline (ZOLOFT) tablet 50 mg  50 mg Oral DAILY    sodium chloride (NS) flush 5-10 mL  5-10 mL IntraVENous Q8H    sodium chloride (NS) flush 5-10 mL  5-10 mL IntraVENous PRN    acetaminophen (TYLENOL) tablet 650 mg  650 mg Oral Q4H PRN    ondansetron (ZOFRAN) injection 4 mg  4 mg IntraVENous Q4H PRN    heparin (porcine) injection 5,000 Units  5,000 Units SubCUTAneous Q12H    nystatin (MYCOSTATIN) 100,000 unit/mL oral suspension 500,000 Units  500,000 Units Oral TID            Lab Review:     No results for input(s): WBC, HGB, HCT, PLT, HGBEXT, HCTEXT, PLTEXT, HGBEXT, HCTEXT, PLTEXT in the last 72 hours. No results for input(s): NA, K, CL, CO2, GLU, BUN, CREA, CA, MG, PHOS, ALB, TBIL, TBILI, SGOT, ALT, INR in the last 72 hours. No lab exists for component: INREXT, INREXT  Lab Results   Component Value Date/Time    Glucose (POC) 151 (H) 03/10/2018 09:40 PM    Glucose (POC) 97 03/10/2018 04:30 PM    Glucose (POC) 172 (H) 03/10/2018 11:27 AM    Glucose (POC) 132 (H) 03/10/2018 07:57 AM    Glucose (POC) 251 (H) 03/09/2018 09:12 PM     No results for input(s): PH, PCO2, PO2, HCO3, FIO2 in the last 72 hours. No results for input(s): INR in the last 72 hours.     No lab exists for component: INREXT, INREXT         Assessment:     Principal Problem:    Acute kidney injury ([facility] [address] (3/2/2018)    Active Problems:    Controlled diabetes mellitus ([facility] [address] (7/31/2017)      Clear cell renal cell carcinoma ([facility] [address] (8/28/2017)      Hypotension (3/3/2018)      Dehydration (3/3/2018)      Se[facility]cal[facility] ([facility] (3/3/2018)      Decubitus ulcer of sacral region, stage 2 (3/6/2018)      Acute metabolic encephalopathy (3/7/2018)      Decubitus ulcer of left elbow, stage 2 (3/8/2018)           Plan:     Risk of deterioration: medium             1. Follow labs    2. PT/OT  3. DNR  4.  Family to talk with hospice Monday                     Care Plan discussed with: Patient and Nursing Staff    Discussed:  Care Plan    Prophylaxis:  Hep SQ    Disposition:  ???  hospice           ___________________________________________________    Attending Physician: Damon Sierra MD

## 2018-03-11 NOTE — PROGRESS NOTES
Bedside shift change report given to Vivien [address] (oncoming nurse) by merissa MARTINEZ (offgoing nurse). Report included the following information SBAR, Kardex, ED Summary, Intake/Output, MAR and Recent Results.

## 2018-03-11 NOTE — PROGRESS NOTES
Bedside and Verbal shift change report given to Veronica RN (oncoming nurse) by Dino Fabry RN (offgoing nurse). Report included the following information SBAR, Kardex, Procedure Summary, Intake/Output, MAR and Recent Results.

## 2018-03-11 NOTE — PROGRESS NOTES
IV leaking. Attempt to inset IV x3. Patient refuse after 3rd try. Unable to draw labs as well. PICC team called and left message to call unit.

## 2018-03-12 NOTE — PROGRESS NOTES
Nutrition Assessment:    INTERVENTIONS/RECOMMENDATIONS:   Continue current diet. Encourage PO intake     ASSESSMENT:   Chart reviewed, noted for hospice consult. Pt continues with poor PO intake. Lunch tray untouched, several unopened ensure clear on bedside table. Pt states she is drinking ensure clear. Unlikely pt will meet nutrition needs. Diet Order: Mechanical soft (ensure clear TID, ensure pudding TID)  % Eaten:  Patient Vitals for the past 72 hrs:   % Diet Eaten   18 1840 0 %   18 1400 0 %   18 1000 0 %     Pertinent Medications: [x]Reviewed: humalog, megace, remeron,   Pertinent Labs: [x]Reviewed: B, 277; K+ 2.6,   Food Allergies: [x]NKFA  []Other   Last BM:  3/9  Edema: 2+     [x]RUE   [x]LUE   [x]RLE   [x]LLE      Pressure Ulcer: sacral     [] Stage I   [x] Stage II   [] Stage III   [] Stage IV      Anthropometrics: Height: 5' (152.4 cm) Weight: 38.6 kg (85 lb)    IBW (%IBW):   ( ) UBW (%UBW):   (  %)    BMI: Body mass index is 16.6 kg/(m^2). This BMI is indicative of:  [x]Underweight   []Normal   []Overweight   [] Obesity   [] Extreme Obesity (BMI>40)  Last Weight Metrics:  Weight Loss Metrics 3/2/2018 2018 2017 11/15/2017 10/30/2017 10/9/2017 9/10/2017   Today's Wt 85 lb 95 lb 3.2 oz 109 lb 9.6 oz 103 lb 101 lb 6.4 oz 97 lb 125 lb   BMI 16.6 kg/m2 17.99 kg/m2 20.71 kg/m2 19.46 kg/m2 19.16 kg/m2 18.94 kg/m2 24.41 kg/m2       Estimated Nutrition Needs (Based on): 1581 Kcals/day (BMR (832) x 1.3AF +500kcal) , 51 g (-59g (1.3-1.5 g/kg bw)) Protein  Carbohydrate:  At Least 130 g/day  Fluids: 1580 mL/day or per primary team    NUTRITION DIAGNOSES:   Problem:  Malnutrition      Etiology: related to renal cell carcinoma     Signs/Symptoms: as evidenced by poor PO, 10.5% weight loss x 1 months      NUTRITION INTERVENTIONS:  Meals/Snacks: General/healthful diet   Supplements: Commercial supplement              GOAL:   PO intake >25% of meals/supplement next 1-3 days    NUTRITION MONITORING AND EVALUATION      Food/Nutrient Intake Outcomes:  Total energy intake  Physical Signs/Symptoms Outcomes: Weight/weight change, Electrolyte and renal profile, GI profile, Glucose profile    Previous Goal Met:   [] Met              [] Progressing Towards Goal              [x] Not Progressing Towards Goal   Previous Recommendations:   [] Implemented          [] Not Implemented          [x] Not Applicable    LEARNING NEEDS (Diet, Food/Nutrient-Drug Interaction):    [x] None Identified   [] Identified and Education Provided/Documented   [] Identified and Pt declined/was not appropriate     Cultural, Latter day, OR Ethnic Dietary Needs:    [x] None Identified   [] Identified and Addressed     [x] Interdisciplinary Care Plan Reviewed/Documented    [x] Discharge Planning: TBD   [] Participated in Interdisciplinary Rounds    NUTRITION RISK:    [x] High              [] Moderate           []  Low  []  Minimal/Uncompromised      Tatyana Lucas RDN  Pager [phone]  Weekend Pager [phone]

## 2018-03-12 NOTE — PROGRESS NOTES
PROGRESS NOTE    NAME:  Gato Grewal   :   1950   MRN:   [id number]     Date/Time:  3/12/2018 7:35 AM  Subjective:   History: More alert and responsive this morning, verbal and answers questions appropriately, no pain, she states she is taking some fluids in and eating small amounts, generally weak. Functionally pt. Is alert but dependent for all ADLs.       Medications reviewed:  Current Facility-Administered Medications   Medication Dose Route Frequency    fentaNYL citrate (PF) injection 25 mcg  25 mcg IntraVENous Q4H PRN    Or    morphine (ROXANOL) 100 mg/5 mL (20 mg/mL) concentrated solution 8 mg  8 mg Oral Q4H PRN    naloxone (NARCAN) injection 0.2 mg  0.2 mg IntraVENous EVERY 2 MINUTES AS NEEDED    glucose chewable tablet 16 g  4 Tab Oral PRN    dextrose (D50W) injection syrg 12.5-25 g  12.5-25 g IntraVENous PRN    glucagon (GLUCAGEN) injection 1 mg  1 mg IntraMUSCular PRN    miconazole (SECURA) 2 % extra thick cream   Topical PRN    balsam peru-castor oil (VENELEX)  mg/gram ointment   Topical BID    insulin lispro (HUMALOG) injection   SubCUTAneous AC&HS    ALPRAZolam (XANAX) tablet 0.25 mg  0.25 mg Oral TID PRN    megestrol (MEGACE) 400 mg/10 mL (10 mL) oral suspension 200 mg  200 mg Oral DAILY    mirtazapine (REMERON) tablet 30 mg  30 mg Oral QHS    sertraline (ZOLOFT) tablet 50 mg  50 mg Oral DAILY    sodium chloride (NS) flush 5-10 mL  5-10 mL IntraVENous Q8H    sodium chloride (NS) flush 5-10 mL  5-10 mL IntraVENous PRN    acetaminophen (TYLENOL) tablet 650 mg  650 mg Oral Q4H PRN    ondansetron (ZOFRAN) injection 4 mg  4 mg IntraVENous Q4H PRN    heparin (porcine) injection 5,000 Units  5,000 Units SubCUTAneous Q12H    nystatin (MYCOSTATIN) 100,000 unit/mL oral suspension 500,000 Units  500,000 Units Oral TID        Objective:   Vitals:  Visit Vitals    /52 (BP 1 Location: Right arm, BP Patient Position: At rest)    Pulse 96    Temp 98.7 °F (37.1 °C)    Resp 18  Ht 5' (1.524 m)    Wt 85 lb (38.6 kg)    SpO2 90%    Breastfeeding No    BMI 16.6 kg/m2    O2 Flow Rate (L/min): 4 l/min O2 Device: Nasal cannula Temp (24hrs), Av.6 °F (37 °C), Min:98.3 °F (36.8 °C), Max:98.7 °F (37.1 °C)      Last 24hr Input/Output:    Intake/Output Summary (Last 24 hours) at 18 0800  Last data filed at 18 2635   Gross per 24 hour   Intake              360 ml   Output                0 ml   Net              360 ml        PHYSICAL EXAM:  General:    Awake,  no distress, appears stated age. Head:    Normocephalic, without obvious abnormality, atraumatic. Eyes:    Conjunctivae/corneas clear. PERRLA  Nose:   Nares normal. No drainage or sinus tenderness. Throat:     Lips, mucosa, and tongue normal.  Dry mucous membranes. Neck:   Supple, symmetrical,  no adenopathy, thyroid: non tender     no carotid bruit and no JVD. Back:     Symmetric,  No CVA tenderness. , sacral decub   Lungs:    Clear to auscultation bilaterally. No Wheezing or Rhonchi. No rales. Heart:    Regular rate and rhythm,  no murmur, rub or gallop. Abdomen:    Soft, minimal tenderness left flank. Not distended.   Bowel sounds normal. No masses  Extremities:  edema RUE, arm dressed, decub left olecranon dressed ,  No clubbing  Lymph nodes:  Cervical, supraclavicular normal.  Neurologic: Grossly nonfocal  Skin:                Stage II sacral decubitus ulcer      Lab Data Reviewed:    Recent Results (from the past 24 hour(s))   GLUCOSE, POC    Collection Time: 18  8:32 AM   Result Value Ref Range    Glucose (POC) 133 (H) 65 - 100 mg/dL    Performed by Caffie Pellet    GLUCOSE, POC    Collection Time: 18 11:26 AM   Result Value Ref Range    Glucose (POC) 201 (H) 65 - 100 mg/dL    Performed by Caffie Pellet    GLUCOSE, POC    Collection Time: 18  4:31 PM   Result Value Ref Range    Glucose (POC) 163 (H) 65 - 100 mg/dL    Performed by Kajal Monae, POC    Collection Time: 03/11/18  9:29 PM   Result Value Ref Range    Glucose (POC) 187 (H) 65 - 100 mg/dL    Performed by Mike Meyer (PCT)    CBC W/O DIFF    Collection Time: 03/12/18  4:26 AM   Result Value Ref Range    WBC 6.9 3.6 - 11.0 K/uL    RBC 2.85 (L) 3.80 - 5.20 M/uL    HGB 8.9 (L) 11.5 - 16.0 g/dL    HCT 29.2 (L) 35.0 - 47.0 %    .5 (H) 80.0 - 99.0 FL    MCH 31.2 26.0 - 34.0 PG    MCHC 30.5 30.0 - 36.5 g/dL    RDW 14.2 11.5 - 14.5 %    PLATELET 286 161 - 062 K/uL    MPV 11.3 8.9 - 12.9 FL    NRBC 0.0 0  WBC    ABSOLUTE NRBC 0.00 0.00 - 9.89 K/uL   METABOLIC PANEL, BASIC    Collection Time: 03/12/18  4:26 AM   Result Value Ref Range    Sodium 140 136 - 145 mmol/L    Potassium 2.6 (LL) 3.5 - 5.1 mmol/L    Chloride 93 (L) 97 - 108 mmol/L    CO2 44 (HH) 21 - 32 mmol/L    Anion gap 3 (L) 5 - 15 mmol/L    Glucose 313 (H) 65 - 100 mg/dL    BUN 23 (H) 6 - 20 MG/DL    Creatinine 1.30 (H) 0.55 - 1.02 MG/DL    BUN/Creatinine ratio 18 12 - 20      GFR est AA 49 (L) >60 ml/min/1.73m2    GFR est non-AA 40 (L) >60 ml/min/1.73m2    Calcium 8.6 8.5 - 10.1 MG/DL   GLUCOSE, POC    Collection Time: 03/12/18  7:43 AM   Result Value Ref Range    Glucose (POC) 320 (H) 65 - 100 mg/dL    Performed by Devin Stewart          Assessment/Plan:     Principal Problem:    Acute kidney injury ([facility] (3/2/2018)    Active Problems:    Controlled diabetes mellitus ([facility] (7/31/2017)      Clear cell renal cell carcinoma ([facility] (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 (3/3/2018)      Decubitus ulcer of sacral region, stage 2 (3/6/2018)      Acute metabolic encephalopathy (6/1/1699)      Decubitus ulcer of left elbow, stage 2 (3/8/2018)       ___________________________________________________  PLAN:    1.  K low this am and given po KCl, Crt improved,   2. DC IVF, DC tele, OOB as tolerates  3.   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 Palliative medicine, patient apparently doing well with active treatment until recently with radiation therapy for brain metastases, appears improved today, continue supportive care. Read and appreciate Dr. Alex Dorsey notes. 5. After discussion with  and patient she wishes to continue treating anything that is reversible or treatable. Creatinine is improving. Continue sliding scale insulin for elevated blood glucose. These may be discontinued if patient decides on comfort care only and does not wish to further have any treatments or medications. Otherwise primary goal is comfort care. If transition is made to hospice to go home the  is very concerned about having the resources to attend to his wife on a continuous basis.   Will consult Hospice.          ___________________________________________________    Attending Physician: Drea Grace MD

## 2018-03-12 NOTE — HOSPICE
[facility] follow up on consultation visit note    Order for consult noted. History and events of this hospitalization reviewed. TC to  Tavia Uriarte and support given. Plans are to meet in patient's room around 1700 today.     Please call [phone] if questions or concerns    Kt Hutson RN [facility]

## 2018-03-12 NOTE — PROGRESS NOTES
Patient does not wished to be turned. She reports that she is only comfortable laying on her left side. 03/12/18:  Patient allowed RN to change sheets and reposition her however then wished to be returned to her left side. 03/12/18 0305:  RN entered room to find pts nasal cannula not on patient. RN assessed pt SPO2 and found it to be 84%. RN placed pt back on 4L NC and monitored patients SPO2 which returned to 90% . 6351:  Patient resting comfortably with SPO2 at 92% on 4LNC as ordered. RN will continue to monitor. 3962:  RN placed new IV in patient as existing IV was leaking when flushed. RN was informed at shift change that physician wanted labs however they were unable to get them during previous shift. RN genny labs with new IV start. 0:  RN informed by lab of patient critical potassium result of 2.6 and critical CO2 of 44. RN verified with lab by verbal readback. RN has paged on call physician to inform and is currently awaiting return call. 0:  RN received return call from Dr. Vlad Echevarria. RN informed physician of patients critical potassium and CO2 results. RN received verbal orders for 40 meq of potassium granule packets. RN also informed physician that patient had nasal cannula off and SPO2 was 84% and that patient was placed back on 4L NC and recovered to 92%. RN will administer potassium as ordered and continue to monitor.

## 2018-03-12 NOTE — PROGRESS NOTES
Bedside and Verbal shift change report given to Williams Charles (oncoming nurse) by Raquel Rubio (offgoing nurse). Report included the following information SBAR, Kardex, Intake/Output, MAR, Recent Results, Med Rec Status and Cardiac Rhythm Sinus Rhythm.

## 2018-03-12 NOTE — PROGRESS NOTES
Bedside and Verbal shift change report given to [facility] (oncoming nurse) by Jay Ojeda (offgoing nurse). Report included the following information SBAR, Kardex, Intake/Output, MAR, Recent Results and Med Rec Status . Patient continues to refused to be turned. She stays on her left side.

## 2018-03-12 NOTE — PROGRESS NOTES
Oncology Nursing Communication Tool  8:05 AM  3/12/2018     Bedside and Verbal shift change report given to Nataliya Acosta RN (incoming nurse) by Jeff Soria (outgoing nurse) on Leopold Darting. Report included the following information SBAR, Kardex, Intake/Output, MAR, Accordion and Recent Results. Shift Summary: Patient rested quietly with no complaints of pain. IV re-sited, labs drawn, critical potassium and CO2 results called to Dr. Marge Syed, PO potassium given as ordered. Patient and family is to meet with hospice today. Issues for physician to address: Patients SPO2 sats having been running in low 90s throughout shift. Please see RN note. Patient is on 4L NC       Oncology Shift Note   Admission Date 3/2/2018   Admission Diagnosis Acute kidney injury ([facility]   Code Status DNR   Consults IP CONSULT TO PALLIATIVE CARE - PROVIDER  IP CONSULT TO ONCOLOGY      Cardiac Monitoring [] Yes [x] No      Purposeful Hourly Rounding [x] Yes    Deedee Score Total Score: 3   Deedee score 3 or > [] Bed Alarm [] Avasys [] 1:1 sitter [] Patient refused (Place signed refusal form in chart)      Pain Managed [x] Yes [] No    Key Pain Meds             oxyCODONE IR (ROXICODONE) 5 mg immediate release tablet Take 1 Tab by mouth every six (6) hours as needed for Pain. Max Daily Amount: 20 mg. Influenza Vaccine Received Flu Vaccine for Current Season (usually Sept-March): No    Patient/Guardian Refused (Notify MD): Yes      Oxygen needs?  [] Room air Oxygen @  []1L    []2L    []3L   [x]4L    []5L   []6L     Use home O2? [] Yes [] No  Perform O2 challenge test using  smartphrase (.oxygenchallenge)      Last bowel movement Last Bowel Movement Date: 03/09/18  bowel movement      Urinary Catheter             LDAs               Peripheral IV 03/12/18 Left Forearm (Active)   Site Assessment Clean, dry, & intact 3/12/2018  4:27 AM   Phlebitis Assessment 0 3/12/2018  4:27 AM   Infiltration Assessment 0 3/12/2018  4:27 AM   Dressing Status Clean, dry, & intact; New 3/12/2018  4:27 AM   Dressing Type Tape;Transparent 3/12/2018  4:27 AM   Hub Color/Line Status Blue;Flushed 3/12/2018  4:27 AM                         Readmission Risk Assessment Tool Score High Risk            39       Total Score        3 Has Seen PCP in Last 6 Months (Yes=3, No=0)    2 . Living with Significant Other. Assisted Living. LTAC. SNF. or   Rehab    3 Patient Length of Stay (>5 days = 3)    4 IP Visits Last 12 Months (1-3=4, 4=9, >4=11)    5 Pt.  Coverage (Medicare=5 , Medicaid, or Self-Pay=4)    22 Charlson Comorbidity Score (Age + Comorbid Conditions)       Expected Length of Stay 4d 7h   Actual Length of Stay [place]

## 2018-03-12 NOTE — PROGRESS NOTES
CM reviewed chart to check on Hospice referral.  Referral confirmed sent to [facility] and it shows \"pending\". CM left message to check on family information session planning.     Socrates Gore RN, BSN, ACM   - Medical Oncology  [phone]

## 2018-03-12 NOTE — PROGRESS NOTES
Problem: Falls - Risk of  Goal: *Absence of Falls  Document Deedee Fall Risk and appropriate interventions in the flowsheet.    Outcome: Progressing Towards Goal  Fall Risk Interventions:  Mobility Interventions: Bed/chair exit alarm, Communicate number of staff needed for ambulation/transfer, Mechanical lift    Mentation Interventions: Adequate sleep, hydration, pain control, Bed/chair exit alarm, Door open when patient unattended, Evaluate medications/consider consulting pharmacy, Gait belt with transfers/ambulation, Toileting rounds    Medication Interventions: Bed/chair exit alarm, Evaluate medications/consider consulting pharmacy    Elimination Interventions: Bed/chair exit alarm, Call light in reach, Patient to call for help with toileting needs, Toileting schedule/hourly rounds    History of Falls Interventions: Bed/chair exit alarm, Consult care management for discharge planning, Door open when patient unattended, Evaluate medications/consider consulting pharmacy

## 2018-03-12 NOTE — HOSPICE
[facility] RN information session note. History and events of this hospitalization reviewed including physician notes,  lab results, scans etc.      Met with patient and  Gasper Gowers in patient's room. Patient was frequently trying to reposition herself saying her back hurt. Unit nurse advised, patient was medicated and then slept. Uri Romeos and I met separately. Support given as tears were shared. Hospice philosophy and scope of services presented. Questions and concerns addressed. Gasper Gowers said he could not take care of Chuck Miranda at home as it would be too difficult emotionally. He asked questions regarding nursing homes and we discussed how hospice would be another layer of support for Chuck Miranda and the family. Also discussed inpatient hospice criteria  Patient had not eaten much in past several weeks. Today having a better day per MD notes and able to converse and express her needs. Wilhemenia Holiday we will evaluate patient daily for being appropriate for inpatient hospice care. Theresa Mares know that the unit care manager Axel Torres would be contacting him about facilities in his part of town for care for Chuck Miranda and again reminded him we will evaluate her daily for inpatient. Hospice medical director Dr. Paulla Fleischer advised of the referral and of the above. Thank you for asking us to be a part of the care for this loved lady and her family.     Please call [phone] if questions or concerns    Taran Persaud RN [facility]

## 2018-03-12 NOTE — PROGRESS NOTES
Palliative Medicine  [place]: [phone] ([phone]  [facility]: [phone] ([phone])      Resuscitation Status: DNR   Durable DNR addressed? [] Yes   [] No    [] Not Applicable    Advance Care Planning 3/3/2018   Patient's Healthcare Decision Maker is: Legal Next of Kin   Primary Decision Maker Name Naomy Larios   Primary Decision Maker Phone Number [phone]   Primary Decision Maker Relationship to Patient -   Confirm Advance Directive None   Patient Would Like to Complete Advance Directive No   Does the patient have other document types Do Not Resuscitate     Checked chart to note plan. [facility] to see if anyone has contacted  Mimi Vitale to discuss hospice as he had requested this meeting occur today. Spoke to Atul at [facility] [place] ([phone]) who will follow up and see who can reach out to . No hospice note in chart as of now.

## 2018-03-13 NOTE — PROGRESS NOTES
PROGRESS NOTE    NAME:  Lacy Ramos   :   1950   MRN:   [id number]     Date/Time:  3/13/2018 7:01 AM  Subjective:   History: More alert and responsive this morning, verbal and answers questions appropriately, no pain, she states she is taking some fluids in and eating small amounts, generally weak. Functionally pt. Is alert but dependent for all ADLs.       Medications reviewed:  Current Facility-Administered Medications   Medication Dose Route Frequency    fentaNYL citrate (PF) injection 25 mcg  25 mcg IntraVENous Q4H PRN    Or    morphine (ROXANOL) 100 mg/5 mL (20 mg/mL) concentrated solution 8 mg  8 mg Oral Q4H PRN    naloxone (NARCAN) injection 0.2 mg  0.2 mg IntraVENous EVERY 2 MINUTES AS NEEDED    glucose chewable tablet 16 g  4 Tab Oral PRN    dextrose (D50W) injection syrg 12.5-25 g  12.5-25 g IntraVENous PRN    glucagon (GLUCAGEN) injection 1 mg  1 mg IntraMUSCular PRN    miconazole (SECURA) 2 % extra thick cream   Topical PRN    balsam peru-castor oil (VENELEX)  mg/gram ointment   Topical BID    insulin lispro (HUMALOG) injection   SubCUTAneous AC&HS    ALPRAZolam (XANAX) tablet 0.25 mg  0.25 mg Oral TID PRN    megestrol (MEGACE) 400 mg/10 mL (10 mL) oral suspension 200 mg  200 mg Oral DAILY    mirtazapine (REMERON) tablet 30 mg  30 mg Oral QHS    sertraline (ZOLOFT) tablet 50 mg  50 mg Oral DAILY    sodium chloride (NS) flush 5-10 mL  5-10 mL IntraVENous Q8H    sodium chloride (NS) flush 5-10 mL  5-10 mL IntraVENous PRN    acetaminophen (TYLENOL) tablet 650 mg  650 mg Oral Q4H PRN    ondansetron (ZOFRAN) injection 4 mg  4 mg IntraVENous Q4H PRN    heparin (porcine) injection 5,000 Units  5,000 Units SubCUTAneous Q12H    nystatin (MYCOSTATIN) 100,000 unit/mL oral suspension 500,000 Units  500,000 Units Oral TID        Objective:   Vitals:  Visit Vitals    /56 (BP 1 Location: Left arm)    Pulse 88    Temp 98.4 °F (36.9 °C)    Resp 16    Ht 5' (1.524 m)    Wt 85 lb (38.6 kg)    SpO2 95%    Breastfeeding No    BMI 16.6 kg/m2    O2 Flow Rate (L/min): 4 l/min O2 Device: Nasal cannula Temp (24hrs), Av.6 °F (37 °C), Min:98.3 °F (36.8 °C), Max:98.8 °F (37.1 °C)      Last 24hr Input/Output:  No intake or output data in the 24 hours ending 18 0728     PHYSICAL EXAM:  General:    Awake,  no distress, appears stated age. Head:    Normocephalic, without obvious abnormality, atraumatic. Eyes:    Conjunctivae/corneas clear. PERRLA  Nose:   Nares normal. No drainage or sinus tenderness. Throat:     Lips, mucosa, and tongue normal.  Dry mucous membranes. Neck:   Supple, symmetrical,  no adenopathy, thyroid: non tender     no carotid bruit and no JVD. Back:     Symmetric,  No CVA tenderness. , sacral decub   Lungs:    Clear to auscultation bilaterally. No Wheezing or Rhonchi. No rales. Heart:    Regular rate and rhythm,  no murmur, rub or gallop. Abdomen:    Soft, minimal tenderness left flank. Not distended.   Bowel sounds normal. No masses  Extremities:  edema RUE, arm dressed, decub left olecranon dressed ,  No clubbing  Lymph nodes:  Cervical, supraclavicular normal.  Neurologic: Grossly nonfocal  Skin:                Stage II sacral decubitus ulcer      Lab Data Reviewed:    Recent Results (from the past 24 hour(s))   GLUCOSE, POC    Collection Time: 18  7:43 AM   Result Value Ref Range    Glucose (POC) 320 (H) 65 - 100 mg/dL    Performed by Devin Stewart    GLUCOSE, POC    Collection Time: 18 11:37 AM   Result Value Ref Range    Glucose (POC) 277 (H) 65 - 100 mg/dL    Performed by Devin Stewart    GLUCOSE, POC    Collection Time: 18  4:22 PM   Result Value Ref Range    Glucose (POC) 176 (H) 65 - 100 mg/dL    Performed by Mia Moore ([facility])    GLUCOSE, POC    Collection Time: 18  9:11 PM   Result Value Ref Range    Glucose (POC) 237 (H) 65 - 100 mg/dL    Performed by Mahin Rodriguez          Assessment/Plan:     Principal Problem: Acute kidney injury ([facility] (3/2/2018)    Active Problems:    Controlled diabetes mellitus ([facility] (7/31/2017)      Clear cell renal cell carcinoma ([facility] (8/28/2017)      Hypotension (3/3/2018)      Dehydration (3/3/2018)      Severe protein-calorie malnutrition ([facility] (3/3/2018)      Decubitus ulcer of sacral region, stage 2 (3/6/2018)      Acute metabolic encephalopathy (6/3/3447)      Decubitus ulcer of left elbow, stage 2 (3/8/2018)       ___________________________________________________  PLAN:    1. Given po  KCl, Crt improved,   2. DC IVF, DC tele, OOB as tolerates  3. 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 Palliative medicine, patient apparently doing well with active treatment until recently with radiation therapy for brain metastases, appears improved today, continue supportive care. Read and appreciate Dr. Carroll Seo notes. 5. Primary goal is comfort care. If transition is made to hospice to go home the  is very concerned about having the resources to attend to his wife on a continuous basis.   Will consult Hospice.          ___________________________________________________    Attending Physician: Alok Mcdaniel MD

## 2018-03-13 NOTE — PROGRESS NOTES
Bedside and Verbal shift change report given to Theresa Aldridge RN (oncoming nurse) by Kenisha Gamboa RN (offgoing nurse). Report included the following information SBAR, Kardex and Intake/Output.

## 2018-03-13 NOTE — PROGRESS NOTES
Oncology Nursing Communication Tool  7:45 PM  3/13/2018     Bedside shift change report given to [facility] TRACIE ELDRIDGE RN (incoming nurse) by Eva Sorto (outgoing nurse) on Emmy Aburto. Report included the following information SBAR, Kardex, Intake/Output, MAR and Recent Results. Shift Summary:       Issues for physician to address: Oncology Shift Note   Admission Date 3/2/2018   Admission Diagnosis Acute kidney injury ([address]   Code Status DNR   Consults IP CONSULT TO PALLIATIVE CARE - PROVIDER  IP CONSULT TO ONCOLOGY      Cardiac Monitoring [] Yes [] No      Purposeful Hourly Rounding [] Yes    Deedee Score Total Score: 3   Deedee score 3 or > [] Bed Alarm [] Avasys [] 1:1 sitter [] Patient refused (Place signed refusal form in chart)      Pain Managed [] Yes [] No    Key Pain Meds             oxyCODONE IR (ROXICODONE) 5 mg immediate release tablet Take 1 Tab by mouth every six (6) hours as needed for Pain. Max Daily Amount: 20 mg. Influenza Vaccine Received Flu Vaccine for Current Season (usually Sept-March): No    Patient/Guardian Refused (Notify MD): Yes      Oxygen needs?  [] Room air Oxygen @  []1L    []2L    []3L   []4L    []5L   []6L     Use home O2? [] Yes [] No  Perform O2 challenge test using  smartphrase (.oxygenchallenge)      Last bowel movement Last Bowel Movement Date: 03/09/18  bowel movement      Urinary Catheter             LDAs               Peripheral IV 03/12/18 Left Forearm (Active)   Site Assessment Clean, dry, & intact 3/13/2018  3:00 PM   Phlebitis Assessment 0 3/13/2018  3:00 PM   Infiltration Assessment 0 3/13/2018  3:00 PM   Dressing Status Clean, dry, & intact 3/13/2018  3:00 PM   Dressing Type Tape;Transparent 3/13/2018  3:00 PM   Hub Color/Line Status Blue;Patent 3/13/2018  3:00 PM   Alcohol Cap Used Yes 3/12/2018  7:36 AM                         Readmission Risk Assessment Tool Score High Risk            39       Total Score        3 Has Seen PCP in Last 6 Months (Yes=3, No=0)    2 . Living with Significant Other. Assisted Living. LTAC. SNF. or   Rehab    3 Patient Length of Stay (>5 days = 3)    4 IP Visits Last 12 Months (1-3=4, 4=9, >4=11)    5 Pt.  Coverage (Medicare=5 , Medicaid, or Self-Pay=4)    22 Charlson Comorbidity Score (Age + Comorbid Conditions)       Expected Length of Stay 4d 7h   Actual Length of Stay Brandyn Collins

## 2018-03-13 NOTE — PROGRESS NOTES
Occupational Therapy Goals  Initiated 3/8/2018  1. Patient will perform UB dressing with minimal assistance/contact guard assist within 7 day(s). 2. Patient will perform seated anterior bathing with minimal assistance/contact guard assist within 7 day(s). 3. Patient will perform seated LB dressing with moderate assistance within 7 day(s). 4. Patient will perform toilet transfers to [facility] with RW with moderate assistance within 7 day(s). Occupational Therapy TREATMENT  Patient: Yvrose Swartz (3 y.o. female)  Date: 3/13/2018  Diagnosis: Acute kidney injury ([facility] Acute kidney injury [facility])       Precautions: Fall    ASSESSMENT:  Some improvement in patient's ability to participate in ADLs and functional mobility, but her affect continues to be flat and she needs coaxing. She is generally following 1 step commands with additional cueing, was distractible t/o session and demonstrated difficulty with motor planning. Overall she was min to mod A for bed mobility, was mod A sit to stand, performed grooming ADLs with supervision to mod A and was supervision for self feeding. Activity tolerance is poor with patient requiring 3.5 L NC to maintain adequate oxygen saturation for activity. Dizziness occurred once seated EOB, but BP stable. At this point the plan for further therapy intervention will likely depend on the the outcome of the Hospice consult. Progression toward goals:  []       Improving appropriately and progressing toward goals  [x]       Improving slowly and progressing toward goals  []       Not making progress toward goals and plan of care will be adjusted     PLAN:  Patient continues to benefit from skilled intervention to address the above impairments. Continue treatment per established plan of care. Discharge Recommendations: To Be Determined Pending progress  Further Equipment Recommendations for Discharge:  TBD     SUBJECTIVE:   Patient stated Maulik Tay will give it a try.     OBJECTIVE DATA SUMMARY: Cognitive/Behavioral Status:  Neurologic State: Alert  Orientation Level: Oriented to person;Oriented to place  Cognition: Decreased command following; Impulsive;Poor safety awareness        Safety/Judgement: Decreased awareness of need for safety  Functional Mobility and Transfers for ADLs:  Bed Mobility:     Supine to Sit: Moderate assistance  Sit to Supine: Minimum assistance  Scooting: Moderate assistance  Transfers:  Sit to Stand: Moderate assistance                                   Balance:  Sitting: Intact  Standing: Impaired  Standing - Static: Poor  ADL Intervention:  Feeding  Drink to Mouth: Supervision/set-up  Cues: Verbal cues provided    Grooming  Grooming Assistance: Supervision/set up; Moderate assistance (seated EOB)  Washing Face: Supervision/set-up  Brushing/Combing Hair: Moderate assistance    Cognitive Retraining  Attention to Task: Distractibility; Single task  Safety/Judgement: Decreased awareness of need for safety  Cues: Tactile cues provided;Verbal cues provided;Visual cues provided  Pain:  Pain Scale 1: Numeric (0 - 10)  Pain Intensity 1: 0              Activity Tolerance:   C/o of dizziness in sitting, but BP and HR stable. Patient presented with NC off with an SpO2 of 78%. Patient placed on 3. 5 L NC and recovered to the Low 90s at rest and during activity.      After treatment:   [] Patient left in no apparent distress sitting up in chair  [x] Patient left in no apparent distress in bed  [x] Call bell left within reach  [x] Nursing notified  [] Caregiver present  [x] Bed alarm activated    COMMUNICATION/COLLABORATION:   The patients plan of care was discussed with: Registered Nurse    Ashley Keen OTR/L  Time Calculation: 25 mins

## 2018-03-13 NOTE — HOSPICE
[facility]  [phone]    Patient Name: Gato Grewal  YOB: 1950  Age: 77 y.o. [facility] RN Note:      Pt verbal but not responding appropriately to questions. Appears restless at times. Call to  Mimi Vitale who will be coming this evening. [address], MSW and myself met with pt and  Mimi Vitale. Explained hospice care in inpatient setting and that if she was stable in a few days she may be transferred to a LTC facility with which we contract. He signed legal consents but we did not date as start of care not determined. Was unable to reach Dr Tiffanie Reyes to inform of 's decision and to get hospice orders. Left message and will try again tomorrow.  given emotional support and also [facility] book. [phone]  During our consult with pt and  she received a PRN Roxanol for pain. Call to Dr. Travis Bradford who was on call for Dr. Tiffanie Reyes. New order: PRN Morphine 2mg IV and Ativan 1mg IV for comfort. Thank you for the opportunity to be of service to this patient.     Kalyan Mann, RN [facility]

## 2018-03-13 NOTE — CONSULTS
Palliative Medicine Consult  Farhan: [phone] ([phone])    Patient Name: Maryhelen Lanes  YOB: 1950    Date of Initial Consult: 03/05/2018  Reason for Consult: care decisions  Requesting Provider: Dr. Christel Schroeder   Primary Care Physician: Isacc Gibson MD     SUMMARY:   Maryhelen Lanes is a 77 y.o. woman w/ PMH most significant for metastatic RCC on pazopanib and s/p completion of SBRT on 01/09/2018 who was admitted on 3/2/2018 from home for BONG. Current medical issues leading to Palliative Medicine involvement include: care decisions in setting of overall worsening of her condition, PMH noted above, dx's below. Active problems: BONG w/ metabolic acidosis 2/2 dehydration  RCC w/ mets to brain, pleura, and mediastinum  Protein calorie malnutrition  Constipation  See below     PALLIATIVE DIAGNOSES:   1. Altered mental status, resolved  2. Poor PO intake, improved  3. Pain  4. Weakness  5. Debility  6. Counseling regarding goals of care and advance care planning     PLAN:   Brief Summary of Plan  -visited w/ pt in her . No family at bedside  -goals of care are maintained. See below. They are not for comfort care at this time  -we will continue to establish therapeutic alliance as well as provide psychosocial support    1. Goals of Care/Goals of Medical Treatment- confirmed   confirms wish for full, restorative treatment and all measures that support this w/ the following exceptions:  NO to intubation if respiratory distress or failure outside of cardiopulmonary arrest  NO to pressors  NO to HD    But  YES to CPAP or BiPAP    No change in the above (03/09/2018)    2. Advance Care Planning- pt's code status   Patient confirms code status as DO NOT ATTEMPT RESUSCITATION (DNAR) / [address] (AND): If pt has cardiopulmonary arrest, then   NO to chest compressions  NO to cardiac shock  NO to ACLS meds  NO to intubation    Pt does not have AMD on file.   Surrogate decision maker is . 3. Information Sharing-     03/12/2018  Pt does not engage well in conversation. Her answers to questions are terse and even abrupt. Encouraged and remind pt to use pain med if needed. 03/09/2018  Pt is doing ok but has belly pain. Pt does not provide details other than it is better. She then says her pain level is okay. When I asked her to rate pain, she says it is 7/10. Pt agrees to IV replacement. I provided info regarding pain mgmt w/ linking IV order to PO order so that pt has pain med regimen available w/ or w/o IV access. See below. Pt said that she drank some Ensure this morning. See above regarding conversation I had w/  over the phone. While he is receptive to speaking w/ hospice liaison staff, he wishes to wait until Monday to be reached by them. 03/08/2018  No significant info sharing w/ pt today. She was resting w/ eyes closed earlier today. When I met up w/ , he noted that he had just gotten here. This was just before 1800. We spoke about pt's day. I relayed what I d/w OT. He was pleased to hear that she worked w/ OT staff some. He was told by bedside RN that she is refusing PO meds and PO intake. He remains hopeful and is looking forward to hearing from Dr. Concepción Russell tomorrow sometime. I relayed to him that I called and left [facility] for Dr. Concepcóin Russell. I plan to try to reach him again tomorrow. Supportive listening provided throughout our conversation. 03/07/2018  Pt says she is doing better. No complaints at this time. I called and spoke w/ . He said that he planned on not coming to the hospital tomorrow to give pt some time and space. He is hoping that she works w/ therapy. He wishes to do what she wants. He mentioned that heme onc team came by again. Dr. Racheal Gray sat down and spoke w/ pt. If pt wishes to work toward getting her overall condition better, then it will happen.      We can reach out to  at any time. He is going to work [employer]ith. But he can be at hospital in 15 min.  thought Dr. Amelia Zacarias and I would speak today. I relayed that I did not have opportunity to do this. I also apologized for not being able to catch him at bedside earlier today. It is clear that we are to continue w/ current goals of care as noted above. 03/06/2018   wished for me to speak w/ pt. I had to return later in the day. I sat and spoke w/ pt and . Pt is clear that she wishes to transition to comfort care. However, they wish to wait to speak w/ Dr. Amelia Zacarias in the AM before making any possible change in goals of care. 03/05/2018   noted that someone told him that I would be visiting. He said that his understanding was we would provide advice in guiding discussions of plan of care. I provided more details about our team and its role in their care.  and Margaret Nair verbalized understanding of all the points made including the following:  -we are an interdisciplinary team serving as a bridge of communication and advocate on behalf of the patient and family when needed  -we are a support care service that, when needed, assists w/ sx mgmt  -though hospice is \"under\" palliative medicine, our service is not hospice nor does a consult visit by our service mean that hospice is being or should be considered  -we are not here to make medical decisions, and our being consulted does not equate to a change in a patient's overall condition  -our involvement is independent of clinical trajectory and/or medical decisions made    We spoke about pt's condition. They demonstrated a good understanding of pt's condition.  became tearful as we spoke. He said that now it seems [employer] is taking its course\" as pt is refusing to drink or eat.   He is having a hard time with this b/c initially he did not understand why someone (pt) would not try to do something if it can mean she might get better. I recognized the difficulty they are having including naming emotions evident. We spoke about range of medical treatment.  is clear on his wishes as noted above. We discussed pt's having signed \"DDNR\" form. Emphasis was placed on honoring pt's wishes.  fully support this. They shared that pt is as stubborn as anyone can be. She likes to read a lot. She has a list of books and authors that she has read.  thinks she may have read 150 books in a time period of over a yr. They have 1 child, Lillie Reyes. Lillie Reyes called in the middle of family meeting.  spoke w/ her briefly. Margo Martinez and pt have been friends for 48 yrs. They met when pt was working at [employer]. 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 She retired. Pt likes to stay at home.  laughed as he said that she never got along with a woman as her boss. Questions and concerns addressed. Supportive listening provided throughout family meeting.  gave permission for me to visit later as well as tomorrow. They are appreciative of the care being provided here. 4. Psychosocial Support- We will continue to support patient and family during this difficult hospitalization    5. Spiritual- at this time, there are no apparent spiritual concerns. 6. Symptom Management-   -continue Fentanyl 25 mcg IV q4h prn pain, SOB, or tachypnea  Linked to/OR  Roxanol 8 mg PO q4h prn pain, SOB, tachypnea w/ HOLD parameters    Fentanyl 25 mcg is 7.5 OME  Fentanyl 40 mcg is 12 OME    I have discussed with Sheela Roman, bedside RN. I have discussed with Roderick, hospice RN. Time and input appreciated. I have discussed with our palliative care IDT. Thank you for this consult that has provided us with the opportunity to be involved in this patient's care. We will continue to follow along with you.   Should you have any questions or concerns prior to our next visit at the bedside, please do not hesitate to contact us at [phone] Mildred Sánchez MD  Palliative Care Team     GOALS OF CARE / TREATMENT PREFERENCES:     GOALS OF CARE:  Patient/Health Care Proxy Stated Goals: Other (comment)   wishes for full, restorative treatment and all measures that support this w/ exception of the following:  NO to intubation if respiratory distress or failure outside of cardiopulmonary arrest  NO to pressors  NO to HD    But  YES to CPAP or BiPAP    TREATMENT PREFERENCES:   Code Status: DNR   Pt's code status is DO NOT ATTEMPT RESUSCITATION (DNAR) / ALLOW NATURAL DEATH (AND): If pt has cardiopulmonary arrest, then   NO to chest compressions  NO to cardiac shock  NO to ACLS meds  NO to intubation    Pt has \"DDNR\" form on file that she signed. Advance Care Planning:  Advance Care Planning 3/3/2018   Patient's Healthcare Decision Maker is: Legal Next of Kin   Primary Decision Maker Name Alberto Alex   Primary Decision Maker Phone Number [phone]   Primary Decision Maker Relationship to Patient -   Confirm Advance Directive None   Patient Would Like to Complete Advance Directive No   Does the patient have other document types Do Not Resuscitate       Medical Interventions: Other (comment)   Limited medical interventions. See plan. Other Instructions: none at this time        Other:    As far as possible, the palliative care team has discussed with patient / health care proxy about goals of care / treatment preferences for patient. HISTORY:     History obtained from: chart, bedside RN    CHIEF COMPLAINT: none    HPI/SUBJECTIVE:    The patient is:   [x] Verbal and participatory  [] Non-participatory due to:    No o/n events or issues  Denies pain  No complaints    Pt is a 76 y/o WF w/ PMH noted above who presented w/ weakness and not eating.   She has been undergoing tx for metastatic RCC but her overall condition has declined over several wks.  notes that she has not done well since receiving brain radiation. He says that she is very stubborn but it is not like her to not eat at all. Pt has had nausea for which Zofran prn had not really helped. Pt was on Megace but has had no appetite. She apparently has drank 3 Ensure cans per day. No BM x >1 wk. Pt did not urinate on the day of ED presentation. Pt has not been able to walk on day of ED presentation. Pt was admitted for further w/u and mgmt.      Clinical Pain Assessment (nonverbal scale for severity on nonverbal patients):   Clinical Pain Assessment  Severity: 7  Location: belly  Character: pt does not provide more details  Duration: since this AM  Factors: Tylenol helped, she said but pain is still 7/10  Frequency: constant     Activity (Movement): Lying quietly, normal position    Duration: for how long has pt been experiencing pain (e.g., 2 days, 1 month, years)  Frequency: how often pain is an issue (e.g., several times per day, once every few days, constant)     FUNCTIONAL ASSESSMENT:     Palliative Performance Scale (PPS):  PPS: 40     PSYCHOSOCIAL/SPIRITUAL SCREENING:     Palliative IDT has assessed this patient for cultural preferences / practices and a referral made as appropriate to needs (Cultural Services, Patient Advocacy, Ethics, etc.)    Advance Care Planning:  Advance Care Planning 3/3/2018   Patient's Healthcare Decision Maker is: Legal Next of Tracey 67   Primary Decision Maker Name Armani Bocanegra   Primary Decision Maker Phone Number [phone]   Primary Decision Maker Relationship to Patient -   Confirm Advance Directive None   Patient Would Like to Complete Advance Directive No   Does the patient have other document types Do Not Resuscitate       Any spiritual / Jewish concerns:  [] Yes /  [x] No    Caregiver Burnout:  [] Yes /  [] No /  [x] No Caregiver Present      Anticipatory grief assessment:   [] Normal  / [] Maladaptive       ESAS Anxiety:      ESAS Depression:          REVIEW OF SYSTEMS:     Positive and pertinent negative findings in ROS are noted above in HPI. The following systems were [x] reviewed / [] unable to be reviewed as noted in HPI  Other findings are noted below. Systems: constitutional, ears/nose/mouth/throat, respiratory, gastrointestinal, genitourinary, musculoskeletal, integumentary, neurologic, psychiatric, endocrine. Positive findings noted below. Modified ESAS Completed by: provider   Fatigue: 2 Drowsiness: 0     Pain: 7     Nausea: 0   Anorexia: 5 Dyspnea: 0           Stool Occurrence(s): 0        PHYSICAL EXAM:     From RN flowsheet:  Wt Readings from Last 3 Encounters:   03/02/18 85 lb (38.6 kg)   02/09/18 95 lb 3.2 oz (43.2 kg)   11/29/17 109 lb 9.6 oz (49.7 kg)     Blood pressure 109/59, pulse 89, temperature 98.5 °F (36.9 °C), resp. rate 18, height 5' (1.524 m), weight 85 lb (38.6 kg), SpO2 97 %, not currently breastfeeding.     Pain Scale 1: Numeric (0 - 10)  Pain Intensity 1: 0  Pain Onset 1: within 30 minutes  Pain Location 1: Abdomen  Pain Orientation 1: Left  Pain Description 1: Aching  Pain Intervention(s) 1: Medication (see MAR)  Last bowel movement, if known:     Gen: sick appearing, weak, lying in bed w/ HOB elevated, in NAD  HEENT: NC/AT, dry lips  Respiratory: breathing not labored, symmetric  Neurologic: awake, alert  Psychiatric: flat affect  No obvious signs of agitation or anxiety  Speech rate is mildly slowed, volume is reduced  Pt answers questions with yes, no, I don't know     HISTORY:     Principal Problem:    Acute kidney injury ([address] (3/2/2018)    Active Problems:    Controlled diabetes mellitus ([address] (7/31/2017)      Clear cell renal cell carcinoma ([address] (8/28/2017)      Hypotension (3/3/2018)      Dehydration (3/3/2018)      Severe protein-calorie malnutrition ([address] (3/3/2018)      Decubitus ulcer of sacral region, stage 2 (3/6/2018)      Acute metabolic encephalopathy (1/5/3141) Decubitus ulcer of left elbow, stage 2 (3/8/2018)      Past Medical History:   Diagnosis Date    Acute metabolic encephalopathy 0/5/2615    Anxiety 7/31/2017    2/3/17:Under a tremendous amount of stress, will continue Alprazolam prn, if using regularly consider changing to an SSRI or counseling    [facility]     Cancer ([facility] 08/2017    renal cell carcinoma    Cervical myelopathy ([facility] 7/31/2017    2/3/17:Cervical fusion     Clear cell adenocarcinoma of kidney ([facility]     Controlled diabetes mellitus ([facility] 7/31/2017    Decubitus ulcer of sacral region, stage 2 3/6/2018    Fatigue 7/31/2017    Hypercholesterolemia 7/31/2017    Hyperglycemia 7/31/2017    Hypertension 7/31/2017    2/3/17:Poorly controlled, lifestyle changes, repeat /90 will follow    On statin therapy 7/31/2017    Osteoporosis 7/31/2017    Post-menopausal 7/31/2017    Spinal stenosis of lumbar region with radiculopathy 7/31/2017    2/3/17:Symptoms c/w this diagnosis discussed with pt., if progresses then need MRI L spine and follow up    Vaginitis due to Candida 7/31/2017    2/3/17:Associated with antibiotic use, requests prescription med      Past Surgical History:   Procedure Laterality Date    HX ORTHOPAEDIC      2009 spinal cord surgery     HX ORTHOPAEDIC Left     knee procedure    HX OTHER SURGICAL  08/16/2017    incisional biopsy right neck mass      Family History   Problem Relation Age of Onset    Heart Disease Mother       History reviewed, no pertinent family history.   Social History   Substance Use Topics    Smoking status: Never Smoker    Smokeless tobacco: Never Used    Alcohol use No     No Known Allergies   Current Facility-Administered Medications   Medication Dose Route Frequency    morphine injection 2 mg  2 mg IntraVENous Q1H PRN    LORazepam (ATIVAN) injection 1 mg  1 mg IntraVENous Q4H PRN    fentaNYL citrate (PF) injection 25 mcg  25 mcg IntraVENous Q4H PRN    Or    morphine (ROXANOL) 100 mg/5 mL (20 mg/mL) concentrated solution 8 mg  8 mg Oral Q4H PRN    naloxone (NARCAN) injection 0.2 mg  0.2 mg IntraVENous EVERY 2 MINUTES AS NEEDED    glucose chewable tablet 16 g  4 Tab Oral PRN    dextrose (D50W) injection syrg 12.5-25 g  12.5-25 g IntraVENous PRN    glucagon (GLUCAGEN) injection 1 mg  1 mg IntraMUSCular PRN    miconazole (SECURA) 2 % extra thick cream   Topical PRN    balsam peru-castor oil (VENELEX)  mg/gram ointment   Topical BID    insulin lispro (HUMALOG) injection   SubCUTAneous AC&HS    ALPRAZolam (XANAX) tablet 0.25 mg  0.25 mg Oral TID PRN    megestrol (MEGACE) 400 mg/10 mL (10 mL) oral suspension 200 mg  200 mg Oral DAILY    mirtazapine (REMERON) tablet 30 mg  30 mg Oral QHS    sertraline (ZOLOFT) tablet 50 mg  50 mg Oral DAILY    sodium chloride (NS) flush 5-10 mL  5-10 mL IntraVENous Q8H    sodium chloride (NS) flush 5-10 mL  5-10 mL IntraVENous PRN    acetaminophen (TYLENOL) tablet 650 mg  650 mg Oral Q4H PRN    ondansetron (ZOFRAN) injection 4 mg  4 mg IntraVENous Q4H PRN    heparin (porcine) injection 5,000 Units  5,000 Units SubCUTAneous Q12H    nystatin (MYCOSTATIN) 100,000 unit/mL oral suspension 500,000 Units  500,000 Units Oral TID          LAB AND IMAGING FINDINGS:     Lab Results   Component Value Date/Time    WBC 6.9 03/12/2018 04:26 AM    HGB 8.9 (L) 03/12/2018 04:26 AM    PLATELET 686 51/58/5513 04:26 AM     Lab Results   Component Value Date/Time    Sodium 140 03/12/2018 04:26 AM    Potassium 2.6 (LL) 03/12/2018 04:26 AM    Chloride 93 (L) 03/12/2018 04:26 AM    CO2 44 (HH) 03/12/2018 04:26 AM    BUN 23 (H) 03/12/2018 04:26 AM    Creatinine 1.30 (H) 03/12/2018 04:26 AM    Calcium 8.6 03/12/2018 04:26 AM    Magnesium 2.6 (H) 03/06/2018 02:35 AM    Phosphorus 4.3 03/04/2018 03:37 AM      Lab Results   Component Value Date/Time    AST (SGOT) 15 03/04/2018 03:37 AM    Alk.  phosphatase 77 03/04/2018 03:37 AM    Protein, total 5.0 (L) 03/04/2018 03:37 AM    Albumin 1.9 (L) 03/04/2018 03:37 AM    Globulin 3.1 03/04/2018 03:37 AM     No results found for: INR, PTMR, PTP, PT1, PT2, APTT   No results found for: IRON, FE, TIBC, IBCT, PSAT, FERR   No results found for: PH, PCO2, PO2  No components found for: [place]   Lab Results   Component Value Date/Time    CK 49 08/31/2017 06:47 PM           Total time: 35 min  Counseling / coordination time, spent as noted above:  20 min  > 50% counseling / coordination?: yes    Prolonged service was provided for  []30 min   []75 min in face to face time in the presence of the patient, spent as noted above. Time Start:   Time End:   Note: this can only be billed with 34577 (initial) or 62710 (follow up). If multiple start / stop times, list each separately.

## 2018-03-13 NOTE — PROGRESS NOTES
Oncology Interdisciplinary rounds were held today to discuss patient plan of care and outcomes. The following members were present: Nursing, Case Management, Pharmacy and Dietary.     Actual Length of Stay: 11    DRG GLOS: 4.3    Expected Length of Stay: 4d 7h                Plan for Day        Mobility        Plan for Stay      Plan for Way   Continue eval for [facility] assistance Continue current plan  DC IVF  DC telemetry  Palliative TBD- home vs LTC with hospice

## 2018-03-13 NOTE — HOSPICE
[facility]  [phone]    Social Work Admission Note  Patient Name: Mary Pelaez  YOB: 1950  Age: 77 y.o. Date of Visit: 03/13/18  Facility of Care: [address] [facility]  Patient Room: [facility]     Hospice Attending: Arvind Keating MD  Hospice Diagnosis: Acute kidney injury [facility]) Renal Cell Carcinoma with mets     Level of Care:    []  GIP    []  Respite   [x]  Routine    NARRATIVE   This MSW and Jimi Bethea RN met with pt and her  Carla Davenport for routine hospice admission. Pt is a 76 y/o CF with a hospice diagnosis of metastatic renal cell carcinoma with comorbid , BONG, mets to the brain, severe protein calorie malnutrition, L kidney ca, and anxiety. Pt is confused and refusing her medications. Pt and  have been  for 45 years. The couple has 1 daughter Fernando Sanchez. Daughter lives about 10 minutes from pt and her . Pt is a former  for the [employer], and several other places. s is sad and somewhat anxious.  reported pt was diagnosed with metastatic renal cell carcinoma in August of 2017.  has been pts caregiver since that time.  reports he can no longer care for pt at the home due to the emotional strain and relational loss he is experiencing.  is overwhelmed. MSW provided support for  in ventilation of feelings and emotions. MSW provided active listening as  recounted wifes diagnosis and disease progression. MSW assessed coping.  gerard by distraction and keeping busy.  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  has limited supports.  noted he and pt have daughter and few friends, but they have isolated themselves over the years. Although  has been pts pcg, he is very dependent upon wife.  looks to their daughter, few friends and a cousins wife as support system.  MSW and  discussed EOL trajectory. MSW shared [address] and [employer]. MSW and  discussed options for care if pt stabilizes in the next few days.  cannot care for pt at home.  prefers NH placement if pt needs to move to another location of care. MSW will share [facility] contracted NH resources for placement.  is low risk for bereavement.  will contact Kuldip Wilfrido Chaves in [place]. Pt is non-Yazidi Adventist, does not attend [employer]. ADVANCE CARE PLANNING    Code Status: DDNR  Durable DNR: Yolanda Esha  _ No  Advance Care Planning 3/3/2018   Patient's Healthcare Decision Maker is: Legal Next of Tracey [address]   Primary Decision Maker Name Mell Canela   Primary Decision Maker Phone Number [phone]   Primary Decision Maker Relationship to Patient -   Confirm Advance Directive None   Patient Would Like to Complete Advance Directive No   Does the patient have other document types Do Not Resuscitate       Relationship Status:  []  Single     [x]        []      []  Domestic Partner     []  /  []  Common Law  []    []  Unknown    If in a relationship, name of partner/spouse:  Duration of relationship:    Congregational: NON Rastafarian     Home: [address] [facility]  Resources Provided: GF[employer], AND [employer]MINALLY [employer]     Social Work Initial Assessment     Gender:  female    Race/Ethnicity: (brandon all that apply)  []  [place] [facility]) or [place] Native  []    []  Black or [place] American  []   or   []  [facility] or [facility]  [x]  White  []  Unknown      Service:    []  Yes   [x]  No       []  Unknown  Appropriate for Pinning Ceremony:   []  Yes      []  No  Is patient using VA benefits?    []  Yes      []  No     [facility] [address] [facility]  []   Needed  []   utilized during visit    Ability to express thoughts/needs/feelings  []  Expressed thoughts/feelings/needs without difficulty  [x]  Requires extra time and cuing  []  Speech limited single words  []  Uses only gestures (eye, blinking eye or head movement/pointing)  []  Unable to express thoughts/feelings/needs (speech unintelligible or inappropriate)  []  Unresponsive  Notes:      Mental Status:  [x]  Alert-oriented to:     [x]  Person     []  Place     []  Time  []  Comatose-responds to:    []   Verbal stimuli    []  Tactile stimuli    []  Painful stimuli  [x]  Forgetful  [x]  Disoriented/Confused  [x]  Lethargic  [x]  Agitated/ RESTLESS  []  Other (specify):    Notes:      Patients description of Illness/Current Health Status:    []  Patient unable to discuss  []  Patient unwilling to discuss  [x]  (Specify)  CONFUSED       Knowledge/Understanding of Disease Process  Patient:    []  Demonstrates knowledge/understanding of disease process   []  Demonstrates knowledge/understanding of treatment plan   []  Demonstrates knowledge/understanding of prognosis   []  Demonstrates acceptance of prognosis   []  Demonstrates knowledge/understanding of resuscitation status   [x]  Other (specify)   Caregiver:   [x]  Demonstrates knowledge/understanding of disease process   [x]  Demonstrates knowledge/understanding of treatment plan   [x]  Demonstrates knowledge/understanding of prognosis   [x]  Demonstrates acceptance of prognosis   [x]  Demonstrates knowledge/understanding of resuscitation status   []  Other (specify)  Notes:      Patients living arrangement/care setting:  Use the PRIOR COLUMN when the PATIENTS current health status necessitated a change in his/her primary residence.     Prior Current Response              [x]             []    Patients own home/residence              []             []    Home of family member/friend              []             []    Boarding home              []             []    Assisted living facility/USP center              []             []    Hospital/Acute care facility []             []    Skilled nursing facility              []             []    Long term care facility/Nursing home              []             []    Hospice in Patient      Primary Caregiver:  []  No Primary Caregiver  Name of Primary Caregiver: Maroc Mars  Relationship or Primary Caregiver:    [x]  Spouse/Significant other       []  Natural Child        []  Step child       []  Sibling   []  Parent   []  Friend/Neighbor   []  Community/Congregational Volunteer   []  Paid help   []  Other (specify):___________  Notes:       Family members/Significant others:  Ava Cohen  Phone Number: [phone]  Actively involved in care? [x]  Yes  []  No    Name: Irma Danielle  Relationship: DAUGHTER  Phone Number:  Actively involved in care? [x]  Yes  []  No    Name:SHAUN BEA  Relationship: FRIEND 50 + YEARS. Phone Number:  Actively involved in care?   [x]  Yes  []  No    Social support systems: (select ONE best description)  [x]  Excellent social support system which includes three or more family members or friends  []  Good social support system which includes two or less members or friends  []  [address] support which includes one family member or friend  []  Poor social support; no family members or friends; basically ALONE  Notes:      Emotional Status: (brandon all that apply)    Patient Caregiver Response                 []                []    Mood/Affect stable and appropriate                   []                []    Angry                 [x]                [x]    Anxious                 []                []    Apprehensive                 []                []    Avoidant                 []                []    Clinging                 []                []    Depressed                 []                []    Distraught                 []                []    Elated                 []                []    Euphoric                 []                []    Fearful                 [] []    Flat Affect                 []                []    Helpless                 []                []    Hostile                 []                []    Impulsive                 []                []    Irritable                 []                []    Labile                 []                []    Manic                 [x]                []    Restlessness                 []                [x]    Sad                 []                []    Suspicious                 []                []    Tearful                 []                []    Withdrawn     Notes:     Coping Skills (strengths/weakness):    Patient: Coping Skills (strength/weakness):  RESTLESS, ANXIOUS , CONFUSED, HAS BRAIN METS   Family/caregiver (strength/weakness):  IS SAD, ANXIOUS, HAS BEEN CAREGIVER FOR PT SINCE DX, LIMITED SUPPORTS,      [place] of care (brandon all that apply):     []  No burden evident   []  Family must administer medications   []  Illness causing financial strain   [x]  Family/Support feels overwhelmed   []  Family/Support sleep disturbed with patients care   []  Patients care causes extra physical stress  of death  []  Illness causes changes in family lifestyle  []  Illness impacting family/support employment  []  Family experiencing increased time demands  []  Patients behavior endangers family  []  Denial of patients illness  []  Concern over outcome of illness/fear  []  Patients behavior embarrassing to family   Notes:      Risk Factors: (brandon all that apply):    [x]  No burden evident   []  Alcohol abuse  []  Financial resources inadequate to meet basic needs (food/house/etc)  []  Financial resources inadequate to meet health care needs (supplies/equipment/medications)  []  Food/nutrition resources inadequate  []  Home environment unsafe/inadequate for home care  []  Homicidal risk  []  Lives alone or without concerned relatives  []  Multiple medications/complex schedule  []  Physical limitations increase likelihood of falls  []  Plan of care/treatments complicated  []  Substance use/abuse  []  Suicidal risk  []  Visual impairment threatens safety/ability to perform self-care  []  Other (specify):     Abuse/Neglect (actual/potential risks):  [x]  No signs of abuse/neglect  []  History of abuse/neglect                 []  WUDONCBS          []  Sexual  []  History of domestic violence  []  Lacks adequate physical care  []  Lacks emotional nurturing/support  []  Lacks appropriate stimulation/cognitive experiences  []  Left alone inappropriately  []  Lacks necessary supervision  []  Inadequate or delayed medical care  []  Unsafe environment (i.e guns/drug use/history of violence in the home/etc.)  []  Bruising or other physical signs of injury present  []  Other (specify):  Notes:   []  Refer to child/adult protective services      Current Sources of Stress (in Addition to Current Illness):   [x]  None reported  []  Bills/Debt    []  Career/Job change    []   (short term)    []   (long term)    []  Death of a child (recent)    []  Death of a parent (recent)   []  Death of a spouse (recent)   []  Employment status changed   []  Family discord    []  Financial loss/Inadequate inther (specify):come  []  Job loss  []  Legal issues unresolved  []  Lifestyle change  []  Marital discord  []  Marriage within the last year  []  Paperwork (insurance/legal/etc) overwhelming  []  Separation/Divorce  []  Other (specify):  Notes:      Current [employer] Being Utilized     1. None noted        Interventions/Plan of Care     1. Assess social and emotional factors related to coping with end of life issues  2. Community resource planning/referral   3. Relocation to different care setting if/when symptoms stabilize: Discussed NH placement if pts stabilizes. 4.     Discharge Planning   Discussed NH placement if pts stabilizes.   1.     MSW Assessment Completed by: Masoud Callahan   3/14/2018  Time In:13:15       Time Out:14;30

## 2018-03-14 PROBLEM — E87.1 HYPONATREMIA: Status: ACTIVE | Noted: 2018-01-01

## 2018-03-14 PROBLEM — C64.9 RENAL CANCER (HCC): Status: ACTIVE | Noted: 2018-01-01

## 2018-03-14 NOTE — HOSPICE
Spoke with Tommie Severance at Dr. Shawna Espinosa office. Dr. Sharon Burroughs agreed to follow pt while in Hospice. Will be admitted inpt.

## 2018-03-14 NOTE — HOSPICE
Inpatient Nursing Admission   Patient Name: Cort Peabody   YOB: 1950   Age: 77 y.o. Date of Hospice Admission: 3/2/2018   Hospice Attending Elected by Patient: Victor Manuel Pandya MD   Primary Care Physician: Victor Manuel Pandya MD   Admitting RN: Mesha Das RN   : JACQUELINE Bowles   Level of Care (GIP/Routine/Respite): Routine   Facility of Care: [facility]   Patient Room: [address] [place] [address] Dr   ER Visits/ Hospitalizations in past year: 4   Hospice Diagnosis: Renal Cell CA with mets   Onset Date of Hospice Diagnosis: Aug 2017   Summary of Disease Progression Leading to Hospice Diagnosis:   Antonio Sanchez is a 77 y.o. female [facility] home with  who reports poor po intake for several days. She has a PMHx of Renal Cell Carcinoma with brain/pleural/mediastinal mets, s/p brain radiation at [facility] x 4, oral chemo, anxiety and HTN. Recently discharged from [address] [place] [address] rehab but has had decline since returning home both physically and mentally.  feels she is giving up and he agreed to comfort care with the support of hospice. Dr. Ambrocio Langley agrees to give verbal CTI.    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    Metastatic adenocarcinoma (HCC) C79.9    NSTEMI (non-ST elevated myocardial infarction) (HCC) I21.4    Type 2 diabetes with nephropathy (HCC) E11.21    Acute kidney injury ([facility] N17.9    Hypotension I95.9    Dehydration E86.0    Severe protein-calorie malnutrition (HCC) E43    Decubitus ulcer of sacral region, stage 2 L89.152    Acute metabolic encephalopathy S51.51    Decubitus ulcer of left elbow, stage 2 L89.022   Diagnoses RELATED to the terminal prognosis: brain mets, severe malnutrition, hypokalemia   Other Diagnoses: Encephalopathy, decub ulcer, DM   Rationale for a prognosis of life expectancy of 6 months or less if the disease follows its normal course (Disease Specific History):   Bekah Urbano is a 77 y.o. who [facility]. The patient's principle diagnosis of Renal Cell CA with mets has resulted in generalized pain, nausea, GARCIA, confusion, weakness, anorexia, debility. . Functionally, the patient's Palliative Performance Scale has declined over a period of several weeks and is estimated at 20%. Objective information that support this patients limited prognosis includes:   MRI Brain 12/2/17:   IMPRESSION   impression: Slight progression of brain metastases with increased size no new   lesion however seen. CT Abd/Pelvis 2/19/18: IMPRESSION: Findings overall are of continued response to therapy with   reduction in size of right upper mediastinal, right inferior hilar, and primary   left renal mass lesions. Increase in size of largest of left lobe pulmonary   nodules is slight and increase in size of left adrenal gland appears may be due   to increased necrosis. No new sites of metastatic disease within the thorax,   abdomen or pelvis with incidental findings as above including cholelithiasis. The patient/family chose comfort measures with the support of Hospice. Patient meets for Routine LOC as evidenced by nonverbal pain, nausea, GARCIA, confusion, weakness, anorexia, debility, decub ulcers   . ASSESSMENT    Patient self-reports: No   SYMPTOMS: nonverbal pain, nausea, GARCIA, confusion, weakness, anorexia, debility. SIGNS/PHYSICAL FINDINGS: Ill-appearing middle age female alert to self, flat affect, minimally verbal, confused. Bedbound but can reposition self. Restless and grimacing intermittently. Will take small sips when offered but does not ask for po intake. Lungs diminished, GARCIA on O2 4L NC, HRR, P-IV left FA, right arm has +3 pitting edema, scattered bruising. Abd soft, non-tender. Incontinent B&B, bowel sounds hypo, last BM 3/7. Skin warm, pale, dry. Lower extremities +1 edema, pedal pulses +   KARNOFSKY: 20%   FAST for all dementia:   Learning Assessment:   Patient   Is patient willing/able to learn? n/a   What is the highest level of education completed? Learning preference (written material, demonstration, visual)? Learning barriers (ESOL, [place], poor vision)? Caregiver   Is caregiver willing to learn care for patient? Yes   What is the highest level of education completed? High school   Learning preference (written material, demonstration, visual)? Learning barriers (ESOL, [place], poor vision)? CLINICAL INFORMATION     Wt Readings from Last 3 Encounters:   03/02/18 38.6 kg (85 lb)   02/09/18 43.2 kg (95 lb 3.2 oz)   11/29/17 49.7 kg (109 lb 9.6 oz)     Ht Readings from Last 3 Encounters:   03/02/18 5' (1.524 m)   02/09/18 5' 1\" (1.549 m)   11/29/17 5' 1\" (1.549 m)     Visit Vitals    /65 (BP 1 Location: Left arm, BP Patient Position: At rest)    Pulse 88    Temp 97.9 °F (36.6 °C)    Resp 18    Ht 5' (1.524 m)    Wt 38.6 kg (85 lb)    SpO2 96%    Breastfeeding No    BMI 16.6 kg/m2   LAB VALUES   Results for Mary Carmen Juarez (MRN [id number]) as of 3/13/2018 16:19    Ref.  Range 3/12/2018 04:26   WBC Latest Ref Range: 3.6 - 11.0 K/uL 6.9   NRBC Latest Ref Range: 0  WBC 0.0   RBC Latest Ref Range: 3.80 - 5.20 M/uL 2.85 (L)   HGB Latest Ref Range: 11.5 - 16.0 g/dL 8.9 (L)   HCT Latest Ref Range: 35.0 - 47.0 % 29.2 (L)   MCV Latest Ref Range: 80.0 - 99.0 .5 (H)   MCH Latest Ref Range: 26.0 - 34.0 PG 31.2   MCHC Latest Ref Range: 30.0 - 36.5 g/dL 30.5   RDW Latest Ref Range: 11.5 - 14.5 % 14.2   PLATELET Latest Ref Range: 150 - 400 K/uL 164   MPV Latest Ref Range: 8.9 - 12.9 FL 11.3   Results for Darvin Campuzano (MRN [id number]) as of 3/13/2018 16:19    Ref. Range 3/12/2018 04:26   Sodium Latest Ref Range: 136 - 145 mmol/L 140   Potassium Latest Ref Range: 3.5 - 5.1 mmol/L 2.6 (LL)   Chloride Latest Ref Range: 97 - 108 mmol/L 93 (L)   CO2 Latest Ref Range: 21 - 32 mmol/L 44 (HH)   Anion gap Latest Ref Range: 5 - 15 mmol/L 3 (L)   Glucose Latest Ref Range: 65 - 100 mg/dL 313 (H)   BUN Latest Ref Range: 6 - 20 MG/DL 23 (H)   Creatinine Latest Ref Range: 0.55 - 1.02 MG/DL 1.30 (H)   BUN/Creatinine ratio Latest Ref Range: 12 - 20  18   Calcium Latest Ref Range: 8.5 - 10.1 MG/DL 8.6   GFR est non-AA Latest Ref Range: >60 ml/min/1.73m2 40 (L)   GFR est AA Latest Ref Range: >60 ml/min/1.73m2 49 (L)     Lab Results   Component Value Date/Time    Protein, total 5.0 (L) 03/04/2018 03:37 AM    Albumin 1.9 (L) 03/04/2018 03:37 AM   Currently this patient has:   Supplemental O2  Peripheral IV   PLAN     1. Pain (abdomen)  IV Morphine q 1 hr prn  2. Nausea  PRN IV Zofran  3. Sacral decubitus:  CC note 03/05/18  Assessment / Recommended treatment: There are scabs and open skin on the sacrum but most of the the skin blanches around the area. The scabbed areas do not erin but all of the skin is completely raw and painful. We will treat it like a Pressure injury and keep the pressure off of it by turning her at least every two hours and keeping her as dry as possible. Venelex to be applied to the area this evening. Bernard Doherty, RN, BSN, [address] Team Frequency Orders:   Skilled Nurse - Every other day x 7 days with 5 PRN visits for symptom control. MSW  1 visit for initial assessment/evaluation for family support and need for volunteer services. Erica Merchant  1 visit for initial assessment/evaluation for spiritual support.    ADVANCE CARE PLANNING (Complete in ACP Flow Sheet)   Code Status: DNR   Durable DNR: Yes   Code Status Discussed/Confirmed: yes Preference for Other Life Sustaining Treatment Discussed/Confirmed: yes   Hospitalization Preference: [facility] Planning 3/3/2018   Patient's Healthcare Decision Maker is: Legal Next of Kin   Primary Decision Maker Name Matt Roman   Primary Decision Maker Phone Number [phone]   Primary Decision Maker Relationship to Patient -   Confirm Advance Directive None   Patient Would Like to Complete Advance Directive No   Does the patient have other document types Do Not Resuscitate   Jehovah's witness: Nellene Flaming Home: TBD   DISCHARGE PLANNING   1. Discharge Plan: LTC facility with hospice   2. Patient/Family teaching: Signs and symptoms at end of life, medications to manage   3. Response to patient/family teaching: Accepting and good understanding   SOCIAL/EMOTIONAL/SPIRITUAL NEEDS   Spiritual Issues Identified: Binta Mcgee, comfort from belief and prayer. Psych/ Social/ Emotional Issues Identified: Pt has been  to Reny Frances for 45 years. [facility]. They have one adult daughter Citlali Hutson who lives nearby. 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  also works full time for [employer]. 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 She has a childhood friend Teagan Torres who visits often. Caregiver Support:   Provided information on End of Life Care   Material Provided: [facility] or Shila Leggett contacted, discharge to hospice order received   Dr. Chary Leggett agrees to serve as attending provider for hospice and provided verbal certification of terminal illness with life expectancy of 6 months or less. Orders for hospice admission, medications and plan of treatment received. Medication reconciliation completed.    MEDS: See medication list below   DME: Per hospital   Supplies: Per hospital   IDT communication to include MD, SN, SW, [address] [place] [address] and support team

## 2018-03-14 NOTE — PROGRESS NOTES
Oncology Nursing Communication Tool  6:57 PM  3/14/2018     Bedside shift change report given to [facility] AT TRACIE ELDRIDGE RN (incoming nurse) by Awilda Antunez (outgoing nurse) on [facility]. Report included the following information SBAR, Kardex, Intake/Output, MAR and Recent Results. Shift Summary:       Issues for physician to address: Oncology Shift Note   Admission Date 3/14/2018   Admission Diagnosis endstage renal cancer  Renal cancer ([facility] [address]   Code Status DNR   Consults None      Cardiac Monitoring [] Yes [] No      Purposeful Hourly Rounding [] Yes    Deedee Score     Deedee score 3 or > [] Bed Alarm [] Avasys [] 1:1 sitter [] Patient refused (Place signed refusal form in chart)      Pain Managed [] Yes [] No    Key Pain Meds             oxyCODONE IR (ROXICODONE) 5 mg immediate release tablet Take 1 Tab by mouth every six (6) hours as needed for Pain. Max Daily Amount: 20 mg. Influenza Vaccine Received Flu Vaccine for Current Season (usually Sept-March): No    Patient/Guardian Refused (Notify MD): Yes      Oxygen needs?  [] Room air Oxygen @  []1L    []2L    []3L   []4L    []5L   []6L     Use home O2? [] Yes [] No  Perform O2 challenge test using  smartphrase (.oxygenchallenge)      Last bowel movement Last Bowel Movement Date: 03/09/18  bowel movement      Urinary Catheter Urinary Catheter 03/14/18 Ayoub-Criteria for Appropriate Use: Comfort Care           LDAs               Peripheral IV 03/12/18 Left Forearm (Active)   Site Assessment Clean, dry, & intact 3/14/2018  3:00 PM   Phlebitis Assessment 0 3/14/2018  3:00 PM   Infiltration Assessment 0 3/14/2018  3:00 PM   Dressing Status Clean, dry, & intact 3/14/2018  3:00 PM   Dressing Type Tape;Transparent 3/14/2018  3:00 PM   Hub Color/Line Status Blue;Patent 3/14/2018  3:00 PM                         Readmission Risk Assessment Tool Score High Risk            29       Total Score        3 Has Seen PCP in Last 6 Months (Yes=3, No=0) 4 IP Visits Last 12 Months (1-3=4, 4=9, >4=11)    22 Charlson Comorbidity Score (Age + Comorbid Conditions)        Criteria that do not apply:    . Living with Significant Other. Assisted Living. LTAC. SNF. or   Rehab    Patient Length of Stay (>5 days = 3)    Pt.  Coverage (Medicare=5 , Medicaid, or Self-Pay=4)       Expected Length of Stay - - -   Actual Length of Stay 0          Mary Alice Wilburn

## 2018-03-14 NOTE — HOSPICE
Staff nurse came to notify me that pts daughter Rupal Clark was in room and needed to talk to me. Very teary at pts change in status. She expressed concern that Morphine was cause of the increased restlessness and agitation. Provided support and EOL education. Spoke with Dr. Cade Ngo (through nurse Brianne)  Orders received to D/C Morphine and start scheduled IV Fentanyl 25 mcg q 4hr as well as IV Lorazepam 1 mg q 4hr. Order for one time dose of IV Haldol 1 mg. Stated \"if my dad saw her like this he would go through the roof. \"  Rupal Clark expressed appreciation for new orders and Hospice support.

## 2018-03-14 NOTE — PROGRESS NOTES
Oncology Interdisciplinary rounds were held today to discuss patient plan of care and outcomes. The following members were present: Nursing, Case Management, Pharmacy and Dietary.     Actual Length of Stay: 12    DRG GLOS: 4.3    Expected Length of Stay: 4d 7h                Plan for Day        Mobility        Plan for Stay      Plan for [facility] Up with assistance  SARAI

## 2018-03-14 NOTE — PROGRESS NOTES
PROGRESS NOTE    NAME:  Devonte Ness   :   1950   MRN:   [id number]     Date/Time:  3/14/2018 7:01 AM  Subjective:   History: More alert and responsive this morning, verbal and answers questions appropriately, no pain, she states she is taking some fluids in and eating small amounts, generally weak. Functionally pt. Is alert but dependent for all ADLs.       Medications reviewed:  Current Facility-Administered Medications   Medication Dose Route Frequency    morphine injection 2 mg  2 mg IntraVENous Q1H PRN    LORazepam (ATIVAN) injection 1 mg  1 mg IntraVENous Q4H PRN    fentaNYL citrate (PF) injection 25 mcg  25 mcg IntraVENous Q4H PRN    Or    morphine (ROXANOL) 100 mg/5 mL (20 mg/mL) concentrated solution 8 mg  8 mg Oral Q4H PRN    naloxone (NARCAN) injection 0.2 mg  0.2 mg IntraVENous EVERY 2 MINUTES AS NEEDED    glucose chewable tablet 16 g  4 Tab Oral PRN    dextrose (D50W) injection syrg 12.5-25 g  12.5-25 g IntraVENous PRN    glucagon (GLUCAGEN) injection 1 mg  1 mg IntraMUSCular PRN    miconazole (SECURA) 2 % extra thick cream   Topical PRN    balsam peru-castor oil (VENELEX)  mg/gram ointment   Topical BID    insulin lispro (HUMALOG) injection   SubCUTAneous AC&HS    ALPRAZolam (XANAX) tablet 0.25 mg  0.25 mg Oral TID PRN    megestrol (MEGACE) 400 mg/10 mL (10 mL) oral suspension 200 mg  200 mg Oral DAILY    mirtazapine (REMERON) tablet 30 mg  30 mg Oral QHS    sertraline (ZOLOFT) tablet 50 mg  50 mg Oral DAILY    sodium chloride (NS) flush 5-10 mL  5-10 mL IntraVENous Q8H    sodium chloride (NS) flush 5-10 mL  5-10 mL IntraVENous PRN    acetaminophen (TYLENOL) tablet 650 mg  650 mg Oral Q4H PRN    ondansetron (ZOFRAN) injection 4 mg  4 mg IntraVENous Q4H PRN    heparin (porcine) injection 5,000 Units  5,000 Units SubCUTAneous Q12H    nystatin (MYCOSTATIN) 100,000 unit/mL oral suspension 500,000 Units  500,000 Units Oral TID        Objective:   Vitals:  Visit Vitals    /60 (BP 1 Location: Left arm, BP Patient Position: At rest)    Pulse 93    Temp 98.3 °F (36.8 °C)    Resp 18    Ht 5' (1.524 m)    Wt 85 lb (38.6 kg)    SpO2 94%    Breastfeeding No    BMI 16.6 kg/m2    O2 Flow Rate (L/min): 3.5 l/min O2 Device: Nasal cannula Temp (24hrs), Av.5 °F (36.9 °C), Min:98.3 °F (36.8 °C), Max:98.6 °F (37 °C)      Last 24hr Input/Output:  No intake or output data in the 24 hours ending 18 0731     PHYSICAL EXAM:  General:    Awake,  no distress, appears stated age. Head:    Normocephalic, without obvious abnormality, atraumatic. Eyes:    Conjunctivae/corneas clear. PERRLA  Nose:   Nares normal. No drainage or sinus tenderness. Throat:     Lips, mucosa, and tongue normal.  Dry mucous membranes. Neck:   Supple, symmetrical,  no adenopathy, thyroid: non tender     no carotid bruit and no JVD. Back:     Symmetric,  No CVA tenderness. , sacral decub   Lungs:    Clear to auscultation bilaterally. No Wheezing or Rhonchi. No rales. Heart:    Regular rate and rhythm,  no murmur, rub or gallop. Abdomen:    Soft, minimal tenderness left flank. Not distended.   Bowel sounds normal. No masses  Extremities:  edema RUE, arm dressed, decub left olecranon dressed ,  No clubbing  Lymph nodes:  Cervical, supraclavicular normal.  Neurologic: Grossly nonfocal  Skin:                Stage II sacral decubitus ulcer      Lab Data Reviewed:    Recent Results (from the past 24 hour(s))   GLUCOSE, POC    Collection Time: 18  8:37 AM   Result Value Ref Range    Glucose (POC) 172 (H) 65 - 100 mg/dL    Performed by Leonie Castillo, POC    Collection Time: 18 11:20 AM   Result Value Ref Range    Glucose (POC) 164 (H) 65 - 100 mg/dL    Performed by Jung Black    GLUCOSE, POC    Collection Time: 18  4:54 PM   Result Value Ref Range    Glucose (POC) 195 (H) 65 - 100 mg/dL    Performed by App PartnerHays Joslyn, POC    Collection Time: 18  9:24 PM Result Value Ref Range    Glucose (POC) 219 (H) 65 - 100 mg/dL    Performed by Cherelle Leyva (PCT)          Assessment/Plan:     Principal Problem:    Acute kidney injury ([facility] [address] (3/2/2018)    Active Problems:    Controlled diabetes mellitus ([facility] [address] (7/31/2017)      Clear cell renal cell carcinoma ([facility] [address] (8/28/2017)      Hypotension (3/3/2018)      Dehydration (3/3/2018)      Severe protein-calorie malnutrition ([address] (3/3/2018)      Decubitus ulcer of sacral region, stage 2 (3/6/2018)      Acute metabolic encephalopathy (6/5/6832)      Decubitus ulcer of left elbow, stage 2 (3/8/2018)      Hyponatremia (3/14/2018)       ___________________________________________________  PLAN:    1. Given po  KCl, Crt improved,   2. DC IVF, DC tele, OOB as tolerates  3. 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 Palliative medicine, patient apparently doing well with active treatment until recently with radiation therapy for brain metastases, appears improved today, continue supportive care. Read and appreciate Dr. Turk Service notes. 5. Primary goal is comfort care. If transition is made to hospice to go home the  is very concerned about having the resources to attend to his wife on a continuous basis.   Will consult Hospice.          ___________________________________________________    Attending Physician: Julieta Aranda MD

## 2018-03-14 NOTE — PROGRESS NOTES
[facility] pending hospice conversation with Dr. Tiffanie Reyes to see if he will be following patient in the hospital.    Juan Martin RN, BSN, [address] [place] [address] Oncology  [phone]

## 2018-03-14 NOTE — PROGRESS NOTES
Nutrition Assessment:    INTERVENTIONS/RECOMMENDATIONS:   Meals/Snacks: General/healthful diet: Mechanical Soft  Supplements: Commercial supplement: Ensure Clear daily   ASSESSMENT:   Chart reviewed. Pt asleep at time of visit. No family in room. Untouched breakfast tray at bedside. According to chart, pt has had zero consumption of meals. Pt has over 5 ensure clear on bedside table. Pt taking megace to enhance appetite. Hospice is on board with pt. Will check back later to see if family is available to talk. Diet Order: Mechanical soft (ensure clear TID, ensure pudding TID)  % Eaten:  Patient Vitals for the past 72 hrs:   % Diet Eaten   03/11/18 1840 0 %   03/11/18 1400 0 %     Pertinent Medications: [x] Reviewed []Other:  Pertinent Labs: [x]Reviewed  []Other: gluc [phone], K 2.6, Cl 93  Food Allergies: [x]None []Other:     Last BM: 3/9  []Active     []Hyperactive  []Hypoactive       [] Absent  BS  Skin:    [] Intact   [] Incision  [] Breakdown   [x]Edema   [x]Other:Pressure injury x2    Anthropometrics: Height: 5' (152.4 cm) Weight: 38.6 kg (85 lb)    IBW (%IBW):   ( ) UBW (%UBW):   (  %)    BMI: Body mass index is 16.6 kg/(m^2). This BMI is indicative of:  [x]Underweight   []Normal   []Overweight   [] Obesity   [] Extreme Obesity (BMI>40)  Last Weight Metrics:  Weight Loss Metrics 3/2/2018 2/9/2018 11/29/2017 11/15/2017 10/30/2017 10/9/2017 9/10/2017   Today's Wt 85 lb 95 lb 3.2 oz 109 lb 9.6 oz 103 lb 101 lb 6.4 oz 97 lb 125 lb   BMI 16.6 kg/m2 17.99 kg/m2 20.71 kg/m2 19.46 kg/m2 19.16 kg/m2 18.94 kg/m2 24.41 kg/m2       Estimated Nutrition Needs (Based on): 1581 Kcals/day (BMR (832) x 1.3AF +500kcal) , 51 g (-59g (1.3-1.5 g/kg bw)) Protein  Carbohydrate:  At Least 130 g/day  Fluids: 1600 mL/day    NUTRITION DIAGNOSES:   Problem:  Malnutrition      Etiology: related to renal cell carcinoma     Signs/Symptoms: as evidenced by poor PO, 10.5% weight loss x 1 months    Previous Nutrition Dx:  [] Resolved  [] Unresolved           [x] Progressing    NUTRITION INTERVENTIONS:  Meals/Snacks: General/healthful diet   Supplements: Commercial supplement              GOAL:   PO intake >25% of meals/supplement next 1-3 days    NUTRITION MONITORING AND EVALUATION   Food/Nutrient Intake Outcomes:  Total energy intake  Physical Signs/Symptoms Outcomes: Weight/weight change, Electrolyte and renal profile, GI profile, Glucose profile    Previous Goal Met:   [] Met              [] Progressing Towards Goal              [x] Not Progressing Towards Goal   Previous Recommendations:   [x] Implemented          [] Not Implemented          [] Not Applicable    LEARNING NEEDS (Diet, Food/Nutrient-Drug Interaction):    [x] None Identified   [] Identified and Education Provided/Documented   [] Identified and Pt declined/was not appropriate     Cultural, Buddhist, OR Ethnic Dietary Needs:    [x] None Identified   [] Identified and Addressed     [x] Interdisciplinary Care Plan Reviewed/Documented    [x] Discharge Planning: Encourage pt to eat, continue a general/healthful diet adequate in protein and calories, supplement w/ ONS   [] Participated in Interdisciplinary Rounds    NUTRITION RISK:    [x] Patient At Nutritional Risk    [x] High              [] Moderate/Mild           []  Low     [] Patient Not At [address]  Pager [phone]  Weekend Pager [phone]

## 2018-03-14 NOTE — PROGRESS NOTES
Oncology Nursing Communication Tool  7:19 AM  3/14/2018     Bedside shift change report given to Linda Roberts RN (incoming nurse) by Merlyn Cortez RN (outgoing nurse) on [employer]. Report included the following information SBAR, Kardex and MAR. Shift Summary:       Issues for physician to address: Oncology Shift Note   Admission Date 3/2/2018   Admission Diagnosis Acute kidney injury ([address]   Code Status DNR   Consults IP CONSULT TO PALLIATIVE CARE - PROVIDER  IP CONSULT TO ONCOLOGY      Cardiac Monitoring [] Yes [] No      Purposeful Hourly Rounding [] Yes    Deedee Score Total Score: 3   Deedee score 3 or > [] Bed Alarm [] Avasys [] 1:1 sitter [] Patient refused (Place signed refusal form in chart)      Pain Managed [] Yes [] No    Key Pain Meds             oxyCODONE IR (ROXICODONE) 5 mg immediate release tablet Take 1 Tab by mouth every six (6) hours as needed for Pain. Max Daily Amount: 20 mg. Influenza Vaccine Received Flu Vaccine for Current Season (usually Sept-March): No    Patient/Guardian Refused (Notify MD): Yes      Oxygen needs? [] Room air Oxygen @  []1L    []2L    []3L   []4L    []5L   []6L     Use home O2? [] Yes [] No  Perform O2 challenge test using  smartphrase (.oxygenchallenge)      Last bowel movement Last Bowel Movement Date: 03/09/18  bowel movement      Urinary Catheter             LDAs               Peripheral IV 03/12/18 Left Forearm (Active)   Site Assessment Clean, dry, & intact 3/14/2018  2:54 AM   Phlebitis Assessment 0 3/14/2018  2:54 AM   Infiltration Assessment 0 3/14/2018  2:54 AM   Dressing Status Clean, dry, & intact; Clean;Dry 3/14/2018  2:54 AM   Dressing Type Tape 3/14/2018  2:54 AM   Hub Color/Line Status Blue 3/14/2018  2:54 AM   Alcohol Cap Used Yes 3/12/2018  7:36 AM                         Readmission Risk Assessment Tool Score High Risk            39       Total Score        3 Has Seen PCP in Last 6 Months (Yes=3, No=0)    2 . Living with Significant Other. Assisted Living. LTAC. SNF. or   Rehab    3 Patient Length of Stay (>5 days = 3)    4 IP Visits Last 12 Months (1-3=4, 4=9, >4=11)    5 Pt.  Coverage (Medicare=5 , Medicaid, or Self-Pay=4)    22 Charlson Comorbidity Score (Age + Comorbid Conditions)       Expected Length of Stay 4d 7h   Actual Length of Stay [address] [place] [address]

## 2018-03-15 NOTE — HOSPICE
Initial spiritual assessment completed with the patient's sister and son-in-law. The patient was resting during most of the visit. The sister shared the events that led up to the patient's hospitalization. She has a supportive family who has been taking turns staying with her. The sister shared stories about their family and their father who is still living . He has been to the hospital when the patient was conversant. He is struggling with his daughter's impending death as it is reminding him of his wife's death. The  and family discussed end of life concerns a. The  validated their emotions. The  and family dicussed different ways in which the various family members are grieving. The  offered grief support. The patient is not affiliated with a Christianity and there was no mention of her spirituality. The patient's daughter walked in the room at the end of the visit but did not want to visit at this time. Spiritual acuity - 1.   No mention for chilo beliefs but no immediate spiritual issues identified during the visit.,    Frequency: 1 wk 2 (begin routine visits 3/18/18); 5 PRN 14

## 2018-03-15 NOTE — PROGRESS NOTES
Oncology Nursing Communication Tool  6:56 PM  3/15/2018     Bedside shift change report given to [facility] AT TRACIE ELDRIDGE RN (incoming nurse) by Ashwin Silva RN (outgoing nurse) on [facility]. Report included the following information SBAR and Kardex. Shift Summary: IV went bad. meds changed to po. Issues for physician to address: fentanyl patch? Oncology Shift Note   Admission Date 3/14/2018   Admission Diagnosis endstage renal cancer  Renal cancer ([facility] [address]   Code Status DNR   Consults None      Cardiac Monitoring [] Yes [] No      Purposeful Hourly Rounding [] Yes    Deedee Score Total Score: 2   Deedee score 3 or > [] Bed Alarm [] Avasys [] 1:1 sitter [] Patient refused (Place signed refusal form in chart)      Pain Managed [] Yes [] No    Key Pain Meds             oxyCODONE IR (ROXICODONE) 5 mg immediate release tablet Take 1 Tab by mouth every six (6) hours as needed for Pain. Max Daily Amount: 20 mg. Influenza Vaccine Received Flu Vaccine for Current Season (usually Sept-March): No    Patient/Guardian Refused (Notify MD): Yes      Oxygen needs?  [] Room air Oxygen @  []1L    []2L    []3L   []4L    []5L   []6L     Use home O2? [] Yes [] No  Perform O2 challenge test using  smartphrase (.oxygenchallenge)      Last bowel movement Last Bowel Movement Date: 03/09/18  bowel movement      Urinary Catheter Urinary Catheter 03/14/18 Ayoub-Criteria for Appropriate Use: Comfort Care     Urinary Catheter 03/14/18 Ayoub-Urine Output (mL): 300 ml     LDAs               Peripheral IV 03/12/18 Left Forearm (Active)   Site Assessment Red 3/15/2018  3:30 PM   Phlebitis Assessment 0 3/15/2018  3:30 PM   Infiltration Assessment 0 3/15/2018  3:30 PM   Dressing Status Clean, dry, & intact 3/15/2018  3:30 PM   Dressing Type Transparent 3/15/2018  3:30 PM   Hub Color/Line Status Blue;Capped 3/15/2018  3:30 PM                         Readmission Risk Assessment Tool Score High Risk            29 Total Score        3 Has Seen PCP in Last 6 Months (Yes=3, No=0)    4 IP Visits Last 12 Months (1-3=4, 4=9, >4=11)    22 Charlson Comorbidity Score (Age + Comorbid Conditions)        Criteria that do not apply:    . Living with Significant Other. Assisted Living. LTAC. SNF. or   Rehab    Patient Length of Stay (>5 days = 3)    Pt.  Coverage (Medicare=5 , Medicaid, or Self-Pay=4)       Expected Length of Stay - - -   Actual Length of Stay [address], RN

## 2018-03-15 NOTE — PROGRESS NOTES
PROGRESS NOTE    NAME:  Michael Browne   :   1950   MRN:   [id number]     Date/Time:  3/15/2018 7:01 AM  Subjective:   History: somnolent and unarousable      Medications reviewed:  Current Facility-Administered Medications   Medication Dose Route Frequency    LORazepam (ATIVAN) injection 1 mg  1 mg IntraVENous Q4H PRN    acetaminophen (TYLENOL) suppository 650 mg  650 mg Rectal Q4H PRN    scopolamine (TRANSDERM-SCOP) 1 mg over 3 days 1 Patch  1 Patch TransDERmal Q72H PRN    bisacodyl (DULCOLAX) suppository 10 mg  10 mg Rectal DAILY PRN    balsam peru-castor oil (VENELEX)  mg/gram ointment   Topical BID    nystatin (MYCOSTATIN) 100,000 unit/mL oral suspension 500,000 Units  500,000 Units Oral TID    LORazepam (ATIVAN) injection 2 mg  2 mg IntraVENous Q15MIN PRN    saline peripheral flush soln 5 mL  5 mL InterCATHeter PRN    ondansetron (ZOFRAN) injection 4 mg  4 mg IntraVENous Q4H PRN    glycopyrrolate (ROBINUL) injection 0.1 mg  0.1 mg IntraMUSCular Q4H PRN    fentaNYL citrate (PF) injection 25 mcg  25 mcg IntraVENous Q4H    LORazepam (ATIVAN) injection 1 mg  1 mg IntraVENous Q4H    haloperidol lactate (HALDOL) injection 1 mg  1 mg IntraVENous Q4H PRN        Objective:   Vitals: There were no vitals taken for this visit. No data recorded. Last 24hr Input/Output:    Intake/Output Summary (Last 24 hours) at 03/15/18 0739  Last data filed at 03/15/18 0542   Gross per 24 hour   Intake                0 ml   Output              300 ml   Net             -300 ml        PHYSICAL EXAM:  General:    Somnolent, no distress, appears stated age. Head:    Normocephalic, without obvious abnormality, atraumatic. Eyes:    Conjunctivae/corneas clear. PERRLA  Nose:   Nares normal. No drainage or sinus tenderness. Throat:     Lips, mucosa, and tongue normal.  Dry mucous membranes. Neck:   Supple, symmetrical,  no adenopathy, thyroid: non tender     no carotid bruit and no JVD.   Back: Symmetric,  No CVA tenderness. , sacral decub   Lungs:    Clear to auscultation bilaterally. No Wheezing or Rhonchi. No rales. Heart:    Regular rate and rhythm,  no murmur, rub or gallop. Abdomen:    Soft, minimal tenderness left flank. Not distended. Bowel sounds normal. No masses  Extremities:  edema RUE, arm dressed, decub left olecranon dressed ,  No clubbing  Lymph nodes:  Cervical, supraclavicular normal.  Neurologic: Grossly nonfocal  Skin:                Stage II sacral decubitus ulcer      Lab Data Reviewed:    Recent Results (from the past 24 hour(s))   GLUCOSE, POC    Collection Time: 03/14/18  7:41 AM   Result Value Ref Range    Glucose (POC) 154 (H) 65 - 100 mg/dL    Performed by Reinhold Gowers          Assessment/Plan:     Active Problems:    Renal cancer ([address] (3/14/2018)       ___________________________________________________  PLAN:    1. Metastatic renal cell cancer. Continue Hospice care.    2. Discontinue FSBS        ___________________________________________________    Attending Physician: Qasim Anderson MD

## 2018-03-15 NOTE — HOSPICE
[facility] Help to Those in Need  [phone]    Routine Nursing Note   Patient Name: Fabio Oliver  YOB: 1950  Age: 77 y.o. Date of Visit: 03/15/18  Facility of Care: [address]  Patient Room: [facility]/     Hospice Attending: Delma Zavala MD  Hospice Diagnosis: endstage renal cancer  Renal cancer [facility])    Level of Care: Routine    ASSESSMENT, PLAN AND INTERVENTIONS     1. Patient admitted to Routine Level of Care  2. Patient minimally responsive, appears agitated, has lost IV access, edematous with agonal breathing, bruising on both arms, skin warm to touch, patient reaching out arms, pulling at gown and oxygen. Family at bedside. 3.  Patient trying to speak, speech incomprehensible, spoke with Dr. Atul Smith and medications changed to sublingual, positioned patient on pillows, some weeping from arms. Appears to have terminal agitation. Spiritual Interventions:  visited with patient and family today. Psych/ Social/ Emotional Interventions: Informed daughter of medication changes, sister was concerned that patient had not been turned since 0930-reinforced medications and repositioning patient with RN staff. Care Coordination Needs: Rn staff, Dr. Atul Smith, family. Changed medications to sublingual and intramuscular. Ordered 10mg Roxanol every 4 hours with prn dosing hourly as needed, 1mg ativan oral liquid every 4 hours, with prn dosing available, haloperidol switched to intramuscular. Care plan and New Orders discussed / approved with Dr. Atul Smith MD.    Description History and Chart Review     List number of doses of PRN medications in last 24 hours:  Medication 1:  Number of doses:    Medication 2:   Number of doses:    Medication 3:   Number of doses:    DISCHARGE PLANNING     1. Discharge Plan: ongoing, once patient is comfortable it is likely she will pass at [address]  2. Patient/Family teaching: terminal agitation, changes in medications  3.  Response to patient/family teaching: understanding and in agreement.     ASSESSMENT    KARNOFSKY: 20    Prognosis estimated based on 03/15/18 clinical assessment is:   [] Few to Many Hours  [x] Hours to Days   [] Few to Many Days   [] Days to Weeks   [] Few to Many Weeks   [] Weeks to Months   [] Few to Many Months    Quality Measure: Patient self-reports:  [] Yes    [x] No    ESAS:   Time of Assessment: 1445  Pain (1-10):6  Fatigue (1-10): 6  Shortness of breath (1-10):7  Nausea (1-10): 0  Appetite (1-10): 0  Anxiety: (1-10):   Depression: (1-10):   Well-being: (1-10):   Constipation: x_ Yes  _ No  LAST BM: last charted BM is 3/9/18    CLINICAL INFORMATION   Patient Vitals for the past 12 hrs:   Temp Pulse Resp BP SpO2   03/15/18 0814 99.2 °F (37.3 °C) 94 18 (!) 98/39 (!) 84 %       Currently this patient has:  [x] Supplemental O2   [x] IV- lost IV access, RN to remove    [] PICC      [] PORT   [] NG Tube    [] PEG Tube   [] Ostomy     [x] Ayoub draining _______ urine  [] Other:     SIGNS/PHYSICAL FINDINGS     Skin (including wound):  [] Warm, dry, supple, intact and color normal for race  [x] Warm -arms weeping  [] Dry   [] Cool     [] Clammy       [] Diaphoretic    Turgor   [] Normal   [x] Decreased  Color:   [] Pink   [] Pale   [] Cyanotic   [] Erythema   [] Jaundice   [] Normal for Race  []  Wounds:    Neuro:  [] Lethargy  [x] Restlessness / agitation  [] Confusion / delirium  [] Hallucinations  [x] Responds to maximal stimulation  [] Unresponsive  [] Seizures     Cardiac:  [] Dyspnea on Exertion  [] JVD  [] Murmur  [] Palpitations  [x] Hypotension  [] Hypertension  [] Tachycardia  [] Bradycardia  [x] Irregular HR  [x] Pulses Decreased  [] Pulses Absent  [] Edema:       (Location, Grade and Pitting)  [] Mottling:      (Location)    Respiratory:  Breath sounds:    [x] Diminished   [] Wheeze   [] Rhonchi   [] Rales   [] Even and unlabored  [x] Labored:  20bpm          [] Cough   [] Non Productive   [] Productive    [] Description:           [] Deep suctioned   [x] O2 at _2__ LPM  [] High flow oxygen greater than 10 LPM  [] Bi-Pap    GI  [x] Abdomen (describe)   [] Ascites  [] Nausea  [] Vomiting  [x] Incontinent of bowels  [x] Bowel sounds (yes/no)  [] Diarrhea  [] Constipation (see above including last bowel movement)  [] Checked for impaction  [] Last BM last charted BM is 3/9/18    Nutrition  Diet:___NPO_______  Appetite:   [] Good   [] Fair   [] Poor   [] Tube Feeding       [] Voiding  [] Incontinent   [x] Ayoub    Musculoskeletal  [] Balance/Hood Unsteady   [x] Weak   Strength:    [] Normal    [] Limited    [x] Decreasing   Activities:    [] Up as tolerated   [x] Bedridden    [] Specify:    SAFETY  [] 24 hr. Caregiver   [x] Side rails ?     [x] Hospital bed   [] Reviewed Falls & Safety     ALLERGIES AND MEDICATIONS     Allergies: No Known Allergies    Current Facility-Administered Medications   Medication Dose Route Frequency    morphine (ROXANOL) 100 mg/5 mL (20 mg/mL) concentrated solution 10 mg  10 mg Oral Q4H    LORazepam (INTENSOL) 2 mg/mL oral concentrate 1 mg  1 mg SubLINGual Q4H    LORazepam (INTENSOL) 2 mg/mL oral concentrate 1 mg  1 mg Oral Q15MIN PRN    morphine (ROXANOL) 100 mg/5 mL (20 mg/mL) concentrated solution 10 mg  10 mg SubLINGual Q1H PRN    acetaminophen (TYLENOL) suppository 650 mg  650 mg Rectal Q4H PRN    scopolamine (TRANSDERM-SCOP) 1 mg over 3 days 1 Patch  1 Patch TransDERmal Q72H PRN    bisacodyl (DULCOLAX) suppository 10 mg  10 mg Rectal DAILY PRN    balsam peru-castor oil (VENELEX)  mg/gram ointment   Topical BID    nystatin (MYCOSTATIN) 100,000 unit/mL oral suspension 500,000 Units  500,000 Units Oral TID    saline peripheral flush soln 5 mL  5 mL InterCATHeter PRN    ondansetron (ZOFRAN) injection 4 mg  4 mg IntraVENous Q4H PRN    glycopyrrolate (ROBINUL) injection 0.1 mg  0.1 mg IntraMUSCular Q4H PRN    haloperidol lactate (HALDOL) injection 1 mg  1 mg IntraVENous Q4H PRN Visit Time In: 1430  Visit Time Out:1500

## 2018-03-15 NOTE — PROGRESS NOTES
Oncology Nursing Communication Tool  7:20 AM  3/15/2018     Bedside shift change report given to Lisseth Espinal RN (incoming nurse) by Saleem Jones RN (outgoing nurse) on Olya Daniel. Report included the following information SBAR, Kardex and MAR. Shift Summary: Patient rested comfortably in bed all night    Issues for physician to address: Oncology Shift Note   Admission Date 3/14/2018   Admission Diagnosis endstage renal cancer  Renal cancer ([facility] [address]   Code Status DNR   Consults None      Cardiac Monitoring [] Yes [] No      Purposeful Hourly Rounding [] Yes    Deedee Score Total Score: 3   Deedee score 3 or > [] Bed Alarm [] Avasys [] 1:1 sitter [] Patient refused (Place signed refusal form in chart)      Pain Managed [] Yes [] No    Key Pain Meds             oxyCODONE IR (ROXICODONE) 5 mg immediate release tablet Take 1 Tab by mouth every six (6) hours as needed for Pain. Max Daily Amount: 20 mg. Influenza Vaccine Received Flu Vaccine for Current Season (usually Sept-March): No    Patient/Guardian Refused (Notify MD): Yes      Oxygen needs?  [] Room air Oxygen @  []1L    []2L    []3L   []4L    []5L   []6L     Use home O2? [] Yes [] No  Perform O2 challenge test using  smartphrase (.oxygenchallenge)      Last bowel movement Last Bowel Movement Date: 03/09/18  bowel movement      Urinary Catheter Urinary Catheter 03/14/18 Ayoub-Criteria for Appropriate Use: Comfort Care     Urinary Catheter 03/14/18 Ayoub-Urine Output (mL): 300 ml     LDAs               Peripheral IV 03/12/18 Left Forearm (Active)   Site Assessment Clean, dry, & intact 3/15/2018  4:17 AM   Phlebitis Assessment 0 3/15/2018  4:17 AM   Infiltration Assessment 0 3/15/2018  4:17 AM   Dressing Status Clean, dry, & intact 3/15/2018  4:17 AM   Dressing Type Tape 3/15/2018  4:17 AM   Hub Color/Line Status Blue 3/15/2018  4:17 AM                         Readmission Risk Assessment Tool Score High Risk            29       Total Score        3 Has Seen PCP in Last 6 Months (Yes=3, No=0)    4 IP Visits Last 12 Months (1-3=4, 4=9, >4=11)    22 Charlson Comorbidity Score (Age + Comorbid Conditions)        Criteria that do not apply:    . Living with Significant Other. Assisted Living. LTAC. SNF. or   Rehab    Patient Length of Stay (>5 days = 3)    Pt.  Coverage (Medicare=5 , Medicaid, or Self-Pay=4)       Expected Length of Stay - - -   Actual Length of Stay [facility]

## 2018-03-15 NOTE — HOSPICE
Dianna Quintanilla 112 MSW note; This MSW met with pts daughter Kurt Simmonds and  Aunamrik Mann. Daughter reported pt has appeared to be in pain today and has been agitated. Family vented frustration re pts discomfort. MSW had spoke to RN prior to entering the room. Hospice RN and RN are making medication changes to control pain and agitation. MSW provided empathetic listening as family vented their frustration. MSW  Validated concerns and discussed terminal agitation with family. MSW provided reassurance hospice and hospital staff are working towards pts comfort. MSW provided emotional support to family. MSW provided pt care handbook from hospice for family. MSW followed up with RN who will explain to medications to family as that is outside this MSW's scope of practice.

## 2018-03-16 NOTE — PROGRESS NOTES
Responded to page from nursing staff on Oncology, notifying patient's death. Met with her  David Stanford and offered pastoral support as he shared feelings of grief. Provided supportive listening to him as he shared a few stories about their life together. Offered a prayer per his request.     Home: [facility]. Rev.  Braden Sanchez   Paging Service: [phone])

## 2018-03-16 NOTE — PROGRESS NOTES
Called to pronounce patient. No response to noxious stimuli. No spontaneous breath sounds nor cardiac sounds. Pupils fixed and dilated. TOD: 12:59 AM  Family notified and grieving appropriately.    D/C Summary to be dictated by Attending MD.

## 2018-03-16 NOTE — DISCHARGE SUMMARY
Physician Discharge Summary     Patient ID:  Vaibhav Lee  [id number]  77 y.o.  1950    Admit date: 3/2/2018  Discharge date and time: 3/14/2018 10:33 AM     Discharge Diagnoses: Acute kidney injury ([facility] [address]     Acute metabolic encephalopathy     Clear cell renal cell carcinoma (HCC)     Controlled diabetes mellitus ([facility] [address]     Decubitus ulcer of left elbow, stage 2     Decubitus ulcer of sacral region, stage 2     Dehydration     Hyponatremia     Hypotension     Severe protein-calorie malnutrition [facility])    Hospital Course: Patient was admitted with increasing weakness and confusion in the setting of metastatic renal cell cancer and acute renal failure. Creatinine on admission was 6.58 with potassium of 3.1 and a blood urea nitrogen of 67. The patient was seen by nephrology and refused hemodialysis. Patient was not volume overloaded and did respond to IV hydration. Her creatinine subsequently decreased to 1.39 with potassium of 3.2. Her mental status improved and her blood pressure improved with IV fluid rehydration. Although the patient's metabolic state improved she remains with metastatic renal cell cancer involving the brain with moderate pain involving the abdomen and flank and intermittent confusion. Patient was seen by palliative medicine at the request of the family and confirmed her wishes of being DNR. Although her kidney function had improved with IV fluid rehydration it was clear that there is no further treatment that was available to treat her metastatic renal cell cancer. She was seen in consultation by oncology who had followed her all along and had already completed her course of radiation therapy for metastatic lesions to her brain. In consultation with the patient and her  she will be discharged from the medical service and transition to hospice care with the goal of making her comfortable allowing her to pass peacefully without further intervention or testing.     PCP: RONY Mathew Cobos MD  Consults: Nephrology, Hematology/Oncology, Palliative Care and Hospice    Significant Diagnostic Studies: Retroperitoneal ultrasound    [unfilled]    [unfilled]      Discharge Exam: See patient's progress note from day of discharge. .    Disposition: Transition to hospice care    Patient Instructions:   Cannot display discharge medications since this patient is not currently admitted.         Signed:  Drea Grace MD  3/16/2018  4:24 PM

## 2018-03-16 NOTE — PROGRESS NOTES
Patient  at 36 on 3/16/18  at bedside. Nursing supervisor Tahmina Arora notified, Skyler Mccoy, Dr George Bee came to pronounce. Life [facility] called.

## 2018-04-23 NOTE — DISCHARGE SUMMARY
Physician Discharge Summary     Patient ID:  Cyndy Larson  [id number]  66 y.o.  1950    Admit date: 3/14/2018  Discharge date and time: 3/16/2018 12:40 AM     Discharge Diagnoses: Renal cancer [facility])      Hospital Course: The patient was admitted to hospice care after being hospitalized with progression of her renal cell cancer and intermittent pain, dehydration, and renal failure. The patient's symptoms persisted and she was transitioned to hospice care for end-of-life transition. Patient responded well to treatment and was made comfortable. She was unresponsive, without spontaneous respiration, and no heart rate and was pronounced  at 12:40 AM on 2018. PCP: Jarod Novoa MD  Consults: Hospice    Significant Diagnostic Studies: None    [unfilled]    [unfilled]      Discharge Exam:  No exam performed today, . Disposition:  home    Patient Instructions:   Cannot display discharge medications since this patient is not currently admitted.         Signed:  Jarod Novoa MD  2018  8:13 AM

## 2019-02-17 NOTE — PROGRESS NOTES
Oncology Nursing Communication Tool  6:47 PM  3/8/2018     Bedside and Verbal shift change report given to Laine Lentz RN (incoming nurse) by Rodri Storey RN (outgoing nurse) on Leopold Darting. Report included the following information SBAR, Kardex, Intake/Output, MAR and Recent Results. Shift Summary: Patient transferred from PCU, impairment noted on her sacrum &  Elbow; RLE pink & boggy, floated heels, patient on turn team. Patient withdrawn, refusing meds & care.  at bedside encouraging PO intake. Issues for physician to address: Oncology Shift Note   Admission Date 3/2/2018   Admission Diagnosis Acute kidney injury ([facility] [address]   Code Status DNR   Consults IP CONSULT TO PALLIATIVE CARE - PROVIDER  IP CONSULT TO ONCOLOGY      Cardiac Monitoring [] Yes [] No      Purposeful Hourly Rounding [] Yes    Deedee Score Total Score: 3   Deedee score 3 or > [] Bed Alarm [] Avasys [] 1:1 sitter [] Patient refused (Place signed refusal form in chart)      Pain Managed [] Yes [] No    Key Pain Meds             oxyCODONE IR (ROXICODONE) 5 mg immediate release tablet Take 1 Tab by mouth every six (6) hours as needed for Pain. Max Daily Amount: 20 mg. Influenza Vaccine Received Flu Vaccine for Current Season (usually Sept-March): No    Patient/Guardian Refused (Notify MD): Yes      Oxygen needs?  [] Room air Oxygen @  []1L    []2L    []3L   []4L    []5L   []6L     Use home O2? [] Yes [] No  Perform O2 challenge test using  smartphrase (.oxygenchallenge)      Last bowel movement Last Bowel Movement Date: 03/07/18  bowel movement      Urinary Catheter             LDAs               Peripheral IV 03/02/18 Right Antecubital (Active)   Site Assessment Drainage (comment) 3/8/2018  4:13 PM   Phlebitis Assessment 0 3/8/2018  4:13 PM   Infiltration Assessment 0 3/8/2018  4:13 PM   Dressing Status Old drainage 3/8/2018  4:13 PM   Dressing Type Tape;Transparent 3/8/2018  4:13 PM   Hub Color/Line Status Pink;Capped;Flushed;Patent 3/8/2018  4:13 PM   Action Taken Other (comment) 3/8/2018  4:13 PM   Alcohol Cap Used Yes 3/8/2018  4:13 PM       Peripheral IV 03/05/18 Right Forearm (Active)   Site Assessment Drainage (comment) 3/8/2018  4:13 PM   Phlebitis Assessment 0 3/8/2018  4:13 PM   Infiltration Assessment 0 3/8/2018  4:13 PM   Dressing Status Old drainage 3/8/2018  4:13 PM   Dressing Type Tape;Transparent 3/8/2018  4:13 PM   Hub Color/Line Status Blue;Capped;Flushed;Patent 3/8/2018  4:13 PM   Action Taken Other (comment) 3/8/2018  4:13 PM   Alcohol Cap Used Yes 3/8/2018  4:13 PM                         Readmission Risk Assessment Tool Score High Risk            39       Total Score        3 Has Seen PCP in Last 6 Months (Yes=3, No=0)    2 . Living with Significant Other. Assisted Living. LTAC. SNF. or   Rehab    3 Patient Length of Stay (>5 days = 3)    4 IP Visits Last 12 Months (1-3=4, 4=9, >4=11)    5 Pt.  Coverage (Medicare=5 , Medicaid, or Self-Pay=4)    22 Charlson Comorbidity Score (Age + Comorbid Conditions)       Expected Length of Stay 4d 7h   Actual Length of Stay CORBY Beavers, RN needs device/independent

## 2020-02-18 NOTE — PROGRESS NOTES
1620: Dr. Esa Aguilar notified for headache unrelieved by prn tylenol. Call back number [phone] given. 1700: no call back, md paged again. Call back number [phone] given. New order given for dilaudid 0.5 mg every 4 hours prn pain. aerobic capacity/endurance/gait, locomotion, and balance/muscle strength

## 2021-06-01 NOTE — PROGRESS NOTES
Bedside shift change report given to [address] (oncoming nurse) by Olga Lidia Lopez RN (offgoing nurse). Report included the following information SBAR, Kardex, MAR and Recent Results. INJ AND SLOWLY PROLONG, WORSENED AT 7-8 IN THE PAST.

## 2021-12-15 NOTE — PROGRESS NOTES
1953: Bedside shift change report given to [employer] (oncoming nurse) by Génesis Garcia (offgoing nurse). Report included the following information SBAR, Intake/Output, MAR, Accordion, Recent Results, Med Rec Status and Cardiac Rhythm NSR.     0810: Bedside shift change [facility] by [employer] (offgoing nurse). Report included the following information SBAR, Intake/Output, MAR, Accordion, Recent Results, Med Rec Status and Cardiac Rhythm NSR. Topical Clindamycin Counseling: Patient counseled that this medication may cause skin irritation or allergic reactions.  In the event of skin irritation, the patient was advised to reduce the amount of the drug applied or use it less frequently.   The patient verbalized understanding of the proper use and possible adverse effects of clindamycin.  All of the patient's questions and concerns were addressed.

## 2022-11-17 NOTE — PROGRESS NOTES
Addended by: Jamari Huizar on: 11/17/2022 01:09 PM     Modules accepted: Orders Barbie Tobin is a 69 y.o. female new patient here today by referral from Dr. Christian Stanley; possible bladder mets. Patient denies pain.

## 2023-01-04 NOTE — PROGRESS NOTES
PCU SHIFT NURSING NOTE      Bedside and Verbal shift change report given to Cody Brown RN (oncoming nurse) by Elroy Howe RN (offgoing nurse). Report included the following information SBAR, Kardex, Intake/Output, MAR, Recent Results and Cardiac Rhythm SR/SB. Shift Summary:       Admission Date 3/2/2018   Admission Diagnosis Acute kidney injury ([facility] [address]   Consults IP CONSULT TO PALLIATIVE CARE - PROVIDER  IP CONSULT TO ONCOLOGY        Consults   []PT   []OT   []Speech   []Case Management      [] Palliative      Cardiac Monitoring Order   []Yes   []No     IV drips   []Yes    Drip:                            Dose:  Drip:                            Dose:  Drip:                            Dose:   []No     GI Prophylaxis   []Yes   []No         DVT Prophylaxis   SCDs:             Haja stockings:         [] Medication   []Contraindicated   []None      Activity Level Activity Level: Bed Rest     Activity Assistance: Complete care   Purposeful Rounding every 1-2 hour? []Yes   Yip Score  Total Score: 2   Bed Alarm (If score 3 or >)   []Yes   [] Refused (See signed refusal form in chart)   Antonio Score  Antonio Score: 13   Antonio Score (if score 14 or less)   []PMT consult   []Wound Care consult      []Specialty bed   [] Nutrition consult          Needs prior to discharge:   Home O2 required:    []Yes   []No    If yes, how much O2 required?     Other:    Last Bowel Movement:        Influenza Vaccine          Pneumonia Vaccine           Diet Active Orders   Diet    DIET MECHANICAL SOFT      LDAs               Peripheral IV 03/02/18 Right Antecubital (Active)   Site Assessment Clean, dry, & intact 3/3/2018 12:41 AM   Phlebitis Assessment 0 3/3/2018 12:41 AM   Infiltration Assessment 0 3/3/2018 12:41 AM   Dressing Status Clean, dry, & intact 3/3/2018 12:41 AM   Dressing Type 4 X 4 3/3/2018 12:41 AM                      Urinary Catheter      Intake & Output        Readmission Risk Assessment Tool Score High Risk            29 Pharmacy called they are asking if you could please send over the scrip again for the azelastine nasal spray with the instructions fixed they said that the way that they are written says like patient should only use in the am but then is says twice a day  Total Score        2 . Living with Significant Other. Assisted Living. LTAC. SNF. or   Rehab    4 IP Visits Last 12 Months (1-3=4, 4=9, >4=11)    5 Pt.  Coverage (Medicare=5 , Medicaid, or Self-Pay=4)    18 Charlson Comorbidity Score (Age + Comorbid Conditions)        Criteria that do not apply:    Has Seen PCP in Last 6 Months (Yes=3, No=0)    Patient Length of Stay (>5 days = 3)       Expected Length of Stay - - -   Actual Length of Stay 1

## 2024-10-29 NOTE — PROGRESS NOTES
[employer]  Social Work Navigator Encounter     Patient Name:  Clifford Schroeder    Medical History: dx metastatic clear cell cancer    Advance Directives:    Narrative: pt received letter from [employer] on 11/15. SW had faxed re-application to [employer] on [phone]. spoke with [employer] who stated they had everything and had to wait to verify new insurance for 2018. Pt using walker not wheel chair, reports a wonderful Thanksgiving and she can't believe how much better she feels. Pt states she doesn't remember everything from when she was really sick and in the hospital.    Pt spouse very happy also-thankful for the assistance with [employer] application. Barriers to Care:     Plan:   1. FU when pt insurance renews in 2018. 30-Oct-2024 01-Nov-2024

## (undated) DEVICE — ROCKER SWITCH PENCIL BLADE ELECTRODE, HOLSTER: Brand: EDGE

## (undated) DEVICE — SUTURE VCRL SZ 4-0 L27IN ABSRB UD L26MM SH 1/2 CIR J415H

## (undated) DEVICE — SOLUTION IV 1000ML 0.9% SOD CHL

## (undated) DEVICE — GOWN,SIRUS,NONRNF,SETINSLV,XL,20/CS: Brand: MEDLINE

## (undated) DEVICE — STERILE POLYISOPRENE POWDER-FREE SURGICAL GLOVES: Brand: PROTEXIS

## (undated) DEVICE — KENDALL SCD EXPRESS SLEEVES, KNEE LENGTH, MEDIUM: Brand: KENDALL SCD

## (undated) DEVICE — SURGICAL PROCEDURE PACK BASIN MAJ SET CUST NO CAUT

## (undated) DEVICE — (D)SYR 10ML 1/5ML GRAD NSAF -- PKGING CHANGE USE ITEM 338027

## (undated) DEVICE — INFECTION CONTROL KIT SYS

## (undated) DEVICE — TOWEL SURG W17XL27IN STD BLU COT NONFENESTRATED PREWASHED

## (undated) DEVICE — (D)PREP SKN CHLRAPRP APPL 26ML -- CONVERT TO ITEM 371833

## (undated) DEVICE — DEVON™ KNEE AND BODY STRAP 60" X 3" (1.5 M X 7.6 CM): Brand: DEVON

## (undated) DEVICE — SUTURE VCRL SZ 4-0 L27IN ABSRB UD L19MM FS-2 3/8 CIR REV J422H

## (undated) DEVICE — CLIP INT SM WIDE RED TI TRNSVRS GRV CHEVRON SHP W/ PRECIS

## (undated) DEVICE — REM POLYHESIVE ADULT PATIENT RETURN ELECTRODE: Brand: VALLEYLAB

## (undated) DEVICE — STANDARD HYPODERMIC NEEDLE,POLYPROPYLENE HUB: Brand: MONOJECT

## (undated) DEVICE — HANDLE LT SNAP ON ULT DURABLE LENS FOR TRUMPF ALC DISPOSABLE

## (undated) DEVICE — INTENDED FOR TISSUE SEPARATION, AND OTHER PROCEDURES THAT REQUIRE A SHARP SURGICAL BLADE TO PUNCTURE OR CUT.: Brand: BARD-PARKER ® CARBON RIB-BACK BLADES

## (undated) DEVICE — DBD-PACK,LAPAROTOMY,2 REINFORCED GOWNS: Brand: MEDLINE

## (undated) DEVICE — MASTISOL ADHESIVE LIQ 2/3ML

## (undated) DEVICE — Device

## (undated) DEVICE — DERMABOND SKIN ADH 0.7ML -- DERMABOND ADVANCED 12/BX